# Patient Record
Sex: FEMALE | Race: WHITE | Employment: OTHER | URBAN - METROPOLITAN AREA
[De-identification: names, ages, dates, MRNs, and addresses within clinical notes are randomized per-mention and may not be internally consistent; named-entity substitution may affect disease eponyms.]

---

## 2017-01-06 DIAGNOSIS — I10 HYPERTENSION GOAL BP (BLOOD PRESSURE) < 140/90: Primary | ICD-10-CM

## 2017-01-06 NOTE — TELEPHONE ENCOUNTER
Lisinopril 40 mg      Last Written Prescription Date: 7/1/2016  Last Fill Quantity: 90, # refills: 1  Last Office Visit with G, [facility] or [facility] prescribing provider: 11/7/2016       POTASSIUM   Date Value Ref Range Status   12/12/2016 3.8 3.5 - 5.1 mmol/L Final     CREATININE   Date Value Ref Range Status   12/12/2016 0.99 0.70 - 1.30 mg/dL Final     BP Readings from Last 3 Encounters:   12/12/16 138/64   11/23/16 152/92   11/08/16 176/80

## 2017-01-09 RX ORDER — LISINOPRIL 40 MG/1
40 TABLET ORAL DAILY
Qty: 90 TABLET | Refills: 1 | Status: SHIPPED | OUTPATIENT
Start: 2017-01-09 | End: 2017-04-20

## 2017-01-09 NOTE — TELEPHONE ENCOUNTER
Prescription approved per [facility] Refill Protocol.  BENITEZ Cavazos, BSN, RN

## 2017-02-17 DIAGNOSIS — I48.91 ATRIAL FIBRILLATION (H): ICD-10-CM

## 2017-02-19 PROBLEM — I48.0 PAROXYSMAL ATRIAL FIBRILLATION (H): Status: ACTIVE | Noted: 2017-02-19

## 2017-02-19 PROBLEM — I27.20 PULMONARY HYPERTENSION, MILD (H): Status: ACTIVE | Noted: 2017-02-19

## 2017-02-19 PROBLEM — I07.1 MODERATE TRICUSPID REGURGITATION: Status: ACTIVE | Noted: 2017-02-19

## 2017-02-19 PROBLEM — Z79.01 CHRONIC ANTICOAGULATION: Status: ACTIVE | Noted: 2017-02-19

## 2017-02-20 ENCOUNTER — OFFICE VISIT (OUTPATIENT)
Dept: FAMILY MEDICINE | Facility: CLINIC | Age: 82
End: 2017-02-20
Payer: COMMERCIAL

## 2017-02-20 VITALS
OXYGEN SATURATION: 99 % | BODY MASS INDEX: 29.18 KG/M2 | SYSTOLIC BLOOD PRESSURE: 140 MMHG | HEART RATE: 72 BPM | RESPIRATION RATE: 16 BRPM | DIASTOLIC BLOOD PRESSURE: 84 MMHG | WEIGHT: 170 LBS | TEMPERATURE: 97.8 F

## 2017-02-20 DIAGNOSIS — I27.20 PULMONARY HYPERTENSION, MILD (H): ICD-10-CM

## 2017-02-20 DIAGNOSIS — L72.0 EPIDERMOID CYST OF FACE: Primary | ICD-10-CM

## 2017-02-20 DIAGNOSIS — E11.9 TYPE 2 DIABETES MELLITUS WITHOUT COMPLICATION, WITHOUT LONG-TERM CURRENT USE OF INSULIN (H): ICD-10-CM

## 2017-02-20 DIAGNOSIS — L60.3 DYSTROPHIC NAIL: ICD-10-CM

## 2017-02-20 DIAGNOSIS — L29.9 ITCHING: ICD-10-CM

## 2017-02-20 DIAGNOSIS — I48.0 PAROXYSMAL ATRIAL FIBRILLATION (H): ICD-10-CM

## 2017-02-20 DIAGNOSIS — L72.3 SEBACEOUS CYST: ICD-10-CM

## 2017-02-20 DIAGNOSIS — Z79.01 CHRONIC ANTICOAGULATION: ICD-10-CM

## 2017-02-20 DIAGNOSIS — I10 HYPERTENSION GOAL BP (BLOOD PRESSURE) < 140/90: ICD-10-CM

## 2017-02-20 DIAGNOSIS — Z71.89 ADVANCED DIRECTIVES, COUNSELING/DISCUSSION: ICD-10-CM

## 2017-02-20 DIAGNOSIS — N39.3 FEMALE STRESS INCONTINENCE: ICD-10-CM

## 2017-02-20 DIAGNOSIS — I07.1 MODERATE TRICUSPID REGURGITATION: ICD-10-CM

## 2017-02-20 LAB — HBA1C MFR BLD: 7 % (ref 4.3–6)

## 2017-02-20 PROCEDURE — 99214 OFFICE O/P EST MOD 30 MIN: CPT | Performed by: FAMILY MEDICINE

## 2017-02-20 PROCEDURE — 36415 COLL VENOUS BLD VENIPUNCTURE: CPT | Performed by: FAMILY MEDICINE

## 2017-02-20 PROCEDURE — 83036 HEMOGLOBIN GLYCOSYLATED A1C: CPT | Performed by: FAMILY MEDICINE

## 2017-02-20 PROCEDURE — 82043 UR ALBUMIN QUANTITATIVE: CPT | Performed by: FAMILY MEDICINE

## 2017-02-20 PROCEDURE — 80053 COMPREHEN METABOLIC PANEL: CPT | Performed by: FAMILY MEDICINE

## 2017-02-20 RX ORDER — TRIAMCINOLONE ACETONIDE 5 MG/G
CREAM TOPICAL 2 TIMES DAILY
Qty: 15 G | Refills: 3 | Status: SHIPPED | OUTPATIENT
Start: 2017-02-20 | End: 2021-04-28

## 2017-02-20 NOTE — PROGRESS NOTES
SUBJECTIVE:                                                    Aundrea Treviño is a 82 year old female who presents to clinic today for the following health issues:      Mass      Duration: years, and then went away and came back in the past 2 months    Description (location/character/radiation): right side of jaw line    Intensity:  moderate    Accompanying signs and symptoms: has fluid in it, not hard    History (similar episodes/previous evaluation): None    Precipitating or alleviating factors: None    Therapies tried and outcome: None    No fever or chills or redness, or drainage, no pain, getting bigger, has had before in same place a long time ago. Seen by derm for sun damage skin and cryo in past in [facility] would like to return to them      Medication changes make her feel her age    Diabetes Follow-up    Patient is checking blood sugars: once daily.  Results are as follows:         am - 115, 132, 121, 129, 122, 114, 137    Diabetic concerns: None     Symptoms of hypoglycemia (low blood sugar): none     Paresthesias (numbness or burning in feet) or sores: No     Date of last diabetic eye exam: 3 months ago negative , no record in epic    No sore in feet      Hypertension Follow-up      Outpatient blood pressures are being checked at home.  Results are 99/71, 150/79. 111/67 104/53, 122/69, 117/69, 131/81, 132/71.    Low Salt Diet: no added salt   Has stable ankle puffiness raises legs in evening, has varicose veins. No pain     Afib       Patient history: hx of afib on liquids,     Residual symptoms: None    Worsened or new symptoms since last visit: No    Daily aspirin use: No as on liquids     Hypertension controlled: Yes     Jevovahs witness brought updated advance directive copied to be scanned to chart    Has stress incontinence on oxybutynin, would like to see rehab for this as told by her massage therapist.     Has thickened toe nails. cannot take care of it on her own any more no  redness, pain     Would like kenalog filled for itchy skin at time on hands    Problem list and histories reviewed & adjusted, as indicated.  Additional history: as documented    Patient Active Problem List   Diagnosis     Disorder of bone and cartilage     Female stress incontinence     Dystonia     Essential tremor     Refusal of blood transfusions as patient is Yazidi     Hypertension goal BP (blood pressure) < 140/90     CARDIOVASCULAR SCREENING; LDL GOAL LESS THAN 130     Gout     Uncomplicated type 2 diabetes mellitus (H)     Venous (peripheral) insufficiency     Chronic anticoagulation     Paroxysmal atrial fibrillation (H)     Pulmonary hypertension, mild (H)     Moderate tricuspid regurgitation     Past Surgical History   Procedure Laterality Date     Surgical history of -        hysterectomy, bladder suspension     Surgical history of -        tonsillectomy       Social History   Substance Use Topics     Smoking status: Former Smoker     Quit date: 4/22/1970     Smokeless tobacco: Never Used     Alcohol use Yes      Comment: rarely, a glass of wine on a special occassion     Family History   Problem Relation Age of Onset     DIABETES Father      type 2     Hypertension Father      CEREBROVASCULAR DISEASE Father      Arthritis Father      Other Cancer Sister          Current Outpatient Prescriptions   Medication Sig Dispense Refill     triamcinolone (KENALOG) 0.5 % cream Apply topically 2 times daily to affected area as directed. 15 g 3     apixaban ANTICOAGULANT (ELIQUIS) 5 MG tablet Take 1 tablet (5 mg) by mouth 2 times daily 60 tablet 11     lisinopril (PRINIVIL/ZESTRIL) 40 MG tablet Take 1 tablet (40 mg) by mouth daily 90 tablet 1     hydrochlorothiazide (HYDRODIURIL) 25 MG tablet Take 1 tablet (25 mg) by mouth daily 90 tablet 3     carvedilol (COREG) 25 MG tablet Take 1 tablet (25 mg) by mouth 2 times daily (with meals) 60 tablet 11     amLODIPine (NORVASC) 2.5 MG tablet Take 1 tablet (2.5  mg) by mouth daily 90 tablet 1     oxybutynin (DITROPAN) 5 MG tablet Take 0.5 tablets (2.5 mg) by mouth 2 times daily 90 tablet 3     blood glucose monitoring (SAMEER MICROLET) lancets Use to test blood sugars once daily as directed. 3 Box 3     fluticasone (FLONASE) 50 MCG/ACT nasal spray Spray 1-2 sprays into both nostrils daily 3 Package 1     blood glucose (SAMEER CONTOUR NEXT) test strip 1 strip by In Vitro route daily 50 strip prn     [place] 650 MG TABS Take 2 capsules by mouth 2 times daily For arthritis pain       calcium carbonate (TUMS E-X 750) 750 MG CHEW Take 750 mg by mouth daily.       FLAXSEED (LINSEED) PO POWD 1.5  tablespoon daily       VITAMIN D 1000 UNIT OR TABS 1 TABLET DAILY 30 0     Allergies   Allergen Reactions     Amlodipine      Swollen ankles and rash but tolerates 2.5 mg without any symptoms     Apple [Chromium]      Kiwi      Nuts      Pear      Tape [Adhesive Tape]      Recent Labs   Lab Test  02/20/17   1557  12/12/16   1126  10/18/16   1618  09/12/16   1152  01/07/16   0920  06/18/15   0854   04/30/14   1057   12/30/13   0904   A1C  7.0*   --    --   7.1*  6.9*  6.9*   < >  6.5*   --   6.4*   LDL   --    --    --   96   --   97   --    --    --   104   HDL   --    --    --   46*   --   39*   --    --    --   39*   TRIG   --    --    --   91   --   115   --    --    --   161*   ALT   --    --    --   20  29  19   < >  22   --   34   CR   --   0.99  0.83  0.72  0.86  0.92   < >  0.79   --   0.86   GFRESTIMATED   --   54*  66  77  63  59*   < >  70   --   64   GFRESTBLACK   --   65  80  >90   GFR Calc    76  71   < >  85   --   77   POTASSIUM   --   3.8  4.2  3.6  3.5  3.6   < >  4.2   --   3.9   TSH   --    --    --   1.56   --    --    --   2.34   < >   --     < > = values in this interval not displayed.      BP Readings from Last 3 Encounters:   02/20/17 140/84   12/12/16 138/64   11/23/16 (!) 152/92    Wt Readings from Last 3 Encounters:   02/20/17 170 lb (77.1 kg)    12/12/16 173 lb (78.5 kg)   11/23/16 171 lb (77.6 kg)                  Labs reviewed in EPIC  Problem list, Medication list, Allergies, and Medical/Social/Surgical histories reviewed in [facility] and updated as appropriate.    ROS:  C: NEGATIVE for fever, chills, change in weight  I: NEGATIVE for worrisome rashes, moles or lesions  E: NEGATIVE for vision changes or irritation  E/M: NEGATIVE for ear, mouth and throat problems  R: NEGATIVE for significant cough or SOB  CV: NEGATIVE for chest pain, palpitations or peripheral edema  GI: NEGATIVE for nausea, abdominal pain, heartburn, or change in bowel habits  : NEGATIVE for frequency, dysuria, or hematuria  M: NEGATIVE for significant arthralgias or myalgia  N: NEGATIVE for weakness, dizziness or paresthesias  E: NEGATIVE for temperature intolerance, skin/hair changes  H: NEGATIVE for bleeding problems  P: NEGATIVE for changes in mood or affect    OBJECTIVE:                                                    /84 (BP Location: Right arm, Patient Position: Chair, Cuff Size: Adult Regular)  Pulse 72  Temp 97.8  F (36.6  C) (Oral)  Resp 16  Wt 170 lb (77.1 kg)  SpO2 99%  BMI 29.18 kg/m2  Body mass index is 29.18 kg/(m^2).  GENERAL: healthy, alert and no distress, looks younger then stated age  EYES: Eyes grossly normal to inspection, PERRL and conjunctivae and sclerae normal, wears glasses  HENT: ear canals and TM's normal, nose and mouth without ulcers or lesions  NECK: no adenopathy, no asymmetry, masses, or scars and thyroid normal to palpation  RESP: lungs clear to auscultation - no rales, rhonchi or wheezes  CV: irregular rate and rhythm, afib rate controlled, no S3 or S4, no murmur, click or rub, no peripheral edema and peripheral pulses strong  ABDOMEN: soft, nontender, no hepatosplenomegaly, no masses and bowel sounds normal  MS: no gross musculoskeletal defects noted, no edema  SKIN: has a 1 cm fluid filled sebaceous cyst below right mid jaw that is not  "draining tender , red or inflamed, no suspicious lesions or rashes  NEURO: Normal strength and tone, mentation intact and speech normal  PSYCH: mentation appears normal, affect normal/bright    Diagnostic Test Results:  Results for orders placed or performed in visit on 02/20/17 (from the past 24 hour(s))   Hemoglobin A1c   Result Value Ref Range    Hemoglobin A1C 7.0 (H) 4.3 - 6.0 %        ASSESSMENT/PLAN:                                                      1. Epidermoid cyst of face/ Sebaceous cyst    Hx of dystonia, essential tremor, DM on diet control , HTN previously on spironolactone 12.5 mg , Toprol- mg daily, HCTZ 25 mg daily, Lisinopril 40 mg daily, and amlodipine 2.5 mg daily, Venous insufficiency, stress incontinence, Jehovah s witness, former smoker, seen by PCP Dr Molina 10/18 for HTN, DM, Peripheral neuropathy with normal folate and B 12 levels, called 11/3 with a couple \"spells\" of her balance being off, and eyes have difficulty focusing, with one spell that lasted two hours, No falls, Occurred at random, and did not seem to be associated with standing up, Denied associated chest pain or SOB/difficulty breathing but did complain of decreased energy, TSH, cbc, CMP, HB A1C , micro albumin, folate and B 12 last two months were normal. BP was not goal but maxed on lisinopril and beta-blocker ( pulse in 60's), and HCTZ and couldn't tolerate dose of amlodipine any higher than 2.5 mg as would get ankle and foot puffiness that she couldn't T tolerate. Advised to stay off the spironolactone and referred to cardiology. Declined rehab. Seen by cardiology 11/8 EKG showed a fib , due to CHADSVas =5 , 7.2 % risk of stroke, started on liquids and asa d/C to decrease risk of bleed. Toprol changed to carvedilol and Echo done Nov 2016 showed a normal EF, Dilated RV, moderate TR and elevated RVSP corresponding to mild pulmonary HTN, seen by cardiology couple times after that last in 12/2016 and BP much improved " and asymptomatic. [place] monitoring [place] negative. Here for cyst on jaw and follow up  on her diabetes.   Given on liquids and near face will refer to derm to get it excised. Currently no infection seen  - DERMATOLOGY REFERRAL    2. Type 2 diabetes mellitus without complication, without long-term current use of insulin (H)  Labs today shows diabetes stable for age. HBA1c 7. micro albumin and CMP pending. recheck in 6 months cut back carbohydrates. Diabetes eye exam yearly. Continue care with cardiology with echo in fall 2017. Dermatology referral in [place] for cyst on face. podiatry referral for toenails. Follow up primary Dr Molina or myself.   - Comprehensive metabolic panel (BMP + Alb, Alk Phos, ALT, AST, Total. Bili, TP)  - Albumin Random Urine Quantitative  - Hemoglobin A1C  - PODIATRY/FOOT & ANKLE SURGERY REFERRAL  - DERMATOLOGY REFERRAL    3. Hypertension goal BP (blood pressure) < 140/90  Goal fo rage on current regimen and lower and better at home.     4. Paroxysmal atrial fibrillation (H)  Rate controlled on chronic anticoagulation with liquids, continue care with cardiology    5. Chronic anticoagulation  On eliquis  - DERMATOLOGY REFERRAL    6. Pulmonary hypertension, mild (H)  mild follow up with cardiology with echo in fall 2017    7. Moderate tricuspid regurgitation  Asymptomatic , follow up cardiology    8. Dystrophic nails b/l   From dm   - PODIATRY/FOOT & ANKLE SURGERY REFERRAL    9. Itching hands  - triamcinolone (KENALOG) 0.5 % cream; Apply topically 2 times daily to affected area as directed.  Dispense: 15 g; Refill: 3    10. Female stress incontinence  On oxybutynin , loosing effects, given age hesitant to increase dose, would benefit form pelvic therapy.   - PHYSICAL THERAPY REFERRAL    11. Advanced directives, counseling/discussion  Updated, Muslim      See Patient Instructions  Patient Instructions   Labs today shows diabetes stable for age  recheck in 6 months cut back  carbohydrates   Diabetes eye exam yearly   Continue care with cardiology with echo in fall 2017   Dermatology referral in [place] for cyst on face  podiatry referral for toenails  Work on advanced directives  Follow up primary Dr Jesse Barboza MD  [facility]

## 2017-02-20 NOTE — NURSING NOTE
"Chief Complaint   Patient presents with     Mass     jawline       Initial /84 (BP Location: Right arm, Patient Position: Chair, Cuff Size: Adult Regular)  Pulse 72  Temp 97.8  F (36.6  C) (Oral)  Resp 16  Wt 170 lb (77.1 kg)  SpO2 99%  BMI 29.18 kg/m2 Estimated body mass index is 29.18 kg/(m^2) as calculated from the following:    Height as of 12/12/16: 5' 4\" (1.626 m).    Weight as of this encounter: 170 lb (77.1 kg).  Medication Reconciliation: complete     Clare Chirinos CMA      "

## 2017-02-20 NOTE — PATIENT INSTRUCTIONS
Labs today shows diabetes stable for age  recheck in 6 months cut back carbohydrates   Diabetes eye exam yearly   Continue care with cardiology with echo in fall 2017   Dermatology referral in [place] for cyst on face  podiatry referral for toenails  Work on advanced directives  Follow up primary Dr Molina

## 2017-02-20 NOTE — MR AVS SNAPSHOT
After Visit Summary   2/20/2017    Aundrea Treviño    MRN: [id number]           Patient Information     Date Of Birth          8/29/1934        Visit Information        Provider Department      2/20/2017 4:00 PM Jes Barboza MD [facility]        Today's Diagnoses     Epidermoid cyst of face    -  1    Type 2 diabetes mellitus without complication, without long-term current use of insulin (H)        Hypertension goal BP (blood pressure) < 140/90        Paroxysmal atrial fibrillation (H)        Chronic anticoagulation        Pulmonary hypertension, mild (H)        Moderate tricuspid regurgitation        Dystrophic nail        Itching        Sebaceous cyst        Female stress incontinence          Care Instructions    Labs today shows diabetes stable for age  recheck in 6 months cut back carbohydrates   Diabetes eye exam yearly   Continue care with cardiology with echo in fall 2017   Dermatology referral in [place] for cyst on face  podiatry referral for toenails  Work on advanced directives  Follow up primary Dr House        Follow-ups after your visit        Additional Services     DERMATOLOGY REFERRAL       Your provider has referred you to: [facility]: [facility] [phone]   [email or web address]    Please be aware that coverage of these services is subject to the terms and limitations of your health insurance plan.  Call member services at your health plan with any benefit or coverage questions.      Please bring the following with you to your appointment:    (1) Any X-Rays, CTs or MRIs which have been performed.  Contact the facility where they were done to arrange for  prior to your scheduled appointment.  Any new CT, MRI or other procedures ordered by your specialist must be performed at a [facility] facility or coordinated by your clinic's referral office.  (2) List of current medications  (3) This referral request   (4) Any documents/labs given to  you for this referral            PHYSICAL THERAPY REFERRAL       *This order will print in the [facility] Central Scheduling Office*    [facility] provides Physical Therapy evaluation and treatment and many specialty services across the [facility] system.  If requesting a specialty program, please choose from the list below.    Call [phone] to schedule [facility] at all locations, with the exception of [facility], please call [phone].     Treatment: Evaluation & Treatment  Special Instructions/Modalities: has urgency on oxybutynin , not helping  Special Programs: Incontinence Pelvic Floor Program    Please be aware that coverage of these services is subject to the terms and limitations of your health insurance plan.  Call member services at your health plan with any benefit or coverage questions.      **Note to Provider** To refer patients to therapy outside of the location list, change the order class to External Referral in the order composer.            PODIATRY/FOOT & ANKLE SURGERY REFERRAL       Your provider has referred you to: [facility]: [facility] [phone]   [email or web address]    Please be aware that coverage of these services is subject to the terms and limitations of your health insurance plan.  Call member services at your health plan with any benefit or coverage questions.      Please bring the following to your appointment:  >>   Any x-rays, CTs or MRIs which have been performed.  Contact the facility where they were done to arrange for  prior to your scheduled appointment.  Any new CT, MRI or other procedures ordered by your specialist must be performed at a [facility] facility or coordinated by your clinic's referral office.    >>   List of current medications   >>   This referral request   >>   Any documents/labs given to you for this referral                  Who  "to contact     If you have questions or need follow up information about today's clinic visit or your schedule please contact [facility] directly at [phone].  Normal or non-critical lab and imaging results will be communicated to you by MyChart, letter or phone within 4 business days after the clinic has received the results. If you do not hear from us within 7 days, please contact the clinic through MyChart or phone. If you have a critical or abnormal lab result, we will notify you by phone as soon as possible.  Submit refill requests through [employer] or call your pharmacy and they will forward the refill request to us. Please allow 3 business days for your refill to be completed.          Additional Information About Your Visit        [facility] Interactive Information     [employer] lets you send messages to your doctor, view your test results, renew your prescriptions, schedule appointments and more. To sign up, go to [email or web address] . Click on \"Log in\" on the left side of the screen, which will take you to the Welcome page. Then click on \"Sign up Now\" on the right side of the page.     You will be asked to enter the access code listed below, as well as some personal information. Please follow the directions to create your username and password.     Your access code is: [email or web address]  Expires: 3/12/2017 12:55 PM     Your access code will  in 90 days. If you need help or a new code, please call your [facility] clinic or [phone].        Care EveryWhere ID     This is your Care EveryWhere ID. This could be used by other organizations to access your [facility] medical records  [id number]        Your Vitals Were     Pulse Temperature Respirations Pulse Oximetry BMI (Body Mass Index)       72 97.8  F (36.6  C) (Oral) 16 99% 29.18 kg/m2        Blood Pressure from Last 3 Encounters:   17 140/84   16 138/64   16 (!) 152/92    Weight from Last 3 Encounters:   17 170 lb (77.1 kg) "   12/12/16 173 lb (78.5 kg)   11/23/16 171 lb (77.6 kg)              We Performed the Following     Albumin Random Urine Quantitative     Comprehensive metabolic panel (BMP + Alb, Alk Phos, ALT, AST, Total. Bili, TP)     DERMATOLOGY REFERRAL     Hemoglobin A1c     PHYSICAL THERAPY REFERRAL     PODIATRY/FOOT & ANKLE SURGERY REFERRAL          Today's Medication Changes          These changes are accurate as of: 2/20/17  4:33 PM.  If you have any questions, ask your nurse or doctor.               These medicines have changed or have updated prescriptions.        Dose/Directions    triamcinolone 0.5 % cream   Commonly known as:  KENALOG   This may have changed:  See the new instructions.   Used for:  Itching   Changed by:  Jes Barboza MD        Apply topically 2 times daily to affected area as directed.   Quantity:  15 g   Refills:  3            Where to get your medicines      These medications were sent to [id number] [place], [place] - [address]  [address], [place] [address]     Phone:  [phone]     triamcinolone 0.5 % cream                Primary Care Provider Office Phone # Fax #    Krysten Molina [phone]       [facility] [address] [facility] [address]        Thank you!     Thank you for choosing [facility]  for your care. Our goal is always to provide you with excellent care. Hearing back from our patients is one way we can continue to improve our services. Please take a few minutes to complete the written survey that you may receive in the mail after your visit with us. Thank you!             Your Updated Medication List - Protect others around you: Learn how to safely use, store and throw away your medicines at www.disposemymeds.org.          This list is accurate as of: 2/20/17  4:33 PM.  Always use your most recent med list.                   Brand Name Dispense Instructions for use    Alfalfa 650 MG Tabs      Take 2  capsules by mouth 2 times daily For arthritis pain       amLODIPine 2.5 MG tablet    NORVASC    90 tablet    Take 1 tablet (2.5 mg) by mouth daily       apixaban ANTICOAGULANT 5 MG tablet    ELIQUIS    60 tablet    Take 1 tablet (5 mg) by mouth 2 times daily       blood glucose monitoring lancets     3 Box    Use to test blood sugars once daily as directed.       blood glucose monitoring test strip    SAMEER CONTOUR NEXT    50 strip    1 strip by In Vitro route daily       carvedilol 25 MG tablet    COREG    60 tablet    Take 1 tablet (25 mg) by mouth 2 times daily (with meals)       cholecalciferol 1000 UNIT tablet    vitamin D    30    1 TABLET DAILY       Flaxseed (Linseed) Powd      1.5  tablespoon daily       fluticasone 50 MCG/ACT spray    FLONASE    3 Package    Spray 1-2 sprays into both nostrils daily       hydrochlorothiazide 25 MG tablet    HYDRODIURIL    90 tablet    Take 1 tablet (25 mg) by mouth daily       lisinopril 40 MG tablet    PRINIVIL/ZESTRIL    90 tablet    Take 1 tablet (40 mg) by mouth daily       oxybutynin 5 MG tablet    DITROPAN    90 tablet    Take 0.5 tablets (2.5 mg) by mouth 2 times daily       triamcinolone 0.5 % cream    KENALOG    15 g    Apply topically 2 times daily to affected area as directed.       TUMS E-X 750 750 MG Chew   Generic drug:  calcium carbonate      Take 750 mg by mouth daily.

## 2017-02-21 LAB
ALBUMIN SERPL-MCNC: 4.2 G/DL (ref 3.4–5)
ALP SERPL-CCNC: 100 U/L (ref 40–150)
ALT SERPL W P-5'-P-CCNC: 20 U/L (ref 0–50)
ANION GAP SERPL CALCULATED.3IONS-SCNC: 9 MMOL/L (ref 3–14)
AST SERPL W P-5'-P-CCNC: 15 U/L (ref 0–45)
BILIRUB SERPL-MCNC: 0.7 MG/DL (ref 0.2–1.3)
BUN SERPL-MCNC: 12 MG/DL (ref 7–30)
CALCIUM SERPL-MCNC: 9.4 MG/DL (ref 8.5–10.1)
CHLORIDE SERPL-SCNC: 103 MMOL/L (ref 94–109)
CO2 SERPL-SCNC: 29 MMOL/L (ref 20–32)
CREAT SERPL-MCNC: 0.74 MG/DL (ref 0.52–1.04)
CREAT UR-MCNC: 42 MG/DL
GFR SERPL CREATININE-BSD FRML MDRD: 76 ML/MIN/1.7M2
GLUCOSE SERPL-MCNC: 154 MG/DL (ref 70–99)
MICROALBUMIN UR-MCNC: 5 MG/L
MICROALBUMIN/CREAT UR: 12.46 MG/G CR (ref 0–25)
POTASSIUM SERPL-SCNC: 3.8 MMOL/L (ref 3.4–5.3)
PROT SERPL-MCNC: 7.1 G/DL (ref 6.8–8.8)
SODIUM SERPL-SCNC: 141 MMOL/L (ref 133–144)

## 2017-03-02 ENCOUNTER — OFFICE VISIT (OUTPATIENT)
Dept: PODIATRY | Facility: CLINIC | Age: 82
End: 2017-03-02
Attending: FAMILY MEDICINE
Payer: COMMERCIAL

## 2017-03-02 VITALS — HEIGHT: 64 IN | BODY MASS INDEX: 29.02 KG/M2 | WEIGHT: 170 LBS | RESPIRATION RATE: 16 BRPM

## 2017-03-02 DIAGNOSIS — L60.8 CHANGE IN NAIL APPEARANCE: ICD-10-CM

## 2017-03-02 DIAGNOSIS — R20.2 PARESTHESIA OF FOOT, BILATERAL: ICD-10-CM

## 2017-03-02 DIAGNOSIS — E11.9 TYPE 2 DIABETES MELLITUS WITHOUT COMPLICATION, WITHOUT LONG-TERM CURRENT USE OF INSULIN (H): Primary | ICD-10-CM

## 2017-03-02 DIAGNOSIS — L60.8 NAIL DEFORMITY: ICD-10-CM

## 2017-03-02 PROCEDURE — 99203 OFFICE O/P NEW LOW 30 MIN: CPT | Performed by: PODIATRIST

## 2017-03-02 NOTE — PROGRESS NOTES
ASSESSMENT/PLAN:    Encounter Diagnoses   Name Primary?     Type 2 diabetes mellitus without complication, without long-term current use of insulin (H) Yes     Paresthesia of foot, bilateral      Nail deformity      Change in nail appearance      Palpable pedal pulses and intact protective sensation.  I do not think she is at high risk for diabetes-related pedal complications.    Pt is instructed to inspect feet daily, wear a supportive shoe at all times, and avoid walking barefoot.  She is instructed to call if any sores develop.   Importance of good blood sugar control emphasized.   Pt verbalized understanding.    A list of foot care nurse businesses was provided for nail care.    She has intact protective sensation, but symmetrical paresthesia.  I suspect some form of peripheral neuropathy.  I offered a referral to neurology, but she would like to wait with that option at this time.    Body mass index is 29.18 kg/(m^2).    Weight management plan: Patient was referred to their PCP to discuss a diet and exercise plan.      Alexey Arellano DPM, FACFAS, MS    [facility] Pod[facility]/Foot & Ankle Surgery      ____________________________________________________________________    HPI:         Chief Complaint: my feet need some attention; she is concerned about her big toenails .They have changed in appearance  Onset of problem: 4 months  Pain/ discomfort is described as:  throbbing  Ratin/10   Frequency:  occasoinal    The pain is made worse with hitting the toe  Previous treatment: no    Secondary concerns:  She also mentioned a numbness in her feet, like she has socks on when she doesn't. She has diet controlled type 2 DM.    *  Past Medical History   Diagnosis Date     Chronic anticoagulation 2017     Diverticulitis of colon (without mention of hemorrhage)      DM type 2, goal A1C below 8.0 12/3/2014     Essential and other specified forms of tremor      eval'd by neuro 2005     Hyperlipidemia LDL  goal <100 5/9/2010     Hypertension goal BP (blood pressure) < 140/90      Intestinal disaccharidase deficiencies and disaccharide malabsorption      Moderate tricuspid regurgitation 2/19/2017     Osteoarthrosis, unspecified whether generalized or localized, unspecified site      Paroxysmal atrial fibrillation (H) 2/19/2017     Pulmonary hypertension, mild (H) 2/19/2017     Type 2 diabetes, HbA1c goal < 7% (H) 5/2/2010     Urge incontinence    *  *  Past Surgical History   Procedure Laterality Date     Surgical history of -        hysterectomy, bladder suspension     Surgical history of -        tonsillectomy   *  *  Current Outpatient Prescriptions   Medication Sig Dispense Refill     triamcinolone (KENALOG) 0.5 % cream Apply topically 2 times daily to affected area as directed. 15 g 3     apixaban ANTICOAGULANT (ELIQUIS) 5 MG tablet Take 1 tablet (5 mg) by mouth 2 times daily 60 tablet 11     lisinopril (PRINIVIL/ZESTRIL) 40 MG tablet Take 1 tablet (40 mg) by mouth daily 90 tablet 1     hydrochlorothiazide (HYDRODIURIL) 25 MG tablet Take 1 tablet (25 mg) by mouth daily 90 tablet 3     carvedilol (COREG) 25 MG tablet Take 1 tablet (25 mg) by mouth 2 times daily (with meals) 60 tablet 11     amLODIPine (NORVASC) 2.5 MG tablet Take 1 tablet (2.5 mg) by mouth daily 90 tablet 1     oxybutynin (DITROPAN) 5 MG tablet Take 0.5 tablets (2.5 mg) by mouth 2 times daily 90 tablet 3     blood glucose monitoring (SAMEER MICROLET) lancets Use to test blood sugars once daily as directed. 3 Box 3     fluticasone (FLONASE) 50 MCG/ACT nasal spray Spray 1-2 sprays into both nostrils daily 3 Package 1     blood glucose (SAMEER CONTOUR NEXT) test strip 1 strip by In Vitro route daily 50 strip prn     Ross 650 MG TABS Take 2 capsules by mouth 2 times daily For arthritis pain       calcium carbonate (TUMS E-X 750) 750 MG CHEW Take 750 mg by mouth daily.       FLAXSEED (LINSEED) PO POWD 1.5  tablespoon daily       VITAMIN D 1000 UNIT OR  TABS 1 TABLET DAILY 30 0     [DISCONTINUED] oxybutynin (DITROPAN) 5 MG tablet Take 0.5 tablets by mouth 2 times daily. 90 tablet 2       ROS:     A 10-point review of systems was performed and is positive for that noted in the HPI and as seen below.  All other areas are negative.     Numbness in feet?  yes   Calf pain with walking? no  Recent foot/ankle injury? no  Weight change over past 12 months? no  Self perception as overweight? yes  Recent flu-like symptoms? no  Joint pain other than feet ? yes    Social History: Employment:  retired;  Exercise/Physical activity:  walking;  Tobacco use:  no  Social History     Social History     Marital status:      Spouse name: yumiko     Number of children: 3     Years of education: 12     Occupational History      None      Social History Main Topics     Smoking status: Former Smoker     Quit date: 4/22/1970     Smokeless tobacco: Never Used     Alcohol use Yes      Comment: rarely, a glass of wine on a special occassion     Drug use: No     Sexual activity: Not Currently     Other Topics Concern     Parent/Sibling W/ Cabg, Mi Or Angioplasty Before 65f 55m? No     Caffeine Concern No     diet cola occassionally     Special Diet No     decreasing carbs.     Exercise No     walking     Social History Narrative    2008    Balanced Diet - Yes    Osteoporosis Prevention Measures - Dairy servings per day: 3 servings daily    Regular Exercise -  Yes Describe walk and dance    Dental Exam - YES - Date: 2008    Eye Exam - YES - Date: 12-08    Self Breast Exam - Yes    Abuse: Current or Past (Physical, Sexual or Emotional)- Not Asked    Do you feel safe in your environment - Yes    Guns stored in the home - No    Sunscreen used - Yes, when she needs it    Seatbelts used - Yes    Lipids -  YES - Date: 7-16-07    Glucose -  YES - Date: 7-16-07    Colon Cancer Screening - Colonoscopy 10-18-06(date completed)    Hemoccults - YES - Date: 4-22-05    Pap Test -  YES -  "Date: 7-21-03    Do you have any concerns about STD's -  Not Asked    Mammography - YES - Date: 9-13-07    DEXA - YES - Date: 8-18-05    Immunizations reviewed and up to date - Yes, last TD was 11-1-00    Linnea Matute MA                       Family history:  Family History   Problem Relation Age of Onset     DIABETES Father      type 2     Hypertension Father      CEREBROVASCULAR DISEASE Father      Arthritis Father      Other Cancer Sister        Rheumatoid arthritis:  no  Foot Problems: no  Diabetes: no      EXAM:    Vitals: Resp 16  Ht 5' 4\" (1.626 m)  Wt 170 lb (77.1 kg)  BMI 29.18 kg/m2  BMI: Body mass index is 29.18 kg/(m^2).  Height: 5' 4\"    Constitutional/ general:  Pt is in no apparent distress, appears well-nourished.  Cooperative with history and physical exam.     Vascular:  Pedal pulses are palpable bilaterally for both the DP and PT arteries.  CFT < 3 sec.  No edema.  Pedal hair growth noted.     Neuro:  Alert and oriented x 3. Coordinated gait.  Light touch sensation is intact to the L4, L5, S1 distributions. No obvious deficits.  No evidence of neurological-based weakness, spasticity, or contracture in the lower extremities.     Protective sensation is intact bilaterally per [place] Monofilament testing.    Derm: Normal texture and turgor.  No erythema, ecchymosis, or cyanosis.  No open lesions.   Bilateral hallux incurvation, thickening and discoloration.     Musculoskeletal:    Lower extremity muscle strength is normal.  Patient is ambulatory without an assistive device or brace .  No gross deformities.   Decrease in medial longitudinal arch with weight bearing.   Ankle and subtalar joint ROM within normal limits B/L.     Alexey Arellano DPM, FACFAS, MS    [facility] of Podiatry/Foot & Ankle Surgery              "

## 2017-03-02 NOTE — NURSING NOTE
"Chief Complaint   Patient presents with     Foot Problems     Right hallux nail is ingrown and painful, thick nails on both feet        Initial Resp 16  Ht 5' 4\" (1.626 m)  Wt 170 lb (77.1 kg)  BMI 29.18 kg/m2 Estimated body mass index is 29.18 kg/(m^2) as calculated from the following:    Height as of this encounter: 5' 4\" (1.626 m).    Weight as of this encounter: 170 lb (77.1 kg).  Medication Reconciliation: complete  "

## 2017-04-05 ENCOUNTER — TELEPHONE (OUTPATIENT)
Dept: PHARMACY | Facility: CLINIC | Age: 82
End: 2017-04-05

## 2017-04-05 NOTE — TELEPHONE ENCOUNTER
Patient has not responded to contact attempts to follow up with MTM. I will stop reaching out to her at this time, but please let me know if I can assist in her care in the future. Routing to PCP as CEASAR.    Rabia Gonzalez, PharmD  Medication Therapy Management Pharmacist

## 2017-04-11 ENCOUNTER — TRANSFERRED RECORDS (OUTPATIENT)
Dept: HEALTH INFORMATION MANAGEMENT | Facility: CLINIC | Age: 82
End: 2017-04-11

## 2017-04-20 ENCOUNTER — OFFICE VISIT (OUTPATIENT)
Dept: FAMILY MEDICINE | Facility: CLINIC | Age: 82
End: 2017-04-20
Payer: COMMERCIAL

## 2017-04-20 VITALS
HEART RATE: 63 BPM | OXYGEN SATURATION: 98 % | BODY MASS INDEX: 28.84 KG/M2 | SYSTOLIC BLOOD PRESSURE: 145 MMHG | TEMPERATURE: 98.1 F | DIASTOLIC BLOOD PRESSURE: 83 MMHG | WEIGHT: 168 LBS | RESPIRATION RATE: 12 BRPM

## 2017-04-20 DIAGNOSIS — I10 HYPERTENSION GOAL BP (BLOOD PRESSURE) < 140/90: Primary | ICD-10-CM

## 2017-04-20 DIAGNOSIS — E11.9 TYPE 2 DIABETES MELLITUS WITHOUT COMPLICATION, WITHOUT LONG-TERM CURRENT USE OF INSULIN (H): ICD-10-CM

## 2017-04-20 PROCEDURE — 99214 OFFICE O/P EST MOD 30 MIN: CPT | Performed by: FAMILY MEDICINE

## 2017-04-20 RX ORDER — LISINOPRIL 40 MG/1
40 TABLET ORAL DAILY
Qty: 90 TABLET | Refills: 1 | Status: SHIPPED | OUTPATIENT
Start: 2017-04-20 | End: 2017-09-07

## 2017-04-20 NOTE — PROGRESS NOTES
"  SUBJECTIVE:                                                    Aundrea Treviño is a 82 year old female who presents to clinic today for the following health issues:    Hypertension Follow-up      Outpatient blood pressures are being checked at home.      Low Salt Diet: low salt       Amount of exercise or physical activity: 2-3 days/week for an average of 30-45 minutes    Problems taking medications regularly: No    Medication side effects: none    Diet: regular (no restrictions)    Cardio 12/12/16:  \"ASSESSMENT AND PLAN: Aundrea is a very delightful 82-year-old female who comes in for 2-week followup for evaluation of her blood pressure. Her blood pressures are very well controlled at home with systolic pressures running from 100-120 consistently. The patient has brought her blood pressure cuff in the past to calibrate it and it does read accurately. Her blood pressure was moderately hypertensive during her last office visit. However, patient reported that her home readings were a lot lower. She also had quite a bit of stress getting to our office that day. Given these facts, it was thought that her blood pressure was elevated due to situational stress and she was recommended to follow for re-evaluation of this.   Her blood pressure today is controlled okay with the carvedilol. We will continue with the current medication regimen at this time. I will have her set up an annual follow with Dr. Katz with a repeat echocardiogram at that time. She has tolerated Eliquis okay without any signs of bleeding.\"      Home BP continue to be within normal range. It ranges from 110 systolic but will fluctuate up to 140-145 systolic at the highest.   Occasionally she has slight SOB, unchanged.    She is getting home foot care through [facility]. Notes her feet still have that heavy feeling and it feels like she is constantly wearing a sock. Patient is pleased with the care.      Problem list and histories reviewed & adjusted, as " indicated.  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     Hypertension goal BP (blood pressure) < 140/90     CARDIOVASCULAR SCREENING; LDL GOAL LESS THAN 130     Gout     Uncomplicated type 2 diabetes mellitus (H)     Venous (peripheral) insufficiency     Chronic anticoagulation     Paroxysmal atrial fibrillation (H)     Pulmonary hypertension, mild (H)     Moderate tricuspid regurgitation     Past Surgical History:   Procedure Laterality Date     SURGICAL HISTORY OF -       hysterectomy, bladder suspension     SURGICAL HISTORY OF -       tonsillectomy       Social History   Substance Use Topics     Smoking status: Former Smoker     Quit date: 4/22/1970     Smokeless tobacco: Never Used     Alcohol use Yes      Comment: rarely, a glass of wine on a special occassion     Family History   Problem Relation Age of Onset     DIABETES Father      type 2     Hypertension Father      CEREBROVASCULAR DISEASE Father      Arthritis Father      Other Cancer Sister          Current Outpatient Prescriptions   Medication Sig Dispense Refill     lisinopril (PRINIVIL/ZESTRIL) 40 MG tablet Take 1 tablet (40 mg) by mouth daily 90 tablet 1     blood glucose monitoring (SAMEER CONTOUR NEXT) test strip 1 strip by In Vitro route daily 100 strip 11     blood glucose monitoring (SAMEER MICROLET) lancets Use to test blood sugars once daily as directed. 3 Box 3     triamcinolone (KENALOG) 0.5 % cream Apply topically 2 times daily to affected area as directed. 15 g 3     apixaban ANTICOAGULANT (ELIQUIS) 5 MG tablet Take 1 tablet (5 mg) by mouth 2 times daily 60 tablet 11     hydrochlorothiazide (HYDRODIURIL) 25 MG tablet Take 1 tablet (25 mg) by mouth daily 90 tablet 3     carvedilol (COREG) 25 MG tablet Take 1 tablet (25 mg) by mouth 2 times daily  (with meals) 60 tablet 11     amLODIPine (NORVASC) 2.5 MG tablet Take 1 tablet (2.5 mg) by mouth daily 90 tablet 1     oxybutynin (DITROPAN) 5 MG tablet Take 0.5 tablets (2.5 mg) by mouth 2 times daily 90 tablet 3     fluticasone (FLONASE) 50 MCG/ACT nasal spray Spray 1-2 sprays into both nostrils daily 3 Package 1     Alfalfa 650 MG TABS Take 2 capsules by mouth 2 times daily For arthritis pain       calcium carbonate (TUMS E-X 750) 750 MG CHEW Take 750 mg by mouth daily.       FLAXSEED (LINSEED) PO POWD 1.5  tablespoon daily       VITAMIN D 1000 UNIT OR TABS 1 TABLET DAILY 30 0     [DISCONTINUED] lisinopril (PRINIVIL/ZESTRIL) 40 MG tablet Take 1 tablet (40 mg) by mouth daily 90 tablet 1     [DISCONTINUED] oxybutynin (DITROPAN) 5 MG tablet Take 0.5 tablets by mouth 2 times daily. 90 tablet 2     Allergies   Allergen Reactions     Amlodipine      Swollen ankles and rash but tolerates 2.5 mg without any symptoms     Apple [Chromium]      Kiwi      Nuts      Pear      Tape [Adhesive Tape]      Recent Labs   Lab Test  02/20/17   1557  12/12/16   1126   09/12/16   1152  01/07/16   0920  06/18/15   0854   04/30/14   1057   12/30/13   0904   A1C  7.0*   --    --   7.1*  6.9*  6.9*   < >  6.5*   --   6.4*   LDL   --    --    --   96   --   97   --    --    --   104   HDL   --    --    --   46*   --   39*   --    --    --   39*   TRIG   --    --    --   91   --   115   --    --    --   161*   ALT  20   --    --   20  29  19   < >  22   --   34   CR  0.74  0.99   < >  0.72  0.86  0.92   < >  0.79   --   0.86   GFRESTIMATED  76  54*   < >  77  63  59*   < >  70   --   64   GFRESTBLACK  >90   GFR Calc    65   < >  >90   GFR Calc    76  71   < >  85   --   77   POTASSIUM  3.8  3.8   < >  3.6  3.5  3.6   < >  4.2   --   3.9   TSH   --    --    --   1.56   --    --    --   2.34   < >   --     < > = values in this interval not displayed.      BP Readings from Last 3 Encounters:   04/20/17 145/83    02/20/17 140/84   12/12/16 138/64    Wt Readings from Last 3 Encounters:   04/20/17 76.2 kg (168 lb)   03/02/17 77.1 kg (170 lb)   02/20/17 77.1 kg (170 lb)        Reviewed and updated as needed this visit by clinical staff  Reviewed and updated as needed this visit by Provider    ROS:  Denies headaches, vision changes, or chest pain. Endorses slight SOB. See above.    This document serves as a record of the services and decisions personally performed and made by Krysten Molina MD. It was created on his/her behalf by Jamia Morales, trained medical scribe. The creation of this document is based the provider's statements to the medical scribes.    Scribe Jamia Morales, April 20, 2017  OBJECTIVE:                                                    /83  Pulse 63  Temp 98.1  F (36.7  C) (Oral)  Resp 12  Wt 76.2 kg (168 lb)  SpO2 98%  BMI 28.84 kg/m2  Body mass index is 28.84 kg/(m^2).  GENERAL: healthy, alert and no distress    Diagnostic Test Results:  Results for orders placed or performed in visit on 02/20/17   Comprehensive metabolic panel (BMP + Alb, Alk Phos, ALT, AST, Total. Bili, TP)   Result Value Ref Range    Sodium 141 133 - 144 mmol/L    Potassium 3.8 3.4 - 5.3 mmol/L    Chloride 103 94 - 109 mmol/L    Carbon Dioxide 29 20 - 32 mmol/L    Anion Gap 9 3 - 14 mmol/L    Glucose 154 (H) 70 - 99 mg/dL    Urea Nitrogen 12 7 - 30 mg/dL    Creatinine 0.74 0.52 - 1.04 mg/dL    GFR Estimate 76 >60 mL/min/1.7m2    GFR Estimate If Black >90   GFR Calc   >60 mL/min/1.7m2    Calcium 9.4 8.5 - 10.1 mg/dL    Bilirubin Total 0.7 0.2 - 1.3 mg/dL    Albumin 4.2 3.4 - 5.0 g/dL    Protein Total 7.1 6.8 - 8.8 g/dL    Alkaline Phosphatase 100 40 - 150 U/L    ALT 20 0 - 50 U/L    AST 15 0 - 45 U/L   Albumin Random Urine Quantitative   Result Value Ref Range    Creatinine Urine 42 mg/dL    Albumin Urine mg/L 5 mg/L    Albumin Urine mg/g Cr 12.46 0 - 25 mg/g Cr   Hemoglobin A1c   Result Value Ref Range     Hemoglobin A1C 7.0 (H) 4.3 - 6.0 %         ASSESSMENT/PLAN:                                                    1. Hypertension goal BP (blood pressure) < 140/90  Controlled. Home BP readings are all within normal limits; ranging 110-145 systolic. Continues on lisinopril, HCTZ, and amlodipine.  - lisinopril (PRINIVIL/ZESTRIL) 40 MG tablet; Take 1 tablet (40 mg) by mouth daily  Dispense: 90 tablet; Refill: 1    2. Type 2 diabetes mellitus without complication, without long-term current use of insulin (H)  Controlled. Eye exam completed in January. She will schedule follow-up diabetes visit in July.  - EYE EXAM (SIMPLE-NONBILLABLE)      Patient Instructions   Schedule your next diabetes visit in August 2017.       The information in this document, created by the medical scribe for me, accurately reflects the services I personally performed and the decisions made by me. I have reviewed and approved this document for accuracy. 04/20/17    Krysten Molina MD  [facility]

## 2017-04-20 NOTE — MR AVS SNAPSHOT
"              After Visit Summary   4/20/2017    Aundrea Treviño    MRN: [id number]           Patient Information     Date Of Birth          8/29/1934        Visit Information        Provider Department      4/20/2017 11:00 AM Krysten Molina MD [facility]        Today's Diagnoses     Hypertension goal BP (blood pressure) < 140/90    -  1    Type 2 diabetes mellitus without complication, without long-term current use of insulin (H)          Care Instructions    Schedule your next diabetes visit in August 2017.         Follow-ups after your visit        Who to contact     If you have questions or need follow up information about today's clinic visit or your schedule please contact [facility] [place] directly at [phone].  Normal or non-critical lab and imaging results will be communicated to you by MyChart, letter or phone within 4 business days after the clinic has received the results. If you do not hear from us within 7 days, please contact the clinic through [employer] or phone. If you have a critical or abnormal lab result, we will notify you by phone as soon as possible.  Submit refill requests through [employer] or call your pharmacy and they will forward the refill request to us. Please allow 3 business days for your refill to be completed.          Additional Information About Your Visit        MyChart Information     [employer] lets you send messages to your doctor, view your test results, renew your prescriptions, schedule appointments and more. To sign up, go to [email or web address] . Click on \"Log in\" on the left side of the screen, which will take you to the Welcome page. Then click on \"Sign up Now\" on the right side of the page.     You will be asked to enter the access code listed below, as well as some personal information. Please follow the directions to create your username and password.     Your access code is: [email or web address]  Expires: 7/19/2017 11:42 AM     Your access code will "  in 90 days. If you need help or a new code, please call your [facility] clinic or [phone].        Care EveryWhere ID     This is your Care EveryWhere ID. This could be used by other organizations to access your [facility] medical records  [id number]        Your Vitals Were     Pulse Temperature Respirations Pulse Oximetry BMI (Body Mass Index)       63 98.1  F (36.7  C) (Oral) 12 98% 28.84 kg/m2        Blood Pressure from Last 3 Encounters:   17 145/83   17 140/84   16 138/64    Weight from Last 3 Encounters:   17 168 lb (76.2 kg)   17 170 lb (77.1 kg)   17 170 lb (77.1 kg)              We Performed the Following     EYE EXAM (SIMPLE-NONBILLABLE)          Where to get your medicines      These medications were sent to [facility] [address] [place], [place] - [address]  [address], [place] [address]     Phone:  [phone]     lisinopril 40 MG tablet          Primary Care Provider Office Phone # Fax #    Krysten Molina [phone]       [facility] [address] [facility] [place] [address]        Thank you!     Thank you for choosing [facility] [place] [facility]  for your care. Our goal is always to provide you with excellent care. Hearing back from our patients is one way we can continue to improve our services. Please take a few minutes to complete the written survey that you may receive in the mail after your visit with us. Thank you!             Your Updated Medication List - Protect others around you: Learn how to safely use, store and throw away your medicines at www.disposemymeds.org.          This list is accurate as of: 17 11:42 AM.  Always use your most recent med list.                   Brand Name Dispense Instructions for use    Alfalfa 650 MG Tabs      Take 2 capsules by mouth 2 times daily For arthritis pain       amLODIPine 2.5 MG tablet    NORVASC    90 tablet    Take 1 tablet (2.5 mg) by mouth daily        apixaban ANTICOAGULANT 5 MG tablet    ELIQUIS    60 tablet    Take 1 tablet (5 mg) by mouth 2 times daily       blood glucose monitoring lancets     3 Box    Use to test blood sugars once daily as directed.       blood glucose monitoring test strip    SAMEER CONTOUR NEXT    50 strip    1 strip by In Vitro route daily       carvedilol 25 MG tablet    COREG    60 tablet    Take 1 tablet (25 mg) by mouth 2 times daily (with meals)       cholecalciferol 1000 UNIT tablet    vitamin D    30    1 TABLET DAILY       Flaxseed (Linseed) Powd      1.5  tablespoon daily       fluticasone 50 MCG/ACT spray    FLONASE    3 Package    Spray 1-2 sprays into both nostrils daily       hydrochlorothiazide 25 MG tablet    HYDRODIURIL    90 tablet    Take 1 tablet (25 mg) by mouth daily       lisinopril 40 MG tablet    PRINIVIL/ZESTRIL    90 tablet    Take 1 tablet (40 mg) by mouth daily       oxybutynin 5 MG tablet    DITROPAN    90 tablet    Take 0.5 tablets (2.5 mg) by mouth 2 times daily       triamcinolone 0.5 % cream    KENALOG    15 g    Apply topically 2 times daily to affected area as directed.       TUMS E-X 750 750 MG Chew   Generic drug:  calcium carbonate      Take 750 mg by mouth daily.

## 2017-04-20 NOTE — NURSING NOTE
"Chief Complaint   Patient presents with     Hypertension       Initial /83  Pulse 63  Temp 98.1  F (36.7  C) (Oral)  Resp 12  Wt 168 lb (76.2 kg)  SpO2 98%  BMI 28.84 kg/m2 Estimated body mass index is 28.84 kg/(m^2) as calculated from the following:    Height as of 3/2/17: 5' 4\" (1.626 m).    Weight as of this encounter: 168 lb (76.2 kg).  Medication Reconciliation: complete     Queenie Alejandre MA     "

## 2017-06-01 ENCOUNTER — DOCUMENTATION ONLY (OUTPATIENT)
Dept: OTHER | Facility: CLINIC | Age: 82
End: 2017-06-01

## 2017-06-01 DIAGNOSIS — Z71.89 ACP (ADVANCE CARE PLANNING): Chronic | ICD-10-CM

## 2017-06-02 DIAGNOSIS — I10 HYPERTENSION GOAL BP (BLOOD PRESSURE) < 140/90: ICD-10-CM

## 2017-06-05 RX ORDER — AMLODIPINE BESYLATE 2.5 MG/1
TABLET ORAL
Qty: 90 TABLET | Refills: 0 | Status: SHIPPED | OUTPATIENT
Start: 2017-06-05 | End: 2017-08-31

## 2017-06-05 NOTE — TELEPHONE ENCOUNTER
Amlodipine  bp is high. Routing to pcp.  BETO Berg    Last Written Prescription Date: 12/6/16  Last Fill Quantity: 90, # refills: 1  Last Office Visit with G, P or [facility] prescribing provider: 4/20/17       Potassium   Date Value Ref Range Status   02/20/2017 3.8 3.4 - 5.3 mmol/L Final     Creatinine   Date Value Ref Range Status   02/20/2017 0.74 0.52 - 1.04 mg/dL Final     BP Readings from Last 3 Encounters:   04/20/17 145/83   02/20/17 140/84   12/12/16 138/64

## 2017-07-25 ENCOUNTER — TELEPHONE (OUTPATIENT)
Dept: FAMILY MEDICINE | Facility: CLINIC | Age: 82
End: 2017-07-25

## 2017-07-25 NOTE — TELEPHONE ENCOUNTER
CLAUDIO to schedule office visit with Dr. Molina for BP and diabetic follow up / mid to late August.  Please schedule when she calls back.    Thank you,  Tanna Guadarrama

## 2017-08-31 DIAGNOSIS — I10 HYPERTENSION GOAL BP (BLOOD PRESSURE) < 150/90: Primary | ICD-10-CM

## 2017-08-31 NOTE — TELEPHONE ENCOUNTER
amLODIPine (NORVASC) 2.5 MG tablet      Last Written Prescription Date: 6/5/17  Last Fill Quantity: 90, # refills: 0    Last Office Visit with [facility], [facility] or [facility] prescribing provider:  2/20/17   Future Office Visit:    Next 5 appointments (look out 90 days)     Sep 07, 2017  9:40 AM CDT   SHORT with Krysten Molina MD   [facility] ([facility])    [address] [place] [id number] [phone]                    BP Readings from Last 3 Encounters:   04/20/17 145/83   02/20/17 140/84   12/12/16 138/64

## 2017-09-01 NOTE — TELEPHONE ENCOUNTER
2/20/17 ov said to continue on this med.  BP was high at the visit.  Will send to pcp.  BETO Berg

## 2017-09-05 RX ORDER — AMLODIPINE BESYLATE 2.5 MG/1
TABLET ORAL
Qty: 90 TABLET | Refills: 1 | Status: SHIPPED | OUTPATIENT
Start: 2017-09-05 | End: 2018-02-27

## 2017-09-07 ENCOUNTER — OFFICE VISIT (OUTPATIENT)
Dept: FAMILY MEDICINE | Facility: CLINIC | Age: 82
End: 2017-09-07
Payer: COMMERCIAL

## 2017-09-07 VITALS
DIASTOLIC BLOOD PRESSURE: 69 MMHG | RESPIRATION RATE: 12 BRPM | OXYGEN SATURATION: 98 % | BODY MASS INDEX: 28.06 KG/M2 | TEMPERATURE: 97.6 F | WEIGHT: 163.5 LBS | HEART RATE: 63 BPM | SYSTOLIC BLOOD PRESSURE: 140 MMHG

## 2017-09-07 DIAGNOSIS — Z23 NEED FOR PROPHYLACTIC VACCINATION AND INOCULATION AGAINST INFLUENZA: ICD-10-CM

## 2017-09-07 DIAGNOSIS — Z78.0 POST-MENOPAUSAL: ICD-10-CM

## 2017-09-07 DIAGNOSIS — Z79.01 CHRONIC ANTICOAGULATION: ICD-10-CM

## 2017-09-07 DIAGNOSIS — I48.0 PAROXYSMAL ATRIAL FIBRILLATION (H): ICD-10-CM

## 2017-09-07 DIAGNOSIS — Z13.6 CARDIOVASCULAR SCREENING; LDL GOAL LESS THAN 130: ICD-10-CM

## 2017-09-07 DIAGNOSIS — E11.9 TYPE 2 DIABETES MELLITUS WITHOUT COMPLICATION, WITHOUT LONG-TERM CURRENT USE OF INSULIN (H): Primary | ICD-10-CM

## 2017-09-07 DIAGNOSIS — I10 HYPERTENSION GOAL BP (BLOOD PRESSURE) < 140/90: ICD-10-CM

## 2017-09-07 LAB
ALBUMIN SERPL-MCNC: 3.9 G/DL (ref 3.4–5)
ALP SERPL-CCNC: 95 U/L (ref 40–150)
ALT SERPL W P-5'-P-CCNC: 21 U/L (ref 0–50)
ANION GAP SERPL CALCULATED.3IONS-SCNC: 9 MMOL/L (ref 3–14)
AST SERPL W P-5'-P-CCNC: 15 U/L (ref 0–45)
BILIRUB SERPL-MCNC: 1.2 MG/DL (ref 0.2–1.3)
BUN SERPL-MCNC: 14 MG/DL (ref 7–30)
CALCIUM SERPL-MCNC: 9.3 MG/DL (ref 8.5–10.1)
CHLORIDE SERPL-SCNC: 99 MMOL/L (ref 94–109)
CHOLEST SERPL-MCNC: 155 MG/DL
CO2 SERPL-SCNC: 28 MMOL/L (ref 20–32)
CREAT SERPL-MCNC: 0.75 MG/DL (ref 0.52–1.04)
GFR SERPL CREATININE-BSD FRML MDRD: 73 ML/MIN/1.7M2
GLUCOSE SERPL-MCNC: 163 MG/DL (ref 70–99)
HBA1C MFR BLD: 6.9 % (ref 4.3–6)
HDLC SERPL-MCNC: 53 MG/DL
LDLC SERPL CALC-MCNC: 88 MG/DL
NONHDLC SERPL-MCNC: 102 MG/DL
POTASSIUM SERPL-SCNC: 3.6 MMOL/L (ref 3.4–5.3)
PROT SERPL-MCNC: 7 G/DL (ref 6.8–8.8)
SODIUM SERPL-SCNC: 136 MMOL/L (ref 133–144)
TRIGL SERPL-MCNC: 70 MG/DL

## 2017-09-07 PROCEDURE — 90662 IIV NO PRSV INCREASED AG IM: CPT | Performed by: FAMILY MEDICINE

## 2017-09-07 PROCEDURE — 80061 LIPID PANEL: CPT | Performed by: FAMILY MEDICINE

## 2017-09-07 PROCEDURE — 80053 COMPREHEN METABOLIC PANEL: CPT | Performed by: FAMILY MEDICINE

## 2017-09-07 PROCEDURE — 83036 HEMOGLOBIN GLYCOSYLATED A1C: CPT | Performed by: FAMILY MEDICINE

## 2017-09-07 PROCEDURE — 99214 OFFICE O/P EST MOD 30 MIN: CPT | Mod: 25 | Performed by: FAMILY MEDICINE

## 2017-09-07 PROCEDURE — 36415 COLL VENOUS BLD VENIPUNCTURE: CPT | Performed by: FAMILY MEDICINE

## 2017-09-07 PROCEDURE — 99207 C PAF COMPLETED  NO CHARGE: CPT | Performed by: FAMILY MEDICINE

## 2017-09-07 PROCEDURE — 90471 IMMUNIZATION ADMIN: CPT | Performed by: FAMILY MEDICINE

## 2017-09-07 RX ORDER — LISINOPRIL 40 MG/1
40 TABLET ORAL DAILY
Qty: 90 TABLET | Refills: 1 | Status: SHIPPED | OUTPATIENT
Start: 2017-09-07 | End: 2018-04-12

## 2017-09-07 NOTE — NURSING NOTE
"Chief Complaint   Patient presents with     Hypertension     Diabetes       Initial /69  Pulse 63  Temp 97.6  F (36.4  C) (Oral)  Resp 12  Wt 163 lb 8 oz (74.2 kg)  SpO2 98%  BMI 28.06 kg/m2 Estimated body mass index is 28.06 kg/(m^2) as calculated from the following:    Height as of 3/2/17: 5' 4\" (1.626 m).    Weight as of this encounter: 163 lb 8 oz (74.2 kg).  Medication Reconciliation: complete       Queenie Alejandre MA     "

## 2017-09-07 NOTE — MR AVS SNAPSHOT
After Visit Summary   9/7/2017    Aundrea Treviño    MRN: [id number]           Patient Information     Date Of Birth          8/29/1934        Visit Information        Provider Department      9/7/2017 9:40 AM Krysten Molina MD [facility]        Today's Diagnoses     Type 2 diabetes mellitus without complication, without long-term current use of insulin (H)    -  1    Hypertension goal BP (blood pressure) < 140/90        Paroxysmal atrial fibrillation (H)        Chronic anticoagulation        Post-menopausal        CARDIOVASCULAR SCREENING; LDL GOAL LESS THAN 130        Need for vaccination          Care Instructions    1. You can take Tylenol for pain relief. Avoid Ibuprofen or Aleve. If the pain in your right leg becomes more bothersome, let me know and we can have you see orthopedics.   2. Schedule with Cardiology.   3. Follow up with me in December.           Follow-ups after your visit        Future tests that were ordered for you today     Open Future Orders        Priority Expected Expires Ordered    DX Hip/Pelvis/Spine Routine  9/7/2018 9/7/2017            Who to contact     If you have questions or need follow up information about today's clinic visit or your schedule please contact [facility] directly at [phone].  Normal or non-critical lab and imaging results will be communicated to you by MyChart, letter or phone within 4 business days after the clinic has received the results. If you do not hear from us within 7 days, please contact the clinic through [employer] or phone. If you have a critical or abnormal lab result, we will notify you by phone as soon as possible.  Submit refill requests through [employer] or call your pharmacy and they will forward the refill request to us. Please allow 3 business days for your refill to be completed.          Additional Information About Your Visit        MyChart Information     [employer] lets you send messages to your  "doctor, view your test results, renew your prescriptions, schedule appointments and more. To sign up, go to [email or web address] . Click on \"Log in\" on the left side of the screen, which will take you to the Welcome page. Then click on \"Sign up Now\" on the right side of the page.     You will be asked to enter the access code listed below, as well as some personal information. Please follow the directions to create your username and password.     Your access code is: [email or web address]  Expires: 2017 10:27 AM     Your access code will  in 90 days. If you need help or a new code, please call your [facility] clinic or [phone].        Care EveryWhere ID     This is your Care EveryWhere ID. This could be used by other organizations to access your [facility] medical records  [id number]        Your Vitals Were     Pulse Temperature Respirations Pulse Oximetry BMI (Body Mass Index)       63 97.6  F (36.4  C) (Oral) 12 98% 28.06 kg/m2        Blood Pressure from Last 3 Encounters:   17 140/69   17 145/83   17 140/84    Weight from Last 3 Encounters:   17 74.2 kg (163 lb 8 oz)   17 76.2 kg (168 lb)   17 77.1 kg (170 lb)              We Performed the Following     Comprehensive metabolic panel     Hemoglobin A1c     Lipid panel reflex to direct LDL     PAF COMPLETED        Primary Care Provider Office Phone # Fax #    Krysten Molina [phone] [address] [facility] [address]        Equal Access to Services     FIDELIA MARTINEZ : Hadofelia Merlos, angelina henry, imelda singletary. So [facility] [phone].    ATENCIÓN: Si habla español, tiene a husain disposición servicios gratuitos de asistencia lingüística. Llame al [phone].    We comply with applicable federal civil rights laws and [place] laws. We do not discriminate on the basis of race, color, national origin, age, disability sex, " sexual orientation or gender identity.            Thank you!     Thank you for choosing [facility] your [facility]. Our goal is always to provide you with excellent care. Hearing back from our patients is one way we can continue to improve our services. Please take a few minutes to complete the written survey that you may receive in the mail after your visit with us. Thank you!             Your Updated Medication List - Protect others around you: Learn how to safely use, store and throw away your medicines at www.disposemymeds.org.          This list is accurate as of: 9/7/17 10:27 AM.  Always use your most recent med list.                   Brand Name Dispense Instructions for use Diagnosis    Alfalfa 650 MG Tabs      Take 2 capsules by mouth 2 times daily For arthritis pain        amLODIPine 2.5 MG tablet    NORVASC    90 tablet    TAKE ONE TABLET BY MOUTH ONE TIME DAILY    Hypertension goal BP (blood pressure) < 150/90       apixaban ANTICOAGULANT 5 MG tablet    ELIQUIS    60 tablet    Take 1 tablet (5 mg) by mouth 2 times daily    Atrial fibrillation (H)       blood glucose monitoring lancets     3 Box    Use to test blood sugars once daily as directed.        blood glucose monitoring test strip    SAMEER CONTOUR NEXT    100 strip    1 strip by In Vitro route daily        carvedilol 25 MG tablet    COREG    60 tablet    Take 1 tablet (25 mg) by mouth 2 times daily (with meals)    SOB (shortness of breath), Essential hypertension with goal blood pressure less than 140/90       cholecalciferol 1000 UNIT tablet    vitamin D    30    1 TABLET DAILY        Flaxseed (Linseed) Powd      1.5  tablespoon daily    Type II or unspecified type diabetes mellitus without mention of complication, not stated as uncontrolled       fluticasone 50 MCG/ACT spray    FLONASE    3 Package    Spray 1-2 sprays into both nostrils daily    Cough, Allergic rhinitis       hydrochlorothiazide 25 MG tablet    HYDRODIURIL    90  tablet    Take 1 tablet (25 mg) by mouth daily    Hypertension goal BP (blood pressure) < 140/90       lisinopril 40 MG tablet    PRINIVIL/ZESTRIL    90 tablet    Take 1 tablet (40 mg) by mouth daily    Hypertension goal BP (blood pressure) < 140/90       oxybutynin 5 MG tablet    DITROPAN    90 tablet    Take 0.5 tablets (2.5 mg) by mouth 2 times daily    Female stress incontinence       triamcinolone 0.5 % cream    KENALOG    15 g    Apply topically 2 times daily to affected area as directed.    Itching       TUMS E-X 750 750 MG Chew   Generic drug:  calcium carbonate      Take 750 mg by mouth daily.

## 2017-09-07 NOTE — PATIENT INSTRUCTIONS
1. You can take Tylenol for pain relief. Avoid Ibuprofen or Aleve. If the pain in your right leg becomes more bothersome, let me know and we can have you see orthopedics.   2. Schedule with Cardiology.   3. Follow up with me in December.

## 2017-09-07 NOTE — PROGRESS NOTES
"  SUBJECTIVE:   Aundrea Treviño is a 83 year old female who presents to clinic today for the following health issues:    Diabetes Follow-up    Patient is checking blood sugars: not recently     Diabetic concerns: None     Symptoms of hypoglycemia (low blood sugar): none     Paresthesias (numbness or burning in feet) or sores: Yes      Date of last diabetic eye exam: This year     Lab Results   Component Value Date    A1C 6.9 09/07/2017    A1C 7.0 02/20/2017    A1C 7.1 09/12/2016    A1C 6.9 01/07/2016    A1C 6.9 06/18/2015     Hypertension Follow-up    Outpatient blood pressures are being checked at home infrequently.      Low Salt Diet: no added salt      Clinic on 4/20/17:  \"1. Hypertension goal BP (blood pressure) < 140/90  Controlled. Home BP readings are all within normal limits; ranging 110-145 systolic. Continues on lisinopril, HCTZ, and amlodipine.  - lisinopril (PRINIVIL/ZESTRIL) 40 MG tablet; Take 1 tablet (40 mg) by mouth daily  Dispense: 90 tablet; Refill: 1  2. Type 2 diabetes mellitus without complication, without long-term current use of insulin (H)  Controlled. Eye exam completed in January. She will schedule follow-up diabetes visit in July.  - EYE EXAM (SIMPLE-NONBILLABLE)\"      Update - She has not been regularly checking her blood pressure or blood glucose. Sometimes her blood pressure is down to low 100s systolic. Patient notes she has been eating more foods she knows she should avoid.     She has not been trying to deliberately lose weight but does have decreased appetite over time.     Right leg pain - Recently she has been experiencing more aches and pains in her right groin area and knee. At times she has pain in her entire right leg. It has been increasingly more difficult to get out of bed in the morning and she has had difficulty even walking. Pain has been somewhat limiting on her physical activity.     Patient is following up with her cardiologist yearly. She feels she has had decreased " "energy since starting Carvedilol in December. The swelling in her ankles continues to come and go. Her feet feel like she has \"socks on\" all the time with decreased sensation in her feet. This feeling does not extend up her legs. She saw podiatry in the past. There is a lump on the medial aspect of her left second toe that is nonpainful and does not rub. Going up steps and walking from her car into a store she feels slightly short of breath. In the morning she occasionally has congestion and hoarseness in the morning.       Wt Readings from Last 5 Encounters:   09/07/17 74.2 kg (163 lb 8 oz)   04/20/17 76.2 kg (168 lb)   03/02/17 77.1 kg (170 lb)   02/20/17 77.1 kg (170 lb)   12/12/16 78.5 kg (173 lb)     Problem list and histories reviewed & adjusted, as indicated.  Additional history: as documented  Patient Active Problem List   Diagnosis     Disorder of bone and cartilage     Female stress incontinence     Dystonia     Essential tremor     Refusal of blood transfusions as patient is [employer]     Hypertension goal BP (blood pressure) < 140/90     CARDIOVASCULAR SCREENING; LDL GOAL LESS THAN 130     Gout     Uncomplicated type 2 diabetes mellitus (H)     Venous (peripheral) insufficiency     Chronic anticoagulation     Paroxysmal atrial fibrillation (H)     Pulmonary hypertension, mild (H)     Moderate tricuspid regurgitation     ACP (advance care planning)     Past Surgical History:   Procedure Laterality Date     SURGICAL HISTORY OF -       hysterectomy, bladder suspension     SURGICAL HISTORY OF -       tonsillectomy       Social History   Substance Use Topics     Smoking status: Former Smoker     Quit date: 4/22/1970     Smokeless tobacco: Never Used     Alcohol use Yes      Comment: rarely, a glass of wine on a special occassion     Family History   Problem Relation Age of Onset     DIABETES Father      type 2     Hypertension Father      CEREBROVASCULAR DISEASE Father      Arthritis Father      Other " Cancer Sister          Current Outpatient Prescriptions   Medication Sig Dispense Refill     lisinopril (PRINIVIL/ZESTRIL) 40 MG tablet Take 1 tablet (40 mg) by mouth daily 90 tablet 1     amLODIPine (NORVASC) 2.5 MG tablet TAKE ONE TABLET BY MOUTH ONE TIME DAILY  90 tablet 1     blood glucose monitoring (SAMEER CONTOUR NEXT) test strip 1 strip by In Vitro route daily 100 strip 11     blood glucose monitoring (SAMEER MICROLET) lancets Use to test blood sugars once daily as directed. 3 Box 3     triamcinolone (KENALOG) 0.5 % cream Apply topically 2 times daily to affected area as directed. 15 g 3     apixaban ANTICOAGULANT (ELIQUIS) 5 MG tablet Take 1 tablet (5 mg) by mouth 2 times daily 60 tablet 11     hydrochlorothiazide (HYDRODIURIL) 25 MG tablet Take 1 tablet (25 mg) by mouth daily 90 tablet 3     carvedilol (COREG) 25 MG tablet Take 1 tablet (25 mg) by mouth 2 times daily (with meals) 60 tablet 11     oxybutynin (DITROPAN) 5 MG tablet Take 0.5 tablets (2.5 mg) by mouth 2 times daily 90 tablet 3     fluticasone (FLONASE) 50 MCG/ACT nasal spray Spray 1-2 sprays into both nostrils daily 3 Package 1     Alfalfa 650 MG TABS Take 2 capsules by mouth 2 times daily For arthritis pain       calcium carbonate (TUMS E-X 750) 750 MG CHEW Take 750 mg by mouth daily.       FLAXSEED (LINSEED) PO POWD 1.5  tablespoon daily       VITAMIN D 1000 UNIT OR TABS 1 TABLET DAILY 30 0     [DISCONTINUED] lisinopril (PRINIVIL/ZESTRIL) 40 MG tablet Take 1 tablet (40 mg) by mouth daily 90 tablet 1     [DISCONTINUED] oxybutynin (DITROPAN) 5 MG tablet Take 0.5 tablets by mouth 2 times daily. 90 tablet 2     Allergies   Allergen Reactions     Amlodipine      Swollen ankles and rash but tolerates 2.5 mg without any symptoms     Apple [Chromium]      Kiwi      Nuts      Pear      Tape [Adhesive Tape]      Recent Labs   Lab Test  09/07/17   0954  02/20/17   1557  12/12/16   1126   09/12/16   1152  01/07/16   0920  06/18/15   0854   04/30/14   1057    12/30/13   0904   A1C  6.9*  7.0*   --    --   7.1*  6.9*  6.9*   < >  6.5*   --   6.4*   LDL   --    --    --    --   96   --   97   --    --    --   104   HDL   --    --    --    --   46*   --   39*   --    --    --   39*   TRIG   --    --    --    --   91   --   115   --    --    --   161*   ALT   --   20   --    --   20  29  19   < >  22   --   34   CR   --   0.74  0.99   < >  0.72  0.86  0.92   < >  0.79   --   0.86   GFRESTIMATED   --   76  54*   < >  77  63  59*   < >  70   --   64   GFRESTBLACK   --   >90   GFR Calc    65   < >  >90   GFR Calc    76  71   < >  85   --   77   POTASSIUM   --   3.8  3.8   < >  3.6  3.5  3.6   < >  4.2   --   3.9   TSH   --    --    --    --   1.56   --    --    --   2.34   < >   --     < > = values in this interval not displayed.      BP Readings from Last 3 Encounters:   09/07/17 140/69   04/20/17 145/83   02/20/17 140/84    Wt Readings from Last 3 Encounters:   09/07/17 74.2 kg (163 lb 8 oz)   04/20/17 76.2 kg (168 lb)   03/02/17 77.1 kg (170 lb)        Reviewed and updated as needed this visit by clinical staffTobacco  Allergies  Meds  Med Hx  Surg Hx  Fam Hx  Soc Hx      Reviewed and updated as needed this visit by Provider         ROS:  Denies chest pain. Endorses mild BRAN, stable over the year.     This document serves as a record of the services and decisions personally performed and made by Krysten Molina MD. It was created on his/her behalf by Jamia Morales, trained medical scribe. The creation of this document is based the provider's statements to the medical scribes.    Scribe Jamia Morales, September 7, 2017  OBJECTIVE:   /69  Pulse 63  Temp 97.6  F (36.4  C) (Oral)  Resp 12  Wt 74.2 kg (163 lb 8 oz)  SpO2 98%  BMI 28.06 kg/m2  Body mass index is 28.06 kg/(m^2).  GENERAL: healthy, alert and no distress  PSYCH: mentation appears normal, affect normal/bright       Diagnostic Test Results:  Results for orders  placed or performed in visit on 09/07/17 (from the past 24 hour(s))   Hemoglobin A1c   Result Value Ref Range    Hemoglobin A1C 6.9 (H) 4.3 - 6.0 %      ASSESSMENT/PLAN:   1. Type 2 diabetes mellitus without complication, without long-term current use of insulin (H)  Controlled.      - Comprehensive metabolic panel  - Hemoglobin A1c    2. Hypertension goal BP (blood pressure) < 140/90  Controlled at home, reasonable bp today as it is often high in clinic. Continues on lisinopril, HCTZ, amlodipine and carvedilol.    follow up with cardiology planned.   - Comprehensive metabolic panel  - lisinopril (PRINIVIL/ZESTRIL) 40 MG tablet; Take 1 tablet (40 mg) by mouth daily  Dispense: 90 tablet; Refill: 1    3. Paroxysmal atrial fibrillation (H)  Stable. Followed by Cardio. Continues on eliquis and carvedilol. No new symptoms. Over time, reports she is exercising less and feels more tired, but no chest pain or worsening BRAN. She felt the lack of energy began after starting the carvedilol and will discuss further with cardiology. She is due for echo and follow up with cardiology.        4. Chronic anticoagulation  Stable. Continues on Eliquis.        5. Post-menopausal       - DX Hip/Pelvis/Spine; Future    6. CARDIOVASCULAR SCREENING; LDL GOAL LESS THAN 130    - Lipid panel reflex to direct LDL    7. Need for prophylactic vaccination and inoculation against influenza    - FLU VACCINE, INCREASED ANTIGEN, PRESV FREE, AGE 65+ [42293]  - Vaccine Administration, Initial [33598]    8. Right leg pain, intermittent, more limiting in the morning. She does not want further eval at this time. She wanted to know if tylenol would be okay to take. She will try tylenol and contact me for referral if not improving.       Patient Instructions   1. You can take Tylenol for pain relief. Avoid Ibuprofen or Aleve. If the pain in your right leg becomes more bothersome, let me know and we can have you see orthopedics.   2. Schedule with  Cardiology.   3. Follow up with me in December.       The information in this document, created by the medical scribe for me, accurately reflects the services I personally performed and the decisions made by me. I have reviewed and approved this document for accuracy. 09/07/17    Krysten Molina MD  [facility] [place]      Injectable Influenza Immunization Documentation    1.  Is the person to be vaccinated sick today?  No    2. Does the person to be vaccinated have an allergy to eggs or to a component of the vaccine?  No    3. Has the person to be vaccinated today ever had a serious reaction to influenza vaccine in the past?  No    4. Has the person to be vaccinated ever had Guillain-Valatie syndrome?  No     Form completed by Queenie Alejandre MA

## 2017-09-14 ENCOUNTER — RADIANT APPOINTMENT (OUTPATIENT)
Dept: BONE DENSITY | Facility: CLINIC | Age: 82
End: 2017-09-14
Attending: FAMILY MEDICINE
Payer: COMMERCIAL

## 2017-09-14 DIAGNOSIS — Z78.0 POST-MENOPAUSAL: ICD-10-CM

## 2017-09-14 PROCEDURE — 77085 DXA BONE DENSITY AXL VRT FX: CPT | Performed by: INTERNAL MEDICINE

## 2017-10-06 ENCOUNTER — DOCUMENTATION ONLY (OUTPATIENT)
Dept: OTHER | Facility: CLINIC | Age: 82
End: 2017-10-06

## 2017-10-06 DIAGNOSIS — Z71.89 ACP (ADVANCE CARE PLANNING): Chronic | ICD-10-CM

## 2017-11-03 DIAGNOSIS — N39.3 FEMALE STRESS INCONTINENCE: ICD-10-CM

## 2017-11-07 RX ORDER — OXYBUTYNIN CHLORIDE 5 MG/1
TABLET ORAL
Qty: 90 TABLET | Refills: 3 | Status: SHIPPED | OUTPATIENT
Start: 2017-11-07 | End: 2018-11-09

## 2017-11-07 NOTE — TELEPHONE ENCOUNTER
Prescription [place] per [facility] [facility].  Thanks! Vivian Mcgee RN

## 2017-12-04 ENCOUNTER — HOSPITAL ENCOUNTER (OUTPATIENT)
Dept: CARDIOLOGY | Facility: CLINIC | Age: 82
Discharge: HOME | End: 2017-12-04
Attending: PHYSICIAN ASSISTANT | Admitting: PHYSICIAN ASSISTANT
Payer: COMMERCIAL

## 2017-12-04 DIAGNOSIS — I07.1 TRICUSPID VALVE INSUFFICIENCY, UNSPECIFIED ETIOLOGY: ICD-10-CM

## 2017-12-04 PROCEDURE — 93306 TTE W/DOPPLER COMPLETE: CPT | Mod: 26 | Performed by: INTERNAL MEDICINE

## 2017-12-04 PROCEDURE — 93306 TTE W/DOPPLER COMPLETE: CPT

## 2017-12-07 ENCOUNTER — PRE VISIT (OUTPATIENT)
Dept: CARDIOLOGY | Facility: CLINIC | Age: 82
End: 2017-12-07

## 2017-12-08 ENCOUNTER — OFFICE VISIT (OUTPATIENT)
Dept: CARDIOLOGY | Facility: CLINIC | Age: 82
End: 2017-12-08
Attending: PHYSICIAN ASSISTANT
Payer: COMMERCIAL

## 2017-12-08 VITALS
BODY MASS INDEX: 27.91 KG/M2 | DIASTOLIC BLOOD PRESSURE: 73 MMHG | SYSTOLIC BLOOD PRESSURE: 121 MMHG | WEIGHT: 163.5 LBS | HEIGHT: 64 IN | HEART RATE: 73 BPM

## 2017-12-08 DIAGNOSIS — I07.1 TRICUSPID VALVE INSUFFICIENCY, UNSPECIFIED ETIOLOGY: Primary | ICD-10-CM

## 2017-12-08 DIAGNOSIS — R06.02 SOB (SHORTNESS OF BREATH): ICD-10-CM

## 2017-12-08 DIAGNOSIS — I48.19 PERSISTENT ATRIAL FIBRILLATION (H): ICD-10-CM

## 2017-12-08 DIAGNOSIS — I10 ESSENTIAL HYPERTENSION WITH GOAL BLOOD PRESSURE LESS THAN 140/90: ICD-10-CM

## 2017-12-08 PROCEDURE — 99213 OFFICE O/P EST LOW 20 MIN: CPT | Performed by: INTERNAL MEDICINE

## 2017-12-08 NOTE — PROGRESS NOTES
HISTORY OF PRESENT ILLNESS:  Aundrea is a very pleasant 83-year-old.  She was initially seen by Dr. Mcgrath for premature atrial contractions and atrial fibrillation.  He was managing her blood pressure as well.  She subsequently came to me approximately a year ago for continued hypertension.  Given her CHADS-VASc score, we gave her a new diagnosis of atrial fibrillation at that time and her CHADS-VASc score of 5.  We restarted Eliquis 5 b.i.d.  She is a [employer], but is doing well with this.  We also changed her Toprol to carvedilol for blood pressure control.  We kept her on lisinopril and Norvasc as well.  In followup here today, she has no cardiovascular complaints.  Her biggest complaint is that of her muscles aching and joint pain.  She has no chest pain, chest pressure.  She has intermittent mild shortness of breath.  She has no problems with atrial fibrillation or edema.      Review of her echocardiogram shows normal ejection fraction, mild dilatation of the RV and 2+ tricuspid regurgitation with mild pulmonary pressures unchanged from previous study.      ASSESSMENT AND PLAN:  It was a delight to see Mrs. Treviño back in clinic, really stable from a cardiovascular standpoint.  Her blood pressure is controlled.  Her AFib is controlled.  She is tolerating anticoagulation.  We discussed that she can certainly come back and see us on a p.r.n. basis but there is no ongoing active management of her cardiovascular problems.  We will sign her over to primary care.      MD MIGUEL Morelos MD [facility]             D: 2017 08:59   T: 2017 09:45   MT: al      Name:     AUNDREA TREVIÑO   MRN:      [id number]        Account:      [id number]   :      1934           Service Date: 2017      Document: [id number]

## 2017-12-08 NOTE — LETTER
12/8/2017      Krysten Molina MD, MD  [address]  [facility] [address]      RE: Aundrea Treviño       Dear Colleague,    I had the pleasure of seeing Aundrea Treviño in the [facility].    HISTORY OF PRESENT ILLNESS:  Aundrea is a very pleasant 83-year-old.  She was initially seen by Dr. Mcgrath for premature atrial contractions and atrial fibrillation.  He was managing her blood pressure as well.  She subsequently came to me approximately a year ago for continued hypertension.  Given her CHADS-VASc score, we gave her a new diagnosis of atrial fibrillation at that time and her CHADS-VASc score of 5.  We restarted Eliquis 5 b.i.d.  She is a [employer], but is doing well with this.  We also changed her Toprol to carvedilol for blood pressure control.  We kept her on lisinopril and Norvasc as well.  In followup here today, she has no cardiovascular complaints.  Her biggest complaint is that of her muscles aching and joint pain.  She has no chest pain, chest pressure.  She has intermittent mild shortness of breath.  She has no problems with atrial fibrillation or edema.      Review of her echocardiogram shows normal ejection fraction, mild dilatation of the RV and 2+ tricuspid regurgitation with mild pulmonary pressures unchanged from previous study.      ASSESSMENT AND PLAN:  It was a delight to see Mrs. Treviño back in clinic, really stable from a cardiovascular standpoint.  Her blood pressure is controlled.  Her AFib is controlled.  She is tolerating anticoagulation.  We discussed that she can certainly come back and see us on a p.r.n. basis but there is no ongoing active management of her cardiovascular problems.  We will sign her over to primary care.     Outpatient Encounter Prescriptions as of 12/8/2017   Medication Sig Dispense Refill     oxybutynin (DITROPAN) 5 MG tablet TAKE 1/2 TABLET BY MOUTH TWICE DAILY 90 tablet 3     lisinopril (PRINIVIL/ZESTRIL) 40 MG tablet Take 1  tablet (40 mg) by mouth daily 90 tablet 1     amLODIPine (NORVASC) 2.5 MG tablet TAKE ONE TABLET BY MOUTH ONE TIME DAILY  90 tablet 1     blood glucose monitoring (SAMEER CONTOUR NEXT) test strip 1 strip by In Vitro route daily 100 strip 11     blood glucose monitoring (SAMEER MICROLET) lancets Use to test blood sugars once daily as directed. 3 Box 3     triamcinolone (KENALOG) 0.5 % cream Apply topically 2 times daily to affected area as directed. 15 g 3     apixaban ANTICOAGULANT (ELIQUIS) 5 MG tablet Take 1 tablet (5 mg) by mouth 2 times daily 60 tablet 11     hydrochlorothiazide (HYDRODIURIL) 25 MG tablet Take 1 tablet (25 mg) by mouth daily 90 tablet 3     carvedilol (COREG) 25 MG tablet Take 1 tablet (25 mg) by mouth 2 times daily (with meals) 60 tablet 11     fluticasone (FLONASE) 50 MCG/ACT nasal spray Spray 1-2 sprays into both nostrils daily 3 Package 1     Alfalfa 650 MG TABS Take 2 capsules by mouth 2 times daily For arthritis pain       calcium carbonate (TUMS E-X 750) 750 MG CHEW Take 750 mg by mouth daily.       FLAXSEED (LINSEED) PO POWD 1.5  tablespoon daily       VITAMIN D 1000 UNIT OR TABS 1 TABLET DAILY 30 0     No facility-administered encounter medications on file as of 12/8/2017.        Again, thank you for allowing me to participate in the care of your patient.      Sincerely,    MIGUEL SANTOS MD     [facility]

## 2017-12-08 NOTE — PROGRESS NOTES
HPI and Plan:   See dictation    No orders of the defined types were placed in this encounter.    No orders of the defined types were placed in this encounter.    There are no discontinued medications.      Encounter Diagnoses   Name Primary?     Essential hypertension with goal blood pressure less than 140/90      SOB (shortness of breath)        CURRENT MEDICATIONS:  Current Outpatient Prescriptions   Medication Sig Dispense Refill     oxybutynin (DITROPAN) 5 MG tablet TAKE 1/2 TABLET BY MOUTH TWICE DAILY 90 tablet 3     lisinopril (PRINIVIL/ZESTRIL) 40 MG tablet Take 1 tablet (40 mg) by mouth daily 90 tablet 1     amLODIPine (NORVASC) 2.5 MG tablet TAKE ONE TABLET BY MOUTH ONE TIME DAILY  90 tablet 1     blood glucose monitoring (SAMEER CONTOUR NEXT) test strip 1 strip by In Vitro route daily 100 strip 11     blood glucose monitoring (SAMEER MICROLET) lancets Use to test blood sugars once daily as directed. 3 Box 3     triamcinolone (KENALOG) 0.5 % cream Apply topically 2 times daily to affected area as directed. 15 g 3     apixaban ANTICOAGULANT (ELIQUIS) 5 MG tablet Take 1 tablet (5 mg) by mouth 2 times daily 60 tablet 11     hydrochlorothiazide (HYDRODIURIL) 25 MG tablet Take 1 tablet (25 mg) by mouth daily 90 tablet 3     carvedilol (COREG) 25 MG tablet Take 1 tablet (25 mg) by mouth 2 times daily (with meals) 60 tablet 11     fluticasone (FLONASE) 50 MCG/ACT nasal spray Spray 1-2 sprays into both nostrils daily 3 Package 1     Alfalfa 650 MG TABS Take 2 capsules by mouth 2 times daily For arthritis pain       calcium carbonate (TUMS E-X 750) 750 MG CHEW Take 750 mg by mouth daily.       FLAXSEED (LINSEED) PO POWD 1.5  tablespoon daily       VITAMIN D 1000 UNIT OR TABS 1 TABLET DAILY 30 0     [DISCONTINUED] oxybutynin (DITROPAN) 5 MG tablet Take 0.5 tablets by mouth 2 times daily. 90 tablet 2       ALLERGIES     Allergies   Allergen Reactions     Amlodipine      Swollen ankles and rash but tolerates 2.5 mg  without any symptoms     Apple [Chromium]      Kiwi      Nuts      Pear      Tape [Adhesive Tape]        PAST MEDICAL HISTORY:  Past Medical History:   Diagnosis Date     Chronic anticoagulation 2/19/2017     Diverticulitis of colon (without mention of hemorrhage)(562.11)      DM type 2, goal A1C below 8.0 12/3/2014     Essential and other specified forms of tremor     eval'd by neuro 2005     Hyperlipidemia LDL goal <100 5/9/2010     Hypertension goal BP (blood pressure) < 140/90      Intestinal disaccharidase deficiencies and disaccharide malabsorption      Moderate tricuspid regurgitation 2/19/2017     Osteoarthrosis, unspecified whether generalized or localized, unspecified site      Paroxysmal atrial fibrillation (H) 2/19/2017     Pulmonary hypertension, mild 2/19/2017     Type 2 diabetes, HbA1c goal < 7% (H) 5/2/2010     Urge incontinence        PAST SURGICAL HISTORY:  Past Surgical History:   Procedure Laterality Date     SURGICAL HISTORY OF -       hysterectomy, bladder suspension     SURGICAL HISTORY OF -       tonsillectomy       FAMILY HISTORY:  Family History   Problem Relation Age of Onset     DIABETES Father      type 2     Hypertension Father      CEREBROVASCULAR DISEASE Father      Arthritis Father      Other Cancer Sister        SOCIAL HISTORY:  Social History     Social History     Marital status:      Spouse name: yumiko     Number of children: 3     Years of education: 12     Occupational History      None      Social History Main Topics     Smoking status: Former Smoker     Quit date: 4/22/1970     Smokeless tobacco: Never Used     Alcohol use Yes      Comment: rarely, a glass of wine on a special occassion     Drug use: No     Sexual activity: Not Currently     Other Topics Concern     Parent/Sibling W/ Cabg, Mi Or Angioplasty Before 65f 55m? No     Caffeine Concern No     diet cola occassionally     Special Diet No     decreasing carbs.     Exercise No     walking  "    Social History Narrative    2008    Balanced Diet - Yes    Osteoporosis Prevention Measures - Dairy servings per day: 3 servings daily    Regular Exercise -  Yes Describe walk and dance    Dental Exam - YES - Date: 2008    Eye Exam - YES - Date: 12-08    Self Breast Exam - Yes    Abuse: Current or Past (Physical, Sexual or Emotional)- Not Asked    Do you feel safe in your environment - Yes    Guns stored in the home - No    Sunscreen used - Yes, when she needs it    Seatbelts used - Yes    Lipids -  YES - Date: 7-16-07    Glucose -  YES - Date: 7-16-07    Colon Cancer Screening - Colonoscopy 10-18-06(date completed)    Hemoccults - YES - Date: 4-22-05    Pap Test -  YES - Date: 7-21-03    Do you have any concerns about STD's -  Not Asked    Mammography - YES - Date: 9-13-07    DEXA - YES - Date: 8-18-05    Immunizations reviewed and up to date - Yes, last TD was 11-1-00    Linnea Matute MA                       Review of Systems:  Skin:  Positive for     Eyes:  Positive for glasses  ENT:  Negative    Respiratory:    cough;dyspnea on exertion  Cardiovascular:  Negative;palpitations;chest pain;lightheadedness;dizziness;syncope or near-syncope;cyanosis;exercise intolerance Positive for;fatigue;edema  Gastroenterology: Negative    Genitourinary:  Negative    Musculoskeletal:  Positive for gout;arthritis;joint pain  Neurologic:  Positive for numbness or tingling of feet  Psychiatric:  Negative    Heme/Lymph/Imm:  Positive for allergies  Endocrine:  Negative      Physical Exam:  Vitals: /73 (BP Location: Left arm, Cuff Size: Adult Regular)  Pulse 73  Ht 1.626 m (5' 4\")  Wt 74.2 kg (163 lb 8 oz)  BMI 28.06 kg/m2    Constitutional:           Skin:             Head:           Eyes:           Lymph:      ENT:           Neck:           Respiratory:            Cardiac:                                                           GI:           Extremities and Muscular Skeletal:                 Neurological:       "     Psych:           Recent Lab Results:  LIPID RESULTS:  Lab Results   Component Value Date    CHOL 155 09/07/2017    HDL 53 09/07/2017    LDL 88 09/07/2017    TRIG 70 09/07/2017    CHOLHDLRATIO 4.1 06/18/2015       LIVER ENZYME RESULTS:  Lab Results   Component Value Date    AST 15 09/07/2017    ALT 21 09/07/2017       CBC RESULTS:  Lab Results   Component Value Date    WBC 6.6 04/30/2014    RBC 5.40 (H) 04/30/2014    HGB 15.2 04/30/2014    HCT 44.2 04/30/2014    MCV 82 04/30/2014    MCH 28.1 04/30/2014    MCHC 34.4 04/30/2014    RDW 13.5 04/30/2014     04/30/2014       BMP RESULTS:  Lab Results   Component Value Date     09/07/2017    POTASSIUM 3.6 09/07/2017    CHLORIDE 99 09/07/2017    CO2 28 09/07/2017    ANIONGAP 9 09/07/2017     (H) 09/07/2017    BUN 14 09/07/2017    CR 0.75 09/07/2017    GFRESTIMATED 73 09/07/2017    GFRESTBLACK 89 09/07/2017    KAIDEN 9.3 09/07/2017        A1C RESULTS:  Lab Results   Component Value Date    A1C 6.9 (H) 09/07/2017       INR RESULTS:  No results found for: INR        CC  Prieto Valentine PA-C  [address]  [address] [place] [address]

## 2017-12-08 NOTE — MR AVS SNAPSHOT
"              After Visit Summary   12/8/2017    Aundrea Treviño    MRN: [id number]           Patient Information     Date Of Birth          8/29/1934        Visit Information        Provider Department      12/8/2017 8:15 AM Mathew Katz MD [facility]        Today's Diagnoses     Tricuspid valve insufficiency, unspecified etiology    -  1    Essential hypertension with goal blood pressure less than 140/90        SOB (shortness of breath)        Persistent atrial fibrillation (H)        Patient is Jehovah's witness           Follow-ups after your visit        Who to contact     If you have questions or need follow up information about today's clinic visit or your schedule please contact [facility] directly at [phone].  Normal or non-critical lab and imaging results will be communicated to you by Mozaicohart, letter or phone within 4 business days after the clinic has received the results. If you do not hear from us within 7 days, please contact the clinic through Mozaicohart or phone. If you have a critical or abnormal lab result, we will notify you by phone as soon as possible.  Submit refill requests through YeePay or call your pharmacy and they will forward the refill request to us. Please allow 3 business days for your refill to be completed.          Additional Information About Your Visit        Mozaicohart Information     YeePay lets you send messages to your doctor, view your test results, renew your prescriptions, schedule appointments and more. To sign up, go to [email or web address] . Click on \"Log in\" on the left side of the screen, which will take you to the Welcome page. Then click on \"Sign up Now\" on the right side of the page.     You will be asked to enter the access code listed below, as well as some personal information. Please follow the directions to create your username and password.     Your access code is: " [email or web address]  Expires: 3/8/2018  8:59 AM     Your access code will  in 90 days. If you need help or a new code, please call your [facility] clinic or [phone].        Care EveryWhere ID     This is your Care EveryWhere ID. This could be used by other organizations to access your [facility] medical records  [id number]        Your Vitals Were     Pulse Height BMI (Body Mass Index)             73 1.626 m (5' 4\") 28.06 kg/m2          Blood Pressure from Last 3 Encounters:   17 121/73   17 140/69   17 145/83    Weight from Last 3 Encounters:   17 74.2 kg (163 lb 8 oz)   17 74.2 kg (163 lb 8 oz)   17 76.2 kg (168 lb)              We Performed the Following     Follow-Up with Cardiologist        Primary Care Provider Office Phone # Fax #    Krysten Molina [phone] [address] [facility] [address]        Equal Access to Services     [facility] : Hadii barrett Merlos, waphilda elaine, qaybta meron lópez, imelda gordon . So [facility] [phone].    ATENCIÓN: Si habla español, tiene a husain disposición servicios gratuitos de asistencia lingüística. [facility] [phone].    We comply with applicable federal civil rights laws and [place] laws. We do not discriminate on the basis of race, color, national origin, age, disability, sex, sexual orientation, or gender identity.            Thank you!     Thank you for choosing [facility]  for your care. Our goal is always to provide you with excellent care. Hearing back from our patients is one way we can continue to improve our services. Please take a few minutes to complete the written survey that you may receive in the mail after your visit with us. Thank you!             Your Updated Medication List - Protect others around you: Learn how to safely use, store and throw away your medicines at www.disposemymeds.org.          This " list is accurate as of: 12/8/17  8:59 AM.  Always use your most recent med list.                   Brand Name Dispense Instructions for use Diagnosis    Alfalfa 650 MG Tabs      Take 2 capsules by mouth 2 times daily For arthritis pain        amLODIPine 2.5 MG tablet    NORVASC    90 tablet    TAKE ONE TABLET BY MOUTH ONE TIME DAILY    Hypertension goal BP (blood pressure) < 150/90       apixaban ANTICOAGULANT 5 MG tablet    ELIQUIS    60 tablet    Take 1 tablet (5 mg) by mouth 2 times daily    Atrial fibrillation (H)       blood glucose monitoring lancets     3 Box    Use to test blood sugars once daily as directed.        blood glucose monitoring test strip    SAMEER CONTOUR NEXT    100 strip    1 strip by In Vitro route daily        carvedilol 25 MG tablet    COREG    60 tablet    Take 1 tablet (25 mg) by mouth 2 times daily (with meals)    SOB (shortness of breath), Essential hypertension with goal blood pressure less than 140/90       cholecalciferol 1000 UNIT tablet    vitamin D3    30    1 TABLET DAILY        Flaxseed (Linseed) Powd      1.5  tablespoon daily    Type II or unspecified type diabetes mellitus without mention of complication, not stated as uncontrolled       fluticasone 50 MCG/ACT spray    FLONASE    3 Package    Spray 1-2 sprays into both nostrils daily    Cough, Allergic rhinitis       hydrochlorothiazide 25 MG tablet    HYDRODIURIL    90 tablet    Take 1 tablet (25 mg) by mouth daily    Hypertension goal BP (blood pressure) < 140/90       lisinopril 40 MG tablet    PRINIVIL/ZESTRIL    90 tablet    Take 1 tablet (40 mg) by mouth daily    Hypertension goal BP (blood pressure) < 140/90       oxybutynin 5 MG tablet    DITROPAN    90 tablet    TAKE 1/2 TABLET BY MOUTH TWICE DAILY    Female stress incontinence       triamcinolone 0.5 % cream    KENALOG    15 g    Apply topically 2 times daily to affected area as directed.    Itching       TUMS E-X 750 750 MG Chew   Generic drug:  calcium carbonate       Take 750 mg by mouth daily.

## 2018-01-09 DIAGNOSIS — I10 HYPERTENSION GOAL BP (BLOOD PRESSURE) < 140/90: ICD-10-CM

## 2018-01-09 NOTE — TELEPHONE ENCOUNTER
"Requested Prescriptions   Pending Prescriptions Disp Refills     hydrochlorothiazide (HYDRODIURIL) 25 MG tablet [Pharmacy Med Name: HydroCHLOROthiazide Oral Tablet 25 MG]  Last Written Prescription Date:  1/2/2017  Last Fill Quantity: 90 tablet,  # refills: 3   Last Office Visit with [facility], [facility] or [facility] prescribing provider:  9/7/2017   Future Office Visit:      90 tablet 2     Sig: TAKE ONE TABLET BY MOUTH ONE TIME DAILY    Diuretics (Including Combos) Protocol Passed    1/9/2018  3:40 PM       Passed - Blood pressure under 140/90    BP Readings from Last 3 Encounters:   12/08/17 121/73   09/07/17 140/69   04/20/17 145/83          Passed - Recent or future visit with authorizing provider's specialty    Patient had office visit in the last year or has a visit in the next 30 days with authorizing provider.  See \"Patient Info\" tab in inbasket, or \"Choose Columns\" in Meds & Orders section of the refill encounter.        Passed - Patient is age 18 or older       Passed - No active pregancy on record       Passed - Normal serum creatinine on file in past 12 months    Recent Labs   Lab Test  09/07/17   0954   CR  0.75           Passed - Normal serum potassium on file in past 12 months    Recent Labs   Lab Test  09/07/17   0954   POTASSIUM  3.6           Passed - Normal serum sodium on file in past 12 months    Recent Labs   Lab Test  09/07/17   0954   NA  136           Passed - No positive pregnancy test in past 12 months          "

## 2018-01-11 DIAGNOSIS — I10 ESSENTIAL HYPERTENSION WITH GOAL BLOOD PRESSURE LESS THAN 140/90: ICD-10-CM

## 2018-01-11 DIAGNOSIS — R06.02 SOB (SHORTNESS OF BREATH): ICD-10-CM

## 2018-01-11 RX ORDER — HYDROCHLOROTHIAZIDE 25 MG/1
TABLET ORAL
Qty: 90 TABLET | Refills: 1 | Status: SHIPPED | OUTPATIENT
Start: 2018-01-11 | End: 2018-07-17

## 2018-01-11 RX ORDER — CARVEDILOL 25 MG/1
25 TABLET ORAL 2 TIMES DAILY WITH MEALS
Qty: 180 TABLET | Refills: 1 | Status: SHIPPED | OUTPATIENT
Start: 2018-01-11 | End: 2018-07-18

## 2018-01-11 NOTE — TELEPHONE ENCOUNTER
Prescription approved per [facility] Refill Protocol.  BENITEZ Peña, BSN, RN

## 2018-01-22 ENCOUNTER — TELEPHONE (OUTPATIENT)
Dept: FAMILY MEDICINE | Facility: CLINIC | Age: 83
End: 2018-01-22

## 2018-01-22 DIAGNOSIS — I10 HYPERTENSION GOAL BP (BLOOD PRESSURE) < 140/90: ICD-10-CM

## 2018-01-22 NOTE — TELEPHONE ENCOUNTER
Patient just refilled her prescription in [place] but forgot to take her meds to [place] with her.  I did authorize a 14 day supply of HCTZ 25 mg tabs to cover patient until she returns to [place].  Padmini Gurrola RN

## 2018-01-22 NOTE — TELEPHONE ENCOUNTER
Reason for Call:  Medication or medication refill:    Do you use a [facility]?  Name of the pharmacy and phone number for the current request:  [facility] [place], [place] [address]    Name of the medication requested: hydrochlorothiazide (HYDRODIURIL) 25 MG tablet     Other request: Pt is in [place] and left her meds in [place]. She needs this ASAP.    Can we leave a detailed message on this number? YES    Phone number patient can be reached at: Other phone number:  [phone]    Best Time: Anytime    Call taken on 1/22/2018 at 2:09 PM by Patrizia Alexander

## 2018-02-09 ENCOUNTER — TELEPHONE (OUTPATIENT)
Dept: FAMILY MEDICINE | Facility: CLINIC | Age: 83
End: 2018-02-09

## 2018-02-09 DIAGNOSIS — I48.0 PAROXYSMAL ATRIAL FIBRILLATION (H): Primary | ICD-10-CM

## 2018-02-09 NOTE — TELEPHONE ENCOUNTER
"Requested Prescriptions   Pending Prescriptions Disp Refills     apixaban ANTICOAGULANT (ELIQUIS) 5 MG tablet  Last Written Prescription Date:  2/17/2017  Last Fill Quantity: 60 tabs,  # refills: 11   Last Office Visit with [facility], [facility] or [facility] prescribing provider:  9/7/2017  Future Office Visit:      60 tablet 11     Sig: Take 1 tablet (5 mg) by mouth 2 times daily    Platelet Inhibitors Failed    2/9/2018 10:53 AM       Failed - Normal HGB on file in past 12 months    Recent Labs   Lab Test  04/30/14   1102   HGB  15.2              Failed - Normal Platelets on file in past 12 months    Recent Labs   Lab Test  04/30/14   1102   PLT  163              Passed - Normal ALT on file in past 12 months    Recent Labs   Lab Test  09/07/17   0954   ALT  21            Passed - Normal AST on file in past 12 months    Recent Labs   Lab Test  09/07/17   0954   AST  15            Passed - Recent or future visit with authorizing provider's specialty    Patient had office visit in the last year or has a visit in the next 30 days with authorizing provider.  See \"Patient Info\" tab in inbasket, or \"Choose Columns\" in Meds & Orders section of the refill encounter.            Passed - Patient is age 18 or older       Passed - No active pregnancy on record       Passed - Normal serum creatinine on file in past 12 months    Recent Labs   Lab Test  09/07/17   0954   CR  0.75            Passed - No positive pregnancy test in past 12 months          "

## 2018-02-09 NOTE — TELEPHONE ENCOUNTER
Patient is out of medication  Previously ordered by cardiology.  Per pharmacy, they called the cardiology office for refill and were told that refills should be going through primary provider's office.  Patient is at the pharmacy.  Padmini Gurrola RN

## 2018-02-12 NOTE — TELEPHONE ENCOUNTER
Patient is again checking on status of refill.  Totally out of medication.  Took last pill today and pharmacy already advanced her 3 tabs.  Padmini Gurrola RN

## 2018-02-13 NOTE — PROGRESS NOTES
SUBJECTIVE:   Aundrea Treviño is a 83 year old female who presents to clinic today for the following health issues:    Joint Pain    Onset: 5 months    Description:   Location: right shoulder, right knee, right hip and right side groin  Character: Dull ache    Intensity: moderate but varies day to day     Progression of Symptoms: worse, intermittent    Accompanying Signs & Symptoms:  Other symptoms: numbness, tingling and weakness of shoulder, knee and hip     History:   Previous similar pain: no       Precipitating factors:   Trauma or overuse: no     Alleviating factors:  Improved by: rest/inactivity and acetaminophen  Therapies Tried and outcome: nothing       She has been bothered by pain in the right groin. Also has right knee pain at times and just feels like it s the right side up her leg and the lateral hip and the right shoulder if she does something wrong like lies on it wrong. The pain comes and goes. One day it may be her knee, one day her shoulder, etc. The groin and right hip are the biggest problem and make it difficult to walk at times. It is not getting any better over time, maybe worse. She does not recall an incident that started this or an injury. She has general lack of energy, but no specific weakness. Does have some tingling at time in the left hand, but none on the right. Denies redness or swelling. She is comfortable sitting and lying down.     She also has concern about her bladder. She has not much control of her bladder anymore. She has had this for the past few months. In the past has had to wear a pad due to mild leakage, but about 2-3 months ago has not had much control over urination. Very occasionally has had some bowel leakage. Denies dysuria, hematuria, frequency, urgency, nocturia. She has some urge and stress incontinence. Denies saddle anesthesia.     Her blood sugar she has not been checking because her strips don't work.     Her feet feel like they weigh a ton. There is  reduced sensation in her feet. Has been present over the past year. Saw Dr. Arellano, who recommended neurology, but she declined a referral at that time, though she does not remember this.     Long list of questions for me today.       Problem list and histories reviewed & adjusted, as indicated.  Additional history: as documented  Patient Active Problem List   Diagnosis     Disorder of bone and cartilage     Female stress incontinence     Dystonia     Essential tremor     Refusal of blood transfusions as patient is [employer]ist     Hypertension goal BP (blood pressure) < 140/90     CARDIOVASCULAR SCREENING; LDL GOAL LESS THAN 130     Gout     Uncomplicated type 2 diabetes mellitus (H)     Venous (peripheral) insufficiency     Chronic anticoagulation     Paroxysmal atrial fibrillation (H)     Pulmonary hypertension, mild     Moderate tricuspid regurgitation     ACP (advance care planning)     Patient is [employer]ist     Past Surgical History:   Procedure Laterality Date     SURGICAL HISTORY OF -       hysterectomy, bladder suspension     SURGICAL HISTORY OF -       tonsillectomy       Social History   Substance Use Topics     Smoking status: Former Smoker     Quit date: 4/22/1970     Smokeless tobacco: Never Used     Alcohol use Yes      Comment: rarely, a glass of wine on a special occassion     Family History   Problem Relation Age of Onset     DIABETES Father      type 2     Hypertension Father      CEREBROVASCULAR DISEASE Father      Arthritis Father      Other Cancer Sister          Current Outpatient Prescriptions   Medication Sig Dispense Refill     blood glucose monitoring (NO BRAND SPECIFIED) test strip Use to test blood sugars 1 times daily or as directed. Needs strips that will work with her contour next ez meter. 100 strip 3     apixaban ANTICOAGULANT (ELIQUIS) 5 MG tablet Take 1 tablet (5 mg) by mouth 2 times daily 60 tablet 11     hydrochlorothiazide (HYDRODIURIL) 25 MG tablet TAKE ONE TABLET BY  MOUTH ONE TIME DAILY  90 tablet 1     carvedilol (COREG) 25 MG tablet Take 1 tablet (25 mg) by mouth 2 times daily (with meals) 180 tablet 1     oxybutynin (DITROPAN) 5 MG tablet TAKE 1/2 TABLET BY MOUTH TWICE DAILY 90 tablet 3     lisinopril (PRINIVIL/ZESTRIL) 40 MG tablet Take 1 tablet (40 mg) by mouth daily 90 tablet 1     amLODIPine (NORVASC) 2.5 MG tablet TAKE ONE TABLET BY MOUTH ONE TIME DAILY  90 tablet 1     triamcinolone (KENALOG) 0.5 % cream Apply topically 2 times daily to affected area as directed. 15 g 3     Alfalfa 650 MG TABS Take 2 capsules by mouth 2 times daily For arthritis pain       calcium carbonate (TUMS E-X 750) 750 MG CHEW Take 750 mg by mouth daily.       FLAXSEED (LINSEED) PO POWD 1.5  tablespoon daily       VITAMIN D 1000 UNIT OR TABS 1 TABLET DAILY 30 0     blood glucose monitoring (SAMEER CONTOUR NEXT) test strip 1 strip by In Vitro route daily (Patient not taking: Reported on 2/14/2018) 100 strip 11     blood glucose monitoring (SAMEER MICROLET) lancets Use to test blood sugars once daily as directed. (Patient not taking: Reported on 2/14/2018) 3 Box 3     fluticasone (FLONASE) 50 MCG/ACT nasal spray Spray 1-2 sprays into both nostrils daily (Patient not taking: Reported on 2/14/2018) 3 Package 1     [DISCONTINUED] oxybutynin (DITROPAN) 5 MG tablet Take 0.5 tablets by mouth 2 times daily. 90 tablet 2     Allergies   Allergen Reactions     Amlodipine      Swollen ankles and rash but tolerates 2.5 mg without any symptoms     Apple [Chromium]      Kiwi      Nuts      Pear      Tape [Adhesive Tape]      Recent Labs   Lab Test  02/14/18   1216  09/07/17   0954  02/20/17   1557   09/12/16   1152   06/18/15   0854   04/30/14   1057   A1C  7.0*  6.9*  7.0*   --   7.1*   < >  6.9*   < >  6.5*   LDL   --   88   --    --   96   --   97   --    --    HDL   --   53   --    --   46*   --   39*   --    --    TRIG   --   70   --    --   91   --   115   --    --    ALT   --   21  20   --   20   < >  19    "< >  22   CR   --   0.75  0.74   < >  0.72   < >  0.92   < >  0.79   GFRESTIMATED   --   73  76   < >  77   < >  59*   < >  70   GFRESTBLACK   --   89  >90   GFR Calc     < >  >90   GFR Calc     < >  71   < >  85   POTASSIUM   --   3.6  3.8   < >  3.6   < >  3.6   < >  4.2   TSH   --    --    --    --   1.56   --    --    --   2.34    < > = values in this interval not displayed.      BP Readings from Last 3 Encounters:   02/14/18 138/84   12/08/17 121/73   09/07/17 140/69    Wt Readings from Last 3 Encounters:   02/14/18 160 lb 8 oz (72.8 kg)   12/08/17 163 lb 8 oz (74.2 kg)   09/07/17 163 lb 8 oz (74.2 kg)        Reviewed and updated as needed this visit by clinical staff  Tobacco  Allergies  Meds  Med Hx  Surg Hx  Fam Hx  Soc Hx      Reviewed and updated as needed this visit by Provider         ROS:  See above.       OBJECTIVE:     /84 (Cuff Size: Adult Regular)  Pulse 64  Temp 97.8  F (36.6  C) (Oral)  Resp 16  Ht 5' 3.75\" (1.619 m)  Wt 160 lb 8 oz (72.8 kg)  SpO2 98%  BMI 27.77 kg/m2  Body mass index is 27.77 kg/(m^2).  GENERAL: healthy, alert and no distress  EXT: BLE with venous insufficiency changes, no edema     Diagnostic Test Results:  Results for orders placed or performed in visit on 02/14/18   *UA reflex to Microscopic and Culture ([place] [facility] (except [place] and [place])   Result Value Ref [facility]    Color Urine Yellow     Appearance Urine Clear     Glucose Urine Negative NEG^Negative mg/dL    Bilirubin Urine Negative NEG^Negative    Ketones Urine Negative NEG^Negative mg/dL    Specific Gravity Urine 1.020 1.003 - 1.035    Blood Urine Negative NEG^Negative    pH Urine 7.0 5.0 - 7.0 pH    Protein Albumin Urine Negative NEG^Negative mg/dL    Urobilinogen Urine 0.2 0.2 - 1.0 EU/dL    Nitrite Urine Negative NEG^Negative    Leukocyte Esterase Urine Negative NEG^Negative    Source Midstream Urine    Hemoglobin A1c   Result Value Ref [facility]    " Hemoglobin A1C 7.0 (H) 4.3 - 6.0 %       ASSESSMENT/PLAN:   1. Hip pain, right  X-rays today with evidence of right hip arthritis. No exam performed today as she had several topics to discuss, referred to sports medicine  - XR Hip Right 2-3 Views; Future  - ORTHO  REFERRAL    2. Right knee pain, unspecified chronicity  As above   - ORTHO  REFERRAL    3. Right shoulder pain, unspecified chronicity  As above   - ORTHO  REFERRAL    4. Mixed incontinence urge and stress (male)(female)  Years of symptoms with 3 months of worsening and without sx suggestive of UTI. UA today was normal. She will schedule with urology/urogyn for further eval. She has been on oxybutinin currently 2.5mg bid without much relief.   - *UA reflex to Microscopic and Culture ([place] [facility] (except [place] and [place])  - UROLOGY ADULT REFERRAL    5. Type 2 diabetes mellitus without complication, without long-term current use of insulin (H)  Well controlled  Given bilateral LE neuropathy symptoms, will recheck a1c today   - blood glucose monitoring (NO BRAND SPECIFIED) test strip; Use to test blood sugars 1 times daily or as directed. Needs strips that will work with her contour next ez meter.  Dispense: 100 strip; Refill: 3    6. Numbness and tingling of both feet  Saw Dr. Arellano in 3/2017. Has not had change in symptoms since then. Was recommended to see neurology at that time, but she declined. She is now interested in scheduling with neurology   - Vitamin B12  - NEUROLOGY ADULT REFERRAL  - Hemoglobin A1c  - Folate    7. HTN   Controlled,   No changes today   Continues on hctz 25mg daily, carvedilol 25mg bid, lisinopril 40mg daily, amlodipine 2.5mg daily.     8. Paroxysmal a fib  Continues on eliquis    She also wanted a handicapped parking application to fill out today. I completed application for temporary parking pass for her on the basis of her hip pain.   Patient Instructions   1) Schedule with the  neurologist (we will help you schedule this) for your feet. We will check labs today for this.   2) Schedule with urology for your incontinence.   3) Schedule with the sports medicine doctor for your hip, knee and shoulder pain. They may also recommend PT.         Krysten Molina MD  [facility]

## 2018-02-14 ENCOUNTER — OFFICE VISIT (OUTPATIENT)
Dept: FAMILY MEDICINE | Facility: CLINIC | Age: 83
End: 2018-02-14
Payer: COMMERCIAL

## 2018-02-14 ENCOUNTER — RADIANT APPOINTMENT (OUTPATIENT)
Dept: GENERAL RADIOLOGY | Facility: CLINIC | Age: 83
End: 2018-02-14
Attending: FAMILY MEDICINE
Payer: COMMERCIAL

## 2018-02-14 VITALS
WEIGHT: 160.5 LBS | HEIGHT: 64 IN | RESPIRATION RATE: 16 BRPM | HEART RATE: 64 BPM | TEMPERATURE: 97.8 F | BODY MASS INDEX: 27.4 KG/M2 | SYSTOLIC BLOOD PRESSURE: 138 MMHG | OXYGEN SATURATION: 98 % | DIASTOLIC BLOOD PRESSURE: 84 MMHG

## 2018-02-14 DIAGNOSIS — I48.0 PAROXYSMAL ATRIAL FIBRILLATION (H): ICD-10-CM

## 2018-02-14 DIAGNOSIS — I10 HYPERTENSION GOAL BP (BLOOD PRESSURE) < 140/90: ICD-10-CM

## 2018-02-14 DIAGNOSIS — R20.2 NUMBNESS AND TINGLING OF BOTH FEET: ICD-10-CM

## 2018-02-14 DIAGNOSIS — N39.46 MIXED INCONTINENCE URGE AND STRESS (MALE)(FEMALE): ICD-10-CM

## 2018-02-14 DIAGNOSIS — M25.551 HIP PAIN, RIGHT: Primary | ICD-10-CM

## 2018-02-14 DIAGNOSIS — R20.0 NUMBNESS AND TINGLING OF BOTH FEET: ICD-10-CM

## 2018-02-14 DIAGNOSIS — M25.551 HIP PAIN, RIGHT: ICD-10-CM

## 2018-02-14 DIAGNOSIS — M25.511 RIGHT SHOULDER PAIN, UNSPECIFIED CHRONICITY: ICD-10-CM

## 2018-02-14 DIAGNOSIS — M25.561 RIGHT KNEE PAIN, UNSPECIFIED CHRONICITY: ICD-10-CM

## 2018-02-14 DIAGNOSIS — E11.9 TYPE 2 DIABETES MELLITUS WITHOUT COMPLICATION, WITHOUT LONG-TERM CURRENT USE OF INSULIN (H): ICD-10-CM

## 2018-02-14 LAB
ALBUMIN UR-MCNC: NEGATIVE MG/DL
APPEARANCE UR: CLEAR
BILIRUB UR QL STRIP: NEGATIVE
COLOR UR AUTO: YELLOW
FOLATE SERPL-MCNC: 15.2 NG/ML
GLUCOSE UR STRIP-MCNC: NEGATIVE MG/DL
HBA1C MFR BLD: 7 % (ref 4.3–6)
HGB UR QL STRIP: NEGATIVE
KETONES UR STRIP-MCNC: NEGATIVE MG/DL
LEUKOCYTE ESTERASE UR QL STRIP: NEGATIVE
NITRATE UR QL: NEGATIVE
PH UR STRIP: 7 PH (ref 5–7)
SOURCE: NORMAL
SP GR UR STRIP: 1.02 (ref 1–1.03)
UROBILINOGEN UR STRIP-ACNC: 0.2 EU/DL (ref 0.2–1)
VIT B12 SERPL-MCNC: 446 PG/ML (ref 193–986)

## 2018-02-14 PROCEDURE — 82607 VITAMIN B-12: CPT | Performed by: FAMILY MEDICINE

## 2018-02-14 PROCEDURE — 73502 X-RAY EXAM HIP UNI 2-3 VIEWS: CPT

## 2018-02-14 PROCEDURE — 82746 ASSAY OF FOLIC ACID SERUM: CPT | Performed by: FAMILY MEDICINE

## 2018-02-14 PROCEDURE — 83036 HEMOGLOBIN GLYCOSYLATED A1C: CPT | Performed by: FAMILY MEDICINE

## 2018-02-14 PROCEDURE — 36415 COLL VENOUS BLD VENIPUNCTURE: CPT | Performed by: FAMILY MEDICINE

## 2018-02-14 PROCEDURE — 81003 URINALYSIS AUTO W/O SCOPE: CPT | Performed by: FAMILY MEDICINE

## 2018-02-14 PROCEDURE — 99214 OFFICE O/P EST MOD 30 MIN: CPT | Performed by: FAMILY MEDICINE

## 2018-02-14 NOTE — NURSING NOTE
"Chief Complaint   Patient presents with     Musculoskeletal Problem       Initial /84 (Cuff Size: Adult Regular)  Pulse 64  Temp 97.8  F (36.6  C) (Oral)  Resp 16  Ht 5' 3.75\" (1.619 m)  Wt 160 lb 8 oz (72.8 kg)  SpO2 98%  BMI 27.77 kg/m2 Estimated body mass index is 27.77 kg/(m^2) as calculated from the following:    Height as of this encounter: 5' 3.75\" (1.619 m).    Weight as of this encounter: 160 lb 8 oz (72.8 kg).  Medication Reconciliation: complete     Shana Bowens CMA      "

## 2018-02-14 NOTE — LETTER
February 15, 2018      Aundrea Treviño  [address]   [place] [address]        Dear Aundrea,    Here are your recent lab results:    Your hemoglobin A1C, the three month measure of your blood sugar, was stable.     Your vitamin B12 and folate were normal. These were checked due to your foot numbness, but do not appear to be the cause of your sensation changes.     Your urine test was normal. As I suspected, you do not have a bladder infection. Please schedule with the urologist to discuss your incontinence.    Results for orders placed or performed in visit on 02/14/18   *UA reflex to Microscopic and Culture ([place] [facility] [place] (except [place] and [place])   Result Value Ref [facility]    Color Urine Yellow     Appearance Urine Clear     Glucose Urine Negative NEG^Negative mg/dL    Bilirubin Urine Negative NEG^Negative    Ketones Urine Negative NEG^Negative mg/dL    Specific Gravity Urine 1.020 1.003 - 1.035    Blood Urine Negative NEG^Negative    pH Urine 7.0 5.0 - 7.0 pH    Protein Albumin Urine Negative NEG^Negative mg/dL    Urobilinogen Urine 0.2 0.2 - 1.0 EU/dL    Nitrite Urine Negative NEG^Negative    Leukocyte Esterase Urine Negative NEG^Negative    Source Midstream Urine    Vitamin B12   Result Value Ref [facility]    Vitamin B12 446 193 - 986 pg/mL   Hemoglobin A1c   Result Value Ref [facility]    Hemoglobin A1C 7.0 (H) 4.3 - 6.0 %   Folate   Result Value Ref [facility]    Folate 15.2 >5.4 ng/mL         Sincerely,      Krysten Molina MD/marylin

## 2018-02-14 NOTE — MR AVS SNAPSHOT
After Visit Summary   2/14/2018    Aundrea Treviño    MRN: [id number]           Patient Information     Date Of Birth          8/29/1934        Visit Information        Provider Department      2/14/2018 10:40 AM Krysten Molina MD [facility]        Today's Diagnoses     Hip pain, right    -  1    Right knee pain, unspecified chronicity        Right shoulder pain, unspecified chronicity        Mixed incontinence urge and stress (male)(female)        Type 2 diabetes mellitus without complication, without long-term current use of insulin (H)        Numbness and tingling of both feet          Care Instructions    1) Schedule with the neurologist (we will help you schedule this) for your feet. We will check labs today for this.   2) Schedule with urology for your incontinence.   3) Schedule with the sports medicine doctor for your hip, knee and shoulder pain. They may also recommend PT.           Follow-ups after your visit        Additional Services     NEUROLOGY ADULT REFERRAL       Your provider has referred you for the following:   Consult at [facility]: [facility] [phone]   [email or web address]    Please be aware that coverage of these services is subject to the terms and limitations of your health insurance plan.  Call member services at your health plan with any benefit or coverage questions.      Please bring the following with you to your appointment:    (1) Any X-Rays, CTs or MRIs which have been performed.  Contact the facility where they were done to arrange for  prior to your scheduled appointment.    (2) List of current medications  (3) This referral request   (4) Any documents/labs given to you for this referral            ORTHO  REFERRAL       [facility] is referring you to the Orthopedic  Services at [facility].       The  Representative will  assist you in the coordination of your Orthopedic and Musculoskeletal Care as prescribed by your physician.    The [facility] Representative will call you within 1 business day to help schedule your appointment, or you may contact the  Representative at:    All areas ~ [phone]     Type of Referral : Non Surgical       Timeframe requested: Routine    Coverage of these services is subject to the terms and limitations of your health insurance plan.  Please call member services at your health plan with any benefit or coverage questions.      If X-rays, CT or MRI's have been performed, please contact the facility where they were done to arrange for , prior to your scheduled appointment.  Please bring this referral request to your appointment and present it to your specialist.            UROLOGY ADULT REFERRAL       Your provider has referred you to: [facility]: [facility] [phone]   [email or web address]    Please be aware that coverage of these services is subject to the terms and limitations of your health insurance plan.  Call member services at your health plan with any benefit or coverage questions.      Please bring the following with you to your appointment:    (1) Any X-Rays, CTs or MRIs which have been performed.  Contact the facility where they were done to arrange for  prior to your scheduled appointment.    (2) List of current medications  (3) This referral request   (4) Any documents/labs given to you for this referral                  Future tests that were ordered for you today     Open Future Orders        Priority Expected Expires Ordered    XR Hip Right 2-3 Views Routine 2/14/2018 2/14/2019 2/14/2018            Who to contact     If you have questions or need follow up information about today's clinic visit or your schedule please contact [facility] directly at [phone].  Normal or  "non-critical lab and imaging results will be communicated to you by MyChart, letter or phone within 4 business days after the clinic has received the results. If you do not hear from us within 7 days, please contact the clinic through [employer] or phone. If you have a critical or abnormal lab result, we will notify you by phone as soon as possible.  Submit refill requests through [employer] or call your pharmacy and they will forward the refill request to us. Please allow 3 business days for your refill to be completed.          Additional Information About Your Visit        [employer] Information     [employer] lets you send messages to your doctor, view your test results, renew your prescriptions, schedule appointments and more. To sign up, go to [email or web address] . Click on \"Log in\" on the left side of the screen, which will take you to the Welcome page. Then click on \"Sign up Now\" on the right side of the page.     You will be asked to enter the access code listed below, as well as some personal information. Please follow the directions to create your username and password.     Your access code is: [email or web address]  Expires: 3/8/2018  8:59 AM     Your access code will  in 90 days. If you need help or a new code, please call your [place] clinic or [phone].        Care EveryWhere ID     This is your Care EveryWhere ID. This could be used by other organizations to access your [place] medical records  [id number]        Your Vitals Were     Pulse Temperature Respirations Height Pulse Oximetry BMI (Body Mass Index)    64 97.8  F (36.6  C) (Oral) 16 5' 3.75\" (1.619 m) 98% 27.77 kg/m2       Blood Pressure from Last 3 Encounters:   18 138/84   17 121/73   17 140/69    Weight from Last 3 Encounters:   18 160 lb 8 oz (72.8 kg)   17 163 lb 8 oz (74.2 kg)   17 163 lb 8 oz (74.2 kg)              We Performed the Following     *UA reflex to Microscopic and Culture ([place] [facility] [place] [facility] (except [place] and [place])     Folate     Hemoglobin A1c     NEUROLOGY ADULT REFERRAL     ORTHO  REFERRAL     UROLOGY ADULT REFERRAL     Vitamin B12          Today's Medication Changes          These changes are accurate as of 2/14/18 11:42 AM.  If you have any questions, ask your nurse or doctor.               These medicines have changed or have updated prescriptions.        Dose/Directions    * blood glucose monitoring test strip   Commonly known as:  SAMEER CONTOUR NEXT   This may have changed:  Another medication with the same name was added. Make sure you understand how and when to take each.   Changed by:  Krysten Molina MD        Dose:  1 strip   1 strip by In Vitro route daily   Quantity:  100 strip   Refills:  11       * blood glucose monitoring test strip   Commonly known as:  no brand specified   This may have changed:  You were already taking a medication with the same name, and this prescription was added. Make sure you understand how and when to take each.   Used for:  Type 2 diabetes mellitus without complication, without long-term current use of insulin (H)   Changed by:  Krysten Molina MD        Use to test blood sugars 1 times daily or as directed. Needs strips that will work with her contour next ez meter.   Quantity:  100 strip   Refills:  3       * Notice:  This list has 2 medication(s) that are the same as other medications prescribed for you. Read the directions carefully, and ask your doctor or other care provider to review them with you.         Where to get your medicines      These medications were sent to [id number] - [place], [place] - [address]  [address], [place] [address]     Phone:  [phone]     blood glucose monitoring test strip                Primary Care Provider Office Phone # Fax #    Krysten Molina [phone] [address] [place] [address]        Equal Access to Services     KEVIN  GAAR : Hadii aad ku hadanson Esparzaali, waaxda luqadaha, qaybta kaalmada adehai, imelda javierin hayaalc rashid ericcharles gordon . So [facility] [phone].    ATENCIÓN: Si habla español, tiene a husain disposición servicios gratuitos de asistencia lingüística. Llame al [phone].    We comply with applicable federal civil rights laws and [place] laws. We do not discriminate on the basis of race, color, national origin, age, disability, sex, sexual orientation, or gender identity.            Thank you!     Thank you for choosing [facility]  for your care. Our goal is always to provide you with excellent care. Hearing back from our patients is one way we can continue to improve our services. Please take a few minutes to complete the written survey that you may receive in the mail after your visit with us. Thank you!             Your Updated Medication List - Protect others around you: Learn how to safely use, store and throw away your medicines at www.disposemymeds.org.          This list is accurate as of 2/14/18 11:42 AM.  Always use your most recent med list.                   Brand Name Dispense Instructions for use Diagnosis    Alfalfa 650 MG Tabs      Take 2 capsules by mouth 2 times daily For arthritis pain        amLODIPine 2.5 MG tablet    NORVASC    90 tablet    TAKE ONE TABLET BY MOUTH ONE TIME DAILY    Hypertension goal BP (blood pressure) < 150/90       apixaban ANTICOAGULANT 5 MG tablet    ELIQUIS    60 tablet    Take 1 tablet (5 mg) by mouth 2 times daily    Paroxysmal atrial fibrillation (H)       blood glucose monitoring lancets     3 Box    Use to test blood sugars once daily as directed.        * blood glucose monitoring test strip    SAMEER CONTOUR NEXT    100 strip    1 strip by In Vitro route daily        * blood glucose monitoring test strip    no brand specified    100 strip    Use to test blood sugars 1 times daily or as directed. Needs strips that will work with her contour next ez  meter.    Type 2 diabetes mellitus without complication, without long-term current use of insulin (H)       carvedilol 25 MG tablet    COREG    180 tablet    Take 1 tablet (25 mg) by mouth 2 times daily (with meals)    SOB (shortness of breath), Essential hypertension with goal blood pressure less than 140/90       cholecalciferol 1000 UNIT tablet    vitamin D3    30    1 TABLET DAILY        Flaxseed (Linseed) Powd      1.5  tablespoon daily    Type II or unspecified type diabetes mellitus without mention of complication, not stated as uncontrolled       fluticasone 50 MCG/ACT spray    FLONASE    3 Package    Spray 1-2 sprays into both nostrils daily    Cough, Allergic rhinitis       hydrochlorothiazide 25 MG tablet    HYDRODIURIL    90 tablet    TAKE ONE TABLET BY MOUTH ONE TIME DAILY    Hypertension goal BP (blood pressure) < 140/90       lisinopril 40 MG tablet    PRINIVIL/ZESTRIL    90 tablet    Take 1 tablet (40 mg) by mouth daily    Hypertension goal BP (blood pressure) < 140/90       oxybutynin 5 MG tablet    DITROPAN    90 tablet    TAKE 1/2 TABLET BY MOUTH TWICE DAILY    Female stress incontinence       triamcinolone 0.5 % cream    KENALOG    15 g    Apply topically 2 times daily to affected area as directed.    Itching       TUMS E-X 750 750 MG Chew   Generic drug:  calcium carbonate      Take 750 mg by mouth daily.        * Notice:  This list has 2 medication(s) that are the same as other medications prescribed for you. Read the directions carefully, and ask your doctor or other care provider to review them with you.

## 2018-02-14 NOTE — PATIENT INSTRUCTIONS
1) Schedule with the neurologist (we will help you schedule this) for your feet. We will check labs today for this.   2) Schedule with urology for your incontinence.   3) Schedule with the sports medicine doctor for your hip, knee and shoulder pain. They may also recommend PT.

## 2018-02-20 ENCOUNTER — TELEPHONE (OUTPATIENT)
Dept: FAMILY MEDICINE | Facility: CLINIC | Age: 83
End: 2018-02-20

## 2018-02-27 DIAGNOSIS — I10 HYPERTENSION GOAL BP (BLOOD PRESSURE) < 150/90: ICD-10-CM

## 2018-02-27 RX ORDER — AMLODIPINE BESYLATE 2.5 MG/1
TABLET ORAL
Qty: 90 TABLET | Refills: 0 | Status: SHIPPED | OUTPATIENT
Start: 2018-02-27 | End: 2018-05-07

## 2018-02-27 NOTE — TELEPHONE ENCOUNTER
"Requested Prescriptions   Pending Prescriptions Disp Refills     amLODIPine (NORVASC) 2.5 MG tablet [Pharmacy Med Name: AmLODIPine Besylate Oral Tablet 2.5 MG]  Last Written Prescription Date:  9/5/2017  Last Fill Quantity: 90 TABLET,  # refills: 1   Last Office Visit: 2/14/2018   Future Office Visit:      90 tablet 0     Sig: TAKE ONE TABLET BY MOUTH ONE TIME DAILY    Calcium Channel Blockers Protocol  Failed    2/27/2018  7:59 AM       Failed - No positive pregnancy test in past 12 months       Passed - Blood pressure under 140/90 in past 12 months    BP Readings from Last 3 Encounters:   02/14/18 138/84   12/08/17 121/73   09/07/17 140/69          Passed - Recent or future visit with authorizing provider    Patient had office visit in the last year or has a visit in the next 30 days with authorizing provider.  See \"Patient Info\" tab in inbasket, or \"Choose Columns\" in Meds & Orders section of the refill encounter.          Passed - Patient is age 18 or older       Passed - No active pregnancy on record       Passed - Normal serum creatinine on file in past 12 months    Recent Labs   Lab Test  09/07/17   0954   CR  0.75               "

## 2018-02-28 ENCOUNTER — OFFICE VISIT (OUTPATIENT)
Dept: NEUROLOGY | Facility: CLINIC | Age: 83
End: 2018-02-28
Payer: COMMERCIAL

## 2018-02-28 VITALS
WEIGHT: 162 LBS | SYSTOLIC BLOOD PRESSURE: 126 MMHG | DIASTOLIC BLOOD PRESSURE: 70 MMHG | HEIGHT: 64 IN | BODY MASS INDEX: 27.66 KG/M2 | TEMPERATURE: 98.3 F | HEART RATE: 58 BPM | RESPIRATION RATE: 18 BRPM | OXYGEN SATURATION: 96 %

## 2018-02-28 DIAGNOSIS — R20.0 NUMBNESS IN FEET: Primary | ICD-10-CM

## 2018-02-28 DIAGNOSIS — R20.2 LEFT HAND PARESTHESIA: ICD-10-CM

## 2018-02-28 PROCEDURE — 99205 OFFICE O/P NEW HI 60 MIN: CPT | Performed by: PSYCHIATRY & NEUROLOGY

## 2018-02-28 NOTE — PROGRESS NOTES
"INITIAL NEUROLOGY CONSULTATION    DATE OF VISIT: 2/28/2018  CLINIC LOCATION: [facility]  MRN: [id number]  PATIENT NAME: Aundrea Treviño  YOB: 1934    PRIMARY CARE PROVIDER: Krysten Molina MD.     REASON FOR VISIT:   Chief Complaint   Patient presents with     Consult     numbness/tingling in both feet     HISTORY OF PRESENT ILLNESS:                                                    Ms. Aundrea Treviño is 83 year old right handed female patient with past medical history of essential tremor, dystonia, gout, diabetes mellitus type 2 (most recent hemoglobin A1C is 7.0), and paroxysmal atrial fibrillation (on apixaban), who was seen in consultation today requested by Krysten Molina MD, for bilateral feet paresthesias.    Per patient's report, she was in her usual state of health until approximately 2 years ago, when she noticed gradual onset of constant feet numbness after the initiation of Norvasc.  At that time, her feet were swollen and she had rash.  Symptoms improved after amlodipine was stopped, except numbness that persists since that time.  She feels that she is constantly \"wearing socks, which is not true\".  She denies any associated focal weakness.  Does not trip over her feet.  No recent falls.    In addition, for about a year the patient noted intermittent tingling of her palmar surface of her left hand and first 3 fingers.  These symptoms occur multiple times a day lasting for several minutes.  There is no associated pain or weakness.    For both locations of the sensory complaints, the patient did not notice any aggravating or alleviating factors.  She did not try any treatments.  She denies other focal neurological symptoms.    The patient was previously seen by Dr. Tobin at the [facility] in 2002 for TGA and 2015 for essential tremor and dystonia.    Recent laboratory evaluation (2018) includes hemoglobin A1C of 7.0, normal folate of 15.2, normal " vitamin B12 of 446, normal UA.  Her LDL was 88, CMP was unremarkable, except elevated glucose of 163.  No prior brain/spine imaging.    The patient denies a history of recent head injury. Prior neurological history: negative for migraine, stroke, brain neoplasms, seizure disorders, multiple sclerosis, meningitis, encephalitis, and major head injuries.    Neurologic Review of Systems - no amaurosis, diplopia, abnormal speech, unilateral numbness or weakness. She endorses irregular heartbeat (known atrial fibrillation, on apixaban), varicose veins, bilateral hearing loss, ringing in the ears, urinary incontinence, arthritis, muscle tenderness, shortness of breath, and memory problems.  These symptoms have been discussed with other medical providers.  Otherwise, she denies any other complaints on 14-point comprehensive review of systems.    PAST MEDICAL/SURGICAL HISTORY:                                                    I personally reviewed patient's past medical and surgical history with the patient at today's visit.  Past Medical History:   Diagnosis Date     Chronic anticoagulation 2/19/2017     Diverticulitis of colon (without mention of hemorrhage)(562.11)      DM type 2, goal A1C below 8.0 12/3/2014     Essential and other specified forms of tremor     eval'd by neuro 2005     Hyperlipidemia LDL goal <100 5/9/2010     Hypertension goal BP (blood pressure) < 140/90      Intestinal disaccharidase deficiencies and disaccharide malabsorption      Moderate tricuspid regurgitation 2/19/2017     Osteoarthrosis, unspecified whether generalized or localized, unspecified site      Paroxysmal atrial fibrillation (H) 2/19/2017     Pulmonary hypertension, mild 2/19/2017     Type 2 diabetes, HbA1c goal < 7% (H) 5/2/2010     Urge incontinence      Past Surgical History:   Procedure Laterality Date     SURGICAL HISTORY OF -       hysterectomy, bladder suspension     SURGICAL HISTORY OF -       tonsillectomy     MEDICATIONS:                                                     I personally reviewed patient's medications and allergies with the patient at today's visit.  Current Outpatient Prescriptions on File Prior to Visit:  amLODIPine (NORVASC) 2.5 MG tablet TAKE ONE TABLET BY MOUTH ONE TIME DAILY    blood glucose monitoring (NO BRAND SPECIFIED) test strip Use to test blood sugars 1 times daily or as directed. Needs strips that will work with her contour next ez meter.   apixaban ANTICOAGULANT (ELIQUIS) 5 MG tablet Take 1 tablet (5 mg) by mouth 2 times daily   hydrochlorothiazide (HYDRODIURIL) 25 MG tablet TAKE ONE TABLET BY MOUTH ONE TIME DAILY    carvedilol (COREG) 25 MG tablet Take 1 tablet (25 mg) by mouth 2 times daily (with meals)   oxybutynin (DITROPAN) 5 MG tablet TAKE 1/2 TABLET BY MOUTH TWICE DAILY   lisinopril (PRINIVIL/ZESTRIL) 40 MG tablet Take 1 tablet (40 mg) by mouth daily   blood glucose monitoring (SAMEER CONTOUR NEXT) test strip 1 strip by In Vitro route daily (Patient not taking: Reported on 2/14/2018)   blood glucose monitoring (SAMEER MICROLET) lancets Use to test blood sugars once daily as directed. (Patient not taking: Reported on 2/14/2018)   triamcinolone (KENALOG) 0.5 % cream Apply topically 2 times daily to affected area as directed.   fluticasone (FLONASE) 50 MCG/ACT nasal spray Spray 1-2 sprays into both nostrils daily (Patient not taking: Reported on 2/14/2018)   Alfalfa 650 MG TABS Take 2 capsules by mouth 2 times daily For arthritis pain   [DISCONTINUED] oxybutynin (DITROPAN) 5 MG tablet Take 0.5 tablets by mouth 2 times daily.   calcium carbonate (TUMS E-X 750) 750 MG CHEW Take 750 mg by mouth daily.   FLAXSEED (LINSEED) PO POWD 1.5  tablespoon daily   VITAMIN D 1000 UNIT OR TABS 1 TABLET DAILY     ALLERGIES:                                                      Allergies   Allergen Reactions     Amlodipine      Swollen ankles and rash but tolerates 2.5 mg without any symptoms     Apple [Chromium]      Kiwi   "    Nuts      Pear      Tape [Adhesive Tape]      FAMILY/SOCIAL HISTORY:                                                    Family and social history was reviewed with the patient at today's visit.  Family history is positive for stroke.   Problem (# of Occurrences) Relation (Name,Age of Onset)    Arthritis (1) Father    CEREBROVASCULAR DISEASE (1) Father    DIABETES (1) Father: type 2    Hypertension (1) Father    Other Cancer (1) Sister        , lives alone.  Warmer smoker, quit in 1970.  Denies current alcohol and recreational drug use.  Retired.  Social History   Substance Use Topics     Smoking status: Former Smoker     Quit date: 4/22/1970     Smokeless tobacco: Never Used     Alcohol use Yes      Comment: rarely, a glass of wine on a special occassion     REVIEW OF SYSTEMS:                                                    Patient has completed a [facility] Patient Health History, including a 14-system review, which was personally reviewed, and pertinent positives are listed in HPI. She denies any additional problems on the further questioning.    EXAM:                                                    VITAL SIGNS:   /78 (BP Location: Left arm, Patient Position: Sitting, Cuff Size: Adult Large)  Pulse 58  Temp 98.3  F (36.8  C) (Oral)  Resp 18  Ht 1.626 m (5' 4\")  Wt 73.5 kg (162 lb)  SpO2 96%  BMI 27.81 kg/m2  Repeat blood pressure: /70 (BP Location: Left arm, Patient Position: Sitting, Cuff Size: Adult Large)  Pulse 58  Temp 98.3  F (36.8  C) (Oral)  Resp 18  Ht 1.626 m (5' 4\")  Wt 73.5 kg (162 lb)  SpO2 96%  BMI 27.81 kg/m2    General: pt is in NAD, cooperative.  Skin: normal turgor, moist mucous membranes, no lesions/rashes noticed.  HEENT: ATNC, EOMI, PERRL, white sclera, normal conjunctiva, no nystagmus or ptosis. No carotid bruits bilaterally.  Respiratory: lung sounds clear to auscultation bilaterally, no crackles, wheezes, rhonchi. Symmetric lung excursion, " no accessory respiratory muscle use.  Cardiovascular: Irregularly irregular, no murmurs/rubs/gallops.   Abdomen: Not distended.  : deferred.    Neurological:  Mental: alert, follows commands, mini-cog is 4/5 with 2/3 on memory recall, no aphasia or dysarthria. Fund of knowledge is appropriate for age.  Cranial Nerves:  CN II: visual acuity - able to accurately count fingers with each eye. Visual fields intact, fundi: discs sharp, no papilledema and normal vessels bilaterally.  CN III, IV, VI: EOM intact, pupils equal and reactive  CN V: facial sensation nl  CN VII: face symmetric, no facial droop  CN VIII: hearing bilaterally reduced  CN IX: palate elevation symmetric, uvula at midline  CN XI SCM normal, shoulder shrug nl  CN XII: tongue midline  Motor: Strength: 5/5 in all major groups of all extremities. Normal tone. No abnormal movements. No pronator drift b/l.  Reflexes: Triceps, biceps, brachioradialis, patellar, and achilles reflexes normal and symmetric. No clonus noted. Toes are down-going b/l.  Phalen's test is positive on the left and is negative on the right.  Tinel's tests are negative bilaterally.  Sensory: temperature and light touch sensations are intact, pinprick and vibration are symmetrically reduced in both feet and 10 cm above her ankles. Romberg: negative.  Coordination: FNF and heel-shin tests intact b/l. No dysdiadochokinesia with rapid alternating movements.  Gait:  Normal, except mild difficulty with tandem walk.    DATA:     LABS: I personally reviewed the following labs:  Office Visit on 02/14/2018   Component Date Value Ref Range Status     Color Urine 02/14/2018 Yellow   Final     Appearance Urine 02/14/2018 Clear   Final     Glucose Urine 02/14/2018 Negative  NEG^Negative mg/dL Final     Bilirubin Urine 02/14/2018 Negative  NEG^Negative Final     Ketones Urine 02/14/2018 Negative  NEG^Negative mg/dL Final     Specific Gravity Urine 02/14/2018 1.020  1.003 - 1.035 Final     Blood Urine  02/14/2018 Negative  NEG^Negative Final     pH Urine 02/14/2018 7.0  5.0 - 7.0 pH Final     Protein Albumin Urine 02/14/2018 Negative  NEG^Negative mg/dL Final     Urobilinogen Urine 02/14/2018 0.2  0.2 - 1.0 EU/dL Final     Nitrite Urine 02/14/2018 Negative  NEG^Negative Final     Leukocyte Esterase Urine 02/14/2018 Negative  NEG^Negative Final     Source 02/14/2018 Midstream Urine   Final     Vitamin B12 02/14/2018 446  193 - 986 pg/mL Final     Hemoglobin A1C 02/14/2018 7.0* 4.3 - 6.0 % Final     Folate 02/14/2018 15.2  >5.4 ng/mL Final   Office Visit on 09/07/2017   Component Date Value Ref Range Status     Sodium 09/07/2017 136  133 - 144 mmol/L Final     Potassium 09/07/2017 3.6  3.4 - 5.3 mmol/L Final     Chloride 09/07/2017 99  94 - 109 mmol/L Final     Carbon Dioxide 09/07/2017 28  20 - 32 mmol/L Final     Anion Gap 09/07/2017 9  3 - 14 mmol/L Final     Glucose 09/07/2017 163* 70 - 99 mg/dL Final     Urea Nitrogen 09/07/2017 14  7 - 30 mg/dL Final     Creatinine 09/07/2017 0.75  0.52 - 1.04 mg/dL Final     GFR Estimate 09/07/2017 73  >60 mL/min/1.7m2 Final    Non  GFR Calc     GFR Estimate If Black 09/07/2017 89  >60 mL/min/1.7m2 Final    African American GFR Calc     Calcium 09/07/2017 9.3  8.5 - 10.1 mg/dL Final     Bilirubin Total 09/07/2017 1.2  0.2 - 1.3 mg/dL Final     Albumin 09/07/2017 3.9  3.4 - 5.0 g/dL Final     Protein Total 09/07/2017 7.0  6.8 - 8.8 g/dL Final     Alkaline Phosphatase 09/07/2017 95  40 - 150 U/L Final     ALT 09/07/2017 21  0 - 50 U/L Final     AST 09/07/2017 15  0 - 45 U/L Final     Hemoglobin A1C 09/07/2017 6.9* 4.3 - 6.0 % Final     Cholesterol 09/07/2017 155  <200 mg/dL Final     Triglycerides 09/07/2017 70  <150 mg/dL Final     HDL Cholesterol 09/07/2017 53  >49 mg/dL Final     LDL Cholesterol Calculated 09/07/2017 88  <100 mg/dL Final    Desirable:       <100 mg/dl     Non HDL Cholesterol 09/07/2017 102  <130 mg/dL Final     IMAGING/OTHER STUDIES: I  reviewed pertinent medical records, including previous neurology notes, as detailed in the history of present illness.    ASSESSMENT and PLAN:      ASSESSMENT: Aundrea Treviño is a 83 year old female patient with past medical history of essential tremor, dystonia, gout, diabetes mellitus type 2 (most recent hemoglobin A1C is 7.0), and paroxysmal atrial fibrillation (on apixaban), who presents with bilateral constant feet numbness, started 2 years ago, and left arm tingling, started 1 year ago.    We had a detailed discussion with the patient regarding her symptoms.  Her neurological exam today is suggestive of mild sensory peripheral neuropathy (diabetes would be most likely etiology) and possibly mild carpal tunnel syndrome.  I ordered EMG to clarify.    DIAGNOSES:    ICD-10-CM    1. Numbness in feet R20.0 EMG   2. Left hand paresthesia R20.2 EMG     PLAN: At today's visit we thoroughly discussed various diagnostic possibilities for patient's symptoms and the reasons for work-up, which includes:  Orders Placed This Encounter   Procedures     EMG     No new medications were ordered.    Patient's blood pressure was elevated during today's visit.  I recommended the patient to discuss it with her primary care provider.    Additional recommendations after the work-up.    Next follow-up appointment is in the next 4-8 weeks or earlier if needed.    I advised the patient to call me with any questions or concerns.    Total Time:  62 minutes with > 50% spent counseling the patient on stated above assessment and recommendations, including nature of the diagnosis, needed w/u, proposed plan of treatment, and prognosis.  Additional time was used to answer patient's questions.    Tramaine Borja MD  / Neurology  [place]  (Chart documentation was completed in part with Dragon voice-recognition software. Even though reviewed, some grammatical, spelling, and word errors may remain.)

## 2018-02-28 NOTE — PATIENT INSTRUCTIONS
AFTER VISIT SUMMARY (AVS):    At today's visit we discussed various diagnostic possibilities for your symptoms and the reasons for work-up, which includes:  Orders Placed This Encounter   Procedures     EMG     No new medications were ordered.    Your blood pressure was elevated during today's visit.  Please discuss it with your primary care provider.    Additional recommendations after the work-up.    Next follow-up appointment is in the next 4-8 weeks or earlier if needed.    Please do not hesitate to call me with any questions or concerns.    Thanks.

## 2018-02-28 NOTE — MR AVS SNAPSHOT
After Visit Summary   2/28/2018    Aundrea Treviño    MRN: [id number]           Patient Information     Date Of Birth          8/29/1934        Visit Information        Provider Department      2/28/2018 1:00 PM Tramaine Borja MD [facility]        Today's Diagnoses     Numbness in feet    -  1    Left hand paresthesia          Care Instructions    AFTER VISIT SUMMARY (AVS):    At today's visit we discussed various diagnostic possibilities for your symptoms and the reasons for work-up, which includes:  Orders Placed This Encounter   Procedures     EMG     No new medications were ordered.    Your blood pressure was elevated during today's visit.  Please discuss it with your primary care provider.    Additional recommendations after the work-up.    Next follow-up appointment is in the next 4-8 weeks or earlier if needed.    Please do not hesitate to call me with any questions or concerns.    Thanks.            Follow-ups after your visit        Follow-up notes from your care team     Return in about 4 weeks (around 3/28/2018).      Your next 10 appointments already scheduled     Mar 02, 2018  2:00 PM CST   New Visit with Luis Lorenzana MD   [facility] ([facility])    [address] [place] [id number] [phone]              Future tests that were ordered for you today     Open Future Orders        Priority Expected Expires Ordered    EMG Routine  2/28/2019 2/28/2018            Who to contact     If you have questions or need follow up information about today's clinic visit or your schedule please contact [facility] directly at [phone].  Normal or non-critical lab and imaging results will be communicated to you by MyChart, letter or phone within 4 business days after the clinic has received the results. If you do not hear from us within 7 days, please contact  "the clinic through Secrettehart or phone. If you have a critical or abnormal lab result, we will notify you by phone as soon as possible.  Submit refill requests through zweitgeist or call your pharmacy and they will forward the refill request to us. Please allow 3 business days for your refill to be completed.          Additional Information About Your Visit        Secrettehart Information     zweitgeist lets you send messages to your doctor, view your test results, renew your prescriptions, schedule appointments and more. To sign up, go to [email or web address] . Click on \"Log in\" on the left side of the screen, which will take you to the Welcome page. Then click on \"Sign up Now\" on the right side of the page.     You will be asked to enter the access code listed below, as well as some personal information. Please follow the directions to create your username and password.     Your access code is: [email or web address]  Expires: 3/8/2018  8:59 AM     Your access code will  in 90 days. If you need help or a new code, please call your [place] clinic or [phone].        Care EveryWhere ID     This is your Care EveryWhere ID. This could be used by other organizations to access your [place] medical records  [id number]        Your Vitals Were     Pulse Temperature Respirations Height Pulse Oximetry BMI (Body Mass Index)    58 98.3  F (36.8  C) (Oral) 18 1.626 m (5' 4\") 96% 27.81 kg/m2       Blood Pressure from Last 3 Encounters:   18 156/78   18 138/84   17 121/73    Weight from Last 3 Encounters:   18 73.5 kg (162 lb)   18 72.8 kg (160 lb 8 oz)   17 74.2 kg (163 lb 8 oz)               Primary Care Provider Office Phone # Fax #    Krysten Molina [phone] [address]  [facility] [address]        Equal Access to Services     [facility] AH: Krista Merlos, angelina henry, qaybta imelda llanos. So [facility] " [phone].    ATENCIÓN: Si gilbert perez, tiene a husain disposición servicios gratuitos de asistencia lingüística. Maria Antonia gupta [phone].    We comply with applicable federal civil rights laws and [place] laws. We do not discriminate on the basis of race, color, national origin, age, disability, sex, sexual orientation, or gender identity.            Thank you!     Thank you for choosing [facility]  for your care. Our goal is always to provide you with excellent care. Hearing back from our patients is one way we can continue to improve our services. Please take a few minutes to complete the written survey that you may receive in the mail after your visit with us. Thank you!             Your Updated Medication List - Protect others around you: Learn how to safely use, store and throw away your medicines at www.disposemymeds.org.          This list is accurate as of 2/28/18  1:31 PM.  Always use your most recent med list.                   Brand Name Dispense Instructions for use Diagnosis    Alfalfa 650 MG Tabs      Take 2 capsules by mouth 2 times daily For arthritis pain        amLODIPine 2.5 MG tablet    NORVASC    90 tablet    TAKE ONE TABLET BY MOUTH ONE TIME DAILY    Hypertension goal BP (blood pressure) < 150/90       apixaban ANTICOAGULANT 5 MG tablet    ELIQUIS    60 tablet    Take 1 tablet (5 mg) by mouth 2 times daily    Paroxysmal atrial fibrillation (H)       blood glucose monitoring lancets     3 Box    Use to test blood sugars once daily as directed.        * blood glucose monitoring test strip    SAMEER CONTOUR NEXT    100 strip    1 strip by In Vitro route daily        * blood glucose monitoring test strip    no brand specified    100 strip    Use to test blood sugars 1 times daily or as directed. Needs strips that will work with her contour next ez meter.    Type 2 diabetes mellitus without complication, without long-term current use of insulin (H)       carvedilol 25 MG tablet     COREG    180 tablet    Take 1 tablet (25 mg) by mouth 2 times daily (with meals)    SOB (shortness of breath), Essential hypertension with goal blood pressure less than 140/90       cholecalciferol 1000 UNIT tablet    vitamin D3    30    1 TABLET DAILY        Flaxseed (Linseed) Powd      1.5  tablespoon daily    Type II or unspecified type diabetes mellitus without mention of complication, not stated as uncontrolled       fluticasone 50 MCG/ACT spray    FLONASE    3 Package    Spray 1-2 sprays into both nostrils daily    Cough, Allergic rhinitis       hydrochlorothiazide 25 MG tablet    HYDRODIURIL    90 tablet    TAKE ONE TABLET BY MOUTH ONE TIME DAILY    Hypertension goal BP (blood pressure) < 140/90       lisinopril 40 MG tablet    PRINIVIL/ZESTRIL    90 tablet    Take 1 tablet (40 mg) by mouth daily    Hypertension goal BP (blood pressure) < 140/90       oxybutynin 5 MG tablet    DITROPAN    90 tablet    TAKE 1/2 TABLET BY MOUTH TWICE DAILY    Female stress incontinence       triamcinolone 0.5 % cream    KENALOG    15 g    Apply topically 2 times daily to affected area as directed.    Itching       TUMS E-X 750 750 MG Chew   Generic drug:  calcium carbonate      Take 1,500 mg by mouth daily        * Notice:  This list has 2 medication(s) that are the same as other medications prescribed for you. Read the directions carefully, and ask your doctor or other care provider to review them with you.

## 2018-02-28 NOTE — NURSING NOTE
COGNITIVE SCREEN  1) Repeat 3 items (Banana, Sunrise, Chair)    2) Clock draw: NORMAL  3) 3 item recall: Recalls 2 objects   Results: NORMAL clock, 1-2 items recalled: COGNITIVE IMPAIRMENT LESS LIKELY    Mini-CogTM Copyright S Shamika. Licensed by the author for use in [facility]; reprinted with permission ([email or web address]). All rights reserved.        Riana Amaya, [facility]  Neurology

## 2018-03-02 ENCOUNTER — OFFICE VISIT (OUTPATIENT)
Dept: ORTHOPEDICS | Facility: CLINIC | Age: 83
End: 2018-03-02
Payer: COMMERCIAL

## 2018-03-02 VITALS
DIASTOLIC BLOOD PRESSURE: 77 MMHG | BODY MASS INDEX: 27.66 KG/M2 | HEIGHT: 64 IN | SYSTOLIC BLOOD PRESSURE: 146 MMHG | WEIGHT: 162 LBS

## 2018-03-02 DIAGNOSIS — M94.9 DISORDER OF BONE AND CARTILAGE: ICD-10-CM

## 2018-03-02 DIAGNOSIS — Z79.01 CHRONIC ANTICOAGULATION: ICD-10-CM

## 2018-03-02 DIAGNOSIS — E11.9 TYPE 2 DIABETES MELLITUS WITHOUT COMPLICATION, WITHOUT LONG-TERM CURRENT USE OF INSULIN (H): ICD-10-CM

## 2018-03-02 DIAGNOSIS — M16.11 PRIMARY OSTEOARTHRITIS OF RIGHT HIP: Primary | ICD-10-CM

## 2018-03-02 DIAGNOSIS — M89.9 DISORDER OF BONE AND CARTILAGE: ICD-10-CM

## 2018-03-02 PROCEDURE — 99203 OFFICE O/P NEW LOW 30 MIN: CPT | Performed by: FAMILY MEDICINE

## 2018-03-02 NOTE — MR AVS SNAPSHOT
"              After Visit Summary   3/2/2018    Aundrea Treviño    MRN: [id number]           Patient Information     Date Of Birth          8/29/1934        Visit Information        Provider Department      3/2/2018 2:00 PM Luis Lorenzana MD [facility] [place]        Today's Diagnoses     Primary osteoarthritis of right hip    -  1    Chronic anticoagulation        Type 2 diabetes mellitus without complication, without long-term current use of insulin (H)        Disorder of bone and cartilage           Follow-ups after your visit        Who to contact     If you have questions or need follow up information about today's clinic visit or your schedule please contact [facility] [place] directly at [phone].  Normal or non-critical lab and imaging results will be communicated to you by MyChart, letter or phone within 4 business days after the clinic has received the results. If you do not hear from us within 7 days, please contact the clinic through [employer] or phone. If you have a critical or abnormal lab result, we will notify you by phone as soon as possible.  Submit refill requests through [employer] or call your pharmacy and they will forward the refill request to us. Please allow 3 business days for your refill to be completed.          Additional Information About Your Visit        MyChart Information     [employer] lets you send messages to your doctor, view your test results, renew your prescriptions, schedule appointments and more. To sign up, go to [email or web address] . Click on \"Log in\" on the left side of the screen, which will take you to the Welcome page. Then click on \"Sign up Now\" on the right side of the page.     You will be asked to enter the access code listed below, as well as some personal information. Please follow the directions to create your username and password.     Your access code is: [email or web address]  Expires: 3/8/2018  " "8:59 AM     Your access code will  in 90 days. If you need help or a new code, please call your [facility] clinic or [phone].        Care EveryWhere ID     This is your Care EveryWhere ID. This could be used by other organizations to access your [facility] medical records  [id number]        Your Vitals Were     Height BMI (Body Mass Index)                5' 4\" (1.626 m) 27.81 kg/m2           Blood Pressure from Last 3 Encounters:   18 146/77   18 126/70   18 138/84    Weight from Last 3 Encounters:   18 162 lb (73.5 kg)   18 162 lb (73.5 kg)   18 160 lb 8 oz (72.8 kg)              Today, you had the following     No orders found for display       Primary Care Provider Office Phone # Fax #    Krysten Molina [phone] [address] [place] [address]        Equal Access to Services     [facility] : Hadii aad ku hadasho Soomaali, waaxda luqadaha, qaybta kaalmada adeegyada, waxay mallory haykevon gordon . [facility] [phone].    ATENCIÓN: Si habla español, tiene a husain disposición servicios gratuitos de asistencia lingüística. [facility] [phone].    We comply with applicable federal civil rights laws and [place] laws. We do not discriminate on the basis of race, color, national origin, age, disability, sex, sexual orientation, or gender identity.            Thank you!     Thank you for choosing [facility] [place]  for your care. Our goal is always to provide you with excellent care. Hearing back from our patients is one way we can continue to improve our services. Please take a few minutes to complete the written survey that you may receive in the mail after your visit with us. Thank you!             Your Updated Medication List - Protect others around you: Learn how to safely use, store and throw away your medicines at www.disposemymeds.org.          This list is accurate as of 3/2/18 11:59 PM.  " Always use your most recent med list.                   Brand Name Dispense Instructions for use Diagnosis    Alfalfa 650 MG Tabs      Take 2 capsules by mouth 2 times daily For arthritis pain        amLODIPine 2.5 MG tablet    NORVASC    90 tablet    TAKE ONE TABLET BY MOUTH ONE TIME DAILY    Hypertension goal BP (blood pressure) < 150/90       apixaban ANTICOAGULANT 5 MG tablet    ELIQUIS    60 tablet    Take 1 tablet (5 mg) by mouth 2 times daily    Paroxysmal atrial fibrillation (H)       blood glucose monitoring lancets     3 Box    Use to test blood sugars once daily as directed.        * blood glucose monitoring test strip    SAMEER CONTOUR NEXT    100 strip    1 strip by In Vitro route daily        * blood glucose monitoring test strip    no brand specified    100 strip    Use to test blood sugars 1 times daily or as directed. Needs strips that will work with her contour next ez meter.    Type 2 diabetes mellitus without complication, without long-term current use of insulin (H)       carvedilol 25 MG tablet    COREG    180 tablet    Take 1 tablet (25 mg) by mouth 2 times daily (with meals)    SOB (shortness of breath), Essential hypertension with goal blood pressure less than 140/90       cholecalciferol 1000 UNIT tablet    vitamin D3    30    1 TABLET DAILY        Flaxseed (Linseed) Powd      1.5  tablespoon daily    Type II or unspecified type diabetes mellitus without mention of complication, not stated as uncontrolled       fluticasone 50 MCG/ACT spray    FLONASE    3 Package    Spray 1-2 sprays into both nostrils daily    Cough, Allergic rhinitis       hydrochlorothiazide 25 MG tablet    HYDRODIURIL    90 tablet    TAKE ONE TABLET BY MOUTH ONE TIME DAILY    Hypertension goal BP (blood pressure) < 140/90       lisinopril 40 MG tablet    PRINIVIL/ZESTRIL    90 tablet    Take 1 tablet (40 mg) by mouth daily    Hypertension goal BP (blood pressure) < 140/90       oxybutynin 5 MG tablet    DITROPAN    90  tablet    TAKE 1/2 TABLET BY MOUTH TWICE DAILY    Female stress incontinence       triamcinolone 0.5 % cream    KENALOG    15 g    Apply topically 2 times daily to affected area as directed.    Itching       TUMS E-X 750 750 MG Chew   Generic drug:  calcium carbonate      Take 1,500 mg by mouth daily        * Notice:  This list has 2 medication(s) that are the same as other medications prescribed for you. Read the directions carefully, and ask your doctor or other care provider to review them with you.

## 2018-03-02 NOTE — PROGRESS NOTES
[facility]   Clinic Visit s Mar 2, 2018    PCP: Krysten Molina      Aundrea is a 83 year old female who is seen as self referral for   Chief Complaint   Patient presents with     Hip Pain     Knee Pain       Injury: Reports insidious onset without acute precipitating event.    Location of Pain: right anterior knee and latera hip and groin, nonradiating   Duration of Pain: 6 month(s)  Rating of Pain at worst: 6/10  Rating of Pain Currently: 0/10  Pain is better with: sitting and lying down   Pain is worse with: walking    Treatment so far consists of: Pain medication: acetaminophen (TYLENOL)  Associated symptoms: no distal numbness or tingling; denies swelling or warmth  Recent imaging completed: X-rays completed 2/14/18.  Prior History of related problems: none    Social History: retired     Past Medical History:   Diagnosis Date     Chronic anticoagulation 2/19/2017     Diverticulitis of colon (without mention of hemorrhage)(562.11)      DM type 2, goal A1C below 8.0 12/3/2014     Essential and other specified forms of tremor     eval'd by neuro 2005     Hyperlipidemia LDL goal <100 5/9/2010     Hypertension goal BP (blood pressure) < 140/90      Intestinal disaccharidase deficiencies and disaccharide malabsorption      Moderate tricuspid regurgitation 2/19/2017     Osteoarthrosis, unspecified whether generalized or localized, unspecified site      Paroxysmal atrial fibrillation (H) 2/19/2017     Pulmonary hypertension, mild 2/19/2017     Type 2 diabetes, HbA1c goal < 7% (H) 5/2/2010     Urge incontinence        Patient Active Problem List    Diagnosis Date Noted     Patient is Moravian 10/06/2017     Priority: Medium     ACP (advance care planning) 06/01/2017     Priority: Medium     Advance Care Planning 6/1/2017: Patient is Moravian.  See Health Care Directive for information on use of blood products.  Recommend Code Status in chart and patient's Advance Care  Planning documents be reviewed to ensure alignment with patient's current wishes.  Most recent Advance Care Planning document is a Health Care Directive dated 11/20/16.  Dunia Garcia   Advance Care Planning Liaison       Chronic anticoagulation 02/19/2017     Priority: Medium     Paroxysmal atrial fibrillation (H) 02/19/2017     Priority: Medium     Pulmonary hypertension, mild 02/19/2017     Priority: Medium     Moderate tricuspid regurgitation 02/19/2017     Priority: Medium     Venous (peripheral) insufficiency 01/07/2016     Priority: Medium     Uncomplicated type 2 diabetes mellitus (H) 12/03/2014     Priority: Medium     Gout 03/21/2012     Priority: Medium     CARDIOVASCULAR SCREENING; LDL GOAL LESS THAN 130 09/20/2011     Priority: Medium     Hypertension goal BP (blood pressure) < 140/90      Priority: Medium     Refusal of blood transfusions as patient is Hindu 03/03/2010     Priority: Medium     Female stress incontinence 12/23/2008     Priority: Medium     (Problem list name updated by automated process. Provider to review and confirm.)       Dystonia 12/23/2008     Priority: Medium     Essential tremor 12/23/2008     Priority: Medium     (Problem list name updated by automated process. Provider to review and confirm.)       Disorder of bone and cartilage 07/19/2006     Priority: Medium     Problem list name updated by automated process. Provider to review         Family History   Problem Relation Age of Onset     DIABETES Father      type 2     Hypertension Father      CEREBROVASCULAR DISEASE Father      Arthritis Father      Other Cancer Sister        Social History     Social History     Marital status:      Spouse name: yumiko     Number of children: 3     Years of education: 12     Occupational History      None      Social History Main Topics     Smoking status: Former Smoker     Quit date: 4/22/1970     Smokeless tobacco: Never Used     Alcohol use Yes     Past  "Surgical History:   Procedure Laterality Date     SURGICAL HISTORY OF -       hysterectomy, bladder suspension     SURGICAL HISTORY OF -       tonsillectomy     Review of Systems   Musculoskeletal: Positive for joint pain.   All other systems reviewed and are negative.        Physical Exam  /77  Ht 5' 4\" (1.626 m)  Wt 162 lb (73.5 kg)  BMI 27.81 kg/m2  Constitutional:well-developed, well-nourished, and in no distress.   Cardiovascular: Intact distal pulses.    Neurological: alert. Gait Abnormal:   Antalgic gait  Skin: Skin is warm and dry.   Psychiatric: Mood and affect normal.   Respiratory: unlabored, speaks in full sentences  Lymph: no LAD, no lymphangitis          Left Hip Exam   Gait: Antalgic.    Tenderness   None    Range of Motion   Extension:            Normal  Flexion:                 120  Internal Rotation:  10  External Rotation: Normal  Abduction:            Normal  Adduction:            Normal    Muscle Strength   Abduction:  5/5  Adduction:  5/5  Flexion:      5/5    Tests   Evelyn: Negative  Samina:  N/t    Right Hip Exam   Gait: Antalgic.    Tenderness   The patient is experiencing tenderness in the anterior.    Range of Motion   Extension:            Normal  Flexion:                 100  Internal Rotation:  0  External Rotation: Normal  Abduction:            Normal  Adduction:            Normal    Muscle Strength   Abduction:  5/5  Adduction:  5/5  Flexion:      5/5    Tests   Evelyn: Positive  Samina:  N/t        X-ray images Previously done and independently reviewed by me in the office today with the patient. X-ray shows:     Recent Results (from the past 744 hour(s))   XR Hip Right 2-3 Views    Narrative    HIP TWO VIEWS RIGHT 2/14/2018 1:27 PM     HISTORY: Right hip and groin pain for five months.     COMPARISON: None.    FINDINGS: Severe bone-on-bone degenerative changes in the right hip  with extensive subchondral sclerosis and geode formation. Moderate  spurring of both femoral head and " acetabulum. There is no acute  fracture. No dislocation. There are no worrisome bony lesions.      Impression    IMPRESSION: No acute osseous abnormality demonstrated.    CHRISTIAN HAM MD     ASSESSMENT/PLAN    ICD-10-CM    1. Primary osteoarthritis of right hip M16.11    2. Chronic anticoagulation Z79.01    3. Type 2 diabetes mellitus without complication, without long-term current use of insulin (H) E11.9    4. Disorder of bone and cartilage M89.9     M94.9      Severe degenerative arthritis of the right hip.  Multiple options discussed.  She would not be a good surgical candidate by her preference, age, and anticoagulation/osteopenia/blood product refusal.  Management with physical therapy, scheduled acetaminophen, and cortisone injections discussed at length.  She is not pleased with her options but will consider these and return if desiring to move forward.

## 2018-03-02 NOTE — LETTER
3/2/2018         RE: Aundrea Treviño  [address]   [place] [address]        Dear Colleague,    Thank you for referring your patient, Aundrea Treviño, to the [facility] [place]. Please see a copy of my visit note below.    HPI     [facility]   Clinic Visit s Mar 2, 2018    PCP: Krysten Molina      Aundrea is a 83 year old female who is seen as self referral for   Chief Complaint   Patient presents with     Hip Pain     Knee Pain       Injury: Reports insidious onset without acute precipitating event.    Location of Pain: right anterior knee and latera hip and groin, nonradiating   Duration of Pain: 6 month(s)  Rating of Pain at worst: 6/10  Rating of Pain Currently: 0/10  Pain is better with: sitting and lying down   Pain is worse with: walking    Treatment so far consists of: Pain medication: acetaminophen (TYLENOL)  Associated symptoms: no distal numbness or tingling; denies swelling or warmth  Recent imaging completed: X-rays completed 2/14/18.  Prior History of related problems: none    Social History: retired     Past Medical History:   Diagnosis Date     Chronic anticoagulation 2/19/2017     Diverticulitis of colon (without mention of hemorrhage)(562.11)      DM type 2, goal A1C below 8.0 12/3/2014     Essential and other specified forms of tremor     eval'd by neuro 2005     Hyperlipidemia LDL goal <100 5/9/2010     Hypertension goal BP (blood pressure) < 140/90      Intestinal disaccharidase deficiencies and disaccharide malabsorption      Moderate tricuspid regurgitation 2/19/2017     Osteoarthrosis, unspecified whether generalized or localized, unspecified site      Paroxysmal atrial fibrillation (H) 2/19/2017     Pulmonary hypertension, mild 2/19/2017     Type 2 diabetes, HbA1c goal < 7% (H) 5/2/2010     Urge incontinence        Patient Active Problem List    Diagnosis Date Noted     Patient is Taoism 10/06/2017      Priority: Medium     ACP (advance care planning) 06/01/2017     Priority: Medium     Advance Care Planning 6/1/2017: Patient is Scientologist.  See Health Care Directive for information on use of blood products.  Recommend Code Status in chart and patient's Advance Care Planning documents be reviewed to ensure alignment with patient's current wishes.  Most recent Advance Care Planning document is a Health Care Directive dated 11/20/16.  Dunia Garcia   Advance Care Planning Liaison       Chronic anticoagulation 02/19/2017     Priority: Medium     Paroxysmal atrial fibrillation (H) 02/19/2017     Priority: Medium     Pulmonary hypertension, mild 02/19/2017     Priority: Medium     Moderate tricuspid regurgitation 02/19/2017     Priority: Medium     Venous (peripheral) insufficiency 01/07/2016     Priority: Medium     Uncomplicated type 2 diabetes mellitus (H) 12/03/2014     Priority: Medium     Gout 03/21/2012     Priority: Medium     CARDIOVASCULAR SCREENING; LDL GOAL LESS THAN 130 09/20/2011     Priority: Medium     Hypertension goal BP (blood pressure) < 140/90      Priority: Medium     Refusal of blood transfusions as patient is Scientologist 03/03/2010     Priority: Medium     Female stress incontinence 12/23/2008     Priority: Medium     (Problem list name updated by automated process. Provider to review and confirm.)       Dystonia 12/23/2008     Priority: Medium     Essential tremor 12/23/2008     Priority: Medium     (Problem list name updated by automated process. Provider to review and confirm.)       Disorder of bone and cartilage 07/19/2006     Priority: Medium     Problem list name updated by automated process. Provider to review         Family History   Problem Relation Age of Onset     DIABETES Father      type 2     Hypertension Father      CEREBROVASCULAR DISEASE Father      Arthritis Father      Other Cancer Sister        Social History     Social History     Marital status:       "Spouse name: yumiko     Number of children: 3     Years of education: 12     Occupational History      None      Social History Main Topics     Smoking status: Former Smoker     Quit date: 4/22/1970     Smokeless tobacco: Never Used     Alcohol use Yes     Past Surgical History:   Procedure Laterality Date     SURGICAL HISTORY OF -       hysterectomy, bladder suspension     SURGICAL HISTORY OF -       tonsillectomy     Review of Systems   Musculoskeletal: Positive for joint pain.   All other systems reviewed and are negative.        Physical Exam  /77  Ht 5' 4\" (1.626 m)  Wt 162 lb (73.5 kg)  BMI 27.81 kg/m2  Constitutional:well-developed, well-nourished, and in no distress.   Cardiovascular: Intact distal pulses.    Neurological: alert. Gait Abnormal:   Antalgic gait  Skin: Skin is warm and dry.   Psychiatric: Mood and affect normal.   Respiratory: unlabored, speaks in full sentences  Lymph: no LAD, no lymphangitis          Left Hip Exam   Gait: Antalgic.    Tenderness   None    Range of Motion   Extension:            Normal  Flexion:                 120  Internal Rotation:  10  External Rotation: Normal  Abduction:            Normal  Adduction:            Normal    Muscle Strength   Abduction:  5/5  Adduction:  5/5  Flexion:      5/5    Tests   Evelyn: Negative  Samina:  N/t    Right Hip Exam   Gait: Antalgic.    Tenderness   The patient is experiencing tenderness in the anterior.    Range of Motion   Extension:            Normal  Flexion:                 100  Internal Rotation:  0  External Rotation: Normal  Abduction:            Normal  Adduction:            Normal    Muscle Strength   Abduction:  5/5  Adduction:  5/5  Flexion:      5/5    Tests   Evelyn: Positive  Samina:  N/t        X-ray images Previously done and independently reviewed by me in the office today with the patient. X-ray shows:     Recent Results (from the past 744 hour(s))   XR Hip Right 2-3 Views    Narrative    HIP TWO " VIEWS RIGHT 2/14/2018 1:27 PM     HISTORY: Right hip and groin pain for five months.     COMPARISON: None.    FINDINGS: Severe bone-on-bone degenerative changes in the right hip  with extensive subchondral sclerosis and geode formation. Moderate  spurring of both femoral head and acetabulum. There is no acute  fracture. No dislocation. There are no worrisome bony lesions.      Impression    IMPRESSION: No acute osseous abnormality demonstrated.    CHRISTIAN HAM MD     ASSESSMENT/PLAN    ICD-10-CM    1. Primary osteoarthritis of right hip M16.11    2. Chronic anticoagulation Z79.01    3. Type 2 diabetes mellitus without complication, without long-term current use of insulin (H) E11.9    4. Disorder of bone and cartilage M89.9     M94.9      Severe degenerative arthritis of the right hip.  Multiple options discussed.  She would not be a good surgical candidate by her preference, age, and anticoagulation/osteopenia/blood product refusal.  Management with physical therapy, scheduled acetaminophen, and cortisone injections discussed at length.  She is not pleased with her options but will consider these and return if desiring to move forward.      Again, thank you for allowing me to participate in the care of your patient.        Sincerely,        Luis Lorenzana MD

## 2018-03-07 ENCOUNTER — TELEPHONE (OUTPATIENT)
Dept: ORTHOPEDICS | Facility: CLINIC | Age: 83
End: 2018-03-07

## 2018-03-07 NOTE — TELEPHONE ENCOUNTER
Patient scheduled an appointment for 3/23/18  Patient wants to make sure she will not have any complications because she is on a blood thinner and 4 blood pressure meds  Please call patient to advise at [phone]  Janneth BRYANT

## 2018-03-08 ENCOUNTER — TELEPHONE (OUTPATIENT)
Dept: FAMILY MEDICINE | Facility: CLINIC | Age: 83
End: 2018-03-08

## 2018-03-08 DIAGNOSIS — E11.9 TYPE 2 DIABETES MELLITUS WITHOUT COMPLICATION, WITHOUT LONG-TERM CURRENT USE OF INSULIN (H): Primary | ICD-10-CM

## 2018-03-08 NOTE — TELEPHONE ENCOUNTER
Insurance no longer covers supplies for patient's Shasha Contour glucometer  Preferred is either the [employer] Ultra or Verio  Pended orders for new glucometer, test strips and lancets    Patient understands.  I called both pharmacy and patient.    Padmini Gurrola RN

## 2018-03-08 NOTE — TELEPHONE ENCOUNTER
Risk reviewed, eliquis noted, does increase risk but typically goes well. Jainism status noted, so will proceed with caution. Diligence also noted for blood glucose level following injection due to DM2.

## 2018-03-19 ENCOUNTER — TELEPHONE (OUTPATIENT)
Dept: CARDIOLOGY | Facility: CLINIC | Age: 83
End: 2018-03-19

## 2018-03-19 NOTE — TELEPHONE ENCOUNTER
Patient having EMG done.  Needs to hold Eliquis x 3 days prior to procedure.  Yes, this would be OK.

## 2018-03-23 ENCOUNTER — OFFICE VISIT (OUTPATIENT)
Dept: ORTHOPEDICS | Facility: CLINIC | Age: 83
End: 2018-03-23
Payer: COMMERCIAL

## 2018-03-23 DIAGNOSIS — M16.11 PRIMARY OSTEOARTHRITIS OF RIGHT HIP: Primary | ICD-10-CM

## 2018-03-23 PROCEDURE — 20611 DRAIN/INJ JOINT/BURSA W/US: CPT | Mod: RT | Performed by: FAMILY MEDICINE

## 2018-03-23 NOTE — MR AVS SNAPSHOT
After Visit Summary   3/23/2018    Aundrea Treviño    MRN: [id number]           Patient Information     Date Of Birth          8/29/1934        Visit Information        Provider Department      3/23/2018 1:40 PM Luis Lorenzana MD; EC PROC RM 1 [facility] [place]        Today's Diagnoses     Primary osteoarthritis of right hip    -  1       Follow-ups after your visit        Your next 10 appointments already scheduled     Apr 09, 2018  2:15 PM CDT   (Arrive by 2:00 PM)   EMG with Lamin Guerrero MD   [facility] EMG ([facility])    [address] [place] [address] [phone]           Do not use lotions or creams on the area to be tested. If you are on blood thinners (Warfarin, Coumadin, Lovenox, etc), please contact your primary care physician to check if it is safe to stop them 3 days prior to testing. If you have anxiety, please check with your referring physician to obtain anti-anxiety medication for the procedure.              Who to contact     If you have questions or need follow up information about today's clinic visit or your schedule please contact [facility] [place] directly at [phone].  Normal or non-critical lab and imaging results will be communicated to you by [employer], letter or phone within 4 business days after the clinic has received the results. If you do not hear from us within 7 days, please contact the clinic through [employer] or phone. If you have a critical or abnormal lab result, we will notify you by phone as soon as possible.  Submit refill requests through [employer] or call your pharmacy and they will forward the refill request to us. Please allow 3 business days for your refill to be completed.          Additional Information About Your Visit        [employer] Information     [employer] lets you send messages to your doctor, view your test results, renew  "your prescriptions, schedule appointments and more. To sign up, go to [email or web address] . Click on \"Log in\" on the left side of the screen, which will take you to the Welcome page. Then click on \"Sign up Now\" on the right side of the page.     You will be asked to enter the access code listed below, as well as some personal information. Please follow the directions to create your username and password.     Your access code is: [email or web address]  Expires: 2018  8:13 PM     Your access code will  in 90 days. If you need help or a new code, please call your [place] clinic or [phone].        Care EveryWhere ID     This is your Care EveryWhere ID. This could be used by other organizations to access your [place] medical records  [id number]         Blood Pressure from Last 3 Encounters:   18 146/77   18 126/70   18 138/84    Weight from Last 3 Encounters:   18 162 lb (73.5 kg)   18 162 lb (73.5 kg)   18 160 lb 8 oz (72.8 kg)              We Performed the Following     HC ARTHROCENTESIS ASPIR&/INJ MAJOR JT/BURSA W/US     METHYLPREDNISOLONE 80 MG INJ          Today's Medication Changes          These changes are accurate as of 3/23/18 11:59 PM.  If you have any questions, ask your nurse or doctor.               Start taking these medicines.        Dose/Directions    lidocaine 1 % injection   Used for:  Primary osteoarthritis of right hip        Dose:  9 mL   9 mLs by Other route once for 1 dose   Quantity:  9 mL   Refills:  0       methylPREDNISolone acetate 80 MG/ML injection   Commonly known as:  DEPO-MEDROL   Used for:  Primary osteoarthritis of right hip        Dose:  80 mg   Inject 1 mL (80 mg) into the muscle once for 1 dose   Quantity:  1 mL   Refills:  0            Where to get your medicines      Some of these will need a paper prescription and others can be bought over the counter.  Ask your nurse if you have questions.     You don't need a prescription for " these medications     lidocaine 1 % injection    methylPREDNISolone acetate 80 MG/ML injection                Primary Care Provider Office Phone # Fax #    Krysten Molina [phone] [address] [facility] [address]        Equal Access to Services     KEVIN MARTINEZ : Hadii barrett ku hadmaggieo Soomaali, waaxda luqadaha, qaybta kaalmada adeericayada, imelda lealn rachid elizondo laLinokevon mansfield. So [facility] [phone].    ATENCIÓN: Si habla español, tiene a husain disposición servicios gratuitos de asistencia lingüística. Llame al [phone].    We comply with applicable federal civil rights laws and [place] laws. We do not discriminate on the basis of race, color, national origin, age, disability, sex, sexual orientation, or gender identity.            Thank you!     Thank you for choosing [facility] [place]  for your care. Our goal is always to provide you with excellent care. Hearing back from our patients is one way we can continue to improve our services. Please take a few minutes to complete the written survey that you may receive in the mail after your visit with us. Thank you!             Your Updated Medication List - Protect others around you: Learn how to safely use, store and throw away your medicines at www.disposemymeds.org.          This list is accurate as of 3/23/18 11:59 PM.  Always use your most recent med list.                   Brand Name Dispense Instructions for use Diagnosis    Alfalfa 650 MG Tabs      Take 2 capsules by mouth 2 times daily For arthritis pain        amLODIPine 2.5 MG tablet    NORVASC    90 tablet    TAKE ONE TABLET BY MOUTH ONE TIME DAILY    Hypertension goal BP (blood pressure) < 150/90       apixaban ANTICOAGULANT 5 MG tablet    ELIQUIS    60 tablet    Take 1 tablet (5 mg) by mouth 2 times daily    Paroxysmal atrial fibrillation (H)       blood glucose lancets standard    no brand specified    100 each    Use to test blood sugar  one times daily or as directed. Dispense compatible with insurance and glucometer - One Touch Ultra    Type 2 diabetes mellitus without complication, without long-term current use of insulin (H)       blood glucose monitoring lancets     3 Box    Use to test blood sugars once daily as directed.        blood glucose monitoring meter device kit    no brand specified    1 kit    Use to test blood sugar one times daily or as directed.  Dispense One Touch Ultra per insurance    Type 2 diabetes mellitus without complication, without long-term current use of insulin (H)       * blood glucose monitoring test strip    SAMEER CONTOUR NEXT    100 strip    1 strip by In Vitro route daily        * blood glucose monitoring test strip    no brand specified    100 strip    Use to test blood sugars 1 times daily or as directed. Needs strips that will work with her contour next ez meter.    Type 2 diabetes mellitus without complication, without long-term current use of insulin (H)       * blood glucose monitoring test strip    no brand specified    100 strip    Use to test blood sugars one times daily or as directed.  Dispense compatible with insurance and glucometer - One Touch Ultra    Type 2 diabetes mellitus without complication, without long-term current use of insulin (H)       carvedilol 25 MG tablet    COREG    180 tablet    Take 1 tablet (25 mg) by mouth 2 times daily (with meals)    SOB (shortness of breath), Essential hypertension with goal blood pressure less than 140/90       cholecalciferol 1000 UNIT tablet    vitamin D3    30    1 TABLET DAILY        Flaxseed (Linseed) Powd      1.5  tablespoon daily    Type II or unspecified type diabetes mellitus without mention of complication, not stated as uncontrolled       fluticasone 50 MCG/ACT spray    FLONASE    3 Package    Spray 1-2 sprays into both nostrils daily    Cough, Allergic rhinitis       hydrochlorothiazide 25 MG tablet    HYDRODIURIL    90 tablet    TAKE ONE  TABLET BY MOUTH ONE TIME DAILY    Hypertension goal BP (blood pressure) < 140/90       lidocaine 1 % injection     9 mL    9 mLs by Other route once for 1 dose    Primary osteoarthritis of right hip       lisinopril 40 MG tablet    PRINIVIL/ZESTRIL    90 tablet    Take 1 tablet (40 mg) by mouth daily    Hypertension goal BP (blood pressure) < 140/90       methylPREDNISolone acetate 80 MG/ML injection    DEPO-MEDROL    1 mL    Inject 1 mL (80 mg) into the muscle once for 1 dose    Primary osteoarthritis of right hip       oxybutynin 5 MG tablet    DITROPAN    90 tablet    TAKE 1/2 TABLET BY MOUTH TWICE DAILY    Female stress incontinence       triamcinolone 0.5 % cream    KENALOG    15 g    Apply topically 2 times daily to affected area as directed.    Itching       TUMS E-X 750 750 MG Chew   Generic drug:  calcium carbonate      Take 1,500 mg by mouth daily        * Notice:  This list has 3 medication(s) that are the same as other medications prescribed for you. Read the directions carefully, and ask your doctor or other care provider to review them with you.

## 2018-03-23 NOTE — PROGRESS NOTES
Right Hip Intra-Articular Corticosteroid Injection     Diagnoses (preoperative and postoperative):  Primary osteoarthritis of right hip    Current Procedure (include preoperative):  Sonographically guided right hip intra-articular corticosteroid injection  Current Indication (include preoperative):  Alleviation of pain    REASON FOR PROCEDURE:  right hip joint corticosteroid injection for modulation of pain. Sonographic guidance will be used to ensure accurate placement of the medication within the joint space and avoid nearby neurovascular structures.  PATIENT EDUCATION:  Ready to learn with no apparent learning barriers identified.  Learning preferences include listening. Explained diagnosis and treatment plan as well as treatment alternatives. Patient expressed understanding of the content.  Following denial of allergy and review of potential side effects and complications including but not necessarily limited to infection, bleeding, allergic reaction, post-injection flare, local tissue breakdown (including but not limited to potential for skin depigmentation and/or subcutaneous fat atrophy), systemic effects of corticosteroids, elevation of blood glucose, injury to soft tissue and/or nerves and seizure, patient indicated their understanding and agreed to proceed. Written and signed consent was obtained and is scanned into the chart.  PROCEDURE: Prior to the procedure, the right anterior hip joint was examined with a 4 MHz curvilinear transducer to visualize the joint space and determine the approach for the procedure.  Prior to procedure, probe and cord were prepared in a sanitary fashion. Procedure was carried out using sterile technique, including chloroprep and sterile transducer gel.     PROCEDURAL PAUSE:  Procedural pause conducted to verify correct patient identity, procedure to be performed, and as applicable, correct side/site, correct patient position, special equipment, or special  requirements.  Patient position: Supine  Transducer type: 4 MHz curvilinear  Approach: Lateral to medial parallel to long axis of transducer  Local Anesthesia: Sonographically guided 25-gauge 2 inch needle was used to anesthetize the skin and subcutaneous tissue with 5 ml of 1% Lidocaine  Aspirate: None  Injectate:  Sonographically guided 25-gauge 2.5 inch needle was directly visualized entering down into the right hip joint.  After confirming needle tip position a solution containing 1 ml of 40 mg/ml Depo Medrol and 4 ml of 1% Lidocaine was injected and seen filling the hip joint.  AFTERCARE:  Patient tolerated the procedure without complication. After a short observation period, patient was discharged under their own power and in excellent condition.    Luis Lorenzana MD [facility] [place]

## 2018-03-23 NOTE — LETTER
3/23/2018         RE: Aundrea Treviño  [address]   [place] [address]        Dear Colleague,    Thank you for referring your patient, Aundrea Treviño, to the [place] [facility]. Please see a copy of my visit note below.    Right Hip Intra-Articular Corticosteroid Injection     Diagnoses (preoperative and postoperative):  Primary osteoarthritis of right hip    Current Procedure (include preoperative):  Sonographically guided right hip intra-articular corticosteroid injection  Current Indication (include preoperative):  Alleviation of pain    REASON FOR PROCEDURE:  right hip joint corticosteroid injection for modulation of pain. Sonographic guidance will be used to ensure accurate placement of the medication within the joint space and avoid nearby neurovascular structures.  PATIENT EDUCATION:  Ready to learn with no apparent learning barriers identified.  Learning preferences include listening. Explained diagnosis and treatment plan as well as treatment alternatives. Patient expressed understanding of the content.  Following denial of allergy and review of potential side effects and complications including but not necessarily limited to infection, bleeding, allergic reaction, post-injection flare, local tissue breakdown (including but not limited to potential for skin depigmentation and/or subcutaneous fat atrophy), systemic effects of corticosteroids, elevation of blood glucose, injury to soft tissue and/or nerves and seizure, patient indicated their understanding and agreed to proceed. Written and signed consent was obtained and is scanned into the chart.  PROCEDURE: Prior to the procedure, the right anterior hip joint was examined with a 4 MHz curvilinear transducer to visualize the joint space and determine the approach for the procedure.  Prior to procedure, probe and cord were prepared in a sanitary fashion. Procedure was carried out using sterile technique,  including chloroprep and sterile transducer gel.     PROCEDURAL PAUSE:  Procedural pause conducted to verify correct patient identity, procedure to be performed, and as applicable, correct side/site, correct patient position, special equipment, or special requirements.  Patient position: Supine  Transducer type: 4 MHz curvilinear  Approach: Lateral to medial parallel to long axis of transducer  Local Anesthesia: Sonographically guided 25-gauge 2 inch needle was used to anesthetize the skin and subcutaneous tissue with 5 ml of 1% Lidocaine  Aspirate: None  Injectate:  Sonographically guided 25-gauge 2.5 inch needle was directly visualized entering down into the right hip joint.  After confirming needle tip position a solution containing 1 ml of 40 mg/ml Depo Medrol and 4 ml of 1% Lidocaine was injected and seen filling the hip joint.  AFTERCARE:  Patient tolerated the procedure without complication. After a short observation period, patient was discharged under their own power and in excellent condition.    Luis Lorenzana MD [facility]    Again, thank you for allowing me to participate in the care of your patient.        Sincerely,        Luis Lorenzana MD

## 2018-03-25 RX ORDER — LIDOCAINE HYDROCHLORIDE 10 MG/ML
9 INJECTION, SOLUTION INFILTRATION; PERINEURAL ONCE
Qty: 9 ML | Refills: 0 | OUTPATIENT
Start: 2018-03-25 | End: 2018-03-25

## 2018-03-25 RX ORDER — METHYLPREDNISOLONE ACETATE 80 MG/ML
80 INJECTION, SUSPENSION INTRA-ARTICULAR; INTRALESIONAL; INTRAMUSCULAR; SOFT TISSUE ONCE
Qty: 1 ML | Refills: 0 | OUTPATIENT
Start: 2018-03-25 | End: 2018-03-25

## 2018-04-09 ENCOUNTER — OFFICE VISIT (OUTPATIENT)
Dept: NEUROLOGY | Facility: CLINIC | Age: 83
End: 2018-04-09
Payer: COMMERCIAL

## 2018-04-09 DIAGNOSIS — R20.0 NUMBNESS IN FEET: ICD-10-CM

## 2018-04-09 DIAGNOSIS — G56.22 LESION OF LEFT ULNAR NERVE: ICD-10-CM

## 2018-04-09 DIAGNOSIS — R20.2 LEFT HAND PARESTHESIA: ICD-10-CM

## 2018-04-09 DIAGNOSIS — G56.02 CARPAL TUNNEL SYNDROME OF LEFT WRIST: ICD-10-CM

## 2018-04-09 DIAGNOSIS — G60.3 IDIOPATHIC PROGRESSIVE POLYNEUROPATHY: Primary | ICD-10-CM

## 2018-04-09 NOTE — MR AVS SNAPSHOT
After Visit Summary   2018    Aundrea Treviño    MRN: [id number]           Patient Information     Date Of Birth          1934        Visit Information        Provider Department      2018 2:15 PM Lamin Guerrero MD [facility] EM[facility]        Today's Diagnoses     Idiopathic progressive polyneuropathy    -  1    Left hand paresthesia        Numbness in feet        Carpal tunnel syndrome of left wrist        Lesion of left ulnar nerve           Follow-ups after your visit        Who to contact     Please call your clinic at [phone] to:    Ask questions about your health    Make or cancel appointments    Discuss your medicines    Learn about your test results    Speak to your doctor            Additional Information About Your Visit        MyChart Information     [employer] is an electronic gateway that provides easy, online access to your medical records. With [employer], you can request a clinic appointment, read your test results, renew a prescription or communicate with your care team.     To sign up for [employer] visit the website at [email or web address]   You will be asked to enter the access code listed below, as well as some personal information. Please follow the directions to create your username and password.     Your access code is: [email or web address]  Expires: 2018  8:13 PM     Your access code will  in 90 days. If you need help or a new code, please contact your [facility] or call [phone] for assistance.        Care EveryWhere ID     This is your Care EveryWhere ID. This could be used by other organizations to access your [place] medical records  [id number]         Blood Pressure from Last 3 Encounters:   18 146/77   18 126/70   18 138/84    Weight from Last 3 Encounters:   18 73.5 kg (162 lb)   18 73.5 kg (162 lb)   18 72.8 kg (160 lb 8 oz)              We Performed the Following     EMG     HC NCS  MOTOR W OR W/O F-WAVE, 11 OR 12     HC NEEDLE EMG EA EXTREMITY W/PARASPINAL AREA LIMITED        Primary Care Provider Office Phone # Fax #    Krysten Molina [phone] [address]  [facility] [address]        Equal Access to Services     KEVIN MARTINEZ : Hadii aad ku hadasho Soomaali, waaxda luqadaha, qaybta kaalmada adeegyada, waxay idiin hayaan adeerica khamadeocharles labird mansfield. So [facility] [phone].    ATENCIÓN: Si habla español, tiene a husain disposición servicios gratuitos de asistencia lingüística. Stacylakesha al [phone].    We comply with applicable federal civil rights laws and [place] laws. We do not discriminate on the basis of race, color, national origin, age, disability, sex, sexual orientation, or gender identity.            Thank you!     Thank you for choosing [facility]  for your care. Our goal is always to provide you with excellent care. Hearing back from our patients is one way we can continue to improve our services. Please take a few minutes to complete the written survey that you may receive in the mail after your visit with us. Thank you!             Your Updated Medication List - Protect others around you: Learn how to safely use, store and throw away your medicines at www.disposemymeds.org.          This list is accurate as of 4/9/18  2:52 PM.  Always use your most recent med list.                   Brand Name Dispense Instructions for use Diagnosis    Alfalfa 650 MG Tabs      Take 2 capsules by mouth 2 times daily For arthritis pain        amLODIPine 2.5 MG tablet    NORVASC    90 tablet    TAKE ONE TABLET BY MOUTH ONE TIME DAILY    Hypertension goal BP (blood pressure) < 150/90       apixaban ANTICOAGULANT 5 MG tablet    ELIQUIS    60 tablet    Take 1 tablet (5 mg) by mouth 2 times daily    Paroxysmal atrial fibrillation (H)       blood glucose lancets standard    no brand specified    100 each    Use to test blood sugar one times daily or as directed. Dispense compatible  with insurance and glucometer - One Touch Ultra    Type 2 diabetes mellitus without complication, without long-term current use of insulin (H)       blood glucose monitoring lancets     3 Box    Use to test blood sugars once daily as directed.        blood glucose monitoring meter device kit    no brand specified    1 kit    Use to test blood sugar one times daily or as directed.  Dispense One Touch Ultra per insurance    Type 2 diabetes mellitus without complication, without long-term current use of insulin (H)       * blood glucose monitoring test strip    SAMEER CONTOUR NEXT    100 strip    1 strip by In Vitro route daily        * blood glucose monitoring test strip    no brand specified    100 strip    Use to test blood sugars 1 times daily or as directed. Needs strips that will work with her contour next ez meter.    Type 2 diabetes mellitus without complication, without long-term current use of insulin (H)       * blood glucose monitoring test strip    no brand specified    100 strip    Use to test blood sugars one times daily or as directed.  Dispense compatible with insurance and glucometer - One Touch Ultra    Type 2 diabetes mellitus without complication, without long-term current use of insulin (H)       carvedilol 25 MG tablet    COREG    180 tablet    Take 1 tablet (25 mg) by mouth 2 times daily (with meals)    SOB (shortness of breath), Essential hypertension with goal blood pressure less than 140/90       cholecalciferol 1000 UNIT tablet    vitamin D3    30    1 TABLET DAILY        Flaxseed (Linseed) Powd      1.5  tablespoon daily    Type II or unspecified type diabetes mellitus without mention of complication, not stated as uncontrolled       fluticasone 50 MCG/ACT spray    FLONASE    3 Package    Spray 1-2 sprays into both nostrils daily    Cough, Allergic rhinitis       hydrochlorothiazide 25 MG tablet    HYDRODIURIL    90 tablet    TAKE ONE TABLET BY MOUTH ONE TIME DAILY    Hypertension goal BP  (blood pressure) < 140/90       lisinopril 40 MG tablet    PRINIVIL/ZESTRIL    90 tablet    Take 1 tablet (40 mg) by mouth daily    Hypertension goal BP (blood pressure) < 140/90       oxybutynin 5 MG tablet    DITROPAN    90 tablet    TAKE 1/2 TABLET BY MOUTH TWICE DAILY    Female stress incontinence       triamcinolone 0.5 % cream    KENALOG    15 g    Apply topically 2 times daily to affected area as directed.    Itching       TUMS E-X 750 750 MG Chew   Generic drug:  calcium carbonate      Take 1,500 mg by mouth daily        * Notice:  This list has 3 medication(s) that are the same as other medications prescribed for you. Read the directions carefully, and ask your doctor or other care provider to review them with you.

## 2018-04-09 NOTE — LETTER
4/9/2018       RE: Aundrea Treviño  [address]   [facility] [address]     Dear Colleague,    Thank you for referring your patient, Aundrea Treviño, to the [facility] at [facility]. Please see a copy of my visit note below.        [facility]  Electrodiagnostic Laboratory    Nerve Conduction & EMG Report          Patient: Aundrea Treviño YOB: 1934  Patient ID: [id number] Age: 83 Years 7 Months  Gender: Female        [facility]    Electrodiagnostic Laboratory  Nerve Conduction & EMG Report            History & Examination:  83 year old woman with numbness and balance difficulty. Also reports numbness in left hand. Eval for polyneuropathy and for focal neuropathy in left hand.     Techniques: Motor and sensory conduction studies were done with surface recording electrodes. EMG was done with a concentric needle electrode. Abnormal results are denoted in bold type in the table.    Results:  Nerve conduction studies:   1. Bilateral sural and superficial peroneal sensory responses were absent.   2. Left median-D2 sensory response shows moderately reduced amplitude and moderately to severely slowed CV.   3. Left ulnar-D5 and radial sensroy responses are normal.   4. Left median-APB motor response shows moderately prolonged DL, normal amplitude and normal CV in the forearm.   5. Left ulnar-ADM motor response shows normal DL, normal amplitude and mild CV slowing across the elbow.   6. Left peroneal-EDB and tibial-AH motor responses were normal.     Needle EMG of selected left lower limb muscles was performed as tabulated below. No abnormal spontaneous activity was observed in the sampled muscles. Motor unit potential morphology and recruitment patterns were normal.     Interpretation:  This is an abnormal study. There is electrophysiologic evidence of a (1) mild axonal, length-dependant sensory polyneuropathy, (2) a moderate  left-sided median neuropathy across the wrist (e.g., carpal tunnel syndrome), and (3) a very mild left-sided ulnar neuropathy across the elbow. Clinical correlation is recommended.     Lamin Guerrero MD  Department of Neurology          Sensory NCS      Nerve / Sites Rec. Site Onset Peak Ref. NP Amp Ref. PP Amp Dist Xander Ref. Temp     ms ms ms  V  V  V cm m/s m/s  C   L MEDIAN - Dig II Anti      Wrist Dig II 3.91 5.10  4.5 10.0 10.1 14 35.8 48.0 32.5   L ULNAR - Dig V Anti      Wrist Dig V 2.50 3.07  8.8 8.0 7.3 12.5 50.0 48.0 32.6   L RADIAL - Snuff      Forearm Snuff 1.82 2.29  15.7 15.0 18.8 10 54.9 48.0 33.8   L SURAL - Lat Mall 60      Calf Ankle NR NR  NR 5.0 NR 14 NR 38.0 31.6   R SURAL - Lat Mall 60      Calf Ankle NR NR  NR 5.0 NR 14 NR 38.0 30.4   R SUP PERONEAL      Lat Leg Johnson NR NR  NR  NR 12.5 NR 38.0 30.3   L SUP PERONEAL      Lat Leg Johnson NR NR  NR  NR 12.5 NR 38.0 31.3       Motor NCS      Nerve / Sites Rec. Site Lat Ref. Amp Ref. Rel Amp Dist Xander Ref. Dur. Area Temp.     ms ms mV mV % cm m/s m/s ms %  C   L MEDIAN - APB      Wrist APB 6.20 4.40 6.2 5.0 100 8   5.52 100 33.4      Elbow APB 10.10  5.9  94.7 20 51.2 48.0 6.15 101 33.5   L ULNAR - ADM      Wrist ADM 3.02 3.50 10.3 5.0 100 8   6.46 100 32.5      B.Elbow ADM 6.46  10.1  98.1 21 61.1 48.0 6.04 98.1 32.4      A.Elbow ADM 8.54  9.4  91.7 9 43.2 48.0 6.30 97.4 32.4   L DEEP PERONEAL - EDB 60      Ankle EDB 3.91 6.00 3.1 2.0 100 8   4.74 100 31.5      FibHead EDB 12.19  3.1  101 38 45.9 38.0 5.05 98 31.5      Pop Fos EDB 13.65  3.0  99.4 7 48.0 38.0 4.84 89.9 31.3   L TIBIAL - AH      Ankle AH 4.01 6.00 7.1 4.0 100 8   5.42 100 31.7      Pop Fos AH 12.86  6.6  92.6 40 45.2 38.0 6.61 106 31.7       F  Wave      Nerve Min F Lat Max F Lat Mean FLat Temp.    ms ms ms  C   L TIBIAL 57.81 62.97 59.56 31.5       EMG Summary Table     Spontaneous MUAP Recruitment    IA Fib PSW Fasc H.F. Amp Dur. PPP Pattern   L. VAST LATERALIS N None None None None N  N None Normal   L. TIB ANTERIOR N None None None None N N None Normal   L. GASTROCN (MED) N None None None None N N None Normal                                        Again, thank you for allowing me to participate in the care of your patient.      Sincerely,    Lamin Guerrero MD

## 2018-04-09 NOTE — PROGRESS NOTES
[facility]  Electrodiagnostic Laboratory    Nerve Conduction & EMG Report          Patient: Aundrea Treviño YOB: 1934  Patient ID: [id number] Age: 83 Years 7 Months  Gender: Female        [facility]    Electrodiagnostic Laboratory  Nerve Conduction & EMG Report            History & Examination:  83 year old woman with numbness and balance difficulty. Also reports numbness in left hand. Eval for polyneuropathy and for focal neuropathy in left hand.     Techniques: Motor and sensory conduction studies were done with surface recording electrodes. EMG was done with a concentric needle electrode. Abnormal results are denoted in bold type in the table.    Results:  Nerve conduction studies:   1. Bilateral sural and superficial peroneal sensory responses were absent.   2. Left median-D2 sensory response shows moderately reduced amplitude and moderately to severely slowed CV.   3. Left ulnar-D5 and radial sensroy responses are normal.   4. Left median-APB motor response shows moderately prolonged DL, normal amplitude and normal CV in the forearm.   5. Left ulnar-ADM motor response shows normal DL, normal amplitude and mild CV slowing across the elbow.   6. Left peroneal-EDB and tibial-AH motor responses were normal.     Needle EMG of selected left lower limb muscles was performed as tabulated below. No abnormal spontaneous activity was observed in the sampled muscles. Motor unit potential morphology and recruitment patterns were normal.     Interpretation:  This is an abnormal study. There is electrophysiologic evidence of a (1) mild axonal, length-dependant sensory polyneuropathy, (2) a moderate left-sided median neuropathy across the wrist (e.g., carpal tunnel syndrome), and (3) a very mild left-sided ulnar neuropathy across the elbow. Clinical correlation is recommended.     Lamin Guerrero MD  Department of Neurology          Sensory NCS      Nerve / Sites Rec. Site Onset Peak  Ref. NP Amp Ref. PP Amp Dist Xander Ref. Temp     ms ms ms  V  V  V cm m/s m/s  C   L MEDIAN - Dig II Anti      Wrist Dig II 3.91 5.10  4.5 10.0 10.1 14 35.8 48.0 32.5   L ULNAR - Dig V Anti      Wrist Dig V 2.50 3.07  8.8 8.0 7.3 12.5 50.0 48.0 32.6   L RADIAL - Snuff      Forearm Snuff 1.82 2.29  15.7 15.0 18.8 10 54.9 48.0 33.8   L SURAL - Lat Mall 60      Calf Ankle NR NR  NR 5.0 NR 14 NR 38.0 31.6   R SURAL - Lat Mall 60      Calf Ankle NR NR  NR 5.0 NR 14 NR 38.0 30.4   R SUP PERONEAL      Lat Leg Johnson NR NR  NR  NR 12.5 NR 38.0 30.3   L SUP PERONEAL      Lat Leg Johnson NR NR  NR  NR 12.5 NR 38.0 31.3       Motor NCS      Nerve / Sites Rec. Site Lat Ref. Amp Ref. Rel Amp Dist Xander Ref. Dur. Area Temp.     ms ms mV mV % cm m/s m/s ms %  C   L MEDIAN - APB      Wrist APB 6.20 4.40 6.2 5.0 100 8   5.52 100 33.4      Elbow APB 10.10  5.9  94.7 20 51.2 48.0 6.15 101 33.5   L ULNAR - ADM      Wrist ADM 3.02 3.50 10.3 5.0 100 8   6.46 100 32.5      B.Elbow ADM 6.46  10.1  98.1 21 61.1 48.0 6.04 98.1 32.4      A.Elbow ADM 8.54  9.4  91.7 9 43.2 48.0 6.30 97.4 32.4   L DEEP PERONEAL - EDB 60      Ankle EDB 3.91 6.00 3.1 2.0 100 8   4.74 100 31.5      FibHead EDB 12.19  3.1  101 38 45.9 38.0 5.05 98 31.5      Pop Fos EDB 13.65  3.0  99.4 7 48.0 38.0 4.84 89.9 31.3   L TIBIAL - AH      Ankle AH 4.01 6.00 7.1 4.0 100 8   5.42 100 31.7      Pop Fos AH 12.86  6.6  92.6 40 45.2 38.0 6.61 106 31.7       F  Wave      Nerve Min F Lat Max F Lat Mean FLat Temp.    ms ms ms  C   L TIBIAL 57.81 62.97 59.56 31.5       EMG Summary Table     Spontaneous MUAP Recruitment    IA Fib PSW Fasc H.F. Amp Dur. PPP Pattern   L. VAST LATERALIS N None None None None N N None Normal   L. TIB ANTERIOR N None None None None N N None Normal   L. GASTROCN (MED) N None None None None N N None Normal

## 2018-04-12 DIAGNOSIS — I10 HYPERTENSION GOAL BP (BLOOD PRESSURE) < 140/90: ICD-10-CM

## 2018-04-13 NOTE — TELEPHONE ENCOUNTER
"Requested Prescriptions   Pending Prescriptions Disp Refills     lisinopril (PRINIVIL/ZESTRIL) 40 MG tablet [Pharmacy Med Name: Lisinopril Oral Tablet 40 MG] 90 tablet 0     Sig: TAKE ONE TABLET BY MOUTH ONE TIME DAILY    ACE Inhibitors (Including Combos) Protocol Failed    4/12/2018  3:16 PM       Failed - Blood pressure under 140/90 in past 12 months    BP Readings from Last 3 Encounters:   03/02/18 146/77   02/28/18 126/70   02/14/18 138/84                Passed - Recent (12 mo) or future (30 days) visit within the authorizing provider's specialty    Patient had office visit in the last 12 months or has a visit in the next 30 days with authorizing provider or within the authorizing provider's specialty.  See \"Patient Info\" tab in inbasket, or \"Choose Columns\" in Meds & Orders section of the refill encounter.           Passed - Patient is age 18 or older       Passed - No active pregnancy on record       Passed - Normal serum creatinine on file in past 12 months    Recent Labs   Lab Test  09/07/17   0954   CR  0.75            Passed - Normal serum potassium on file in past 12 months    Recent Labs   Lab Test  09/07/17   0954   POTASSIUM  3.6            Passed - No positive pregnancy test in past 12 months        Routing refill request to provider for review/approval because:   out of range:  Blood pressure        "

## 2018-04-16 RX ORDER — LISINOPRIL 40 MG/1
TABLET ORAL
Qty: 30 TABLET | Refills: 0 | Status: SHIPPED | OUTPATIENT
Start: 2018-04-16 | End: 2018-05-07

## 2018-04-16 NOTE — TELEPHONE ENCOUNTER
Filled med for one month.  Reception- please bring pt into clinic for MA BP check due to higher BP.  BETO Berg

## 2018-04-19 ENCOUNTER — TRANSFERRED RECORDS (OUTPATIENT)
Dept: HEALTH INFORMATION MANAGEMENT | Facility: CLINIC | Age: 83
End: 2018-04-19

## 2018-04-25 ENCOUNTER — OFFICE VISIT (OUTPATIENT)
Dept: NEUROLOGY | Facility: CLINIC | Age: 83
End: 2018-04-25
Payer: COMMERCIAL

## 2018-04-25 VITALS
HEART RATE: 63 BPM | BODY MASS INDEX: 27.29 KG/M2 | RESPIRATION RATE: 16 BRPM | DIASTOLIC BLOOD PRESSURE: 70 MMHG | WEIGHT: 159 LBS | SYSTOLIC BLOOD PRESSURE: 142 MMHG | OXYGEN SATURATION: 97 %

## 2018-04-25 DIAGNOSIS — G56.02 CARPAL TUNNEL SYNDROME OF LEFT WRIST: ICD-10-CM

## 2018-04-25 DIAGNOSIS — E11.42 DIABETIC POLYNEUROPATHY ASSOCIATED WITH TYPE 2 DIABETES MELLITUS (H): Primary | ICD-10-CM

## 2018-04-25 DIAGNOSIS — G56.22 ULNAR NEUROPATHY AT ELBOW, LEFT: ICD-10-CM

## 2018-04-25 PROCEDURE — 99215 OFFICE O/P EST HI 40 MIN: CPT | Performed by: PSYCHIATRY & NEUROLOGY

## 2018-04-25 NOTE — NURSING NOTE
"Chief Complaint   Patient presents with     Follow Up For     F/U-EMG       Initial /87 (BP Location: Left arm, Patient Position: Sitting, Cuff Size: Adult Regular)  Pulse 63  Resp 16  Wt 72.1 kg (159 lb)  SpO2 97%  BMI 27.29 kg/m2 Estimated body mass index is 27.29 kg/(m^2) as calculated from the following:    Height as of 3/2/18: 1.626 m (5' 4\").    Weight as of this encounter: 72.1 kg (159 lb).  Medication Reconciliation: complete     Catrina Banks MA      "

## 2018-04-25 NOTE — MR AVS SNAPSHOT
After Visit Summary   4/25/2018    Aundrea Treviño    MRN: [id number]           Patient Information     Date Of Birth          8/29/1934        Visit Information        Provider Department      4/25/2018 2:00 PM Tramaine Borja MD [facility]        Today's Diagnoses     Diabetic polyneuropathy associated with type 2 diabetes mellitus (H)    -  1    Carpal tunnel syndrome of left wrist        Ulnar neuropathy at elbow, left          Care Instructions    AFTER VISIT SUMMARY (AVS):    At today's visit we thoroughly reviewed EMG results, discussed treatment options and the plan.    Please wear left wrist splint during the day with repetitive wrist movements and every night for the next 2 months.      Please wear left elbow pad during the day and left elbow splint every night for the next 2 months.    We discussed the use of alpha lipoic acid at 600 mg daily for treatment of peripheral polyneuropathy.  Additional information regarding polyneuropathy was provided for home review.    In addition, your blood pressure continues to be high, please discuss it with your primary care provider.    Next follow-up appointment is in the next 2 months or earlier if needed.    Please do not hesitate to call me with any questions or concerns.    Thanks.    Treating Peripheral Neuropathy  Peripheral neuropathy is a disease of the nerves. It most often starts in your feet and may also eventually affect the arms. It may cause pain or may make you unable to sense pain. Sometimes, weakness occurs as well. Lack of pain and weakness makes you more likely to injure yourself without knowing it.  Learn ways to protect your feet. Check your feet daily for wounds you may not have felt. Avoid burns by testing bath water with your elbow before stepping in. Also, always wear shoes to prevent injury.    Regular foot care  If you have foot numbness, you may not notice cutting yourself while trimming your  nails. To prevent problems, your healthcare provider may ask you to visit for nail and callus trimming. See your provider for foot care as often as suggested.  Check your feet daily  Catch problems early by checking your feet every day for changes. Look at the top and bottom of your feet, your heels, and between your toes. It may help to use a mirror. If this is hard, ask someone to check for you. Call your healthcare provider if you notice a wound, ulceration, ingrown nail, or any changes in your feet. This includes increased heat, swelling, numbness, tingling, pain, and redness.  Wear proper footwear  Always wear shoes and socks, even indoors. Ask your healthcare provider how to choose the right shoe. After buying shoes, bring them to your doctor to be checked for proper fit. Take new shoes off every hour or so to check for red pressure areas on your feet. Each time you put on your shoes, use your fingers first to feel inside for foreign objects.  Common causes of peripheral neuropathy  Some common causes of peripheral neuropathy include:    Diabetes or other endocrine disorders    Toxins (such as alcohol)    Nutritional deficiencies (such as Vitamin B-12)    Kidney disease    Injury    Repetitive stress (such as carpal tunnel syndrome)    Autoimmune disease    Cancer and tumors    Chemotherapy     Arthritis    Advanced age    Heredity    Infection  Diagnosis and treatment  Diagnosis of peripheral neuropathy includes a complete history and physical exam.  Lab tests including blood work and imaging often help determine the cause. Special nerve tests are often helpful including nerve conduction velocity studies (NCV), and electromyography (EMG).  Treatment focuses on treating the underlying disorder and treating symptoms through the use of medicines, injections, TENS (transcutaneous electrical nerve stimulation), acupuncture, massage, and other methods.  Date Last Reviewed: 1/1/2018 2000-2017 [employer]. [address] [place] [address]. All rights reserved. This information is not intended as a substitute for professional medical care. Always follow your healthcare professional's instructions.        Carpal Tunnel Syndrome Prevention Tips  Carpal tunnel syndrome is a painful condition in the hand and wrist. It occurs when there is too much pressure on the median nerve at the wrist. The median nerve runs from your forearm to the palm of your hand. It may get squeezed or pressed when it passes through the carpal tunnel from your wrist to your hand. You may then feel numbness, tingling, pain, or weakness in your hand and up your forearm.  Doing the same hand activities over and over can put you at higher risk for carpal tunnel syndrome. But you can reduce your risk. Learn how to change the way you use your hands. Below are tips for at home and on the job. Also follow the hand and wrist safety policies at your workplace.      Keep your wrist in a straight (neutral) position when exercising.      Keep your wrist in neutral  Keep a straight (neutral) wrist position as often as you can. Don t use your wrist in a bent (flexed) position for long periods of time. This includes extended or twisted positions.  When you sleep, don't have your wrist flexed (don't sleep all curled up on your side). And don't put extra pressure on your wrist for long periods of time (don't sleep on your stomach with your hands under you).  Watch your   Don t use only your thumb and index finger to grasp or lift something. This can put stress on your wrist. When you can, use your whole hand and all its fingers to grasp an object.  Minimize repetition  Don t move your arms or hands the same way for long periods of time. And don't hold an object in the same way for long periods of time. Even simple, light tasks can cause injury this way. Instead, switch tasks or switch hands.  Rest your hands  Give your hands a break from  time to time with a rest. Even a few minutes once an hour can help.  Reduce speed and force  Slow down when you do a forceful, repetitive motion. This gives your wrist time to recover from the effort. Use power tools to help reduce the force.  Strengthen the muscles  Weak muscles may lead to a poor wrist or arm position. Exercises will make your hand and arm muscles stronger. This can help you keep a better position.  Date Last Reviewed: 1/1/2018 2000-2017 [employer]. [address] [place] [address]. All rights reserved. This information is not intended as a substitute for professional medical care. Always follow your healthcare professional's instructions.                Follow-ups after your visit        Your next 10 appointments already scheduled     Jun 20, 2018  2:00 PM CDT   Return Visit with Tramaine Borja MD   [facility] ([facility])    [address] [place] [id number] [phone]              Who to contact     If you have questions or need follow up information about today's clinic visit or your schedule please contact [facility] directly at [phone].  Normal or non-critical lab and imaging results will be communicated to you by MyChart, letter or phone within 4 business days after the clinic has received the results. If you do not hear from us within 7 days, please contact the clinic through MyChart or phone. If you have a critical or abnormal lab result, we will notify you by phone as soon as possible.  Submit refill requests through [email or web address] or call your pharmacy and they will forward the refill request to us. Please allow 3 business days for your refill to be completed.          Additional Information About Your Visit        Hyperpiahart Information     [email or web address] lets you send messages to your doctor, view your test results, renew your prescriptions, schedule appointments and more. To sign up, go  "to [email or web address] . Click on \"Log in\" on the left side of the screen, which will take you to the Welcome page. Then click on \"Sign up Now\" on the right side of the page.     You will be asked to enter the access code listed below, as well as some personal information. Please follow the directions to create your username and password.     Your access code is: [email or web address]  Expires: 2018  8:13 PM     Your access code will  in 90 days. If you need help or a new code, please call your [facility] clinic or [phone].        Care EveryWhere ID     This is your Care EveryWhere ID. This could be used by other organizations to access your [facility] medical records  [id number]        Your Vitals Were     Pulse Respirations Pulse Oximetry BMI (Body Mass Index)          63 16 97% 27.29 kg/m2         Blood Pressure from Last 3 Encounters:   18 142/70   18 146/77   18 126/70    Weight from Last 3 Encounters:   18 72.1 kg (159 lb)   18 73.5 kg (162 lb)   18 73.5 kg (162 lb)              Today, you had the following     No orders found for display         Today's Medication Changes          These changes are accurate as of 18  2:44 PM.  If you have any questions, ask your nurse or doctor.               Start taking these medicines.        Dose/Directions    order for DME   Used for:  Carpal tunnel syndrome of left wrist   Started by:  Tramaine Borja MD        Equipment being ordered: Left wrist splint.   Quantity:  1 each   Refills:  0            Where to get your medicines      Some of these will need a paper prescription and others can be bought over the counter.  Ask your nurse if you have questions.     Bring a paper prescription for each of these medications     order for DME                Primary Care Provider Office Phone # Fax #    Krysten Molina [phone] [address] [facility] [address]        Equal Access to " Services     [facility]: Hadii barrett landers janellesabi Sobhumiali, waaxda luqadaha, qaybta kaalmada rene, imelda gordon . So [facility] [phone].    ATENCIÓN: Si habla chris, tiene a husain disposición servicios gratuitos de asistencia lingüística. Llame al [phone].    We comply with applicable federal civil rights laws and [place] laws. We do not discriminate on the basis of race, color, national origin, age, disability, sex, sexual orientation, or gender identity.            Thank you!     Thank you for choosing [facility]  for your care. Our goal is always to provide you with excellent care. Hearing back from our patients is one way we can continue to improve our services. Please take a few minutes to complete the written survey that you may receive in the mail after your visit with us. Thank you!             Your Updated Medication List - Protect others around you: Learn how to safely use, store and throw away your medicines at www.disposemymeds.org.          This list is accurate as of 4/25/18  2:44 PM.  Always use your most recent med list.                   Brand Name Dispense Instructions for use Diagnosis    Alfalfa 650 MG Tabs      Take 2 capsules by mouth 2 times daily For arthritis pain        amLODIPine 2.5 MG tablet    NORVASC    90 tablet    TAKE ONE TABLET BY MOUTH ONE TIME DAILY    Hypertension goal BP (blood pressure) < 150/90       apixaban ANTICOAGULANT 5 MG tablet    ELIQUIS    60 tablet    Take 1 tablet (5 mg) by mouth 2 times daily    Paroxysmal atrial fibrillation (H)       blood glucose lancets standard    no brand specified    100 each    Use to test blood sugar one times daily or as directed. Dispense compatible with insurance and glucometer - One Touch Ultra    Type 2 diabetes mellitus without complication, without long-term current use of insulin (H)       blood glucose monitoring lancets     3 Box    Use to test blood sugars once daily as  directed.        blood glucose monitoring meter device kit    no brand specified    1 kit    Use to test blood sugar one times daily or as directed.  Dispense One Touch Ultra per insurance    Type 2 diabetes mellitus without complication, without long-term current use of insulin (H)       * blood glucose monitoring test strip    SAMEER CONTOUR NEXT    100 strip    1 strip by In Vitro route daily        * blood glucose monitoring test strip    no brand specified    100 strip    Use to test blood sugars 1 times daily or as directed. Needs strips that will work with her contour next ez meter.    Type 2 diabetes mellitus without complication, without long-term current use of insulin (H)       * blood glucose monitoring test strip    no brand specified    100 strip    Use to test blood sugars one times daily or as directed.  Dispense compatible with insurance and glucometer - One Touch Ultra    Type 2 diabetes mellitus without complication, without long-term current use of insulin (H)       carvedilol 25 MG tablet    COREG    180 tablet    Take 1 tablet (25 mg) by mouth 2 times daily (with meals)    SOB (shortness of breath), Essential hypertension with goal blood pressure less than 140/90       cholecalciferol 1000 UNIT tablet    vitamin D3    30    1 TABLET DAILY        Flaxseed (Linseed) Powd      1.5  tablespoon daily    Type II or unspecified type diabetes mellitus without mention of complication, not stated as uncontrolled       fluticasone 50 MCG/ACT spray    FLONASE    3 Package    Spray 1-2 sprays into both nostrils daily    Cough, Allergic rhinitis       hydrochlorothiazide 25 MG tablet    HYDRODIURIL    90 tablet    TAKE ONE TABLET BY MOUTH ONE TIME DAILY    Hypertension goal BP (blood pressure) < 140/90       lisinopril 40 MG tablet    PRINIVIL/ZESTRIL    30 tablet    TAKE ONE TABLET BY MOUTH ONE TIME DAILY    Hypertension goal BP (blood pressure) < 140/90       order for DME     1 each    Equipment being  ordered: Left wrist splint.    Carpal tunnel syndrome of left wrist       oxybutynin 5 MG tablet    DITROPAN    90 tablet    TAKE 1/2 TABLET BY MOUTH TWICE DAILY    Female stress incontinence       triamcinolone 0.5 % cream    KENALOG    15 g    Apply topically 2 times daily to affected area as directed.    Itching       TUMS E-X 750 750 MG Chew   Generic drug:  calcium carbonate      Take 1,500 mg by mouth daily        * Notice:  This list has 3 medication(s) that are the same as other medications prescribed for you. Read the directions carefully, and ask your doctor or other care provider to review them with you.

## 2018-04-25 NOTE — PATIENT INSTRUCTIONS
AFTER VISIT SUMMARY (AVS):    At today's visit we thoroughly reviewed EMG results, discussed treatment options and the plan.    Please wear left wrist splint during the day with repetitive wrist movements and every night for the next 2 months.      Please wear left elbow pad during the day and left elbow splint every night for the next 2 months.    We discussed the use of alpha lipoic acid at 600 mg daily for treatment of peripheral polyneuropathy.  Additional information regarding polyneuropathy was provided for home review.    In addition, your blood pressure continues to be high, please discuss it with your primary care provider.    Next follow-up appointment is in the next 2 months or earlier if needed.    Please do not hesitate to call me with any questions or concerns.    Thanks.    Treating Peripheral Neuropathy  Peripheral neuropathy is a disease of the nerves. It most often starts in your feet and may also eventually affect the arms. It may cause pain or may make you unable to sense pain. Sometimes, weakness occurs as well. Lack of pain and weakness makes you more likely to injure yourself without knowing it.  Learn ways to protect your feet. Check your feet daily for wounds you may not have felt. Avoid burns by testing bath water with your elbow before stepping in. Also, always wear shoes to prevent injury.    Regular foot care  If you have foot numbness, you may not notice cutting yourself while trimming your nails. To prevent problems, your healthcare provider may ask you to visit for nail and callus trimming. See your provider for foot care as often as suggested.  Check your feet daily  Catch problems early by checking your feet every day for changes. Look at the top and bottom of your feet, your heels, and between your toes. It may help to use a mirror. If this is hard, ask someone to check for you. Call your healthcare provider if you notice a wound, ulceration, ingrown nail, or any changes in your  feet. This includes increased heat, swelling, numbness, tingling, pain, and redness.  Wear proper footwear  Always wear shoes and socks, even indoors. Ask your healthcare provider how to choose the right shoe. After buying shoes, bring them to your doctor to be checked for proper fit. Take new shoes off every hour or so to check for red pressure areas on your feet. Each time you put on your shoes, use your fingers first to feel inside for foreign objects.  Common causes of peripheral neuropathy  Some common causes of peripheral neuropathy include:    Diabetes or other endocrine disorders    Toxins (such as alcohol)    Nutritional deficiencies (such as Vitamin B-12)    Kidney disease    Injury    Repetitive stress (such as carpal tunnel syndrome)    Autoimmune disease    Cancer and tumors    Chemotherapy     Arthritis    Advanced age    Heredity    Infection  Diagnosis and treatment  Diagnosis of peripheral neuropathy includes a complete history and physical exam.  Lab tests including blood work and imaging often help determine the cause. Special nerve tests are often helpful including nerve conduction velocity studies (NCV), and electromyography (EMG).  Treatment focuses on treating the underlying disorder and treating symptoms through the use of medicines, injections, TENS (transcutaneous electrical nerve stimulation), acupuncture, massage, and other methods.  Date Last Reviewed: 1/1/2018 2000-2017 The [employer]. [address] [place] [address], [place] [id number]. All rights reserved. This information is not intended as a substitute for professional medical care. Always follow your healthcare professional's instructions.        Carpal Tunnel Syndrome Prevention Tips  Carpal tunnel syndrome is a painful condition in the hand and wrist. It occurs when there is too much pressure on the median nerve at the wrist. The median nerve runs from your forearm to the palm of your hand. It may get squeezed or pressed when  it passes through the carpal tunnel from your wrist to your hand. You may then feel numbness, tingling, pain, or weakness in your hand and up your forearm.  Doing the same hand activities over and over can put you at higher risk for carpal tunnel syndrome. But you can reduce your risk. Learn how to change the way you use your hands. Below are tips for at home and on the job. Also follow the hand and wrist safety policies at your workplace.      Keep your wrist in a straight (neutral) position when exercising.      Keep your wrist in neutral  Keep a straight (neutral) wrist position as often as you can. Don t use your wrist in a bent (flexed) position for long periods of time. This includes extended or twisted positions.  When you sleep, don't have your wrist flexed (don't sleep all curled up on your side). And don't put extra pressure on your wrist for long periods of time (don't sleep on your stomach with your hands under you).  Watch your   Don t use only your thumb and index finger to grasp or lift something. This can put stress on your wrist. When you can, use your whole hand and all its fingers to grasp an object.  Minimize repetition  Don t move your arms or hands the same way for long periods of time. And don't hold an object in the same way for long periods of time. Even simple, light tasks can cause injury this way. Instead, switch tasks or switch hands.  Rest your hands  Give your hands a break from time to time with a rest. Even a few minutes once an hour can help.  Reduce speed and force  Slow down when you do a forceful, repetitive motion. This gives your wrist time to recover from the effort. Use power tools to help reduce the force.  Strengthen the muscles  Weak muscles may lead to a poor wrist or arm position. Exercises will make your hand and arm muscles stronger. This can help you keep a better position.  Date Last Reviewed: 1/1/2018 2000-2017 [employer]. [address] [place] [address]  Road, [place] [address]. All rights reserved. This information is not intended as a substitute for professional medical care. Always follow your healthcare professional's instructions.

## 2018-04-25 NOTE — PROGRESS NOTES
ESTABLISHED PATIENT NEUROLOGY NOTE    DATE OF VISIT: 4/25/2018  CLINIC LOCATION: [facility]  MRN: [id number]  PATIENT NAME: Aundrea Treviño  YOB: 1934    PCP: Krysten Molina MD.    REASON FOR VISIT:   Chief Complaint   Patient presents with     Follow Up For     F/U-EMG     SUBJECTIVE:                                                      HISTORY OF PRESENT ILLNESS: Patient presents for follow-up of her bilateral feet numbness and left hand paresthesias.  Please refer to my initial note from 02/28/2018 for further information.  The patient is accompanied by her daughter, who participates in interview.    Since the last visit, the patient reports no interval changes of her symptoms - continues to have numbness that symmetrically affects both of her feet and left hand paresthesias, predominantly in the distribution of the median nerve.  She completed the recommended EMG on 04/09/2018.  It demonstrated mild axonal length dependent sensory polyneuropathy, moderate left-sided median neuropathy across the wrist (carpal tunnel syndrome), and very mild left-sided ulnar neuropathy across the elbow.  The patient denies interval development of new focal neurological symptoms.    On review of systems, patient endorses no new active complaints.  Medications, allergies, family and social history were also reviewed.  There are no changes reported by patient.    CURRENT MEDICATIONS:   Current Outpatient Prescriptions   Medication     Alfalfa 650 MG TABS     amLODIPine (NORVASC) 2.5 MG tablet     apixaban ANTICOAGULANT (ELIQUIS) 5 MG tablet     blood glucose (NO BRAND SPECIFIED) lancets standard     blood glucose monitoring (SAMEER CONTOUR NEXT) test strip     blood glucose monitoring (SAMEER MICROLET) lancets     blood glucose monitoring (NO BRAND SPECIFIED) meter device kit     blood glucose monitoring (NO BRAND SPECIFIED) test strip     blood glucose monitoring (NO BRAND SPECIFIED) test strip      calcium carbonate (TUMS E-X 750) 750 MG CHEW     carvedilol (COREG) 25 MG tablet     FLAXSEED (LINSEED) PO POWD     fluticasone (FLONASE) 50 MCG/ACT nasal spray     hydrochlorothiazide (HYDRODIURIL) 25 MG tablet     lisinopril (PRINIVIL/ZESTRIL) 40 MG tablet     oxybutynin (DITROPAN) 5 MG tablet     triamcinolone (KENALOG) 0.5 % cream     VITAMIN D 1000 UNIT OR TABS     REVIEW OF SYSTEMS:                                                    10-system review was completed. Pertinent positives are included in HPI. The remainder of ROS is negative.  EXAM:                                                    Physical Exam:   Vitals: /87 (BP Location: Left arm, Patient Position: Sitting, Cuff Size: Adult Regular)  Pulse 63  Resp 16  Wt 72.1 kg (159 lb)  SpO2 97%  BMI 27.29 kg/m2  Repeat blood pressure: /70  Pulse 63  Resp 16  Wt 72.1 kg (159 lb)  SpO2 97%  BMI 27.29 kg/m2  General: pt is in NAD, cooperative.  Skin: normal turgor, moist mucous membranes, no lesions/rashes noticed.  HEENT: ATNC, white sclera, normal conjunctiva.  Respiratory: Symmetric lung excursion, no accessory respiratory muscle use.  Abdomen: Non distended.  Neurological: awake, cooperative, follows commands, no aphasia or dysarthria noted, cranial nerves II-XII: no ptosis, extraocular motility is full, face is symmetric, tongue is midline, equally moves all extremities, light touch sensation is symmetrically reduced in both feet, no dysmetria bilaterally, casual gait is normal.    DATA:   EMG: I personally reviewed the tabulated EMG data and agree with the formal report, as detailed in the history of present illness.  ASSESSMENT and PLAN:                                                    Assessment: 83-year-old female patient with history of diabetes mellitus presents for follow-up of bilateral feet numbness and left hand paresthesias.  Her EMG demonstrated mild axonal length dependent sensory polyneuropathy, moderate left carpal  tunnel syndrome, and mild left ulnar neuropathy.  We went over each condition, available treatment options, prognosis, and the plan.  The rest of our discussion and the plan are summarized below.    Diagnoses:    ICD-10-CM    1. Diabetic polyneuropathy associated with type 2 diabetes mellitus (H) E11.42    2. Carpal tunnel syndrome of left wrist G56.02 order for DME   3. 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    I recommended the patient to wear left wrist splint during the day with repetitive wrist movements and every night for the next 2 months.     I advised her to wear left elbow pad during the day and left elbow splint every night for the next 2 months.    We discussed the use of alpha lipoic acid at 600 mg daily for treatment of peripheral polyneuropathy.  Additional information regarding polyneuropathy was provided for home review.    Patient's blood pressure continues to be high, I recommended the patient to discuss it with her primary care provider.    Next follow-up appointment is in the next 2 months or earlier if needed.    I advised the patient to call me with any questions or concerns.    Total Time: 41 minutes with > 50% spent counseling the patient and her daughter on stated above assessment and recommendations, including review of current symptoms, EMG results, treatment options, and the plan.    Tramaine Borja MD  / Neurology

## 2018-05-07 ENCOUNTER — OFFICE VISIT (OUTPATIENT)
Dept: FAMILY MEDICINE | Facility: CLINIC | Age: 83
End: 2018-05-07
Payer: COMMERCIAL

## 2018-05-07 VITALS
HEIGHT: 64 IN | OXYGEN SATURATION: 98 % | DIASTOLIC BLOOD PRESSURE: 70 MMHG | TEMPERATURE: 98.2 F | SYSTOLIC BLOOD PRESSURE: 150 MMHG | HEART RATE: 56 BPM | WEIGHT: 159 LBS | BODY MASS INDEX: 27.14 KG/M2

## 2018-05-07 DIAGNOSIS — E11.42 TYPE 2 DIABETES MELLITUS WITH DIABETIC POLYNEUROPATHY, WITHOUT LONG-TERM CURRENT USE OF INSULIN (H): ICD-10-CM

## 2018-05-07 DIAGNOSIS — I10 HYPERTENSION GOAL BP (BLOOD PRESSURE) < 140/90: Primary | ICD-10-CM

## 2018-05-07 DIAGNOSIS — I27.20 PULMONARY HYPERTENSION, MILD (H): ICD-10-CM

## 2018-05-07 DIAGNOSIS — I48.0 PAROXYSMAL ATRIAL FIBRILLATION (H): ICD-10-CM

## 2018-05-07 LAB — HBA1C MFR BLD: 7.3 % (ref 0–5.6)

## 2018-05-07 PROCEDURE — 99214 OFFICE O/P EST MOD 30 MIN: CPT | Performed by: FAMILY MEDICINE

## 2018-05-07 PROCEDURE — 36415 COLL VENOUS BLD VENIPUNCTURE: CPT | Performed by: FAMILY MEDICINE

## 2018-05-07 PROCEDURE — 80053 COMPREHEN METABOLIC PANEL: CPT | Performed by: FAMILY MEDICINE

## 2018-05-07 PROCEDURE — 83036 HEMOGLOBIN GLYCOSYLATED A1C: CPT | Performed by: FAMILY MEDICINE

## 2018-05-07 PROCEDURE — 82043 UR ALBUMIN QUANTITATIVE: CPT | Performed by: FAMILY MEDICINE

## 2018-05-07 PROCEDURE — 99207 C FOOT EXAM  NO CHARGE: CPT | Performed by: FAMILY MEDICINE

## 2018-05-07 RX ORDER — AMLODIPINE BESYLATE 2.5 MG/1
2.5 TABLET ORAL DAILY
Qty: 90 TABLET | Refills: 3 | Status: SHIPPED | OUTPATIENT
Start: 2018-05-07 | End: 2019-02-18

## 2018-05-07 RX ORDER — LISINOPRIL 40 MG/1
40 TABLET ORAL DAILY
Qty: 90 TABLET | Refills: 3 | Status: SHIPPED | OUTPATIENT
Start: 2018-05-07 | End: 2019-02-18

## 2018-05-07 NOTE — PROGRESS NOTES
SUBJECTIVE:                                                    Aundrea Treviño is a 83 year old female who presents to clinic today for the following health issues:       Diabetes Follow-up  Patient is checking blood sugars: once daily.  Results vary depending on her intake, she may have a paper.         am     Diabetic concerns: yes, she is told that she is diabetic but her a1c is acceptable, she is unsure on what she should do     Symptoms of hypoglycemia (low blood sugar): none     Paresthesias (numbness or burning in feet) or sores: Yes. numbness     Date of last diabetic eye exam: 2017    BP Readings from Last 2 Encounters:   05/07/18 150/70   04/25/18 142/70     Hemoglobin A1C (%)   Date Value   02/14/2018 7.0 (H)   09/07/2017 6.9 (H)     LDL Cholesterol Calculated (mg/dL)   Date Value   09/07/2017 88   09/12/2016 96     Hypertension Follow-up    Outpatient blood pressures are being checked at home.  Results are within range .    Low Salt Diet: low salt      Amount of exercise or physical activity: 6-7 days/week for an average of 15-30 minutes    Problems taking medications regularly: No    Medication side effects: none    Diet: regular (no restrictions)        Problem list and histories reviewed & adjusted, as indicated.  Additional history: as documented    Patient reports that her blood pressure readings have been well controlled; most readings are in the goal range. She states that when she needs to get her eye exam done she is called and she states that she has not been contacted yet. After visiting with Dr. Borja she was told she has diabetic neuropathy. She is open to starting metformin if her A1c is still not controlled. She states that she has been taking her medications as directed, but she feels her oxybutynin is not effective.    Patient had questions about Lipoic acid supplement as she was advised to start that by Dr. Borja. She notes tht she has not statex it       Patient Active  Problem List   Diagnosis     Disorder of bone and cartilage     Female stress incontinence     Dystonia     Essential tremor     Refusal of blood transfusions as patient is [employer]     Hypertension goal BP (blood pressure) < 140/90     CARDIOVASCULAR SCREENING; LDL GOAL LESS THAN 130     Gout     Uncomplicated type 2 diabetes mellitus (H)     Venous (peripheral) insufficiency     Chronic anticoagulation     Paroxysmal atrial fibrillation (H)     Pulmonary hypertension, mild     Moderate tricuspid regurgitation     ACP (advance care planning)     Patient is [employer]     Type 2 diabetes mellitus with diabetic polyneuropathy, without long-term current use of insulin (H)     Past Surgical History:   Procedure Laterality Date     SURGICAL HISTORY OF -       hysterectomy, bladder suspension     SURGICAL HISTORY OF -       tonsillectomy       Social History   Substance Use Topics     Smoking status: Former Smoker     Quit date: 4/22/1970     Smokeless tobacco: Never Used     Alcohol use Yes      Comment: rarely, a glass of wine on a special occassion     Family History   Problem Relation Age of Onset     DIABETES Father      type 2     Hypertension Father      CEREBROVASCULAR DISEASE Father      Arthritis Father      Other Cancer Sister          Current Outpatient Prescriptions   Medication Sig Dispense Refill     [place] 650 MG TABS Take 2 capsules by mouth 2 times daily For arthritis pain       amLODIPine (NORVASC) 2.5 MG tablet TAKE ONE TABLET BY MOUTH ONE TIME DAILY  90 tablet 0     apixaban ANTICOAGULANT (ELIQUIS) 5 MG tablet Take 1 tablet (5 mg) by mouth 2 times daily 60 tablet 11     blood glucose (NO BRAND SPECIFIED) lancets standard Use to test blood sugar one times daily or as directed. Dispense compatible with insurance and glucometer - One Touch Ultra 100 each 3     blood glucose monitoring (NO BRAND SPECIFIED) meter device kit Use to test blood sugar one times daily or as directed.  Dispense  One Touch Ultra per insurance 1 kit 0     blood glucose monitoring (NO BRAND SPECIFIED) test strip Use to test blood sugars one times daily or as directed.  Dispense compatible with insurance and glucometer - One Touch Ultra 100 strip 3     calcium carbonate (TUMS E-X 750) 750 MG CHEW Take 1,500 mg by mouth daily        carvedilol (COREG) 25 MG tablet Take 1 tablet (25 mg) by mouth 2 times daily (with meals) 180 tablet 1     FLAXSEED (LINSEED) PO POWD 1.5  tablespoon daily       fluticasone (FLONASE) 50 MCG/ACT nasal spray Spray 1-2 sprays into both nostrils daily 3 Package 1     hydrochlorothiazide (HYDRODIURIL) 25 MG tablet TAKE ONE TABLET BY MOUTH ONE TIME DAILY  90 tablet 1     lisinopril (PRINIVIL/ZESTRIL) 40 MG tablet TAKE ONE TABLET BY MOUTH ONE TIME DAILY  30 tablet 0     order for DME Equipment being ordered: Left wrist splint. 1 each 0     oxybutynin (DITROPAN) 5 MG tablet TAKE 1/2 TABLET BY MOUTH TWICE DAILY 90 tablet 3     triamcinolone (KENALOG) 0.5 % cream Apply topically 2 times daily to affected area as directed. 15 g 3     VITAMIN D 1000 UNIT OR TABS 1 TABLET DAILY 30 0     [DISCONTINUED] oxybutynin (DITROPAN) 5 MG tablet Take 0.5 tablets by mouth 2 times daily. 90 tablet 2     Allergies   Allergen Reactions     Amlodipine      Swollen ankles and rash but tolerates 2.5 mg without any symptoms     Apple [Chromium]      Kiwi      Nuts      Pear      Tape [Adhesive Tape]      Recent Labs   Lab Test  02/14/18   1216  09/07/17   0954  02/20/17   1557   09/12/16   1152   06/18/15   0854   04/30/14   1057   A1C  7.0*  6.9*  7.0*   --   7.1*   < >  6.9*   < >  6.5*   LDL   --   88   --    --   96   --   97   --    --    HDL   --   53   --    --   46*   --   39*   --    --    TRIG   --   70   --    --   91   --   115   --    --    ALT   --   21  20   --   20   < >  19   < >  22   CR   --   0.75  0.74   < >  0.72   < >  0.92   < >  0.79   GFRESTIMATED   --   73  76   < >  77   < >  59*   < >  70   GFRESTBLACK  "  --   89  >90   GFR Calc     < >  >90   GFR Calc     < >  71   < >  85   POTASSIUM   --   3.6  3.8   < >  3.6   < >  3.6   < >  4.2   TSH   --    --    --    --   1.56   --    --    --   2.34    < > = values in this interval not displayed.      BP Readings from Last 3 Encounters:   05/07/18 150/70   04/25/18 142/70   03/02/18 146/77    Wt Readings from Last 3 Encounters:   05/07/18 159 lb (72.1 kg)   04/25/18 159 lb (72.1 kg)   03/02/18 162 lb (73.5 kg)        ROS:  Denies SOB or chest pain  See above    This document serves as a record of the services and decisions personally performed and made by Krysten Molina MD. It was created on his/her behalf by Marta Holguin, trained medical scribe. The creation of this document is based the provider's statements to the medical scribes.    Scribe Marta Holguin, May 7, 2018  OBJECTIVE:     /70  Pulse 56  Temp 98.2  F (36.8  C) (Oral)  Ht 5' 4\" (1.626 m)  Wt 159 lb (72.1 kg)  SpO2 98%  BMI 27.29 kg/m2  Body mass index is 27.29 kg/(m^2).     GENERAL: healthy, alert and no distress    Diagnostic Test Results:  No results found for this or any previous visit (from the past 24 hour(s)).    ASSESSMENT/PLAN:     1. Hypertension goal BP (blood pressure) < 140/90  Mildly elevated today, though goal of less than 150/90 is reasonable given her age, home pressures are in goal range.    Her blood pressure has continued in this range over th past year. Her cardiologist has recommended continuing her current medications.   - Comprehensive metabolic panel (BMP + Alb, Alk Phos, ALT, AST, Total. Bili, TP)  - Albumin Random Urine Quantitative with Creat Ratio  - lisinopril (PRINIVIL/ZESTRIL) 40 MG tablet; Take 1 tablet (40 mg) by mouth daily  Dispense: 90 tablet; Refill: 3    2. Type 2 diabetes mellitus with diabetic polyneuropathy, without long-term current use of insulin (H)  Well controlled with dietary measures. Will recheck A1C today rather " than have her return in a month for recheck.   No changes today     3. Pulmonary hypertension, mild  Stable, followed by cardiology.     4. Paroxysmal atrial fibrillation (H)  Stable, followed by cardiology. Continues on anticoagulation with Eliquis.       Patient Instructions   1) Continue your current medications   2) Schedule a follow up visit with me in 6 months and earlier as needed.   3) Schedule with Urology when you are able.   4) We will do lab work today.   5) Call your insurance company to find out about coverage for your shingles vaccination (Shingrix)       The information in this document, created by the medical scribe for me, accurately reflects the services I personally performed and the decisions made by me. I have reviewed and approved this document for accuracy. 05/07/18    Krysten Molina MD  [facility]

## 2018-05-07 NOTE — MR AVS SNAPSHOT
After Visit Summary   5/7/2018    Aundrea Treviño    MRN: [id number]           Patient Information     Date Of Birth          8/29/1934        Visit Information        Provider Department      5/7/2018 2:20 PM Krysten Molina MD [facility]        Today's Diagnoses     Hypertension goal BP (blood pressure) < 140/90    -  1    Type 2 diabetes mellitus with diabetic polyneuropathy, without long-term current use of insulin (H)        Pulmonary hypertension, mild        Paroxysmal atrial fibrillation (H)          Care Instructions    1) Continue your current medications   2) Schedule a follow up visit with me in 6 months and earlier as needed.   3) Schedule with Urology when you are able.   4) We will do lab work today.   5) Call your insurance company to find out about coverage for your shingles vaccination (Shingrix)           Follow-ups after your visit        Your next 10 appointments already scheduled     Jun 20, 2018  2:00 PM CDT   Return Visit with Tramaine Borja MD   [facility] ([facility])    [address] [place] [id number] [phone]              Who to contact     If you have questions or need follow up information about today's clinic visit or your schedule please contact [facility] directly at [phone].  Normal or non-critical lab and imaging results will be communicated to you by MyChart, letter or phone within 4 business days after the clinic has received the results. If you do not hear from us within 7 days, please contact the clinic through MyChart or phone. If you have a critical or abnormal lab result, we will notify you by phone as soon as possible.  Submit refill requests through [employer] or call your pharmacy and they will forward the refill request to us. Please allow 3 business days for your refill to be completed.          Additional Information About Your Visit        [facility]  "Information     [email or web address] lets you send messages to your doctor, view your test results, renew your prescriptions, schedule appointments and more. To sign up, go to [email or web address] . Click on \"Log in\" on the left side of the screen, which will take you to the Welcome page. Then click on \"Sign up Now\" on the right side of the page.     You will be asked to enter the access code listed below, as well as some personal information. Please follow the directions to create your username and password.     Your access code is: [email or web address]  Expires: 2018  8:13 PM     Your access code will  in 90 days. If you need help or a new code, please call your [facility] clinic or [phone].        Care EveryWhere ID     This is your Care EveryWhere ID. This could be used by other organizations to access your [facility] medical records  [id number]        Your Vitals Were     Pulse Temperature Height Pulse Oximetry BMI (Body Mass Index)       56 98.2  F (36.8  C) (Oral) 5' 4\" (1.626 m) 98% 27.29 kg/m2        Blood Pressure from Last 3 Encounters:   18 150/70   18 142/70   18 146/77    Weight from Last 3 Encounters:   18 159 lb (72.1 kg)   18 159 lb (72.1 kg)   18 162 lb (73.5 kg)              We Performed the Following     Albumin Random Urine Quantitative with Creat Ratio     Comprehensive metabolic panel (BMP + Alb, Alk Phos, ALT, AST, Total. Bili, TP)     FOOT EXAM     Hemoglobin A1c          Today's Medication Changes          These changes are accurate as of 18  2:56 PM.  If you have any questions, ask your nurse or doctor.               These medicines have changed or have updated prescriptions.        Dose/Directions    amLODIPine 2.5 MG tablet   Commonly known as:  NORVASC   This may have changed:  See the new instructions.   Used for:  Hypertension goal BP (blood pressure) < 140/90   Changed by:  Krysten Molina MD        Dose:  2.5 mg   Take 1 tablet (2.5 mg) by mouth " daily   Quantity:  90 tablet   Refills:  3       blood glucose monitoring test strip   Commonly known as:  no brand specified   This may have changed:  Another medication with the same name was removed. Continue taking this medication, and follow the directions you see here.   Used for:  Type 2 diabetes mellitus without complication, without long-term current use of insulin (H)   Changed by:  Krysten Molina MD        Use to test blood sugars one times daily or as directed.  Dispense compatible with insurance and glucometer - One Touch Ultra   Quantity:  100 strip   Refills:  3       lisinopril 40 MG tablet   Commonly known as:  PRINIVIL/ZESTRIL   This may have changed:  See the new instructions.   Used for:  Hypertension goal BP (blood pressure) < 140/90   Changed by:  Krysten Molina MD        Dose:  40 mg   Take 1 tablet (40 mg) by mouth daily   Quantity:  90 tablet   Refills:  3         Stop taking these medicines if you haven't already. Please contact your care team if you have questions.     blood glucose monitoring lancets   Stopped by:  Krysten Molina MD                Where to get your medicines      These medications were sent to [id number] - [place], [place] - [address]  [address], [place] [address]     Phone:  [phone]     amLODIPine 2.5 MG tablet    lisinopril 40 MG tablet                Primary Care Provider Office Phone # Fax #    Krysten Molina [phone] [address]  [facility] [address]        Equal Access to Services     [facility] CaliforniaRADHA : Hadii barrett landers hadmaggieo Soron, waaxda luqadaha, qaybta kaalmada adeericayada, imelda gordon . So [facility] [phone].    ATENCIÓN: Si habla español, tiene a husain disposición servicios gratuitos de asistencia lingüística. Llame al [phone].    We comply with applicable federal civil rights laws and [place] laws. We do not discriminate on the basis of race, color, national  origin, age, disability, sex, sexual orientation, or gender identity.            Thank you!     Thank you for choosing [facility]  for your care. Our goal is always to provide you with excellent care. Hearing back from our patients is one way we can continue to improve our services. Please take a few minutes to complete the written survey that you may receive in the mail after your visit with us. Thank you!             Your Updated Medication List - Protect others around you: Learn how to safely use, store and throw away your medicines at www.disposemymeds.org.          This list is accurate as of 5/7/18  2:56 PM.  Always use your most recent med list.                   Brand Name Dispense Instructions for use Diagnosis    Alfalfa 650 MG Tabs      Take 2 capsules by mouth 2 times daily For arthritis pain        amLODIPine 2.5 MG tablet    NORVASC    90 tablet    Take 1 tablet (2.5 mg) by mouth daily    Hypertension goal BP (blood pressure) < 140/90       apixaban ANTICOAGULANT 5 MG tablet    ELIQUIS    60 tablet    Take 1 tablet (5 mg) by mouth 2 times daily    Paroxysmal atrial fibrillation (H)       blood glucose lancets standard    no brand specified    100 each    Use to test blood sugar one times daily or as directed. Dispense compatible with insurance and glucometer - One Touch Ultra    Type 2 diabetes mellitus without complication, without long-term current use of insulin (H)       blood glucose monitoring meter device kit    no brand specified    1 kit    Use to test blood sugar one times daily or as directed.  Dispense One Touch Ultra per insurance    Type 2 diabetes mellitus without complication, without long-term current use of insulin (H)       blood glucose monitoring test strip    no brand specified    100 strip    Use to test blood sugars one times daily or as directed.  Dispense compatible with insurance and glucometer - One Touch Ultra    Type 2 diabetes mellitus without complication,  without long-term current use of insulin (H)       carvedilol 25 MG tablet    COREG    180 tablet    Take 1 tablet (25 mg) by mouth 2 times daily (with meals)    SOB (shortness of breath), Essential hypertension with goal blood pressure less than 140/90       cholecalciferol 1000 UNIT tablet    vitamin D3    30    1 TABLET DAILY        Flaxseed (Linseed) Powd      1.5  tablespoon daily    Type II or unspecified type diabetes mellitus without mention of complication, not stated as uncontrolled       fluticasone 50 MCG/ACT spray    FLONASE    3 Package    Spray 1-2 sprays into both nostrils daily    Cough, Allergic rhinitis       hydrochlorothiazide 25 MG tablet    HYDRODIURIL    90 tablet    TAKE ONE TABLET BY MOUTH ONE TIME DAILY    Hypertension goal BP (blood pressure) < 140/90       lisinopril 40 MG tablet    PRINIVIL/ZESTRIL    90 tablet    Take 1 tablet (40 mg) by mouth daily    Hypertension goal BP (blood pressure) < 140/90       order for DME     1 each    Equipment being ordered: Left wrist splint.    Carpal tunnel syndrome of left wrist       oxybutynin 5 MG tablet    DITROPAN    90 tablet    TAKE 1/2 TABLET BY MOUTH TWICE DAILY    Female stress incontinence       triamcinolone 0.5 % cream    KENALOG    15 g    Apply topically 2 times daily to affected area as directed.    Itching       TUMS E-X 750 750 MG Chew   Generic drug:  calcium carbonate      Take 1,500 mg by mouth daily

## 2018-05-07 NOTE — LETTER
May 10, 2018      Aundrea Treviño  [address]   [place] [address]        Dear ,    We are writing to inform you of your test results.    Your hemoglobin A1C (three month measure of your blood sugars) was a little higher than last check. We should follow up on this in 3 months. In the meantime, working on your diet and exercise can be very helpful. Let me know if you are interested in seeing a nutritionist to help with your diet as well.    Your urine protein test was normal.    The testing of your kidney function, liver function and electrolytes was normal.    Resulted Orders   Comprehensive metabolic panel (BMP + Alb, Alk Phos, ALT, AST, Total. Bili, TP)   Result Value Ref Range    Sodium 139 133 - 144 mmol/L    Potassium 3.9 3.4 - 5.3 mmol/L    Chloride 101 94 - 109 mmol/L    Carbon Dioxide 28 20 - 32 mmol/L    Anion Gap 10 3 - 14 mmol/L    Glucose 132 (H) 70 - 99 mg/dL      Comment:      Non Fasting    Urea Nitrogen 17 7 - 30 mg/dL    Creatinine 0.73 0.52 - 1.04 mg/dL    GFR Estimate 76 >60 mL/min/1.7m2      Comment:      Non  GFR Calc    GFR Estimate If Black >90 >60 mL/min/1.7m2      Comment:       GFR Calc    Calcium 9.3 8.5 - 10.1 mg/dL    Bilirubin Total 1.0 0.2 - 1.3 mg/dL    Albumin 4.1 3.4 - 5.0 g/dL    Protein Total 7.0 6.8 - 8.8 g/dL    Alkaline Phosphatase 94 40 - 150 U/L    ALT 22 0 - 50 U/L    AST 11 0 - 45 U/L   Albumin Random Urine Quantitative with Creat Ratio   Result Value Ref Range    Creatinine Urine 84 mg/dL    Albumin Urine mg/L <5 mg/L    Albumin Urine mg/g Cr Unable to calculate due to low value 0 - 25 mg/g Cr   Hemoglobin A1c   Result Value Ref Range    Hemoglobin A1C 7.3 (H) 0 - 5.6 %      Comment:      Normal <5.7% Prediabetes 5.7-6.4%  Diabetes 6.5% or higher - adopted from ADA   consensus guidelines.         If you have any questions or concerns, please call the clinic at the number listed above.       Sincerely,        Krysten  YENIFER Molina MD/nr

## 2018-05-07 NOTE — PATIENT INSTRUCTIONS
1) Continue your current medications   2) Schedule a follow up visit with me in 6 months and earlier as needed.   3) Schedule with Urology when you are able.   4) We will do lab work today.   5) Call your insurance company to find out about coverage for your shingles vaccination (Shingrix)

## 2018-05-08 LAB
ALBUMIN SERPL-MCNC: 4.1 G/DL (ref 3.4–5)
ALP SERPL-CCNC: 94 U/L (ref 40–150)
ALT SERPL W P-5'-P-CCNC: 22 U/L (ref 0–50)
ANION GAP SERPL CALCULATED.3IONS-SCNC: 10 MMOL/L (ref 3–14)
AST SERPL W P-5'-P-CCNC: 11 U/L (ref 0–45)
BILIRUB SERPL-MCNC: 1 MG/DL (ref 0.2–1.3)
BUN SERPL-MCNC: 17 MG/DL (ref 7–30)
CALCIUM SERPL-MCNC: 9.3 MG/DL (ref 8.5–10.1)
CHLORIDE SERPL-SCNC: 101 MMOL/L (ref 94–109)
CO2 SERPL-SCNC: 28 MMOL/L (ref 20–32)
CREAT SERPL-MCNC: 0.73 MG/DL (ref 0.52–1.04)
CREAT UR-MCNC: 84 MG/DL
GFR SERPL CREATININE-BSD FRML MDRD: 76 ML/MIN/1.7M2
GLUCOSE SERPL-MCNC: 132 MG/DL (ref 70–99)
MICROALBUMIN UR-MCNC: <5 MG/L
MICROALBUMIN/CREAT UR: NORMAL MG/G CR (ref 0–25)
POTASSIUM SERPL-SCNC: 3.9 MMOL/L (ref 3.4–5.3)
PROT SERPL-MCNC: 7 G/DL (ref 6.8–8.8)
SODIUM SERPL-SCNC: 139 MMOL/L (ref 133–144)

## 2018-06-20 ENCOUNTER — OFFICE VISIT (OUTPATIENT)
Dept: NEUROLOGY | Facility: CLINIC | Age: 83
End: 2018-06-20
Payer: COMMERCIAL

## 2018-06-20 VITALS
BODY MASS INDEX: 27.46 KG/M2 | DIASTOLIC BLOOD PRESSURE: 80 MMHG | RESPIRATION RATE: 14 BRPM | WEIGHT: 160 LBS | HEART RATE: 98 BPM | TEMPERATURE: 98.1 F | SYSTOLIC BLOOD PRESSURE: 152 MMHG | OXYGEN SATURATION: 97 %

## 2018-06-20 DIAGNOSIS — E11.42 DIABETIC POLYNEUROPATHY ASSOCIATED WITH TYPE 2 DIABETES MELLITUS (H): Primary | ICD-10-CM

## 2018-06-20 DIAGNOSIS — G56.22 ULNAR NEUROPATHY OF LEFT UPPER EXTREMITY: ICD-10-CM

## 2018-06-20 DIAGNOSIS — G56.02 CARPAL TUNNEL SYNDROME OF LEFT WRIST: ICD-10-CM

## 2018-06-20 PROCEDURE — 99214 OFFICE O/P EST MOD 30 MIN: CPT | Performed by: PSYCHIATRY & NEUROLOGY

## 2018-06-20 NOTE — PATIENT INSTRUCTIONS
AFTER VISIT SUMMARY (AVS):    At today's visit we discussed the current symptoms, available treatment options, and the plan.    I am pleased to hear that your left hand symptoms resolved with regular use of wrist splint.  If symptoms recur again please come back for additional evaluation.    Regarding peripheral neuropathy, if alpha lipoic acid is not helpful, you could stop it.  Given the absence of pain, no other medications are recommended at this point.  Control of your diabetes with hemoglobin A1C less than 7.0 would be important to prevent further worsening of diabetic polyneuropathy.  Regular feet care is important, as discussed.    Your blood pressure was elevated during today's visit.  Please discuss it with your primary care provider.    Next follow-up appointment is on as needed basis.    Please do not hesitate to call me with any questions or concerns.    Thanks.

## 2018-06-20 NOTE — MR AVS SNAPSHOT
After Visit Summary   6/20/2018    Aundrea Treviño    MRN: [id number]           Patient Information     Date Of Birth          8/29/1934        Visit Information        Provider Department      6/20/2018 2:00 PM Tramaine Borja MD [facility]        Today's Diagnoses     Diabetic polyneuropathy associated with type 2 diabetes mellitus (H)    -  1    Ulnar neuropathy of left upper extremity        Carpal tunnel syndrome of left wrist          Care Instructions    AFTER VISIT SUMMARY (AVS):    At today's visit we discussed the current symptoms, available treatment options, and the plan.    I am pleased to hear that your left hand symptoms improved with regular use of wrist splint.  If symptoms worsen again please come back for additional evaluation.    Regarding peripheral neuropathy, if alpha lipoic acid is not helpful, you could stop it.  Given the absence of pain, no other medications are recommended at this point.  Control of your diabetes with hemoglobin A1C less than 7.0 would be important to prevent further worsening of diabetic polyneuropathy.  Regular feet care is important, as discussed.    Your blood pressure was elevated during today's visit.  Please discuss it with your primary care provider.    Additional recommendations after the work-up.    Next follow-up appointment is on as needed basis.    Please do not hesitate to call me with any questions or concerns.    Thanks.            Follow-ups after your visit        Follow-up notes from your care team     Return if symptoms worsen or fail to improve.      Who to contact     If you have questions or need follow up information about today's clinic visit or your schedule please contact [facility] directly at [phone].  Normal or non-critical lab and imaging results will be communicated to you by MyChart, letter or phone within 4 business days after the clinic has received the results. If you  do not hear from us within 7 days, please contact the clinic through [place] or phone. If you have a critical or abnormal lab result, we will notify you by phone as soon as possible.  Submit refill requests through [employer] or call your pharmacy and they will forward the refill request to us. Please allow 3 business days for your refill to be completed.          Additional Information About Your Visit        Care EveryWhere ID     This is your Care EveryWhere ID. This could be used by other organizations to access your [facility] medical records  [id number]        Your Vitals Were     Pulse Temperature Respirations Pulse Oximetry BMI (Body Mass Index)       98 98.1  F (36.7  C) (Oral) 14 97% 27.46 kg/m2        Blood Pressure from Last 3 Encounters:   06/20/18 152/80   05/07/18 150/70   04/25/18 142/70    Weight from Last 3 Encounters:   06/20/18 72.6 kg (160 lb)   05/07/18 72.1 kg (159 lb)   04/25/18 72.1 kg (159 lb)              Today, you had the following     No orders found for display       Primary Care Provider Office Phone # Fax #    Krysten Molina [phone]       [address]  [facility] [address]        Equal Access to Services     KEVIN MARTINEZ : Hadii barrett landers hadasho Soomaali, waaxda luqadaha, qaybta kaalmada adeegyada, imelda mansfield. So [facility] [phone].    ATENCIÓN: Si habla español, tiene a husain disposición servicios gratuitos de asistencia lingüística. Llame al [phone].    We comply with applicable federal civil rights laws and [place] laws. We do not discriminate on the basis of race, color, national origin, age, disability, sex, sexual orientation, or gender identity.            Thank you!     Thank you for choosing [facility]  for your care. Our goal is always to provide you with excellent care. Hearing back from our patients is one way we can continue to improve our services. Please take a few minutes to complete the written  survey that you may receive in the mail after your visit with us. Thank you!             Your Updated Medication List - Protect others around you: Learn how to safely use, store and throw away your medicines at www.disposemymeds.org.          This list is accurate as of 6/20/18  2:24 PM.  Always use your most recent med list.                   Brand Name Dispense Instructions for use Diagnosis    Alfalfa 650 MG Tabs      Take 2 capsules by mouth 2 times daily For arthritis pain        amLODIPine 2.5 MG tablet    NORVASC    90 tablet    Take 1 tablet (2.5 mg) by mouth daily    Hypertension goal BP (blood pressure) < 140/90       apixaban ANTICOAGULANT 5 MG tablet    ELIQUIS    60 tablet    Take 1 tablet (5 mg) by mouth 2 times daily    Paroxysmal atrial fibrillation (H)       blood glucose lancets standard    no brand specified    100 each    Use to test blood sugar one times daily or as directed. Dispense compatible with insurance and glucometer - One Touch Ultra    Type 2 diabetes mellitus without complication, without long-term current use of insulin (H)       blood glucose monitoring meter device kit    no brand specified    1 kit    Use to test blood sugar one times daily or as directed.  Dispense One Touch Ultra per insurance    Type 2 diabetes mellitus without complication, without long-term current use of insulin (H)       blood glucose monitoring test strip    no brand specified    100 strip    Use to test blood sugars one times daily or as directed.  Dispense compatible with insurance and glucometer - One Touch Ultra    Type 2 diabetes mellitus without complication, without long-term current use of insulin (H)       carvedilol 25 MG tablet    COREG    180 tablet    Take 1 tablet (25 mg) by mouth 2 times daily (with meals)    SOB (shortness of breath), Essential hypertension with goal blood pressure less than 140/90       cholecalciferol 1000 UNIT tablet    vitamin D3    30    1 TABLET DAILY        Flaxseed  (Linseed) Powd      1.5  tablespoon daily    Type II or unspecified type diabetes mellitus without mention of complication, not stated as uncontrolled       fluticasone 50 MCG/ACT spray    FLONASE    3 Package    Spray 1-2 sprays into both nostrils daily    Cough, Allergic rhinitis       hydrochlorothiazide 25 MG tablet    HYDRODIURIL    90 tablet    TAKE ONE TABLET BY MOUTH ONE TIME DAILY    Hypertension goal BP (blood pressure) < 140/90       lisinopril 40 MG tablet    PRINIVIL/ZESTRIL    90 tablet    Take 1 tablet (40 mg) by mouth daily    Hypertension goal BP (blood pressure) < 140/90       order for DME     1 each    Equipment being ordered: Left wrist splint.    Carpal tunnel syndrome of left wrist       oxybutynin 5 MG tablet    DITROPAN    90 tablet    TAKE 1/2 TABLET BY MOUTH TWICE DAILY    Female stress incontinence       triamcinolone 0.5 % cream    KENALOG    15 g    Apply topically 2 times daily to affected area as directed.    Itching       TUMS E-X 750 750 MG Chew   Generic drug:  calcium carbonate      Take 1,500 mg by mouth daily

## 2018-06-20 NOTE — PROGRESS NOTES
ESTABLISHED PATIENT NEUROLOGY NOTE    DATE OF VISIT: 6/20/2018  CLINIC LOCATION: [facility]  MRN: [id number]  PATIENT NAME: Aundrea Treviño  YOB: 1934    PCP: Krysten Molina MD.    REASON FOR VISIT:   Chief Complaint   Patient presents with     Follow Up For     alpha lipic acid-600mg gave her rash so she decrease tro 400 mg and rash went away, but unsure if it is helping     SUBJECTIVE:                                                      HISTORY OF PRESENT ILLNESS: Patient is here for follow up regarding bilateral feet numbness and left hand paresthesias.  She was last seen on 04/25/2018.  At that time, the patient was recommended to wear left wrist and elbow splints for the next 2 months.  We also discussed the use of alpha lipoic acid at 600 mg daily for peripheral neuropathy.  Please refer to my initial/other prior notes for further information.    Since the last visit, the patient reports interval resolution of her left hand paresthesia/numbness with the regular use of wrist splint.  She continues to experience bilateral lower extremity numbness that slightly extended to her ankles.  She denies interval development of new focal neurological symptoms.  Her most recent hemoglobin A1C was 7.3.    She tried alpha lipoic acid at 600 mg daily, but developed severe itching.  Stopped it for a while and tried again with similar effects.  When she decreased her dose to 400 mg, itching resolved.  No other side effects.  Does not feel any effect on her symptoms.      On review of systems, patient endorses no new active complaints. Medications, allergies, family and social history were also reviewed. There are no changes reported by patient.    CURRENT MEDICATIONS:   Current Outpatient Prescriptions   Medication     Alfalfa 650 MG TABS     amLODIPine (NORVASC) 2.5 MG tablet     apixaban ANTICOAGULANT (ELIQUIS) 5 MG tablet     blood glucose (NO BRAND SPECIFIED) lancets standard     blood  glucose monitoring (NO BRAND SPECIFIED) meter device kit     blood glucose monitoring (NO BRAND SPECIFIED) test strip     calcium carbonate (TUMS E-X 750) 750 MG CHEW     carvedilol (COREG) 25 MG tablet     FLAXSEED (LINSEED) PO POWD     fluticasone (FLONASE) 50 MCG/ACT nasal spray     hydrochlorothiazide (HYDRODIURIL) 25 MG tablet     lisinopril (PRINIVIL/ZESTRIL) 40 MG tablet     order for DME     oxybutynin (DITROPAN) 5 MG tablet     triamcinolone (KENALOG) 0.5 % cream     VITAMIN D 1000 UNIT OR TABS     REVIEW OF SYSTEMS:                                                    10-system review was completed. Pertinent positives are included in HPI. The remainder of ROS is negative.  EXAM:                                                    Physical Exam:   Vitals: /88 (BP Location: Left arm, Patient Position: Sitting, Cuff Size: Adult Regular)  Pulse 103  Temp 98.1  F (36.7  C) (Oral)  Resp 14  Wt 72.6 kg (160 lb)  SpO2 97%  BMI 27.46 kg/m2  Repeat blood pressure: /80  Pulse 98  Temp 98.1  F (36.7  C) (Oral)  Resp 14  Wt 72.6 kg (160 lb)  SpO2 97%  BMI 27.46 kg/m2.  General: pt is in NAD, cooperative.  Skin: normal turgor, moist mucous membranes, no lesions/rashes noticed.  HEENT: ATNC, white sclera, normal conjunctiva.  Respiratory: Symmetric lung excursion, no accessory respiratory muscle use.  Abdomen: Non distended.  Neurological: awake, cooperative, follows commands, no aphasia or dysarthria noted, cranial nerves II-XII: no ptosis, extraocular motility is full, face is symmetric, tongue is midline, equally moves all extremities, sensation is symmetrically reduced in both feet, no dysmetria bilaterally, casual gait is normal.  ASSESSMENT and PLAN:                                                    Assessment: 83-year-old female patient with mild axonal length dependent sensory polyneuropathy, moderate left carpal tunnel syndrome, and mild left ulnar neuropathy presents for follow-up.  She  reports interval resolution of her left hand symptoms, but bilateral feet numbness continues without significant changes on alpha lipoic acid.  She denies any significant feet pain or discomfort.  We discussed that good control of her diabetes with hemoglobin A1C less than 7.0 would be important to prevent further worsening of diabetic polyneuropathy.  The rest of our discussion and the plan are summarized below.    Patient to follow up with Primary Care provider regarding elevated blood pressure.    Diagnoses:    ICD-10-CM    1. Diabetic polyneuropathy associated with type 2 diabetes mellitus (H) E11.42    2. Ulnar neuropathy of left upper extremity G56.22    3. Carpal tunnel syndrome of left wrist G56.02      Plan: At today's visit we thoroughly discussed the current symptoms, available treatment options, and the plan.    I am pleased to hear that her left hand symptoms resolved with regular use of wrist splint.  I advised her to come back if the symptoms recur again.    Regarding peripheral neuropathy, if alpha lipoic acid is not helpful, she could stop it.  Given the absence of pain, no other medications are recommended at this point.  Regular feet care is important, as discussed.    Next follow-up appointment is on as needed basis.    I encouraged the patient to call me with any questions or concerns.    Total Time: 35 minutes with > 50% spent counseling the patient on stated above assessment and recommendations, including nature of the diagnosis, available treatment options, and proposed plan.    Tramaine Borja MD  / Neurology

## 2018-07-17 DIAGNOSIS — R06.02 SOB (SHORTNESS OF BREATH): ICD-10-CM

## 2018-07-17 DIAGNOSIS — I10 HYPERTENSION GOAL BP (BLOOD PRESSURE) < 140/90: ICD-10-CM

## 2018-07-17 DIAGNOSIS — I10 ESSENTIAL HYPERTENSION WITH GOAL BLOOD PRESSURE LESS THAN 140/90: ICD-10-CM

## 2018-07-17 NOTE — TELEPHONE ENCOUNTER
"Requested Prescriptions   Pending Prescriptions Disp Refills     hydrochlorothiazide (HYDRODIURIL) 25 MG tablet [Pharmacy Med Name: HydroCHLOROthiazide Oral Tablet 25 MG]  Last Written Prescription Date:  1/11/18  Last Fill Quantity: 90 tablet,  # refills: 1   Last Office Visit: 5/7/2018   Future Office Visit:      90 tablet 0     Sig: TAKE ONE TABLET BY MOUTH ONE TIME DAILY    Diuretics (Including Combos) Protocol Failed    7/17/2018 10:51 AM       Failed - Blood pressure under 140/90 in past 12 months    BP Readings from Last 3 Encounters:   06/20/18 152/80   05/07/18 150/70   04/25/18 142/70            Passed - Recent (12 mo) or future (30 days) visit within the authorizing provider's specialty    Patient had office visit in the last 12 months or has a visit in the next 30 days with authorizing provider or within the authorizing provider's specialty.  See \"Patient Info\" tab in inbasket, or \"Choose Columns\" in Meds & Orders section of the refill encounter.           Passed - Patient is age 18 or older       Passed - No active pregancy on record       Passed - Normal serum creatinine on file in past 12 months    Recent Labs   Lab Test  05/07/18   1511   CR  0.73             Passed - Normal serum potassium on file in past 12 months    Recent Labs   Lab Test  05/07/18   1511   POTASSIUM  3.9                   Passed - Normal serum sodium on file in past 12 months    Recent Labs   Lab Test  05/07/18   1511   NA  139             Passed - No positive pregnancy test in past 12 months          "

## 2018-07-19 NOTE — TELEPHONE ENCOUNTER
Routing refill request to provider for review/approval because:  Blood pressure above threshold.  Padmini Gurrola RN

## 2018-07-19 NOTE — TELEPHONE ENCOUNTER
Patient called to inquire about the status of this request. States she was out of medication so the pharmacy gave her a few tablets to last until tomorrow. Please assist. Thanks!

## 2018-07-20 RX ORDER — CARVEDILOL 25 MG/1
25 TABLET ORAL 2 TIMES DAILY WITH MEALS
Qty: 180 TABLET | Refills: 1 | Status: SHIPPED | OUTPATIENT
Start: 2018-07-20 | End: 2019-01-11

## 2018-07-20 RX ORDER — HYDROCHLOROTHIAZIDE 25 MG/1
TABLET ORAL
Qty: 90 TABLET | Refills: 0 | Status: SHIPPED | OUTPATIENT
Start: 2018-07-20 | End: 2018-11-09

## 2018-07-26 ENCOUNTER — OFFICE VISIT (OUTPATIENT)
Dept: FAMILY MEDICINE | Facility: CLINIC | Age: 83
End: 2018-07-26
Payer: COMMERCIAL

## 2018-07-26 VITALS
OXYGEN SATURATION: 96 % | DIASTOLIC BLOOD PRESSURE: 63 MMHG | WEIGHT: 159.25 LBS | BODY MASS INDEX: 27.34 KG/M2 | TEMPERATURE: 97.8 F | SYSTOLIC BLOOD PRESSURE: 123 MMHG | HEART RATE: 61 BPM

## 2018-07-26 DIAGNOSIS — Z79.01 CHRONIC ANTICOAGULATION: ICD-10-CM

## 2018-07-26 DIAGNOSIS — I48.0 PAROXYSMAL ATRIAL FIBRILLATION (H): ICD-10-CM

## 2018-07-26 DIAGNOSIS — I10 HYPERTENSION GOAL BP (BLOOD PRESSURE) < 140/90: ICD-10-CM

## 2018-07-26 DIAGNOSIS — E11.42 TYPE 2 DIABETES MELLITUS WITH DIABETIC POLYNEUROPATHY, WITHOUT LONG-TERM CURRENT USE OF INSULIN (H): Primary | ICD-10-CM

## 2018-07-26 LAB — HBA1C MFR BLD: 6.9 % (ref 0–5.6)

## 2018-07-26 PROCEDURE — 36415 COLL VENOUS BLD VENIPUNCTURE: CPT | Performed by: FAMILY MEDICINE

## 2018-07-26 PROCEDURE — 83036 HEMOGLOBIN GLYCOSYLATED A1C: CPT | Performed by: FAMILY MEDICINE

## 2018-07-26 PROCEDURE — 99214 OFFICE O/P EST MOD 30 MIN: CPT | Performed by: FAMILY MEDICINE

## 2018-07-26 RX ORDER — METFORMIN HCL 500 MG
500 TABLET, EXTENDED RELEASE 24 HR ORAL
Qty: 30 TABLET | Refills: 1 | Status: CANCELLED | OUTPATIENT
Start: 2018-07-26

## 2018-07-26 NOTE — PROGRESS NOTES
SUBJECTIVE:   Aundrea Treviño is a 83 year old female who presents to clinic today for the following health issues:      Diabetes Follow-up    Patient is checking blood sugars: once daily.  Results are as follows:         Different times of the day and the results vary     Diabetic concerns: None     Symptoms of hypoglycemia (low blood sugar): none     Paresthesias (numbness or burning in feet) or sores: Yes strange feeling that she cant really describe      Date of last diabetic eye exam: 2018    Diabetes Management Resources    Hyperlipidemia Follow-Up      Rate your low fat/cholesterol diet?: good    Taking statin?  No    Other lipid medications/supplements?:  none    Hypertension Follow-up      Outpatient blood pressures are being checked at home.  Results are good.    Low Salt Diet: low salt    BP Readings from Last 2 Encounters:   07/26/18 123/63   06/20/18 152/80     Hemoglobin A1C (%)   Date Value   05/07/2018 7.3 (H)   02/14/2018 7.0 (H)     LDL Cholesterol Calculated (mg/dL)   Date Value   09/07/2017 88   09/12/2016 96       Amount of exercise or physical activity: 2-3 days/week and it varies depending on the day     Problems taking medications regularly: No    Medication side effects: none    Diet: low salt and low fat/cholesterol             Problem list and histories reviewed & adjusted, as indicated.  Additional history: as documented    Patient Active Problem List   Diagnosis     Disorder of bone and cartilage     Female stress incontinence     Dystonia     Essential tremor     Refusal of blood transfusions as patient is Cheondoism     Hypertension goal BP (blood pressure) < 140/90     CARDIOVASCULAR SCREENING; LDL GOAL LESS THAN 130     Gout     Uncomplicated type 2 diabetes mellitus (H)     Venous (peripheral) insufficiency     Chronic anticoagulation     Paroxysmal atrial fibrillation (H)     Pulmonary hypertension, mild     Moderate tricuspid regurgitation     ACP (advance care planning)      Patient is Sikh     Type 2 diabetes mellitus with diabetic polyneuropathy, without long-term current use of insulin (H)     Past Surgical History:   Procedure Laterality Date     SURGICAL HISTORY OF -       hysterectomy, bladder suspension     SURGICAL HISTORY OF -       tonsillectomy       Social History   Substance Use Topics     Smoking status: Former Smoker     Quit date: 4/22/1970     Smokeless tobacco: Never Used     Alcohol use Yes      Comment: rarely, a glass of wine on a special occassion     Family History   Problem Relation Age of Onset     Diabetes Father      type 2     Hypertension Father      Cerebrovascular Disease Father      Arthritis Father      Other Cancer Sister          Current Outpatient Prescriptions   Medication Sig Dispense Refill     [place] 650 MG TABS Take 2 capsules by mouth 2 times daily For arthritis pain       amLODIPine (NORVASC) 2.5 MG tablet Take 1 tablet (2.5 mg) by mouth daily 90 tablet 3     apixaban ANTICOAGULANT (ELIQUIS) 5 MG tablet Take 1 tablet (5 mg) by mouth 2 times daily 60 tablet 11     blood glucose (NO BRAND SPECIFIED) lancets standard Use to test blood sugar one times daily or as directed. Dispense compatible with insurance and glucometer - One Touch Ultra 100 each 3     blood glucose monitoring (NO BRAND SPECIFIED) meter device kit Use to test blood sugar one times daily or as directed.  Dispense One Touch Ultra per insurance 1 kit 0     blood glucose monitoring (NO BRAND SPECIFIED) test strip Use to test blood sugars one times daily or as directed.  Dispense compatible with insurance and glucometer - One Touch Ultra 100 strip 3     calcium carbonate (TUMS E-X 750) 750 MG CHEW Take 1,500 mg by mouth daily        carvedilol (COREG) 25 MG tablet Take 1 tablet (25 mg) by mouth 2 times daily (with meals) 180 tablet 1     FLAXSEED (LINSEED) PO POWD 1.5  tablespoon daily       fluticasone (FLONASE) 50 MCG/ACT nasal spray Spray 1-2 sprays into both  nostrils daily 3 Package 1     hydrochlorothiazide (HYDRODIURIL) 25 MG tablet TAKE ONE TABLET BY MOUTH ONE TIME DAILY 90 tablet 0     lisinopril (PRINIVIL/ZESTRIL) 40 MG tablet Take 1 tablet (40 mg) by mouth daily 90 tablet 3     order for DME Equipment being ordered: Left wrist splint. 1 each 0     oxybutynin (DITROPAN) 5 MG tablet TAKE 1/2 TABLET BY MOUTH TWICE DAILY 90 tablet 3     triamcinolone (KENALOG) 0.5 % cream Apply topically 2 times daily to affected area as directed. 15 g 3     VITAMIN D 1000 UNIT OR TABS 1 TABLET DAILY 30 0     [DISCONTINUED] oxybutynin (DITROPAN) 5 MG tablet Take 0.5 tablets by mouth 2 times daily. 90 tablet 2     Allergies   Allergen Reactions     Amlodipine      Swollen ankles and rash but tolerates 2.5 mg without any symptoms     Apple [Chromium]      Kiwi      Nuts      Pear      Tape [Adhesive Tape]      Recent Labs   Lab Test  05/07/18   1511  02/14/18   1216  09/07/17   0954  02/20/17   1557   09/12/16   1152   06/18/15   0854   04/30/14   1057   A1C  7.3*  7.0*  6.9*  7.0*   --   7.1*   < >  6.9*   < >  6.5*   LDL   --    --   88   --    --   96   --   97   --    --    HDL   --    --   53   --    --   46*   --   39*   --    --    TRIG   --    --   70   --    --   91   --   115   --    --    ALT  22   --   21  20   --   20   < >  19   < >  22   CR  0.73   --   0.75  0.74   < >  0.72   < >  0.92   < >  0.79   GFRESTIMATED  76   --   73  76   < >  77   < >  59*   < >  70   GFRESTBLACK  >90   --   89  >90   GFR Calc     < >  >90   GFR Calc     < >  71   < >  85   POTASSIUM  3.9   --   3.6  3.8   < >  3.6   < >  3.6   < >  4.2   TSH   --    --    --    --    --   1.56   --    --    --   2.34    < > = values in this interval not displayed.      BP Readings from Last 3 Encounters:   07/26/18 123/63   06/20/18 152/80   05/07/18 150/70    Wt Readings from Last 3 Encounters:   07/26/18 159 lb 4 oz (72.2 kg)   06/20/18 160 lb (72.6 kg)   05/07/18 159 lb  (72.1 kg)                    Reviewed and updated as needed this visit by clinical staff  Tobacco  Allergies  Meds       Reviewed and updated as needed this visit by Provider         ROS:  Denies chest pain or shortness of breath.     OBJECTIVE:     /63  Pulse 61  Temp 97.8  F (36.6  C) (Oral)  Wt 159 lb 4 oz (72.2 kg)  SpO2 96%  BMI 27.34 kg/m2  Body mass index is 27.34 kg/(m^2).  GENERAL: healthy, alert and no distress    Diagnostic Test Results:  none     ASSESSMENT/PLAN:          1. Type 2 diabetes mellitus with diabetic polyneuropathy, without long-term current use of insulin (H)  Continues on diet control.   Last eye exam was this year. Could consider starting metformin if A1C increasing. Recommend A1C recheck in 11/18    2. Hypertension goal BP (blood pressure) < 140/90  Recent bp had been elevated. Controlled today.   No changes made. Continue hydrochlorothiazide 25mg daily, lisinpirl 40mg daily, amlodipine 2.5mg daily.     3. Paroxysmal atrial fibrillation (H)  stable    4. Chronic anticoagulation  Continues on eliquis.       Patient Instructions   Your A1C improved to 6.9.     Return for recheck in November and we can discuss medication if needed at that time.       Krysten Molina MD   [facility] [place]

## 2018-07-26 NOTE — PATIENT INSTRUCTIONS
Your A1C improved to 6.9.     Return for recheck in November and we can discuss medication if needed at that time.

## 2018-10-29 ENCOUNTER — OFFICE VISIT (OUTPATIENT)
Dept: FAMILY MEDICINE | Facility: CLINIC | Age: 83
End: 2018-10-29
Payer: COMMERCIAL

## 2018-10-29 VITALS
RESPIRATION RATE: 20 BRPM | HEART RATE: 68 BPM | OXYGEN SATURATION: 98 % | HEIGHT: 64 IN | TEMPERATURE: 98 F | WEIGHT: 162.5 LBS | DIASTOLIC BLOOD PRESSURE: 63 MMHG | SYSTOLIC BLOOD PRESSURE: 138 MMHG | BODY MASS INDEX: 27.74 KG/M2

## 2018-10-29 DIAGNOSIS — M25.511 ACUTE PAIN OF RIGHT SHOULDER: ICD-10-CM

## 2018-10-29 DIAGNOSIS — Z79.01 CHRONIC ANTICOAGULATION: ICD-10-CM

## 2018-10-29 DIAGNOSIS — I10 HYPERTENSION GOAL BP (BLOOD PRESSURE) < 140/90: ICD-10-CM

## 2018-10-29 DIAGNOSIS — Z13.6 CARDIOVASCULAR SCREENING; LDL GOAL LESS THAN 130: ICD-10-CM

## 2018-10-29 DIAGNOSIS — Z23 NEED FOR PROPHYLACTIC VACCINATION AND INOCULATION AGAINST INFLUENZA: ICD-10-CM

## 2018-10-29 DIAGNOSIS — E11.42 TYPE 2 DIABETES MELLITUS WITH DIABETIC POLYNEUROPATHY, WITHOUT LONG-TERM CURRENT USE OF INSULIN (H): ICD-10-CM

## 2018-10-29 DIAGNOSIS — M10.9 GOUT, UNSPECIFIED CAUSE, UNSPECIFIED CHRONICITY, UNSPECIFIED SITE: ICD-10-CM

## 2018-10-29 DIAGNOSIS — M79.89 SWELLING OF RIGHT FOOT: Primary | ICD-10-CM

## 2018-10-29 DIAGNOSIS — I87.2 VENOUS (PERIPHERAL) INSUFFICIENCY: ICD-10-CM

## 2018-10-29 LAB
BASOPHILS # BLD AUTO: 0 10E9/L (ref 0–0.2)
BASOPHILS NFR BLD AUTO: 0.3 %
DIFFERENTIAL METHOD BLD: ABNORMAL
EOSINOPHIL # BLD AUTO: 0.1 10E9/L (ref 0–0.7)
EOSINOPHIL NFR BLD AUTO: 1.4 %
ERYTHROCYTE [DISTWIDTH] IN BLOOD BY AUTOMATED COUNT: 13.7 % (ref 10–15)
HCT VFR BLD AUTO: 43.1 % (ref 35–47)
HGB BLD-MCNC: 14.3 G/DL (ref 11.7–15.7)
LYMPHOCYTES # BLD AUTO: 1.7 10E9/L (ref 0.8–5.3)
LYMPHOCYTES NFR BLD AUTO: 26.6 %
MCH RBC QN AUTO: 27.9 PG (ref 26.5–33)
MCHC RBC AUTO-ENTMCNC: 33.2 G/DL (ref 31.5–36.5)
MCV RBC AUTO: 84 FL (ref 78–100)
MONOCYTES # BLD AUTO: 0.4 10E9/L (ref 0–1.3)
MONOCYTES NFR BLD AUTO: 6.8 %
NEUTROPHILS # BLD AUTO: 4.1 10E9/L (ref 1.6–8.3)
NEUTROPHILS NFR BLD AUTO: 64.9 %
PLATELET # BLD AUTO: 147 10E9/L (ref 150–450)
RBC # BLD AUTO: 5.12 10E12/L (ref 3.8–5.2)
WBC # BLD AUTO: 6.3 10E9/L (ref 4–11)

## 2018-10-29 PROCEDURE — 90662 IIV NO PRSV INCREASED AG IM: CPT | Performed by: FAMILY MEDICINE

## 2018-10-29 PROCEDURE — 85025 COMPLETE CBC W/AUTO DIFF WBC: CPT | Performed by: FAMILY MEDICINE

## 2018-10-29 PROCEDURE — 80053 COMPREHEN METABOLIC PANEL: CPT | Performed by: FAMILY MEDICINE

## 2018-10-29 PROCEDURE — 80061 LIPID PANEL: CPT | Performed by: FAMILY MEDICINE

## 2018-10-29 PROCEDURE — 36415 COLL VENOUS BLD VENIPUNCTURE: CPT | Performed by: FAMILY MEDICINE

## 2018-10-29 PROCEDURE — G0008 ADMIN INFLUENZA VIRUS VAC: HCPCS | Performed by: FAMILY MEDICINE

## 2018-10-29 PROCEDURE — 99214 OFFICE O/P EST MOD 30 MIN: CPT | Mod: 25 | Performed by: FAMILY MEDICINE

## 2018-10-29 PROCEDURE — 84443 ASSAY THYROID STIM HORMONE: CPT | Performed by: FAMILY MEDICINE

## 2018-10-29 NOTE — PATIENT INSTRUCTIONS
1) Let me know if you would like a referral to physical therapy for your hip or shoulder or general strengthening for deconditioning.   2) start the metformin 500mg (one tablet) daily.   3) Start using your compression stockings every day.   4) Elevate your legs when you can (above the level of your heart).   5) Return to see me in one month.   6) Schedule with the sports medicine for your shoulder.

## 2018-10-29 NOTE — LETTER
October 31, 2018      Aundrea Treviño  [address]   [place] [address]        Dear ,    We are writing to inform you of your test results.    Your lab results are stable. Please let us know if you have any questions.     Resulted Orders   CBC with platelets differential   Result Value Ref Range    WBC 6.3 4.0 - 11.0 10e9/L    RBC Count 5.12 3.8 - 5.2 10e12/L    Hemoglobin 14.3 11.7 - 15.7 g/dL    Hematocrit 43.1 35.0 - 47.0 %    MCV 84 78 - 100 fl    MCH 27.9 26.5 - 33.0 pg    MCHC 33.2 31.5 - 36.5 g/dL    RDW 13.7 10.0 - 15.0 %    Platelet Count 147 (L) 150 - 450 10e9/L    % Neutrophils 64.9 %    % Lymphocytes 26.6 %    % Monocytes 6.8 %    % Eosinophils 1.4 %    % Basophils 0.3 %    Absolute Neutrophil 4.1 1.6 - 8.3 10e9/L    Absolute Lymphocytes 1.7 0.8 - 5.3 10e9/L    Absolute Monocytes 0.4 0.0 - 1.3 10e9/L    Absolute Eosinophils 0.1 0.0 - 0.7 10e9/L    Absolute Basophils 0.0 0.0 - 0.2 10e9/L    Diff Method Automated Method    Lipid panel reflex to direct LDL Fasting   Result Value Ref Range    Cholesterol 166 <200 mg/dL    Triglycerides 103 <150 mg/dL      Comment:      Non Fasting    HDL Cholesterol 54 >49 mg/dL    LDL Cholesterol Calculated 91 <100 mg/dL      Comment:      Desirable:       <100 mg/dl    Non HDL Cholesterol 112 <130 mg/dL   TSH with free T4 reflex   Result Value Ref Range    TSH 1.53 0.40 - 4.00 mU/L   Comprehensive metabolic panel   Result Value Ref Range    Sodium 138 133 - 144 mmol/L    Potassium 3.7 3.4 - 5.3 mmol/L    Chloride 100 94 - 109 mmol/L    Carbon Dioxide 27 20 - 32 mmol/L    Anion Gap 11 3 - 14 mmol/L    Glucose 105 (H) 70 - 99 mg/dL      Comment:      Non Fasting    Urea Nitrogen 15 7 - 30 mg/dL    Creatinine 0.79 0.52 - 1.04 mg/dL    GFR Estimate 69 >60 mL/min/1.7m2      Comment:      Non  GFR Calc    GFR Estimate If Black 84 >60 mL/min/1.7m2      Comment:       GFR Calc    Calcium 9.8 8.5 - 10.1 mg/dL    Bilirubin  Total 0.9 0.2 - 1.3 mg/dL    Albumin 4.4 3.4 - 5.0 g/dL    Protein Total 7.3 6.8 - 8.8 g/dL    Alkaline Phosphatase 82 40 - 150 U/L    ALT 19 0 - 50 U/L    AST 14 0 - 45 U/L       If you have any questions or concerns, please call the clinic at the number listed above.       Sincerely,        Krysten Molina MD/nr

## 2018-10-29 NOTE — PROGRESS NOTES
SUBJECTIVE:   Aundrea Treviño is a 84 year old female who presents to clinic today for the following health issues:       She says that her whole right side is affected. She says she was diagnosed with neuropathy. Her feet had a strange sensation like they were made of cardboard. Both feet have gradually gotten worse. She has had some swelling in the right leg only. About three weeks the swelling has been present. It is not painful. It is uncomfortable. It affects her walking. She has been putting her feet up. She thinks it helps. By the time that she goes to bed at night it is more swollen. She has varicose veins. Feet seem more reddish color that what they used to be, but no recent changes. She sees speckled changes on her lower leg skin. Her left leg does not swell.     She says that her right shoulder has been bothering her for the past month or so. No swelling or redness. She has difficulty reaching behind her. It hurts when she sleeps on that shoulder. It feels like often when she lifts something or stretches it is going to go out of joint. Denies arm numbness or tingling. No neck pain on the right. She feels her strength overall has declined over time. Variable from day to day. No clear morning stiffness, can be anytime.     She had an injection and her hip is better. She still once in awhile gets pain in her groin like she should not step a certain way.     Problem list and histories reviewed & adjusted, as indicated.  Additional history: as documented    Patient Active Problem List   Diagnosis     Disorder of bone and cartilage     Female stress incontinence     Dystonia     Essential tremor     Refusal of blood transfusions as patient is Nondenominational     Hypertension goal BP (blood pressure) < 140/90     CARDIOVASCULAR SCREENING; LDL GOAL LESS THAN 130     Gout     Uncomplicated type 2 diabetes mellitus (H)     Venous (peripheral) insufficiency     Chronic anticoagulation     Paroxysmal atrial  fibrillation (H)     Pulmonary hypertension, mild (H)     Moderate tricuspid regurgitation     ACP (advance care planning)     Patient is Yarsanism     Type 2 diabetes mellitus with diabetic polyneuropathy, without long-term current use of insulin (H)     Past Surgical History:   Procedure Laterality Date     SURGICAL HISTORY OF -       hysterectomy, bladder suspension     SURGICAL HISTORY OF -       tonsillectomy       Social History   Substance Use Topics     Smoking status: Former Smoker     Quit date: 4/22/1970     Smokeless tobacco: Never Used     Alcohol use Yes      Comment: rarely, a glass of wine on a special occassion     Family History   Problem Relation Age of Onset     Diabetes Father      type 2     Hypertension Father      Cerebrovascular Disease Father      Arthritis Father      Other Cancer Sister          Current Outpatient Prescriptions   Medication Sig Dispense Refill     [place] 650 MG TABS Take 2 capsules by mouth 2 times daily For arthritis pain       amLODIPine (NORVASC) 2.5 MG tablet Take 1 tablet (2.5 mg) by mouth daily 90 tablet 3     apixaban ANTICOAGULANT (ELIQUIS) 5 MG tablet Take 1 tablet (5 mg) by mouth 2 times daily 60 tablet 11     blood glucose (NO BRAND SPECIFIED) lancets standard Use to test blood sugar one times daily or as directed. Dispense compatible with insurance and glucometer - One Touch Ultra 100 each 3     blood glucose monitoring (NO BRAND SPECIFIED) meter device kit Use to test blood sugar one times daily or as directed.  Dispense One Touch Ultra per insurance 1 kit 0     blood glucose monitoring (NO BRAND SPECIFIED) test strip Use to test blood sugars one times daily or as directed.  Dispense compatible with insurance and glucometer - One Touch Ultra 100 strip 3     calcium carbonate (TUMS E-X 750) 750 MG CHEW Take 1,500 mg by mouth daily        carvedilol (COREG) 25 MG tablet Take 1 tablet (25 mg) by mouth 2 times daily (with meals) 180 tablet 1     FLAXSEED  (LINSEED) PO POWD 1.5  tablespoon daily       fluticasone (FLONASE) 50 MCG/ACT nasal spray Spray 1-2 sprays into both nostrils daily 3 Package 1     hydrochlorothiazide (HYDRODIURIL) 25 MG tablet TAKE ONE TABLET BY MOUTH ONE TIME DAILY 90 tablet 0     lisinopril (PRINIVIL/ZESTRIL) 40 MG tablet Take 1 tablet (40 mg) by mouth daily 90 tablet 3     order for DME Equipment being ordered: Left wrist splint. 1 each 0     oxybutynin (DITROPAN) 5 MG tablet TAKE 1/2 TABLET BY MOUTH TWICE DAILY 90 tablet 3     triamcinolone (KENALOG) 0.5 % cream Apply topically 2 times daily to affected area as directed. 15 g 3     VITAMIN D 1000 UNIT OR TABS 1 TABLET DAILY 30 0     [DISCONTINUED] oxybutynin (DITROPAN) 5 MG tablet Take 0.5 tablets by mouth 2 times daily. 90 tablet 2     Allergies   Allergen Reactions     Amlodipine      Swollen ankles and rash but tolerates 2.5 mg without any symptoms     Apple [Chromium]      Kiwi      Nuts      Pear      Tape [Adhesive Tape]      Recent Labs   Lab Test  07/26/18   1219  05/07/18   1511  02/14/18   1216  09/07/17   0954  02/20/17   1557   09/12/16   1152   06/18/15   0854   04/30/14   1057   A1C  6.9*  7.3*  7.0*  6.9*  7.0*   --   7.1*   < >  6.9*   < >  6.5*   LDL   --    --    --   88   --    --   96   --   97   --    --    HDL   --    --    --   53   --    --   46*   --   39*   --    --    TRIG   --    --    --   70   --    --   91   --   115   --    --    ALT   --   22   --   21  20   --   20   < >  19   < >  22   CR   --   0.73   --   0.75  0.74   < >  0.72   < >  0.92   < >  0.79   GFRESTIMATED   --   76   --   73  76   < >  77   < >  59*   < >  70   GFRESTBLACK   --   >90   --   89  >90   GFR Calc     < >  >90   GFR Calc     < >  71   < >  85   POTASSIUM   --   3.9   --   3.6  3.8   < >  3.6   < >  3.6   < >  4.2   TSH   --    --    --    --    --    --   1.56   --    --    --   2.34    < > = values in this interval not displayed.      BP Readings from  "Last 3 Encounters:   10/29/18 138/63   07/26/18 123/63   06/20/18 152/80    Wt Readings from Last 3 Encounters:   10/29/18 162 lb 8 oz (73.7 kg)   07/26/18 159 lb 4 oz (72.2 kg)   06/20/18 160 lb (72.6 kg)              Reviewed and updated as needed this visit by clinical staff  Tobacco  Allergies  Meds       Reviewed and updated as needed this visit by Provider         ROS:      OBJECTIVE:     /63  Pulse 68  Temp 98  F (36.7  C) (Oral)  Resp 20  Ht 5' 4\" (1.626 m)  Wt 162 lb 8 oz (73.7 kg)  SpO2 98%  BMI 27.89 kg/m2  Body mass index is 27.89 kg/(m^2).  GENERAL: healthy, alert and no distress  Ext: 1+ pitting edema at the right foot and ankle and trace edema left LE. She has varicosities present on the left lower leg, primarily at the medial arch of the foot. Right LE with fewer venous insufficiency changes.      Diagnostic Test Results:  none     ASSESSMENT/PLAN:          1. Swelling of right foot  Suspect secondary to venous insufficiency. She has compression stockings at home and she will start to wear these again every day.  Also recommended elevating her legs when she is able.  - CBC with platelets differential  - Comprehensive metabolic panel    2. Venous (peripheral) insufficiency  As above    3. Hypertension goal BP (blood pressure) < 140/90  Controlled.  No changes today    4. Type 2 diabetes mellitus with diabetic polyneuropathy, without long-term current use of insulin (H)  Well controlled, though A1c has been climbing a little bit and has had some worsening neuropathic symptoms bilateral feet.  Will start metformin 500 mg daily.  She will follow-up with me in a month.  - TSH with free T4 reflex  - metFORMIN (GLUCOPHAGE) 500 MG tablet; Take 1 tablet (500 mg) by mouth daily (with dinner)  Dispense: 90 tablet; Refill: 1    5. Chronic anticoagulation  Continues on Eliquis 5 mg twice daily    6. Need for prophylactic vaccination and inoculation against influenza     - FLU VACCINE, INCREASED " ANTIGEN, PRESV FREE, AGE 65+ [63584]  - Vaccine Administration, Initial [81555]    7. Acute pain of right shoulder  She will schedule with sports medicine for further evaluation  - ORTHO  REFERRAL    8. Gout, unspecified cause, unspecified chronicity, unspecified site  No evidence of gout today,  without recent flare.    9. CARDIOVASCULAR SCREENING; LDL GOAL LESS THAN 130     - Lipid panel reflex to direct LDL Fasting    Patient Instructions   1) Let me know if you would like a referral to physical therapy for your hip or shoulder or general strengthening for deconditioning.   2) start the metformin 500mg (one tablet) daily.   3) Start using your compression stockings every day.   4) Elevate your legs when you can (above the level of your heart).   5) Return to see me in one month.   6) Schedule with the sports medicine for your shoulder.        Krysten Molina M.D.        [facility]

## 2018-10-29 NOTE — PROGRESS NOTES

## 2018-10-29 NOTE — MR AVS SNAPSHOT
After Visit Summary   10/29/2018    Aundrea Treviño    MRN: [id number]           Patient Information     Date Of Birth          8/29/1934        Visit Information        Provider Department      10/29/2018 2:00 PM Krysten Molina MD [facility]        Today's Diagnoses     Swelling of right foot    -  1    Venous (peripheral) insufficiency        Hypertension goal BP (blood pressure) < 140/90        Type 2 diabetes mellitus with diabetic polyneuropathy, without long-term current use of insulin (H)        Chronic anticoagulation        Need for prophylactic vaccination and inoculation against influenza        Acute pain of right shoulder        Gout, unspecified cause, unspecified chronicity, unspecified site        CARDIOVASCULAR SCREENING; LDL GOAL LESS THAN 130          Care Instructions    1) Let me know if you would like a referral to physical therapy for your hip or shoulder or general strengthening for deconditioning.   2) start the metformin 500mg (one tablet) daily.   3) Start using your compression stockings every day.   4) Elevate your legs when you can (above the level of your heart).   5) Return to see me in one month.   6) Schedule with the sports medicine for your shoulder.            Follow-ups after your visit        Additional Services     ORTHO  REFERRAL       [facility] is referring you to the Orthopedic  Services at [facility].       The  Representative will assist you in the coordination of your Orthopedic and Musculoskeletal Care as prescribed by your physician.    The  Representative will call you within 1 business day to help schedule your appointment, or you may contact the  Representative at:    All areas ~ [phone]     Type of Referral : Non Surgical / Sport Medicine       Timeframe requested: Routine    Coverage of these services is subject to the terms and limitations  "of your health insurance plan.  Please call member services at your health plan with any benefit or coverage questions.      If X-rays, CT or MRI's have been performed, please contact the facility where they were done to arrange for , prior to your scheduled appointment.  Please bring this referral request to your appointment and present it to your specialist.                  Follow-up notes from your care team     Return in about 1 month (around 11/29/2018) for Follow up visit.      Your next 10 appointments already scheduled     Nov 26, 2018  2:20 PM CST   SHORT with Krysten Molina MD   [facility] ([facility])    [address] [place] [id number] [phone]              Who to contact     If you have questions or need follow up information about today's clinic visit or your schedule please contact [facility] directly at [phone].  Normal or non-critical lab and imaging results will be communicated to you by MyChart, letter or phone within 4 business days after the clinic has received the results. If you do not hear from us within 7 days, please contact the clinic through MyChart or phone. If you have a critical or abnormal lab result, we will notify you by phone as soon as possible.  Submit refill requests through [employer] or call your pharmacy and they will forward the refill request to us. Please allow 3 business days for your refill to be completed.          Additional Information About Your Visit        Care EveryWhere ID     This is your Care EveryWhere ID. This could be used by other organizations to access your [facility] medical records  [id number]        Your Vitals Were     Pulse Temperature Respirations Height Pulse Oximetry BMI (Body Mass Index)    68 98  F (36.7  C) (Oral) 20 5' 4\" (1.626 m) 98% 27.89 kg/m2       Blood Pressure from Last 3 Encounters:   10/29/18 138/63   07/26/18 123/63   06/20/18 152/80    Weight from Last 3 " Encounters:   10/29/18 162 lb 8 oz (73.7 kg)   07/26/18 159 lb 4 oz (72.2 kg)   06/20/18 160 lb (72.6 kg)              We Performed the Following     CBC with platelets differential     Comprehensive metabolic panel     FLU VACCINE, INCREASED ANTIGEN, PRESV FREE, AGE 65+ [21509]     Lipid panel reflex to direct LDL Fasting     ORTHO  REFERRAL     TSH with free T4 reflex     Vaccine Administration, Initial [25341]          Today's Medication Changes          These changes are accurate as of 10/29/18  3:36 PM.  If you have any questions, ask your nurse or doctor.               Start taking these medicines.        Dose/Directions    metFORMIN 500 MG tablet   Commonly known as:  GLUCOPHAGE   Used for:  Type 2 diabetes mellitus with diabetic polyneuropathy, without long-term current use of insulin (H)   Started by:  Krysten Molina MD        Dose:  500 mg   Take 1 tablet (500 mg) by mouth daily (with dinner)   Quantity:  90 tablet   Refills:  1            Where to get your medicines      These medications were sent to [id number] [address], [place] [address]     Phone:  [phone]     metFORMIN 500 MG tablet                Primary Care Provider Office Phone # Fax #    Krysten Molina [phone] [address]  [facility] [address]        Equal Access to Services     FIDELIA MARTINEZ AH: Hadii barrett landers hadasho Soron, waaxda luqadaha, qaybta kaalmada adeegyada, imelda gordon . So [facility] [phone].    ATENCIÓN: Si habla español, tiene a husain disposición servicios gratuitos de asistencia lingüística. Llame al [phone].    We comply with applicable federal civil rights laws and [place] laws. We do not discriminate on the basis of race, color, national origin, age, disability, sex, sexual orientation, or gender identity.            Thank you!     Thank you for choosing [facility]  for  your care. Our goal is always to provide you with excellent care. Hearing back from our patients is one way we can continue to improve our services. Please take a few minutes to complete the written survey that you may receive in the mail after your visit with us. Thank you!             Your Updated Medication List - Protect others around you: Learn how to safely use, store and throw away your medicines at www.disposemymeds.org.          This list is accurate as of 10/29/18  3:36 PM.  Always use your most recent med list.                   Brand Name Dispense Instructions for use Diagnosis    Alfalfa 650 MG Tabs      Take 2 capsules by mouth 2 times daily For arthritis pain        amLODIPine 2.5 MG tablet    NORVASC    90 tablet    Take 1 tablet (2.5 mg) by mouth daily    Hypertension goal BP (blood pressure) < 140/90       apixaban ANTICOAGULANT 5 MG tablet    ELIQUIS    60 tablet    Take 1 tablet (5 mg) by mouth 2 times daily    Paroxysmal atrial fibrillation (H)       blood glucose lancets standard    no brand specified    100 each    Use to test blood sugar one times daily or as directed. Dispense compatible with insurance and glucometer - One Touch Ultra    Type 2 diabetes mellitus without complication, without long-term current use of insulin (H)       blood glucose monitoring meter device kit    no brand specified    1 kit    Use to test blood sugar one times daily or as directed.  Dispense One Touch Ultra per insurance    Type 2 diabetes mellitus without complication, without long-term current use of insulin (H)       blood glucose monitoring test strip    no brand specified    100 strip    Use to test blood sugars one times daily or as directed.  Dispense compatible with insurance and glucometer - One Touch Ultra    Type 2 diabetes mellitus without complication, without long-term current use of insulin (H)       carvedilol 25 MG tablet    COREG    180 tablet    Take 1 tablet (25 mg) by mouth 2 times daily  (with meals)    SOB (shortness of breath), Essential hypertension with goal blood pressure less than 140/90       cholecalciferol 1000 UNIT tablet    vitamin D3    30    1 TABLET DAILY        Flaxseed (Linseed) Powd      1.5  tablespoon daily    Type II or unspecified type diabetes mellitus without mention of complication, not stated as uncontrolled       fluticasone 50 MCG/ACT spray    FLONASE    3 Package    Spray 1-2 sprays into both nostrils daily    Cough, Allergic rhinitis       hydrochlorothiazide 25 MG tablet    HYDRODIURIL    90 tablet    TAKE ONE TABLET BY MOUTH ONE TIME DAILY    Hypertension goal BP (blood pressure) < 140/90       lisinopril 40 MG tablet    PRINIVIL/ZESTRIL    90 tablet    Take 1 tablet (40 mg) by mouth daily    Hypertension goal BP (blood pressure) < 140/90       metFORMIN 500 MG tablet    GLUCOPHAGE    90 tablet    Take 1 tablet (500 mg) by mouth daily (with dinner)    Type 2 diabetes mellitus with diabetic polyneuropathy, without long-term current use of insulin (H)       order for DME     1 each    Equipment being ordered: Left wrist splint.    Carpal tunnel syndrome of left wrist       oxybutynin 5 MG tablet    DITROPAN    90 tablet    TAKE 1/2 TABLET BY MOUTH TWICE DAILY    Female stress incontinence       triamcinolone 0.5 % cream    KENALOG    15 g    Apply topically 2 times daily to affected area as directed.    Itching       TUMS E-X 750 750 MG Chew   Generic drug:  calcium carbonate      Take 1,500 mg by mouth daily

## 2018-10-30 LAB
ALBUMIN SERPL-MCNC: 4.4 G/DL (ref 3.4–5)
ALP SERPL-CCNC: 82 U/L (ref 40–150)
ALT SERPL W P-5'-P-CCNC: 19 U/L (ref 0–50)
ANION GAP SERPL CALCULATED.3IONS-SCNC: 11 MMOL/L (ref 3–14)
AST SERPL W P-5'-P-CCNC: 14 U/L (ref 0–45)
BILIRUB SERPL-MCNC: 0.9 MG/DL (ref 0.2–1.3)
BUN SERPL-MCNC: 15 MG/DL (ref 7–30)
CALCIUM SERPL-MCNC: 9.8 MG/DL (ref 8.5–10.1)
CHLORIDE SERPL-SCNC: 100 MMOL/L (ref 94–109)
CHOLEST SERPL-MCNC: 166 MG/DL
CO2 SERPL-SCNC: 27 MMOL/L (ref 20–32)
CREAT SERPL-MCNC: 0.79 MG/DL (ref 0.52–1.04)
GFR SERPL CREATININE-BSD FRML MDRD: 69 ML/MIN/1.7M2
GLUCOSE SERPL-MCNC: 105 MG/DL (ref 70–99)
HDLC SERPL-MCNC: 54 MG/DL
LDLC SERPL CALC-MCNC: 91 MG/DL
NONHDLC SERPL-MCNC: 112 MG/DL
POTASSIUM SERPL-SCNC: 3.7 MMOL/L (ref 3.4–5.3)
PROT SERPL-MCNC: 7.3 G/DL (ref 6.8–8.8)
SODIUM SERPL-SCNC: 138 MMOL/L (ref 133–144)
TRIGL SERPL-MCNC: 103 MG/DL
TSH SERPL DL<=0.005 MIU/L-ACNC: 1.53 MU/L (ref 0.4–4)

## 2018-11-05 ENCOUNTER — RADIANT APPOINTMENT (OUTPATIENT)
Dept: GENERAL RADIOLOGY | Facility: CLINIC | Age: 83
End: 2018-11-05
Attending: FAMILY MEDICINE
Payer: COMMERCIAL

## 2018-11-05 ENCOUNTER — OFFICE VISIT (OUTPATIENT)
Dept: ORTHOPEDICS | Facility: CLINIC | Age: 83
End: 2018-11-05
Payer: COMMERCIAL

## 2018-11-05 VITALS
DIASTOLIC BLOOD PRESSURE: 85 MMHG | HEIGHT: 64 IN | SYSTOLIC BLOOD PRESSURE: 146 MMHG | WEIGHT: 162 LBS | BODY MASS INDEX: 27.66 KG/M2

## 2018-11-05 DIAGNOSIS — M67.911 ROTATOR CUFF DYSFUNCTION, RIGHT: ICD-10-CM

## 2018-11-05 DIAGNOSIS — M25.511 PAIN IN JOINT OF RIGHT SHOULDER: ICD-10-CM

## 2018-11-05 DIAGNOSIS — M19.011 PRIMARY OSTEOARTHRITIS OF RIGHT SHOULDER: ICD-10-CM

## 2018-11-05 DIAGNOSIS — M25.511 PAIN IN JOINT OF RIGHT SHOULDER: Primary | ICD-10-CM

## 2018-11-05 PROCEDURE — 73030 X-RAY EXAM OF SHOULDER: CPT | Mod: RT

## 2018-11-05 PROCEDURE — 99213 OFFICE O/P EST LOW 20 MIN: CPT | Performed by: FAMILY MEDICINE

## 2018-11-05 NOTE — LETTER
11/5/2018         RE: Aundrea Treviño  [address]  [facility] [address]        Dear Colleague,    Thank you for referring your patient, Aundrea Treviño, to the  SPORTS MEDICINE. Please see a copy of my visit note below.    HPI   [facility]   Clinic Visit s Nov 5, 2018    PCP: Krysten Molina      Aundrea is a 84 year old female who is seen in consultation at the request of Dr. Molina for   Chief Complaint   Patient presents with     Right Shoulder - Pain       Injury: Reports insidious onset without acute precipitating event.     Right hand dominant    Location of Pain: right shoulder posterior and lateral, nonradiating   Duration of Pain: 2 week(s)  Rating of Pain at worst: 8/10  Rating of Pain Currently: 8/10  Pain is better with: activity avoidance   Pain is worse with: overhead movement and sleeping   Treatment so far consists of: rest  Associated symptoms: no distal numbness or tingling; denies swelling or warmth  Recent imaging completed: No recent imaging completed.  Prior History of related problems: none    Social History: retired     Past Medical History:   Diagnosis Date     Chronic anticoagulation 2/19/2017     Diverticulitis of colon (without mention of hemorrhage)(562.11)      DM type 2, goal A1C below 8.0 12/3/2014     Essential and other specified forms of tremor     eval'd by neuro 2005     Hyperlipidemia LDL goal <100 5/9/2010     Hypertension goal BP (blood pressure) < 140/90      Intestinal disaccharidase deficiencies and disaccharide malabsorption      Moderate tricuspid regurgitation 2/19/2017     Osteoarthrosis, unspecified whether generalized or localized, unspecified site      Paroxysmal atrial fibrillation (H) 2/19/2017     Pulmonary hypertension, mild (H) 2/19/2017     Type 2 diabetes, HbA1c goal < 7% (H) 5/2/2010     Urge incontinence        Patient Active Problem List    Diagnosis Date Noted     Type 2 diabetes mellitus with diabetic  polyneuropathy, without long-term current use of insulin (H) 05/07/2018     Priority: Medium     Patient is Hoahaoism 10/06/2017     Priority: Medium     ACP (advance care planning) 06/01/2017     Priority: Medium     Advance Care Planning 6/1/2017: Patient is Hoahaoism.  See Health Care Directive for information on use of blood products.  Recommend Code Status in chart and patient's Advance Care Planning documents be reviewed to ensure alignment with patient's current wishes.  Most recent Advance Care Planning document is a Health Care Directive dated 11/20/16.  Dunia Garcia   Advance Care Planning Liaison       Chronic anticoagulation 02/19/2017     Priority: Medium     Paroxysmal atrial fibrillation (H) 02/19/2017     Priority: Medium     Pulmonary hypertension, mild (H) 02/19/2017     Priority: Medium     Moderate tricuspid regurgitation 02/19/2017     Priority: Medium     Venous (peripheral) insufficiency 01/07/2016     Priority: Medium     Uncomplicated type 2 diabetes mellitus (H) 12/03/2014     Priority: Medium     Gout 03/21/2012     Priority: Medium     CARDIOVASCULAR SCREENING; LDL GOAL LESS THAN 130 09/20/2011     Priority: Medium     Hypertension goal BP (blood pressure) < 140/90      Priority: Medium     Refusal of blood transfusions as patient is Hoahaoism 03/03/2010     Priority: Medium     Female stress incontinence 12/23/2008     Priority: Medium     (Problem list name updated by automated process. Provider to review and confirm.)       Dystonia 12/23/2008     Priority: Medium     Essential tremor 12/23/2008     Priority: Medium     (Problem list name updated by automated process. Provider to review and confirm.)       Disorder of bone and cartilage 07/19/2006     Priority: Medium     Problem list name updated by automated process. Provider to review         Family History   Problem Relation Age of Onset     Diabetes Father      type 2     Hypertension Father       "Cerebrovascular Disease Father      Arthritis Father      Other Cancer Sister        Social History     Social History     Marital status:      Spouse name: Scott     Number of children: 3     Years of education: 12     Occupational History      None      Social History Main Topics     Smoking status: Former Smoker     Quit date: 4/22/1970     Smokeless tobacco: Never Used     Alcohol use Yes      Comment: rarely, a glass of wine on a special occasion       Past Surgical History:   Procedure Laterality Date     SURGICAL HISTORY OF -       hysterectomy, bladder suspension     SURGICAL HISTORY OF -       tonsillectomy         Review of Systems   Musculoskeletal: Positive for joint pain.   All other systems reviewed and are negative.        Physical Exam  /85  Ht 5' 4\" (1.626 m)  Wt 162 lb (73.5 kg)  BMI 27.81 kg/m2  Constitutional:well-developed, well-nourished, and in no distress.   Cardiovascular: Intact distal pulses.    Neurological: alert. Gait Normal:   Gait, station, stance, and balance appear normal for age  Skin: Skin is warm and dry.   Psychiatric: Mood and affect normal.   Respiratory: unlabored, speaks in full sentences  Lymph: no LAD, no lymphangitis    Right Shoulder Exam     Tenderness   None    Range of Motion   Active Abduction:                       130  Passive Abduction:                    90  Extension:                                  Normal  Forward Flexion:                        100  External Rotation:                      60  Internal Rotation 0 degrees:      Lumbar  Internal Rotation 90 degrees:    N/t    Muscle Strength   Abduction:            5/5  Internal Rotation:  5/5  External Rotation: 5/5  Supraspinatus:     5/5  Subscapularis:     5/5  Biceps:                 5/5    Tests   Impingement:   Positive  Selby:          Positive  Cross Arm:      Negative  Drop Arm:        Negative  Apprehension: Negative  Sulcus:            Negative          X-ray images " Ordered and independently reviewed by me in the office today with the patient. X-ray shows: Mild degenerative changes, no fracture    ASSESSMENT/PLAN    ICD-10-CM    1. Pain in joint of right shoulder M25.511 XR Shoulder Right G/E 3 Views   2. Primary osteoarthritis of right shoulder M19.011    3. Rotator cuff dysfunction, right M67.911      She has mild glenohumeral arthritis and probable rotator cuff tendinopathy, cannot exclude complete or partial tearing, but is doing markedly better today than over the last few weeks, suggesting arthritic flare.  Discussed physical therapy or cortisone injections and today she declines both.  May call for therapy referral or return for an ultrasound-guided shoulder injection if desired.    Again, thank you for allowing me to participate in the care of your patient.        Sincerely,        Luis Lorenzana MD

## 2018-11-05 NOTE — PROGRESS NOTES
[facility]   [facility]   Clinic Visit s Nov 5, 2018    PCP: Krysten Molina      Aundrea is a 84 year old female who is seen in consultation at the request of Dr. Molina for   Chief Complaint   Patient presents with     Right Shoulder - Pain       Injury: Reports insidious onset without acute precipitating event.     Right hand dominant    Location of Pain: right shoulder posterior and lateral, nonradiating   Duration of Pain: 2 week(s)  Rating of Pain at worst: 8/10  Rating of Pain Currently: 8/10  Pain is better with: activity avoidance   Pain is worse with: overhead movement and sleeping   Treatment so far consists of: rest  Associated symptoms: no distal numbness or tingling; denies swelling or warmth  Recent imaging completed: No recent imaging completed.  Prior History of related problems: none    Social History: retired     Past Medical History:   Diagnosis Date     Chronic anticoagulation 2/19/2017     Diverticulitis of colon (without mention of hemorrhage)(562.11)      DM type 2, goal A1C below 8.0 12/3/2014     Essential and other specified forms of tremor     eval'd by neuro 2005     Hyperlipidemia LDL goal <100 5/9/2010     Hypertension goal BP (blood pressure) < 140/90      Intestinal disaccharidase deficiencies and disaccharide malabsorption      Moderate tricuspid regurgitation 2/19/2017     Osteoarthrosis, unspecified whether generalized or localized, unspecified site      Paroxysmal atrial fibrillation (H) 2/19/2017     Pulmonary hypertension, mild (H) 2/19/2017     Type 2 diabetes, HbA1c goal < 7% (H) 5/2/2010     Urge incontinence        Patient Active Problem List    Diagnosis Date Noted     Type 2 diabetes mellitus with diabetic polyneuropathy, without long-term current use of insulin (H) 05/07/2018     Priority: Medium     Patient is Sabianist 10/06/2017     Priority: Medium     ACP (advance care planning) 06/01/2017     Priority: Medium     Advance Care Planning  6/1/2017: Patient is Mormonism.  See Health Care Directive for information on use of blood products.  Recommend Code Status in chart and patient's Advance Care Planning documents be reviewed to ensure alignment with patient's current wishes.  Most recent Advance Care Planning document is a Health Care Directive dated 11/20/16.  Dunia Garcia   Advance Care Planning Liaison       Chronic anticoagulation 02/19/2017     Priority: Medium     Paroxysmal atrial fibrillation (H) 02/19/2017     Priority: Medium     Pulmonary hypertension, mild (H) 02/19/2017     Priority: Medium     Moderate tricuspid regurgitation 02/19/2017     Priority: Medium     Venous (peripheral) insufficiency 01/07/2016     Priority: Medium     Uncomplicated type 2 diabetes mellitus (H) 12/03/2014     Priority: Medium     Gout 03/21/2012     Priority: Medium     CARDIOVASCULAR SCREENING; LDL GOAL LESS THAN 130 09/20/2011     Priority: Medium     Hypertension goal BP (blood pressure) < 140/90      Priority: Medium     Refusal of blood transfusions as patient is Mormonism 03/03/2010     Priority: Medium     Female stress incontinence 12/23/2008     Priority: Medium     (Problem list name updated by automated process. Provider to review and confirm.)       Dystonia 12/23/2008     Priority: Medium     Essential tremor 12/23/2008     Priority: Medium     (Problem list name updated by automated process. Provider to review and confirm.)       Disorder of bone and cartilage 07/19/2006     Priority: Medium     Problem list name updated by automated process. Provider to review         Family History   Problem Relation Age of Onset     Diabetes Father      type 2     Hypertension Father      Cerebrovascular Disease Father      Arthritis Father      Other Cancer Sister        Social History     Social History     Marital status:      Spouse name: Scott     Number of children: 3     Years of education: 12     Occupational History     food  "demonstrator None      Social History Main Topics     Smoking status: Former Smoker     Quit date: 4/22/1970     Smokeless tobacco: Never Used     Alcohol use Yes      Comment: rarely, a glass of wine on a special occasion       Past Surgical History:   Procedure Laterality Date     SURGICAL HISTORY OF -       hysterectomy, bladder suspension     SURGICAL HISTORY OF -       tonsillectomy         Review of Systems   Musculoskeletal: Positive for joint pain.   All other systems reviewed and are negative.        Physical Exam  /85  Ht 5' 4\" (1.626 m)  Wt 162 lb (73.5 kg)  BMI 27.81 kg/m2  Constitutional:well-developed, well-nourished, and in no distress.   Cardiovascular: Intact distal pulses.    Neurological: alert. Gait Normal:   Gait, station, stance, and balance appear normal for age  Skin: Skin is warm and dry.   Psychiatric: Mood and affect normal.   Respiratory: unlabored, speaks in full sentences  Lymph: no LAD, no lymphangitis    Right Shoulder Exam     Tenderness   None    Range of Motion   Active Abduction:                       130  Passive Abduction:                    90  Extension:                                  Normal  Forward Flexion:                        100  External Rotation:                      60  Internal Rotation 0 degrees:      Lumbar  Internal Rotation 90 degrees:    N/t    Muscle Strength   Abduction:            5/5  Internal Rotation:  5/5  External Rotation: 5/5  Supraspinatus:     5/5  Subscapularis:     5/5  Biceps:                 5/5    Tests   Impingement:   Positive  Selby:          Positive  Cross Arm:      Negative  Drop Arm:        Negative  Apprehension: Negative  Sulcus:            Negative          X-ray images Ordered and independently reviewed by me in the office today with the patient. X-ray shows: Mild degenerative changes, no fracture    ASSESSMENT/PLAN    ICD-10-CM    1. Pain in joint of right shoulder M25.511 XR Shoulder Right G/E 3 Views   2. Primary " osteoarthritis of right shoulder M19.011    3. Rotator cuff dysfunction, right M67.911      She has mild glenohumeral arthritis and probable rotator cuff tendinopathy, cannot exclude complete or partial tearing, but is doing markedly better today than over the last few weeks, suggesting arthritic flare.  Discussed physical therapy or cortisone injections and today she declines both.  May call for therapy referral or return for an ultrasound-guided shoulder injection if desired.

## 2018-11-05 NOTE — MR AVS SNAPSHOT
"              After Visit Summary   11/5/2018    Aundrea Treviño    MRN: [id number]           Patient Information     Date Of Birth          8/29/1934        Visit Information        Provider Department      11/5/2018 1:00 PM Luis Lorenzana MD  Sports Medicine        Today's Diagnoses     Pain in joint of right shoulder    -  1    Primary osteoarthritis of right shoulder        Rotator cuff dysfunction, right           Follow-ups after your visit        Your next 10 appointments already scheduled     Nov 26, 2018  2:20 PM CST   SHORT with Krysten Molina MD   [facility] ([facility])    [address] [place] [address] [phone]              Who to contact     If you have questions or need follow up information about today's clinic visit or your schedule please contact  SPORTS MEDICINE directly at [phone].  Normal or non-critical lab and imaging results will be communicated to you by MyChart, letter or phone within 4 business days after the clinic has received the results. If you do not hear from us within 7 days, please contact the clinic through MyChart or phone. If you have a critical or abnormal lab result, we will notify you by phone as soon as possible.  Submit refill requests through Zipments or call your pharmacy and they will forward the refill request to us. Please allow 3 business days for your refill to be completed.          Additional Information About Your Visit        Care EveryWhere ID     This is your Care EveryWhere ID. This could be used by other organizations to access your [facility] medical records  [id number]        Your Vitals Were     Height BMI (Body Mass Index)                5' 4\" (1.626 m) 27.81 kg/m2           Blood Pressure from Last 3 Encounters:   11/05/18 146/85   10/29/18 138/63   07/26/18 123/63    Weight from Last 3 Encounters:   11/05/18 162 lb (73.5 kg)   10/29/18 162 lb 8 oz (73.7 kg)   07/26/18 159 lb 4 oz " (72.2 kg)               Primary Care Provider Office Phone # Fax #    Krysten Molina [phone] [address]  [facility] [address]        Equal Access to Services     KEVIN MARTINEZ : Hadii barrett ku janelleo Soomaali, waaxda luqadaha, qaybta kaalmada adeegyada, imelda dowlingkevon mansfield. So [facility] [phone].    ATENCIÓN: Si habla español, tiene a husain disposición servicios gratuitos de asistencia lingüística. Llame al [phone].    We comply with applicable federal civil rights laws and [place] laws. We do not discriminate on the basis of race, color, national origin, age, disability, sex, sexual orientation, or gender identity.            Thank you!     Thank you for choosing  SPORTS MEDICINE  for your care. Our goal is always to provide you with excellent care. Hearing back from our patients is one way we can continue to improve our services. Please take a few minutes to complete the written survey that you may receive in the mail after your visit with us. Thank you!             Your Updated Medication List - Protect others around you: Learn how to safely use, store and throw away your medicines at www.disposemymeds.org.          This list is accurate as of 11/5/18  4:49 PM.  Always use your most recent med list.                   Brand Name Dispense Instructions for use Diagnosis    Alfalfa 650 MG Tabs      Take 2 capsules by mouth 2 times daily For arthritis pain        amLODIPine 2.5 MG tablet    NORVASC    90 tablet    Take 1 tablet (2.5 mg) by mouth daily    Hypertension goal BP (blood pressure) < 140/90       apixaban ANTICOAGULANT 5 MG tablet    ELIQUIS    60 tablet    Take 1 tablet (5 mg) by mouth 2 times daily    Paroxysmal atrial fibrillation (H)       blood glucose lancets standard    no brand specified    100 each    Use to test blood sugar one times daily or as directed. Dispense compatible with insurance and glucometer - One Touch Ultra    Type 2 diabetes  mellitus without complication, without long-term current use of insulin (H)       blood glucose monitoring meter device kit    no brand specified    1 kit    Use to test blood sugar one times daily or as directed.  Dispense One Touch Ultra per insurance    Type 2 diabetes mellitus without complication, without long-term current use of insulin (H)       blood glucose monitoring test strip    no brand specified    100 strip    Use to test blood sugars one times daily or as directed.  Dispense compatible with insurance and glucometer - One Touch Ultra    Type 2 diabetes mellitus without complication, without long-term current use of insulin (H)       carvedilol 25 MG tablet    COREG    180 tablet    Take 1 tablet (25 mg) by mouth 2 times daily (with meals)    SOB (shortness of breath), Essential hypertension with goal blood pressure less than 140/90       cholecalciferol 1000 UNIT tablet    vitamin D3    30    1 TABLET DAILY        Flaxseed (Linseed) Powd      1.5  tablespoon daily    Type II or unspecified type diabetes mellitus without mention of complication, not stated as uncontrolled       fluticasone 50 MCG/ACT spray    FLONASE    3 Package    Spray 1-2 sprays into both nostrils daily    Cough, Allergic rhinitis       hydrochlorothiazide 25 MG tablet    HYDRODIURIL    90 tablet    TAKE ONE TABLET BY MOUTH ONE TIME DAILY    Hypertension goal BP (blood pressure) < 140/90       lisinopril 40 MG tablet    PRINIVIL/ZESTRIL    90 tablet    Take 1 tablet (40 mg) by mouth daily    Hypertension goal BP (blood pressure) < 140/90       metFORMIN 500 MG tablet    GLUCOPHAGE    90 tablet    Take 1 tablet (500 mg) by mouth daily (with dinner)    Type 2 diabetes mellitus with diabetic polyneuropathy, without long-term current use of insulin (H)       order for DME     1 each    Equipment being ordered: Left wrist splint.    Carpal tunnel syndrome of left wrist       oxybutynin 5 MG tablet    DITROPAN    90 tablet    TAKE 1/2  TABLET BY MOUTH TWICE DAILY    Female stress incontinence       triamcinolone 0.5 % cream    KENALOG    15 g    Apply topically 2 times daily to affected area as directed.    Itching       TUMS E-X 750 750 MG Chew   Generic drug:  calcium carbonate      Take 1,500 mg by mouth daily

## 2018-11-09 DIAGNOSIS — N39.3 FEMALE STRESS INCONTINENCE: ICD-10-CM

## 2018-11-09 DIAGNOSIS — I10 HYPERTENSION GOAL BP (BLOOD PRESSURE) < 140/90: ICD-10-CM

## 2018-11-09 NOTE — TELEPHONE ENCOUNTER
"Requested Prescriptions   Pending Prescriptions Disp Refills     hydrochlorothiazide (HYDRODIURIL) 25 MG tablet [Pharmacy Med Name: HydroCHLOROthiazide Oral Tablet 25 MG]  Last Written Prescription Date:  7/20/2018  Last Fill Quantity: 90 tabs,  # refills: 0   Last office visit: 10/29/2018 with prescribing provider:  House   Future Office Visit:   Next 5 appointments (look out 90 days)     Nov 26, 2018  2:20 PM CST   SHORT with Krysten Molina MD   [facility]    [address] [place] [address] [phone]                  90 tablet 0     Sig: TAKE ONE TABLET BY MOUTH ONE TIME DAILY    Diuretics (Including Combos) Protocol Failed    11/9/2018  4:31 PM       Failed - Blood pressure under 140/90 in past 12 months    BP Readings from Last 3 Encounters:   11/05/18 146/85   10/29/18 138/63   07/26/18 123/63                Passed - Recent (12 mo) or future (30 days) visit within the authorizing provider's specialty    Patient had office visit in the last 12 months or has a visit in the next 30 days with authorizing provider or within the authorizing provider's specialty.  See \"Patient Info\" tab in inbasket, or \"Choose Columns\" in Meds & Orders section of the refill encounter.             Passed - Patient is age 18 or older       Passed - No active pregancy on record       Passed - Normal serum creatinine on file in past 12 months    Recent Labs   Lab Test  10/29/18   1508   CR  0.79             Passed - Normal serum potassium on file in past 12 months    Recent Labs   Lab Test  10/29/18   1508   POTASSIUM  3.7                   Passed - Normal serum sodium on file in past 12 months    Recent Labs   Lab Test  10/29/18   1508   NA  138             Passed - No positive pregnancy test in past 12 months          "

## 2018-11-09 NOTE — TELEPHONE ENCOUNTER
"Requested Prescriptions   Pending Prescriptions Disp Refills     oxybutynin (DITROPAN-XL) 5 MG 24 hr tablet [Pharmacy Med Name: Oxybutynin Chloride ER Oral Tablet Extended Release 24 Hour 5  Last Written Prescription Date:  11/7/2017  Last Fill Quantity: 90 tabs,  # refills: 3   Last office visit: 10/29/2018 with prescribing provider:  House   Future Office Visit:   Next 5 appointments (look out 90 days)     Nov 26, 2018  2:20 PM CST   SHORT with Krysten Molina MD   [facility] ([facility])    [address] [place] [address] [phone]                  MG] 90 tablet 2     Sig: TAKE 1/2 TABLET BY MOUTH TWICE DAILY    Muscarinic Antagonists (Urinary Incontinence Agents) Passed    11/9/2018  4:31 PM       Passed - Recent (12 mo) or future (30 days) visit within the authorizing provider's specialty    Patient had office visit in the last 12 months or has a visit in the next 30 days with authorizing provider or within the authorizing provider's specialty.  See \"Patient Info\" tab in inbasket, or \"Choose Columns\" in Meds & Orders section of the refill encounter.             Passed - Medication is Oxybutynin and patient is 5 years of age or older       Passed - Patient does not have a diagnosis of glaucoma on the problem list    If glaucoma diagnosis is new, refer refill to physician.         Passed - Patient is 18 years of age or older          "

## 2018-11-12 RX ORDER — OXYBUTYNIN CHLORIDE 5 MG/1
TABLET, EXTENDED RELEASE ORAL
Qty: 90 TABLET | Refills: 3 | Status: SHIPPED | OUTPATIENT
Start: 2018-11-12 | End: 2019-07-03

## 2018-11-12 NOTE — TELEPHONE ENCOUNTER
Prescription approved per [facility] Refill Protocol.    Signed Prescriptions:                        Disp   Refills    oxybutynin (DITROPAN-XL) 5 MG 24 hr tablet 90 tab*3        Sig: TAKE 1/2 TABLET BY MOUTH TWICE DAILY  Authorizing Provider: MELVI GARCIA  Ordering User: MATTHEW VICTORIA      Closing encounter - no further actions needed at this time    Matthew Victoria RN

## 2018-11-13 RX ORDER — HYDROCHLOROTHIAZIDE 25 MG/1
TABLET ORAL
Qty: 90 TABLET | Refills: 2 | Status: SHIPPED | OUTPATIENT
Start: 2018-11-13 | End: 2019-08-09

## 2018-11-13 NOTE — TELEPHONE ENCOUNTER
LOV: 10/29/2018  Last CMP on 10/29/2018 wnl    BP Readings from Last 3 Encounters:   11/05/18 146/85   10/29/18 138/63   07/26/18 123/63         Prescription approved per [facility] Refill Protocol.  Thanks! Vivian Mcgee RN

## 2018-11-26 ENCOUNTER — OFFICE VISIT (OUTPATIENT)
Dept: FAMILY MEDICINE | Facility: CLINIC | Age: 83
End: 2018-11-26
Payer: COMMERCIAL

## 2018-11-26 VITALS
HEART RATE: 81 BPM | BODY MASS INDEX: 27.55 KG/M2 | TEMPERATURE: 98 F | WEIGHT: 160.5 LBS | SYSTOLIC BLOOD PRESSURE: 145 MMHG | OXYGEN SATURATION: 98 % | DIASTOLIC BLOOD PRESSURE: 80 MMHG

## 2018-11-26 DIAGNOSIS — Z79.01 CHRONIC ANTICOAGULATION: ICD-10-CM

## 2018-11-26 DIAGNOSIS — I10 BENIGN ESSENTIAL HYPERTENSION: ICD-10-CM

## 2018-11-26 DIAGNOSIS — M79.89 LEG SWELLING: ICD-10-CM

## 2018-11-26 DIAGNOSIS — I48.0 PAROXYSMAL ATRIAL FIBRILLATION (H): ICD-10-CM

## 2018-11-26 DIAGNOSIS — E11.9 TYPE 2 DIABETES MELLITUS WITHOUT COMPLICATION, WITHOUT LONG-TERM CURRENT USE OF INSULIN (H): Primary | ICD-10-CM

## 2018-11-26 DIAGNOSIS — I87.2 VENOUS (PERIPHERAL) INSUFFICIENCY: ICD-10-CM

## 2018-11-26 PROCEDURE — 99214 OFFICE O/P EST MOD 30 MIN: CPT | Performed by: FAMILY MEDICINE

## 2018-11-26 NOTE — MR AVS SNAPSHOT
After Visit Summary   11/26/2018    Aundrea Treviño    MRN: [id number]           Patient Information     Date Of Birth          8/29/1934        Visit Information        Provider Department      11/26/2018 2:20 PM Krysten Molina MD [facility]        Today's Diagnoses     Type 2 diabetes mellitus without complication, without long-term current use of insulin (H)    -  1    Leg swelling        Venous (peripheral) insufficiency        Benign essential hypertension        Chronic anticoagulation        Paroxysmal atrial fibrillation (H)          Care Instructions    Schedule a follow up in two months for your diabetes.           Follow-ups after your visit        Your next 10 appointments already scheduled     Feb 11, 2019  2:00 PM CST   [facility]   [facility]    [address] [place] [address] [phone]           Bring a current list of meds and any records pertaining to this visit. For Physicals, please bring immunization records and any forms needing to be filled out. Please arrive 10 minutes early to complete paperwork.              Who to contact     If you have questions or need follow up information about today's clinic visit or your schedule please contact [facility] directly at [phone].  Normal or non-critical lab and imaging results will be communicated to you by MyChart, letter or phone within 4 business days after the clinic has received the results. If you do not hear from us within 7 days, please contact the clinic through MyChart or phone. If you have a critical or abnormal lab result, we will notify you by phone as soon as possible.  Submit refill requests through [employer] or call your pharmacy and they will forward the refill request to us. Please allow 3 business days for your refill to be completed.          Additional Information About Your Visit        Care  EveryWhere ID     This is your Care EveryWhere ID. This could be used by other organizations to access your [facility] medical records  [id number]        Your Vitals Were     Pulse Temperature Pulse Oximetry BMI (Body Mass Index)          81 98  F (36.7  C) (Oral) 98% 27.55 kg/m2         Blood Pressure from Last 3 Encounters:   11/26/18 145/80   11/05/18 146/85   10/29/18 138/63    Weight from Last 3 Encounters:   11/26/18 160 lb 8 oz (72.8 kg)   11/05/18 162 lb (73.5 kg)   10/29/18 162 lb 8 oz (73.7 kg)              Today, you had the following     No orders found for display       Primary Care Provider Office Phone # Fax #    Krysten Molina [phone] [address]  [facility] [address]        Equal Access to Services     [facility]: Hadii barrett landers hadasho Soron, waaxda luqadaha, qaybta kaalmada adeericayada, imelda gordon . So [facility] [phone].    ATENCIÓN: Si habla español, tiene a husain disposición servicios gratuitos de asistencia lingüística. Maria Antonia al [phone].    We comply with applicable federal civil rights laws and [place] laws. We do not discriminate on the basis of race, color, national origin, age, disability, sex, sexual orientation, or gender identity.            Thank you!     Thank you for choosing [facility]  for your care. Our goal is always to provide you with excellent care. Hearing back from our patients is one way we can continue to improve our services. Please take a few minutes to complete the written survey that you may receive in the mail after your visit with us. Thank you!             Your Updated Medication List - Protect others around you: Learn how to safely use, store and throw away your medicines at www.disposemymeds.org.          This list is accurate as of 11/26/18  2:57 PM.  Always use your most recent med list.                   Brand Name Dispense Instructions for use Diagnosis    Alfalfa 650 MG Tabs       Take 2 capsules by mouth 2 times daily For arthritis pain        amLODIPine 2.5 MG tablet    NORVASC    90 tablet    Take 1 tablet (2.5 mg) by mouth daily    Hypertension goal BP (blood pressure) < 140/90       apixaban ANTICOAGULANT 5 MG tablet    ELIQUIS    60 tablet    Take 1 tablet (5 mg) by mouth 2 times daily    Paroxysmal atrial fibrillation (H)       blood glucose lancets standard    no brand specified    100 each    Use to test blood sugar one times daily or as directed. Dispense compatible with insurance and glucometer - One Touch Ultra    Type 2 diabetes mellitus without complication, without long-term current use of insulin (H)       blood glucose monitoring meter device kit    no brand specified    1 kit    Use to test blood sugar one times daily or as directed.  Dispense One Touch Ultra per insurance    Type 2 diabetes mellitus without complication, without long-term current use of insulin (H)       blood glucose monitoring test strip    no brand specified    100 strip    Use to test blood sugars one times daily or as directed.  Dispense compatible with insurance and glucometer - One Touch Ultra    Type 2 diabetes mellitus without complication, without long-term current use of insulin (H)       carvedilol 25 MG tablet    COREG    180 tablet    Take 1 tablet (25 mg) by mouth 2 times daily (with meals)    SOB (shortness of breath), Essential hypertension with goal blood pressure less than 140/90       Flaxseed (Linseed) Powd      1.5  tablespoon daily    Type II or unspecified type diabetes mellitus without mention of complication, not stated as uncontrolled       fluticasone 50 MCG/ACT nasal spray    FLONASE    3 Package    Spray 1-2 sprays into both nostrils daily    Cough, Allergic rhinitis       hydrochlorothiazide 25 MG tablet    HYDRODIURIL    90 tablet    TAKE ONE TABLET BY MOUTH ONE TIME DAILY    Hypertension goal BP (blood pressure) < 140/90       lisinopril 40 MG tablet    PRINIVIL/ZESTRIL     90 tablet    Take 1 tablet (40 mg) by mouth daily    Hypertension goal BP (blood pressure) < 140/90       metFORMIN 500 MG tablet    GLUCOPHAGE    90 tablet    Take 1 tablet (500 mg) by mouth daily (with dinner)    Type 2 diabetes mellitus with diabetic polyneuropathy, without long-term current use of insulin (H)       order for DME     1 each    Equipment being ordered: Left wrist splint.    Carpal tunnel syndrome of left wrist       oxybutynin ER 5 MG 24 hr tablet    DITROPAN-XL    90 tablet    TAKE 1/2 TABLET BY MOUTH TWICE DAILY    Female stress incontinence       triamcinolone 0.5 % cream    KENALOG    15 g    Apply topically 2 times daily to affected area as directed.    Itching       TUMS E-X 750 750 MG Chew   Generic drug:  calcium carbonate      Take 1,500 mg by mouth daily        vitamin D3 1000 UNIT tablet    CHOLECALCIFEROL    30    1 TABLET DAILY

## 2018-11-26 NOTE — PROGRESS NOTES
"  SUBJECTIVE:   Aundrea Treviño is a 84 year old female who presents to clinic today for the following health issues:       Diabetes Follow-up    Patient is checking blood sugars: once daily.  Results are as follows:         She takes it whenever she remembers (120-140 on average )     Diabetic concerns: None     Symptoms of hypoglycemia (low blood sugar): none     Paresthesias (numbness or burning in feet) or sores: Yes bilateral foot numbness      Date of last diabetic eye exam: 2018    BP Readings from Last 2 Encounters:   11/26/18 145/80   11/05/18 146/85     Hemoglobin A1C (%)   Date Value   07/26/2018 6.9 (H)   05/07/2018 7.3 (H)     LDL Cholesterol Calculated (mg/dL)   Date Value   10/29/2018 91   09/07/2017 88       Diabetes Management Resources    Amount of exercise or physical activity: minor exercise mostly walking if she is not experiencing pain     Problems taking medications regularly: No    Medication side effects: none    Diet: regular (no restrictions)        From 10/29/2018 clinic visit:  \"  She says that her whole right side is affected. She says she was diagnosed with neuropathy. Her feet had a strange sensation like they were made of cardboard. Both feet have gradually gotten worse. She has had some swelling in the right leg only. About three weeks the swelling has been present. It is not painful. It is uncomfortable. It affects her walking. She has been putting her feet up. She thinks it helps. By the time that she goes to bed at night it is more swollen. She has varicose veins. Feet seem more reddish color that what they used to be, but no recent changes. She sees speckled changes on her lower leg skin. Her left leg does not swell.     She says that her right shoulder has been bothering her for the past month or so. No swelling or redness. She has difficulty reaching behind her. It hurts when she sleeps on that shoulder. It feels like often when she lifts something or stretches it is going to " "go out of joint. Denies arm numbness or tingling. No neck pain on the right. She feels her strength overall has declined over time. Variable from day to day. No clear morning stiffness, can be anytime.     She had an injection and her hip is better. She still once in awhile gets pain in her groin like she should not step a certain way. \"    Problem list and histories reviewed & adjusted, as indicated.  Additional history: as documented    Patient Active Problem List   Diagnosis     Disorder of bone and cartilage     Female stress incontinence     Dystonia     Essential tremor     Refusal of blood transfusions as patient is [employer]     Hypertension goal BP (blood pressure) < 140/90     CARDIOVASCULAR SCREENING; LDL GOAL LESS THAN 130     Gout     Uncomplicated type 2 diabetes mellitus (H)     Venous (peripheral) insufficiency     Chronic anticoagulation     Paroxysmal atrial fibrillation (H)     Pulmonary hypertension, mild (H)     Moderate tricuspid regurgitation     ACP (advance care planning)     Patient is [employer]     Type 2 diabetes mellitus with diabetic polyneuropathy, without long-term current use of insulin (H)     Past Surgical History:   Procedure Laterality Date     SURGICAL HISTORY OF -       hysterectomy, bladder suspension     SURGICAL HISTORY OF -       tonsillectomy       Social History   Substance Use Topics     Smoking status: Former Smoker     Quit date: 4/22/1970     Smokeless tobacco: Never Used     Alcohol use Yes      Comment: rarely, a glass of wine on a special occassion     Family History   Problem Relation Age of Onset     Diabetes Father      type 2     Hypertension Father      Cerebrovascular Disease Father      Arthritis Father      Other Cancer Sister          Current Outpatient Prescriptions   Medication Sig Dispense Refill     [place] 650 MG TABS Take 2 capsules by mouth 2 times daily For arthritis pain       amLODIPine (NORVASC) 2.5 MG tablet Take 1 tablet (2.5 " mg) by mouth daily 90 tablet 3     apixaban ANTICOAGULANT (ELIQUIS) 5 MG tablet Take 1 tablet (5 mg) by mouth 2 times daily 60 tablet 11     blood glucose (NO BRAND SPECIFIED) lancets standard Use to test blood sugar one times daily or as directed. Dispense compatible with insurance and glucometer - One Touch Ultra 100 each 3     blood glucose monitoring (NO BRAND SPECIFIED) meter device kit Use to test blood sugar one times daily or as directed.  Dispense One Touch Ultra per insurance 1 kit 0     blood glucose monitoring (NO BRAND SPECIFIED) test strip Use to test blood sugars one times daily or as directed.  Dispense compatible with insurance and glucometer - One Touch Ultra 100 strip 3     calcium carbonate (TUMS E-X 750) 750 MG CHEW Take 1,500 mg by mouth daily        carvedilol (COREG) 25 MG tablet Take 1 tablet (25 mg) by mouth 2 times daily (with meals) 180 tablet 1     FLAXSEED (LINSEED) PO POWD 1.5  tablespoon daily       fluticasone (FLONASE) 50 MCG/ACT nasal spray Spray 1-2 sprays into both nostrils daily 3 Package 1     hydrochlorothiazide (HYDRODIURIL) 25 MG tablet TAKE ONE TABLET BY MOUTH ONE TIME DAILY 90 tablet 2     lisinopril (PRINIVIL/ZESTRIL) 40 MG tablet Take 1 tablet (40 mg) by mouth daily 90 tablet 3     metFORMIN (GLUCOPHAGE) 500 MG tablet Take 1 tablet (500 mg) by mouth daily (with dinner) 90 tablet 1     order for DME Equipment being ordered: Left wrist splint. 1 each 0     oxybutynin (DITROPAN-XL) 5 MG 24 hr tablet TAKE 1/2 TABLET BY MOUTH TWICE DAILY 90 tablet 3     triamcinolone (KENALOG) 0.5 % cream Apply topically 2 times daily to affected area as directed. 15 g 3     VITAMIN D 1000 UNIT OR TABS 1 TABLET DAILY 30 0     [DISCONTINUED] oxybutynin (DITROPAN) 5 MG tablet Take 0.5 tablets by mouth 2 times daily. 90 tablet 2     Allergies   Allergen Reactions     Amlodipine      Swollen ankles and rash but tolerates 2.5 mg without any symptoms     Apple [Chromium]      Kiwi      Nuts       Pear      Tape [Adhesive Tape]      Recent Labs   Lab Test  10/29/18   1508  07/26/18   1219  05/07/18   1511  02/14/18   1216  09/07/17   0954   09/12/16   1152   A1C   --   6.9*  7.3*  7.0*  6.9*   < >  7.1*   LDL  91   --    --    --   88   --   96   HDL  54   --    --    --   53   --   46*   TRIG  103   --    --    --   70   --   91   ALT  19   --   22   --   21   < >  20   CR  0.79   --   0.73   --   0.75   < >  0.72   GFRESTIMATED  69   --   76   --   73   < >  77   GFRESTBLACK  84   --   >90   --   89   < >  >90   GFR Calc     POTASSIUM  3.7   --   3.9   --   3.6   < >  3.6   TSH  1.53   --    --    --    --    --   1.56    < > = values in this interval not displayed.      BP Readings from Last 3 Encounters:   11/26/18 145/80   11/05/18 146/85   10/29/18 138/63    Wt Readings from Last 3 Encounters:   11/26/18 160 lb 8 oz (72.8 kg)   11/05/18 162 lb (73.5 kg)   10/29/18 162 lb 8 oz (73.7 kg)              Reviewed and updated as needed this visit by clinical staff  Tobacco  Allergies  Meds       Reviewed and updated as needed this visit by Provider         ROS:      OBJECTIVE:     /80  Pulse 81  Temp 98  F (36.7  C) (Oral)  Wt 160 lb 8 oz (72.8 kg)  SpO2 98%  BMI 27.55 kg/m2  Body mass index is 27.55 kg/(m^2).  GENERAL: healthy, alert and no distress    Diagnostic Test Results:  none     ASSESSMENT/PLAN:          1. Type 2 diabetes mellitus with diabetic polyneuropathy, without long-term current use of insulin (H)  At last visit, metformin was started 500mg daily. Tolerating it well. Will recheck in two months.    Has been well controlled, though A1c had been climbing a little bit and has had some worsening neuropathic symptoms bilateral feet.     2. swelling of right foot greater than left  Improving with compression stockings.      3 Venous (peripheral) insufficiency  As above    4. Hypertension goal BP (blood pressure) < 140/90  Controlled.  No changes today  5. Chronic  anticoagulation  6. Paroxysmal A. fib  Continues on Eliquis 5 mg twice daily         Patient Instructions   Schedule a follow up in two months for your diabetes.       Krysten Molina M.D.        [facility]

## 2019-01-11 DIAGNOSIS — I10 ESSENTIAL HYPERTENSION WITH GOAL BLOOD PRESSURE LESS THAN 140/90: ICD-10-CM

## 2019-01-11 DIAGNOSIS — R06.02 SOB (SHORTNESS OF BREATH): ICD-10-CM

## 2019-01-11 NOTE — TELEPHONE ENCOUNTER
"Requested Prescriptions   Pending Prescriptions Disp Refills     carvedilol (CORE[Pharmacy G) 25 MG tablet Med Name: Carvedilol Oral Tablet 25 MG]  Last Written Prescription Date:  7/20/2018  Last Fill Quantity: 180 tabs,  # refills: 1   Last office visit: 11/26/2018 with prescribing provider:  House   Future Office Visit:   Next 5 appointments (look out 90 days)    Feb 11, 2019  2:00 PM CST  Office Visit with Krysten Molina MD  [facility] ([facility] [address] [place] [id number] [phone]          180 tablet 0     Sig: Take 1 tablet (25 mg) by mouth 2 times daily with meals    Beta-Blockers Protocol Failed - 1/11/2019  1:09 PM       Failed - Blood pressure under 140/90 in past 12 months    BP Readings from Last 3 Encounters:   11/26/18 145/80   11/05/18 146/85   10/29/18 138/63                Passed - Patient is age 6 or older       Passed - Recent (12 mo) or future (30 days) visit within the authorizing provider's specialty    Patient had office visit in the last 12 months or has a visit in the next 30 days with authorizing provider or within the authorizing provider's specialty.  See \"Patient Info\" tab in inbasket, or \"Choose Columns\" in Meds & Orders section of the refill encounter.             Passed - Medication is active on med list          "

## 2019-01-13 NOTE — TELEPHONE ENCOUNTER
Routing refill request to provider for review/approval because:  BP elevated.    Patient has appt scheduled on 2/11/19 with Dr. Molina.      Dr. Molina-Please sign if agree. One month supply pended.    Thank you!  BENITEZ Peña, MIGUELN, RN

## 2019-01-14 RX ORDER — CARVEDILOL 25 MG/1
TABLET ORAL
Qty: 180 TABLET | Refills: 3 | Status: SHIPPED | OUTPATIENT
Start: 2019-01-14 | End: 2020-01-16

## 2019-01-14 NOTE — TELEPHONE ENCOUNTER
Pt called to check on the status of this medication refill request. Pt needs this to be filled by Wednesday morning as she is going out of town. Please assist! Thanks!     Niurka Rodriguez  [employer] Patient Rep

## 2019-01-14 NOTE — TELEPHONE ENCOUNTER
Provider plan 11/26/18    4. Hypertension goal BP (blood pressure) < 140/90  Controlled.  No changes today    Prescription approved per G Refill Protocol.    Signed Prescriptions:                        Disp   Refills    carvedilol (COREG) 25 MG tablet            180 ta*3        Sig: Take 1 tablet (25 mg) by mouth 2 times daily with           meals  Authorizing Provider: NITISH CA  Ordering User: MATTHEW VICTORIA    Notified patient    Closing encounter - no further actions needed at this time    Matthew Victoria RN

## 2019-02-13 NOTE — PROGRESS NOTES
"  SUBJECTIVE:   Aundrea Treviño is a 84 year old female who presents to clinic today for the following health issues:         Diabetes Follow-up      Patient is checking blood sugars: occ- mornings are always higher then in the afternoon but it is normally around 150 in the morning     Diabetic concerns: other - her feet have been bothering her more then normally      Symptoms of hypoglycemia (low blood sugar): none     Paresthesias (numbness or burning in feet) or sores: Yes      Date of last diabetic eye exam: 2018    Diabetes Management Resources    Hyperlipidemia Follow-Up      Rate your low fat/cholesterol diet?: good    Taking statin?  No    Other lipid medications/supplements?:  none    Hypertension Follow-up      Outpatient blood pressures are being checked at home.  Results have Improved .    Low Salt Diet: low salt    BP Readings from Last 2 Encounters:   02/18/19 134/66   11/26/18 145/80     Hemoglobin A1C (%)   Date Value   02/18/2019 6.7 (H)   07/26/2018 6.9 (H)     LDL Cholesterol Calculated (mg/dL)   Date Value   10/29/2018 91   09/07/2017 88       Amount of exercise or physical activity: not as much as she used to due to the pain that she is experiencing     Problems taking medications regularly: No    Medication side effects: none    Diet: regular (no restrictions)         From 10/29/2018 clinic visit:  \"  She says that her whole right side is affected. She says she was diagnosed with neuropathy. Her feet had a strange sensation like they were made of cardboard. Both feet have gradually gotten worse. She has had some swelling in the right leg only. About three weeks the swelling has been present. It is not painful. It is uncomfortable. It affects her walking. She has been putting her feet up. She thinks it helps. By the time that she goes to bed at night it is more swollen. She has varicose veins. Feet seem more reddish color that what they used to be, but no recent changes. She sees speckled " "changes on her lower leg skin. Her left leg does not swell.     She says that her right shoulder has been bothering her for the past month or so. No swelling or redness. She has difficulty reaching behind her. It hurts when she sleeps on that shoulder. It feels like often when she lifts something or stretches it is going to go out of joint. Denies arm numbness or tingling. No neck pain on the right. She feels her strength overall has declined over time. Variable from day to day. No clear morning stiffness, can be anytime.     She had an injection and her hip is better. She still once in awhile gets pain in her groin like she should not step a certain way. \"    Problem list and histories reviewed & adjusted, as indicated.  Additional history: as documented    Patient Active Problem List   Diagnosis     Disorder of bone and cartilage     Female stress incontinence     Dystonia     Essential tremor     Refusal of blood transfusions as patient is [employer]     Hypertension goal BP (blood pressure) < 140/90     CARDIOVASCULAR SCREENING; LDL GOAL LESS THAN 130     Gout     Uncomplicated type 2 diabetes mellitus (H)     Venous (peripheral) insufficiency     Chronic anticoagulation     Paroxysmal atrial fibrillation (H)     Pulmonary hypertension, mild (H)     Moderate tricuspid regurgitation     ACP (advance care planning)     Patient is [employer]     Type 2 diabetes mellitus with diabetic polyneuropathy, without long-term current use of insulin (H)     Past Surgical History:   Procedure Laterality Date     SURGICAL HISTORY OF -       hysterectomy, bladder suspension     SURGICAL HISTORY OF -       tonsillectomy       Social History     Tobacco Use     Smoking status: Former Smoker     Last attempt to quit: 1970     Years since quittin.8     Smokeless tobacco: Never Used   Substance Use Topics     Alcohol use: Yes     Comment: rarely, a glass of wine on a special occassion     Family History "   Problem Relation Age of Onset     Diabetes Father         type 2     Hypertension Father      Cerebrovascular Disease Father      Arthritis Father      Other Cancer Sister          Current Outpatient Medications   Medication Sig Dispense Refill     [place] 650 MG TABS Take 2 capsules by mouth 2 times daily For arthritis pain       amLODIPine (NORVASC) 2.5 MG tablet Take 1 tablet (2.5 mg) by mouth daily 90 tablet 3     apixaban ANTICOAGULANT (ELIQUIS) 5 MG tablet Take 1 tablet (5 mg) by mouth 2 times daily 60 tablet 11     blood glucose (NO BRAND SPECIFIED) lancets standard Use to test blood sugar one times daily or as directed. Dispense compatible with insurance and glucometer - One Touch Ultra 100 each 3     blood glucose monitoring (NO BRAND SPECIFIED) meter device kit Use to test blood sugar one times daily or as directed.  Dispense One Touch Ultra per insurance 1 kit 0     blood glucose monitoring (NO BRAND SPECIFIED) test strip Use to test blood sugars one times daily or as directed.  Dispense compatible with insurance and glucometer - One Touch Ultra 100 strip 3     calcium carbonate (TUMS E-X 750) 750 MG CHEW Take 1,500 mg by mouth daily        carvedilol (COREG) 25 MG tablet Take 1 tablet (25 mg) by mouth 2 times daily with meals 180 tablet 3     FLAXSEED (LINSEED) PO POWD 1.5  tablespoon daily       fluticasone (FLONASE) 50 MCG/ACT nasal spray Spray 1-2 sprays into both nostrils daily 3 Package 1     hydrochlorothiazide (HYDRODIURIL) 25 MG tablet TAKE ONE TABLET BY MOUTH ONE TIME DAILY 90 tablet 2     lisinopril (PRINIVIL/ZESTRIL) 40 MG tablet Take 1 tablet (40 mg) by mouth daily 90 tablet 3     metFORMIN (GLUCOPHAGE) 500 MG tablet Take 1 tablet (500 mg) by mouth daily (with dinner) 90 tablet 1     order for DME Equipment being ordered: Left wrist splint. 1 each 0     oxybutynin (DITROPAN-XL) 5 MG 24 hr tablet TAKE 1/2 TABLET BY MOUTH TWICE DAILY 90 tablet 3     triamcinolone (KENALOG) 0.5 % cream Apply  topically 2 times daily to affected area as directed. 15 g 3     VITAMIN D 1000 UNIT OR TABS 1 TABLET DAILY 30 0     Allergies   Allergen Reactions     Amlodipine      Swollen ankles and rash but tolerates 2.5 mg without any symptoms     Apple [Chromium]      Kiwi      Nuts      Pear      Tape [Adhesive Tape]      Recent Labs   Lab Test 02/18/19  0944 10/29/18  1508 07/26/18  1219 05/07/18  1511  09/07/17  0954  09/12/16  1152   A1C 6.7*  --  6.9* 7.3*   < > 6.9*   < > 7.1*   LDL  --  91  --   --   --  88  --  96   HDL  --  54  --   --   --  53  --  46*   TRIG  --  103  --   --   --  70  --  91   ALT  --  19  --  22  --  21   < > 20   CR  --  0.79  --  0.73  --  0.75   < > 0.72   GFRESTIMATED  --  69  --  76  --  73   < > 77   GFRESTBLACK  --  84  --  >90  --  89   < > >90   GFR Calc     POTASSIUM  --  3.7  --  3.9  --  3.6   < > 3.6   TSH  --  1.53  --   --   --   --   --  1.56    < > = values in this interval not displayed.      BP Readings from Last 3 Encounters:   02/18/19 134/66   11/26/18 145/80   11/05/18 146/85    Wt Readings from Last 3 Encounters:   02/18/19 71.8 kg (158 lb 4 oz)   11/26/18 72.8 kg (160 lb 8 oz)   11/05/18 73.5 kg (162 lb)              Reviewed and updated as needed this visit by clinical staff  Tobacco  Allergies  Meds       Reviewed and updated as needed this visit by Provider         ROS:  No complaint of chest pain or shortness of breath.     OBJECTIVE:     /66   Pulse 73   Temp 98  F (36.7  C) (Oral)   Wt 71.8 kg (158 lb 4 oz)   SpO2 98%   BMI 27.16 kg/m    Body mass index is 27.16 kg/m .  GENERAL: healthy, alert and no distress    Diagnostic Test Results:  none     ASSESSMENT/PLAN:          1. Type 2 diabetes mellitus with diabetic polyneuropathy, without long-term current use of insulin (H)   Controlled. continue metformin 500mg daily. Tolerating it well. Peripheral neuropathy bilateral feet unchanged.     2.Hypertension goal BP (blood pressure) <  140/90  Controlled.  No changes today.  Continues amlodipine 2.5 mg daily, carvedilol 25 mg twice daily, hydrochlorothiazide 25 mg daily, lisinopril 40 mg daily.       3 Venous (peripheral) insufficiency.   4. Swelling of right foot greater than left  Improving with compression stockings.    5. Chronic anticoagulation  6. Paroxysmal A. fib  Continues on Eliquis 5 mg twice daily.  Followed by cardiology    7.  Female stress incontinence  Continues oxybutynin 2.5 mg twice daily--unchanged for years. Started at the 5mg once daily dose, then was told by her previous provider to cut in half and take twice daily since she was having side effect of dry mouth. She will schedule with urology. She does not want to make any change to her medication at this time as she would prefer to visit with urology first.            Patient Instructions   1) Schedule with Urology. There are several other treatments for urinary incontinence than the oxybutinin.   2) Follow-up with me in 6 months  3)  Continue metformin 500 mg daily.  4) Continue the rest of your medications unchanged.  5) I recommend getting the new shingles vaccine, Shingrix.  You can call your insurance company to find out if this vaccine is covered.  You may also ask our pharmacy to check if your insurance covers Shingrix if given at the pharmacy.             Krysten Molina M.D.        [facility]

## 2019-02-18 ENCOUNTER — OFFICE VISIT (OUTPATIENT)
Dept: FAMILY MEDICINE | Facility: CLINIC | Age: 84
End: 2019-02-18
Payer: COMMERCIAL

## 2019-02-18 VITALS
DIASTOLIC BLOOD PRESSURE: 66 MMHG | SYSTOLIC BLOOD PRESSURE: 134 MMHG | HEART RATE: 73 BPM | OXYGEN SATURATION: 98 % | WEIGHT: 158.25 LBS | TEMPERATURE: 98 F | BODY MASS INDEX: 27.16 KG/M2

## 2019-02-18 DIAGNOSIS — N39.46 MIXED INCONTINENCE URGE AND STRESS (MALE)(FEMALE): ICD-10-CM

## 2019-02-18 DIAGNOSIS — E11.9 TYPE 2 DIABETES MELLITUS WITHOUT COMPLICATION, WITHOUT LONG-TERM CURRENT USE OF INSULIN (H): Primary | ICD-10-CM

## 2019-02-18 DIAGNOSIS — N39.3 FEMALE STRESS INCONTINENCE: ICD-10-CM

## 2019-02-18 DIAGNOSIS — I10 HYPERTENSION GOAL BP (BLOOD PRESSURE) < 140/90: ICD-10-CM

## 2019-02-18 DIAGNOSIS — I48.0 PAROXYSMAL ATRIAL FIBRILLATION (H): ICD-10-CM

## 2019-02-18 DIAGNOSIS — E11.42 TYPE 2 DIABETES MELLITUS WITH DIABETIC POLYNEUROPATHY, WITHOUT LONG-TERM CURRENT USE OF INSULIN (H): ICD-10-CM

## 2019-02-18 DIAGNOSIS — Z79.01 CHRONIC ANTICOAGULATION: ICD-10-CM

## 2019-02-18 DIAGNOSIS — M79.89 LEG SWELLING: ICD-10-CM

## 2019-02-18 DIAGNOSIS — I87.2 VENOUS (PERIPHERAL) INSUFFICIENCY: ICD-10-CM

## 2019-02-18 LAB — HBA1C MFR BLD: 6.7 % (ref 0–5.6)

## 2019-02-18 PROCEDURE — 99214 OFFICE O/P EST MOD 30 MIN: CPT | Performed by: FAMILY MEDICINE

## 2019-02-18 PROCEDURE — 36415 COLL VENOUS BLD VENIPUNCTURE: CPT | Performed by: FAMILY MEDICINE

## 2019-02-18 PROCEDURE — 83036 HEMOGLOBIN GLYCOSYLATED A1C: CPT | Performed by: FAMILY MEDICINE

## 2019-02-18 RX ORDER — AMLODIPINE BESYLATE 2.5 MG/1
2.5 TABLET ORAL DAILY
Qty: 90 TABLET | Refills: 3 | Status: SHIPPED | OUTPATIENT
Start: 2019-02-18 | End: 2019-07-03

## 2019-02-18 RX ORDER — LISINOPRIL 40 MG/1
40 TABLET ORAL DAILY
Qty: 90 TABLET | Refills: 3 | Status: SHIPPED | OUTPATIENT
Start: 2019-02-18 | End: 2020-02-19

## 2019-02-18 NOTE — PATIENT INSTRUCTIONS
1) Schedule with Urology. There are several other treatments for urinary incontinence than the oxybutinin.   2) Follow-up with me in 6 months  3)  Continue metformin 500 mg daily.  4) Continue the rest of your medications unchanged.  5) I recommend getting the new shingles vaccine, Shingrix.  You can call your insurance company to find out if this vaccine is covered.  You may also ask our pharmacy to check if your insurance covers Shingrix if given at the pharmacy.

## 2019-02-21 ENCOUNTER — PRE VISIT (OUTPATIENT)
Dept: UROLOGY | Facility: CLINIC | Age: 84
End: 2019-02-21

## 2019-02-21 NOTE — TELEPHONE ENCOUNTER
MEDICAL RECORDS REQUEST   [facility]  Urology Clinic  [address] [place] [address]  PHONE: [phone]  Fax: [phone]        FUTURE VISIT INFORMATION                                                   Aundrea Treviño, : 1934 scheduled for future visit at [facility]    APPOINTMENT INFORMATION:    Date: 2019    Provider:  DESI GOMEZ    Reason for Visit/Diagnosis: MIXED INCONTINENCE    REFERRAL INFORMATION:    Referring provider:  MELVI GARCIA    Specialty: MD    Referring providers clinic:  [facility] contact number:  [phone]    RECORDS REQUESTED FOR VISIT                                                     NOTES  STATUS/DETAILS   OFFICE NOTE from referring provider  yes   OFFICE NOTE from other specialist  no   DISCHARGE SUMMARY from hospital  no   DISCHARGE REPORT from the ER  no   OPERATIVE REPORT  no   MEDICATION LIST  yes       PRE-VISIT CHECKLIST      Record collection complete Yes   Appointment appropriately scheduled           (right time/right provider) Yes   MyChart activation Yes   Questionnaire complete If no, please explain IN PROCESS     Completed by: Elida Britt

## 2019-02-26 ENCOUNTER — DOCUMENTATION ONLY (OUTPATIENT)
Dept: CARE COORDINATION | Facility: CLINIC | Age: 84
End: 2019-02-26

## 2019-03-12 ENCOUNTER — PRE VISIT (OUTPATIENT)
Dept: UROLOGY | Facility: CLINIC | Age: 84
End: 2019-03-12

## 2019-03-12 NOTE — TELEPHONE ENCOUNTER
Reason for Visit: Consult    Diagnosis: Mixed urinary incontinence    Contact Patient: n/a    Rooming Requirements: UA dip / PVR (unless patient is bothered more by stress incontinence - then no urine)      Jennifer No LPN  03/12/19  3:05 PM

## 2019-03-20 ENCOUNTER — OFFICE VISIT (OUTPATIENT)
Dept: ORTHOPEDICS | Facility: CLINIC | Age: 84
End: 2019-03-20
Payer: COMMERCIAL

## 2019-03-20 ENCOUNTER — OFFICE VISIT (OUTPATIENT)
Dept: UROLOGY | Facility: CLINIC | Age: 84
End: 2019-03-20
Attending: FAMILY MEDICINE
Payer: COMMERCIAL

## 2019-03-20 VITALS
SYSTOLIC BLOOD PRESSURE: 145 MMHG | HEIGHT: 64 IN | WEIGHT: 158 LBS | DIASTOLIC BLOOD PRESSURE: 79 MMHG | HEART RATE: 82 BPM | BODY MASS INDEX: 26.98 KG/M2

## 2019-03-20 DIAGNOSIS — N39.492 POSTURAL URINARY INCONTINENCE: Primary | ICD-10-CM

## 2019-03-20 DIAGNOSIS — N95.2 ATROPHIC VAGINITIS: ICD-10-CM

## 2019-03-20 DIAGNOSIS — M16.11 PRIMARY OSTEOARTHRITIS OF RIGHT HIP: Primary | ICD-10-CM

## 2019-03-20 PROCEDURE — 20611 DRAIN/INJ JOINT/BURSA W/US: CPT | Mod: RT | Performed by: FAMILY MEDICINE

## 2019-03-20 RX ORDER — LIDOCAINE HYDROCHLORIDE 10 MG/ML
5 INJECTION, SOLUTION INFILTRATION; PERINEURAL
Status: DISCONTINUED | OUTPATIENT
Start: 2019-03-20 | End: 2019-07-03

## 2019-03-20 RX ORDER — TRIAMCINOLONE ACETONIDE 40 MG/ML
40 INJECTION, SUSPENSION INTRA-ARTICULAR; INTRAMUSCULAR
Status: DISCONTINUED | OUTPATIENT
Start: 2019-03-20 | End: 2019-07-03

## 2019-03-20 RX ORDER — LIDOCAINE HYDROCHLORIDE 10 MG/ML
4 INJECTION, SOLUTION INFILTRATION; PERINEURAL
Status: DISCONTINUED | OUTPATIENT
Start: 2019-03-20 | End: 2019-07-03

## 2019-03-20 RX ADMIN — TRIAMCINOLONE ACETONIDE 40 MG: 40 INJECTION, SUSPENSION INTRA-ARTICULAR; INTRAMUSCULAR at 13:46

## 2019-03-20 RX ADMIN — LIDOCAINE HYDROCHLORIDE 4 ML: 10 INJECTION, SOLUTION INFILTRATION; PERINEURAL at 13:46

## 2019-03-20 RX ADMIN — LIDOCAINE HYDROCHLORIDE 5 ML: 10 INJECTION, SOLUTION INFILTRATION; PERINEURAL at 13:46

## 2019-03-20 ASSESSMENT — ENCOUNTER SYMPTOMS
TREMORS: 0
EXERCISE INTOLERANCE: 0
LIGHT-HEADEDNESS: 1
PARALYSIS: 0
HEADACHES: 0
DIZZINESS: 1
DIFFICULTY URINATING: 0
MUSCLE CRAMPS: 0
PALPITATIONS: 1
MUSCLE WEAKNESS: 1
HOARSE VOICE: 1
SINUS PAIN: 0
TROUBLE SWALLOWING: 0
SYNCOPE: 0
DYSURIA: 0
HEMATURIA: 0
FLANK PAIN: 0
DISTURBANCES IN COORDINATION: 0
LOSS OF CONSCIOUSNESS: 0
HYPOTENSION: 0
HYPERTENSION: 1
TINGLING: 1
BACK PAIN: 1
SPEECH CHANGE: 0
SINUS CONGESTION: 0
SEIZURES: 0
MYALGIAS: 1
TASTE DISTURBANCE: 1
ORTHOPNEA: 0
NECK PAIN: 0
LEG PAIN: 1
ARTHRALGIAS: 1
SORE THROAT: 0
NUMBNESS: 1
STIFFNESS: 1
JOINT SWELLING: 1
SMELL DISTURBANCE: 1
WEAKNESS: 1
SLEEP DISTURBANCES DUE TO BREATHING: 0
MEMORY LOSS: 1
NECK MASS: 0

## 2019-03-20 ASSESSMENT — MIFFLIN-ST. JEOR: SCORE: 1151.68

## 2019-03-20 ASSESSMENT — PAIN SCALES - GENERAL: PAINLEVEL: NO PAIN (0)

## 2019-03-20 NOTE — LETTER
3/20/2019         RE: Aundrea Treviño  [address]  [place] [address]        Dear Colleague,    Thank you for referring your patient, Aundrea Treviño, to the  [facility]. Please see a copy of my visit note below.    Large Joint Injection/Arthocentesis: R hip joint  Date/Time: 3/20/2019 1:46 PM  Performed by: Luis Lorenzana MD  Authorized by: Luis Lorenzana MD     Indications:  Pain and osteoarthritis  Needle Size:  22 G  Guidance: ultrasound    Approach:  Anterior  Location:  Hip  Site:  R hip joint  Medications:  4 mL lidocaine 1 %; 5 mL lidocaine 1 %; 40 mg triamcinolone 40 MG/ML  Outcome:  Tolerated well, no immediate complications  Procedure discussed: discussed risks, benefits, and alternatives    Consent Given by:  Patient  Timeout: timeout called immediately prior to procedure    Prep: patient was prepped and draped in usual sterile fashion            Returns for repeat injection of the right hip, having had very good results until December for same injection last March.    Again, thank you for allowing me to participate in the care of your patient.        Sincerely,        Luis Lorenzana MD

## 2019-03-20 NOTE — PROGRESS NOTES
CC:  Poor bladder control    HPI:  Aundrea Treviño is a 84 year old female asked to be seen in consultation by Dr. Ocasio  for the above.  This problem has been going on for almost a year and has been getting worse.  It is associated with some incontinence that seems to related to gettng up from a sitting position.  She takes ditropan XL which helps a little with the urgency.  She has multiple episodes of incontinence per day and has been using some pads per day.  The patient voids qfew hours, nocturia X 0.  She drinks caffeine free cola and decaf coffee and water.  She denies any dysuria,  hematuria, hesitancy, intermittency, feeling of incomplete emptying, or any recent hx of UTI's or stones.    The patient has no constipation or splinting.  She is not sexually active and denies any dyspareunia or pelvic pain.   She denies any vaginal bulge.  She has no neurological or balance problems.     Obstetric Hx:  She is .  The weight of her largest baby was 7 lbs 8oz.  Babies were delivered vaginally.  Menopause mid 40 but had a partial hysterecomy in , HRT:  none    Past Medical History:   Diagnosis Date     Chronic anticoagulation 2017     Diverticulitis of colon (without mention of hemorrhage)(562.11)      DM type 2, goal A1C below 8.0 12/3/2014     Essential and other specified forms of tremor     eval'd by neuro      Hyperlipidemia LDL goal <100 2010     Hypertension goal BP (blood pressure) < 140/90      Intestinal disaccharidase deficiencies and disaccharide malabsorption      Moderate tricuspid regurgitation 2017     Osteoarthrosis, unspecified whether generalized or localized, unspecified site      Paroxysmal atrial fibrillation (H) 2017     Pulmonary hypertension, mild (H) 2017     Type 2 diabetes, HbA1c goal < 7% (H) 2010     Urge incontinence        Past Surgical History:   Procedure Laterality Date     SURGICAL HISTORY OF -       hysterectomy, bladder suspension      "SURGICAL HISTORY OF -       tonsillectomy       Meds, Allergies, FHx and SHx reviewed per nurse's intake note.    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  All other positive and pertinent information is mentioned in the HPI.    PEx:   Blood pressure 145/79, pulse 82, height 1.626 m (5' 4\"), weight 71.7 kg (158 lb).  5' 4\", Body mass index is 27.12 kg/m ., 158 lbs 0 oz  Gen appearance:  Well groomed  HEENT:  EOMI, AT NC  Psych:  Normal Affect  Neuro:  A/O X 3  Skin:  Warm to touch  Resp:  No increased respiratory effort  Vasc:  RRR  lymph:  mild LE edema  Musk:  Full ROM in extremities  :  deferred    UA: UA RESULTS:  Recent Labs   Lab Test 02/14/18  1210   COLOR Yellow   APPEARANCE Clear   URINEGLC Negative   URINEBILI Negative   URINEKETONE Negative   SG 1.020   UBLD Negative   URINEPH 7.0   PROTEIN Negative   UROBILINOGEN 0.2   NITRITE Negative   LEUKEST Negative       Office Visit on 02/18/2019   Component Date Value Ref Range Status     Hemoglobin A1C 02/18/2019 6.7* 0 - 5.6 % Final    Comment: Normal <5.7% Prediabetes 5.7-6.4%  Diabetes 6.5% or higher - adopted from ADA   consensus guidelines.       ASSESSMENT and PLAN:  This is a 84 year old female with urinary incontinence that may be related to ISD or stress induced urge incontinence.  Different management options were discussed with the patient including observation, other medication and further w/u.  Given that she has tried the oxybutynin and that she may benefit from bulking it was decided that UDS would be a good next step and we will have her try to sit up during this test to see if it makes a difference.    -start estrogen cream  -schedule UDS and f/u to review for cystoscopy    Thank you for allowing me to participate in Ms. Treviño's care.  I will keep you updated on her progress.    Irma Painter MD    Answers for HPI/ROS submitted by the patient on 3/20/2019   General Symptoms: " No  Skin Symptoms: No  HENT Symptoms: Yes  EYE SYMPTOMS: No  HEART SYMPTOMS: Yes  LUNG SYMPTOMS: No  INTESTINAL SYMPTOMS: No  URINARY SYMPTOMS: Yes  GYNECOLOGIC SYMPTOMS: No  BREAST SYMPTOMS: No  SKELETAL SYMPTOMS: Yes  BLOOD SYMPTOMS: No  NERVOUS SYSTEM SYMPTOMS: Yes  MENTAL HEALTH SYMPTOMS: No  Ear pain: No  Ear discharge: No  Hearing loss: Yes  Tinnitus: Yes  Nosebleeds: No  Congestion: No  Sinus pain: No  Trouble swallowing: No   Voice hoarseness: Yes  Mouth sores: No  Sore throat: No  Tooth pain: No  Gum tenderness: No  Bleeding gums: No  Change in taste: Yes  Change in sense of smell: Yes  Dry mouth: No  Hearing aid used: No  Neck lump: No  Chest pain or pressure: No  Fast or irregular heartbeat: Yes  Pain in legs with walking: Yes  Trouble breathing while lying down: No  Fingers or toes appear blue: No  High blood pressure: Yes  Low blood pressure: No  Fainting: No  Murmurs: No  Pacemaker: No  Varicose veins: Yes  Edema or swelling: No  Wake up at night with shortness of breath: No  Light-headedness: Yes  Exercise intolerance: No  Trouble holding urine or incontinence: Yes  Pain or burning: No  Trouble starting or stopping: Yes  Increased frequency of urination: Yes  Blood in urine: No  Decreased frequency of urination: No  Frequent nighttime urination: No  Flank pain: No  Difficulty emptying bladder: No  Back pain: Yes  Muscle aches: Yes  Neck pain: No  Swollen joints: Yes  Joint pain: Yes  Bone pain: Yes  Muscle cramps: No  Muscle weakness: Yes  Joint stiffness: Yes  Bone fracture: No  Trouble with coordination: No  Dizziness or trouble with balance: Yes  Fainting or black-out spells: No  Memory loss: Yes  Headache: No  Seizures: No  Speech problems: No  Tingling: Yes  Tremor: No  Weakness: Yes  Difficulty walking: Yes  Paralysis: No  Numbness: Yes

## 2019-03-20 NOTE — PROGRESS NOTES
Returns for repeat injection of the right hip, having had very good results until December for same injection last March.

## 2019-03-20 NOTE — PATIENT INSTRUCTIONS
Schedule Urodynamics (with fluoro xray).  Complete bladder diary prior to urodynamics and bring it in with you to review.  Follow up with Dr. Painter for Cystoscopy and to review the UDS results.        It was a pleasure meeting with you today.  Thank you for allowing me and my team the privilege of caring for you today.  YOU are the reason we are here, and I truly hope we provided you with the excellent service you deserve.  Please let us know if there is anything else we can do for you so that we can be sure you are leaving completely satisfied with your care experience.

## 2019-03-20 NOTE — NURSING NOTE
"Chief Complaint   Patient presents with     Consult     Stress incontinence       Blood pressure 145/79, pulse 82, height 1.626 m (5' 4\"), weight 71.7 kg (158 lb). Body mass index is 27.12 kg/m .    Patient Active Problem List   Diagnosis     Disorder of bone and cartilage     Female stress incontinence     Dystonia     Essential tremor     Refusal of blood transfusions as patient is [place]     Hypertension goal BP (blood pressure) < 140/90     CARDIOVASCULAR SCREENING; LDL GOAL LESS THAN 130     Gout     Uncomplicated type 2 diabetes mellitus (H)     Venous (peripheral) insufficiency     Chronic anticoagulation     Paroxysmal atrial fibrillation (H)     Pulmonary hypertension, mild (H)     Moderate tricuspid regurgitation     ACP (advance care planning)     Patient is [place]     Type 2 diabetes mellitus with diabetic polyneuropathy, without long-term current use of insulin (H)       Allergies   Allergen Reactions     Amlodipine      Swollen ankles and rash but tolerates 2.5 mg without any symptoms     Apple [Chromium]      Kiwi      Nuts      Pear      Tape [Adhesive Tape]        Current Outpatient Medications   Medication Sig Dispense Refill     [place] 650 MG TABS Take 2 capsules by mouth 2 times daily For arthritis pain       amLODIPine (NORVASC) 2.5 MG tablet Take 1 tablet (2.5 mg) by mouth daily 90 tablet 3     apixaban ANTICOAGULANT (ELIQUIS) 5 MG tablet Take 1 tablet (5 mg) by mouth 2 times daily 60 tablet 11     blood glucose (NO BRAND SPECIFIED) lancets standard Use to test blood sugar one times daily or as directed. Dispense compatible with insurance and glucometer - One Touch Ultra 100 each 3     blood glucose monitoring (NO BRAND SPECIFIED) meter device kit Use to test blood sugar one times daily or as directed.  Dispense One Touch Ultra per insurance 1 kit 0     blood glucose monitoring (NO BRAND SPECIFIED) test strip Use to test blood sugars one times daily or as directed.  " Dispense compatible with insurance and glucometer - One Touch Ultra 100 strip 3     calcium carbonate (TUMS E-X 750) 750 MG CHEW Take 1,500 mg by mouth daily        carvedilol (COREG) 25 MG tablet Take 1 tablet (25 mg) by mouth 2 times daily with meals 180 tablet 3     FLAXSEED (LINSEED) PO POWD 1.5  tablespoon daily       fluticasone (FLONASE) 50 MCG/ACT nasal spray Spray 1-2 sprays into both nostrils daily 3 Package 1     hydrochlorothiazide (HYDRODIURIL) 25 MG tablet TAKE ONE TABLET BY MOUTH ONE TIME DAILY 90 tablet 2     lisinopril (PRINIVIL/ZESTRIL) 40 MG tablet Take 1 tablet (40 mg) by mouth daily 90 tablet 3     metFORMIN (GLUCOPHAGE) 500 MG tablet Take 1 tablet (500 mg) by mouth daily (with dinner) 90 tablet 1     order for DME Equipment being ordered: Left wrist splint. 1 each 0     oxybutynin (DITROPAN-XL) 5 MG 24 hr tablet TAKE 1/2 TABLET BY MOUTH TWICE DAILY 90 tablet 3     triamcinolone (KENALOG) 0.5 % cream Apply topically 2 times daily to affected area as directed. 15 g 3     VITAMIN D 1000 UNIT OR TABS 1 TABLET DAILY 30 0       Social History     Tobacco Use     Smoking status: Former Smoker     Last attempt to quit: 1970     Years since quittin.9     Smokeless tobacco: Never Used   Substance Use Topics     Alcohol use: Yes     Comment: rarely, a glass of wine on a special occassion     Drug use: No       Jennifer No LPN  3/20/2019  10:20 AM    Compound estriol cream ordered and faxed to [facility] at [phone]. Phone number [phone].  Jennifer No LPN  3/20/2019  11:36 AM

## 2019-03-20 NOTE — LETTER
3/20/2019       RE: Aundrea Treviño  [address]  [facility] [id number]     Dear Colleague,    Thank you for referring your patient, Aundrea Treviño, to the [facility] AND [facility] PROSTATE AND UROLOGIC CANCERS at [facility]. Please see a copy of my visit note below.    CC:  Poor bladder control    HPI:  Aundrae Treviño is a 84 year old female asked to be seen in consultation by Dr. Ocasio  for the above.  This problem has been going on for almost a year and has been getting worse.  It is associated with some incontinence that seems to related to gettng up from a sitting position.  She takes ditropan XL which helps a little with the urgency.  She has multiple episodes of incontinence per day and has been using some pads per day.  The patient voids qfew hours, nocturia X 0.  She drinks caffeine free cola and decaf coffee and water.  She denies any dysuria,  hematuria, hesitancy, intermittency, feeling of incomplete emptying, or any recent hx of UTI's or stones.    The patient has no constipation or splinting.  She is not sexually active and denies any dyspareunia or pelvic pain.   She denies any vaginal bulge.  She has no neurological or balance problems.     Obstetric Hx:  She is .  The weight of her largest baby was 7 lbs 8oz.  Babies were delivered vaginally.  Menopause mid 40 but had a partial hysterecomy in , HRT:  none    Past Medical History:   Diagnosis Date     Chronic anticoagulation 2017     Diverticulitis of colon (without mention of hemorrhage)(562.11)      DM type 2, goal A1C below 8.0 12/3/2014     Essential and other specified forms of tremor     eval'd by neuro      Hyperlipidemia LDL goal <100 2010     Hypertension goal BP (blood pressure) < 140/90      Intestinal disaccharidase deficiencies and disaccharide malabsorption      Moderate tricuspid regurgitation 2017     Osteoarthrosis, unspecified whether generalized  "or localized, unspecified site      Paroxysmal atrial fibrillation (H) 2/19/2017     Pulmonary hypertension, mild (H) 2/19/2017     Type 2 diabetes, HbA1c goal < 7% (H) 5/2/2010     Urge incontinence        Past Surgical History:   Procedure Laterality Date     SURGICAL HISTORY OF -       hysterectomy, bladder suspension     SURGICAL HISTORY OF -       tonsillectomy       Meds, Allergies, FHx and SHx reviewed per nurse's intake note.    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  All other positive and pertinent information is mentioned in the HPI.    PEx:   Blood pressure 145/79, pulse 82, height 1.626 m (5' 4\"), weight 71.7 kg (158 lb).  5' 4\", Body mass index is 27.12 kg/m ., 158 lbs 0 oz  Gen appearance:  Well groomed  HEENT:  EOMI, AT NC  Psych:  Normal Affect  Neuro:  A/O X 3  Skin:  Warm to touch  Resp:  No increased respiratory effort  Vasc:  RRR  lymph:  mild LE edema  Musk:  Full ROM in extremities  :  deferred    UA: UA RESULTS:  Recent Labs   Lab Test 02/14/18  1210   COLOR Yellow   APPEARANCE Clear   URINEGLC Negative   URINEBILI Negative   URINEKETONE Negative   SG 1.020   UBLD Negative   URINEPH 7.0   PROTEIN Negative   UROBILINOGEN 0.2   NITRITE Negative   LEUKEST Negative       Office Visit on 02/18/2019   Component Date Value Ref Range Status     Hemoglobin A1C 02/18/2019 6.7* 0 - 5.6 % Final    Comment: Normal <5.7% Prediabetes 5.7-6.4%  Diabetes 6.5% or higher - adopted from ADA   consensus guidelines.       ASSESSMENT and PLAN:  This is a 84 year old female with urinary incontinence that may be related to ISD or stress induced urge incontinence.  Different management options were discussed with the patient including observation, other medication and further w/u.  Given that she has tried the oxybutynin and that she may benefit from bulking it was decided that UDS would be a good next step and we will have her try to sit up during this " test to see if it makes a difference.    -start estrogen cream  -schedule UDS and f/u to review for cystoscopy    Thank you for allowing me to participate in Ms. Treviño's care.  I will keep you updated on her progress.    Irma Painter MD

## 2019-03-20 NOTE — PROGRESS NOTES
Large Joint Injection/Arthocentesis: R hip joint  Date/Time: 3/20/2019 1:46 PM  Performed by: Luis Lorenzana MD  Authorized by: Luis Lorenzana MD     Indications:  Pain and osteoarthritis  Needle Size:  22 G  Guidance: ultrasound    Approach:  Anterior  Location:  Hip  Site:  R hip joint  Medications:  4 mL lidocaine 1 %; 5 mL lidocaine 1 %; 40 mg triamcinolone 40 MG/ML  Outcome:  Tolerated well, no immediate complications  Procedure discussed: discussed risks, benefits, and alternatives    Consent Given by:  Patient  Timeout: timeout called immediately prior to procedure    Prep: patient was prepped and draped in usual sterile fashion

## 2019-04-04 ENCOUNTER — TRANSFERRED RECORDS (OUTPATIENT)
Dept: HEALTH INFORMATION MANAGEMENT | Facility: CLINIC | Age: 84
End: 2019-04-04

## 2019-04-25 ENCOUNTER — PRE VISIT (OUTPATIENT)
Dept: UROLOGY | Facility: CLINIC | Age: 84
End: 2019-04-25

## 2019-05-03 ENCOUNTER — PRE VISIT (OUTPATIENT)
Dept: UROLOGY | Facility: CLINIC | Age: 84
End: 2019-05-03

## 2019-05-03 NOTE — TELEPHONE ENCOUNTER
Reason for Visit: Cystoscopy and UDS review    Diagnosis: urinary incontinence    Orders/Procedures/Records: UDS prior    Contact Patient: n/a    Rooming Requirements: UA dip prior to getting ready for cystoscopy. If positive for Leuks and/or Nitrites, will not do cystoscopy. Send urine for official UA / UC.      Jennifer No LPN  05/03/19  9:56 AM

## 2019-05-08 ENCOUNTER — ANCILLARY PROCEDURE (OUTPATIENT)
Dept: RADIOLOGY | Facility: AMBULATORY SURGERY CENTER | Age: 84
End: 2019-05-08
Attending: PHYSICIAN ASSISTANT
Payer: COMMERCIAL

## 2019-05-08 ENCOUNTER — OFFICE VISIT (OUTPATIENT)
Dept: UROLOGY | Facility: CLINIC | Age: 84
End: 2019-05-08
Payer: COMMERCIAL

## 2019-05-08 VITALS
SYSTOLIC BLOOD PRESSURE: 146 MMHG | BODY MASS INDEX: 26.98 KG/M2 | WEIGHT: 158 LBS | HEART RATE: 61 BPM | DIASTOLIC BLOOD PRESSURE: 74 MMHG | HEIGHT: 64 IN

## 2019-05-08 DIAGNOSIS — N39.492 POSTURAL URINARY INCONTINENCE: Primary | ICD-10-CM

## 2019-05-08 DIAGNOSIS — N39.3 FEMALE STRESS INCONTINENCE: ICD-10-CM

## 2019-05-08 LAB
APPEARANCE UR: CLEAR
BILIRUB UR QL: NORMAL
COLOR UR: YELLOW
GLUCOSE URINE: NORMAL MG/DL
HGB UR QL: NORMAL
KETONES UR QL: 5 MG/DL
LEUKOCYTE ESTERASE URINE: NORMAL
NITRITE UR QL STRIP: NORMAL
PH UR STRIP: 7 PH (ref 5–7)
PROTEIN ALBUMIN URINE: NORMAL MG/DL
SOURCE: NORMAL
SP GR UR STRIP: 1.01 (ref 1–1.03)
UROBILINOGEN UR QL STRIP: 0.2 EU/DL (ref 0.2–1)

## 2019-05-08 RX ORDER — SULFAMETHOXAZOLE/TRIMETHOPRIM 800-160 MG
1 TABLET ORAL ONCE
Status: COMPLETED | OUTPATIENT
Start: 2019-05-08 | End: 2019-05-08

## 2019-05-08 RX ADMIN — SULFAMETHOXAZOLE AND TRIMETHOPRIM 1 TABLET: 800; 160 TABLET ORAL at 11:26

## 2019-05-08 ASSESSMENT — MIFFLIN-ST. JEOR: SCORE: 1151.68

## 2019-05-08 ASSESSMENT — PAIN SCALES - GENERAL: PAINLEVEL: NO PAIN (0)

## 2019-05-08 NOTE — NURSING NOTE
Invasive Procedure Safety Checklist:    Procedure: Urodynamics Study    Action: Complete sections and checkboxes as appropriate.    Pre-procedure:  1. Patient ID Verified with 2 identifiers (Yessica and  or MRN) : YES    2. Procedure and site verified with patient/designee (when able) : YES    3. Accurate consent documentation in medical record : YES    4. H&P (or appropriate assessment) documented in medical record : NO  H&P must be up to 30 days prior to procedure an updated within 24 hours of                 Procedure as applicable.     5. Relevant diagnostic and radiology test results appropriately labeled and displayed as applicable : NO    6. Blood products, implants, devices, and/or special equipment available for the procedure as applicable : NO    7. Procedure site(s) marked with provider initials [Exclusions: ] : NO    8. Marking not required. Reason : Yes  Procedure does not require site marking    Time Out:     Time-Out performed immediately prior to starting procedure, including verbal and active participation of all team members addressing: YES    1. Correct patient identity.  2. Confirmed that the correct side and site are marked.  3. An accurate procedure to be done.  4. Agreement on the procedure to be done.  5. Correct patient position.  6. Relevant images and results are properly labeled and appropriately displayed.  7. The need to administer antibiotics or fluids for irrigation purposes during the procedure as applicable.  8. Safety precautions based on patient history or medication use.    During Procedure: Verification of correct person, site, and procedure occurs any time the responsibility for care of the patient is transferred to another member of the care team.    KARSTEN Aragon

## 2019-05-08 NOTE — PATIENT INSTRUCTIONS
Follow up with Dr. Painter to discuss the results of your Urodynamics Study, and further treatment options.    It was a pleasure meeting with you today.  Thank you for allowing me and my team the privilege of caring for you today.  YOU are the reason we are here, and I truly hope we provided you with the excellent service you deserve.  Please let us know if there is anything else we can do for you so that we can be sure you are leaving completely satisfied with your care experience.        KARSTEN Aragon

## 2019-05-08 NOTE — LETTER
5/8/2019     RE: Aundrea Treviño  [address]  [facility] [address]     Dear Colleague,    Thank you for referring your patient, Aundrea Treviño, to the [facility] UROLOGY AND INST FOR PROSTATE AND UROLOGIC CANCERS at [facility]. Please see a copy of my visit note below.    PREPROCEDURE DIAGNOSES:    1. Postural urinary incontinence    POSTPROCEDURE DIAGNOSES:  -Borderline small bladder capacity (310 mL) with normal filling sensations.  -Normal bladder compliance with questionable very mild stress-induced DO without incontinence.   -Multiple episodes of stress incontinence at the following points throughout filling. High Pabd pressures are suggestive for urethral hypermobility.    Pabd 177 cm H2O at a volume of 217 mL.    Pabd 127 cm H2O at a volume of 260 mL.  -Fair detrusor contraction during voiding to max Pdet 10 cm H2O which she supplements with abdominal straining.  -Good flow rate (Qmax 18 mL/s) with a bell-shape flow curve (with few intermittent peaks) and complete bladder emptying (final PVR 0 mL).  -Mildly increased EMG activity during voiding that correlates with abdominal straining.  -No evidence for bladder outlet obstruction.  -Fluoroscopy reveals a mildly trabeculated bladder wall without diverticuli or VUR. Bladder neck is primarily closed during filling but appears open during episodes of stress incontinence and voiding.    PROCEDURE:    1. Sterile urethral catheterization for measurement of postvoid residual urine volume.  2. Complex filling cystometrogram with measurement of bladder and rectal pressures.  3. Complex voiding cystometrogram with measurement of bladder and rectal pressures.  4. Electromyography of the pelvic floor during urodynamics.  5. Fluoroscopic imaging of the bladder during urodynamics, at least 3 views.    6. Interpretation of urodynamics and flouroscopic imaging.      INDICATIONS FOR PROCEDURE:  Ms. Aundrea Treviño is a  pleasant 84 year old female with postural urinary incontinence. Baseline video urodynamic assessment is requested today by Dr. Painter to better characterize Ms. Aundrea Treviño's voiding dysfunction.      VOIDING DIARY:  Voids every 2-4 hours during the day, nocturia x 1.  Episodes of incontinence: multiple per day - goes through 5 pads per day  Incontinence associated with: walking, coughing.  Total Volume Intake: 1300 mL; mostly water, 6 oz coffee, 5 oz OJ.  Total Volume Output: 800 mL; average voided volume 100 mL, largest voided volume 175 mL.    DESCRIPTION OF PROCEDURE:  Risks, benefits, and alternatives to urodynamics were discussed with the patient and she wished to proceed.  Urodynamics are planned to better assess the primary etiology for Ms. Treviño's urologic dysfunction. After informed consent was obtained, the patient was taken to the procedure room where uroflowmetry was performed. Findings below.     PRE-STUDY UROFLOWMETRY:  No flow data as patient voided into the toilet in conjunction with a bowel movement.  Postvoid residual by catheter: 10 mL.  Pretest urine dipstick was negative for leukocytes and nitrites.    Next a 7F double-lumen urodynamics catheter was inserted into the bladder under sterile technique via urethra.  A 7F abdominal manometry catheter was placed in the rectum.  EMG pads were placed on both sides of the anal verge.  The bladder was filled with 200 mL of Cystografin at 40 mL/minute and serial pressures were recorded.  With coughing there was an appropriate rise in vesical and abdominal pressures with no change in detrusor pressure, confirming good study catheter placement.    DURING THE FILLING PHASE:  First sensation: 160 mL.  First Desire: 211 mL.  Strong Desire: 240 mL.  Maximum Capacity: 271 mL.    Uninhibited detrusor contractions: possible very mild stress-induced DO without incontinence.  Compliance: normal. PDet=2.8 cmH20 at capacity. Compliance ratio of 97.  Continence:  multiple stress leaks at the following points throughout filling:  -Pabd 177 cm H2O at a volume of 217 mL.  -Pabd 127 cm H2O at a volume of 260 mL.  EMG: concordant during filling.    DURING THE VOIDING PHASE:  Maximum detrusor contraction with void: 10 cm of H2O pressure.  She supplements her mild detrusor contraction with abdominal straining.  Voided volume: 309 mL.  Maximum flow rate: 18 mL/sec.  Average flow rate: 8.9 mL/sec.  Postvoid Residual: 0 mL.  EMG activity: mildly increased that likely correlates with abdominal straining.  Character of voiding curve: bell-shape with some intermittent peaks.  BOOI: -27.3 (suggesting no obstruction - see key below)  [obstructed (JEFF index [BOOI] ? 40); equivocal (no definite   obstruction; BOOI 20-40); and no obstruction (BOOI ? 20)]    FLUOROSCOPIC IMAGING OF THE BLADDER DURING URODYNAMICS:  Please note, image numbers on UDS tracings correlate with iSite series numbers on PACS images. Fluoroscopy during today's procedure demonstrated a mildly trabeculated bladder wall without diverticulae or cellules.  No vesicoureteral reflux was observed.  The bladder neck was closed during filling and appeared open during episodes of incontinence and during voiding.  After voiding to completion, all catheters were removed and the patient was brought back into the consultation room to further discuss today's study results.      ASSESSMENT/PLAN:  Ms. Aundrea Treviño is a pleasant 84 year old female with postural urinary incontinence who demonstrated the following findings today on urodynamic evaluation:    -Borderline small bladder capacity (310 mL) with normal filling sensations.  -Normal bladder compliance with questionable very mild stress-induced DO without incontinence.   -Multiple episodes of stress incontinence at the following points throughout filling. High Pabd pressures are suggestive for urethral hypermobility.    Pabd 177 cm H2O at a volume of 217 mL.    Pabd 127 cm H2O at  a volume of 260 mL.  -Fair detrusor contraction during voiding to max Pdet 10 cm H2O which she supplements with abdominal straining.  -Good flow rate (Qmax 18 mL/s) with a bell-shape flow curve (with few intermittent peaks) and complete bladder emptying (final PVR 0 mL).  -Mildly increased EMG activity during voiding that correlates with abdominal straining.  -No evidence for bladder outlet obstruction.  -Fluoroscopy reveals a mildly trabeculated bladder wall without diverticuli or VUR. Bladder neck is primarily closed during filling but appears open during episodes of stress incontinence and voiding.    The patient will follow up as scheduled with Dr. Painter for cystoscopy and to further discuss today's study results and make plans for how best to proceed.      - A single Bactrim antibiotic was provided for UTI prophylaxis following completion of today's study per department protocol.  The risk of UTI with VUDS is low at ~2.5-3%.      Thank you for allowing me to participate in the care of Ms. Aundrea Treviño and please don't hesitate to contact me with any questions or concerns.      Brooke Porter PA-C  Urology Physician Assistant

## 2019-05-08 NOTE — PROGRESS NOTES
PREPROCEDURE DIAGNOSES:    1. Postural urinary incontinence    POSTPROCEDURE DIAGNOSES:  -Borderline small bladder capacity (310 mL) with normal filling sensations.  -Normal bladder compliance with questionable very mild stress-induced DO without incontinence.   -Multiple episodes of stress incontinence at the following points throughout filling. High Pabd pressures are suggestive for urethral hypermobility.    Pabd 177 cm H2O at a volume of 217 mL.    Pabd 127 cm H2O at a volume of 260 mL.  -Fair detrusor contraction during voiding to max Pdet 10 cm H2O which she supplements with abdominal straining.  -Good flow rate (Qmax 18 mL/s) with a bell-shape flow curve (with few intermittent peaks) and complete bladder emptying (final PVR 0 mL).  -Mildly increased EMG activity during voiding that correlates with abdominal straining.  -No evidence for bladder outlet obstruction.  -Fluoroscopy reveals a mildly trabeculated bladder wall without diverticuli or VUR. Bladder neck is primarily closed during filling but appears open during episodes of stress incontinence and voiding.    PROCEDURE:    1. Sterile urethral catheterization for measurement of postvoid residual urine volume.  2. Complex filling cystometrogram with measurement of bladder and rectal pressures.  3. Complex voiding cystometrogram with measurement of bladder and rectal pressures.  4. Electromyography of the pelvic floor during urodynamics.  5. Fluoroscopic imaging of the bladder during urodynamics, at least 3 views.    6. Interpretation of urodynamics and flouroscopic imaging.      INDICATIONS FOR PROCEDURE:  Ms. Aundrea Treviño is a pleasant 84 year old female with postural urinary incontinence. Baseline video urodynamic assessment is requested today by Dr. Painter to better characterize Ms. Aundrea Treviño's voiding dysfunction.      VOIDING DIARY:  Voids every 2-4 hours during the day, nocturia x 1.  Episodes of incontinence: multiple per day - goes  through 5 pads per day  Incontinence associated with: walking, coughing.  Total Volume Intake: 1300 mL; mostly water, 6 oz coffee, 5 oz OJ.  Total Volume Output: 800 mL; average voided volume 100 mL, largest voided volume 175 mL.    DESCRIPTION OF PROCEDURE:  Risks, benefits, and alternatives to urodynamics were discussed with the patient and she wished to proceed.  Urodynamics are planned to better assess the primary etiology for Ms. Treviño's urologic dysfunction. After informed consent was obtained, the patient was taken to the procedure room where uroflowmetry was performed. Findings below.     PRE-STUDY UROFLOWMETRY:  No flow data as patient voided into the toilet in conjunction with a bowel movement.  Postvoid residual by catheter: 10 mL.  Pretest urine dipstick was negative for leukocytes and nitrites.    Next a 7F double-lumen urodynamics catheter was inserted into the bladder under sterile technique via urethra.  A 7F abdominal manometry catheter was placed in the rectum.  EMG pads were placed on both sides of the anal verge.  The bladder was filled with 200 mL of Cystografin at 40 mL/minute and serial pressures were recorded.  With coughing there was an appropriate rise in vesical and abdominal pressures with no change in detrusor pressure, confirming good study catheter placement.    DURING THE FILLING PHASE:  First sensation: 160 mL.  First Desire: 211 mL.  Strong Desire: 240 mL.  Maximum Capacity: 271 mL.    Uninhibited detrusor contractions: possible very mild stress-induced DO without incontinence.  Compliance: normal. PDet=2.8 cmH20 at capacity. Compliance ratio of 97.  Continence: multiple stress leaks at the following points throughout filling:  -Pabd 177 cm H2O at a volume of 217 mL.  -Pabd 127 cm H2O at a volume of 260 mL.  EMG: concordant during filling.    DURING THE VOIDING PHASE:  Maximum detrusor contraction with void: 10 cm of H2O pressure.  She supplements her mild detrusor contraction  with abdominal straining.  Voided volume: 309 mL.  Maximum flow rate: 18 mL/sec.  Average flow rate: 8.9 mL/sec.  Postvoid Residual: 0 mL.  EMG activity: mildly increased that likely correlates with abdominal straining.  Character of voiding curve: bell-shape with some intermittent peaks.  BOOI: -27.3 (suggesting no obstruction - see key below)  [obstructed (JEFF index [BOOI] ? 40); equivocal (no definite   obstruction; BOOI 20-40); and no obstruction (BOOI ? 20)]    FLUOROSCOPIC IMAGING OF THE BLADDER DURING URODYNAMICS:  Please note, image numbers on UDS tracings correlate with iSite series numbers on PACS images. Fluoroscopy during today's procedure demonstrated a mildly trabeculated bladder wall without diverticulae or cellules.  No vesicoureteral reflux was observed.  The bladder neck was closed during filling and appeared open during episodes of incontinence and during voiding.  After voiding to completion, all catheters were removed and the patient was brought back into the consultation room to further discuss today's study results.      ASSESSMENT/PLAN:  Ms. Aundrea Treviño is a pleasant 84 year old female with postural urinary incontinence who demonstrated the following findings today on urodynamic evaluation:    -Borderline small bladder capacity (310 mL) with normal filling sensations.  -Normal bladder compliance with questionable very mild stress-induced DO without incontinence.   -Multiple episodes of stress incontinence at the following points throughout filling. High Pabd pressures are suggestive for urethral hypermobility.    Pabd 177 cm H2O at a volume of 217 mL.    Pabd 127 cm H2O at a volume of 260 mL.  -Fair detrusor contraction during voiding to max Pdet 10 cm H2O which she supplements with abdominal straining.  -Good flow rate (Qmax 18 mL/s) with a bell-shape flow curve (with few intermittent peaks) and complete bladder emptying (final PVR 0 mL).  -Mildly increased EMG activity during voiding  that correlates with abdominal straining.  -No evidence for bladder outlet obstruction.  -Fluoroscopy reveals a mildly trabeculated bladder wall without diverticuli or VUR. Bladder neck is primarily closed during filling but appears open during episodes of stress incontinence and voiding.    The patient will follow up as scheduled with Dr. Painter for cystoscopy and to further discuss today's study results and make plans for how best to proceed.      - A single Bactrim antibiotic was provided for UTI prophylaxis following completion of today's study per department protocol.  The risk of UTI with VUDS is low at ~2.5-3%.      Thank you for allowing me to participate in the care of Ms. Aundrea Treviño and please don't hesitate to contact me with any questions or concerns.      Brooke Porter PA-C  Urology Physician Assistant

## 2019-05-08 NOTE — NURSING NOTE
The following medication was given:     MEDICATION:  Bactrim DS  ROUTE: PO  SITE: Medication was given orally   DOSE: 800 mg/ 160mg  LOT #: [id number]  : Major  EXPIRATION DATE: 09/2019  NDC#: 4222-7777-06   Was there drug waste? No      Vidhya Salmeron CMA  May 8, 2019

## 2019-05-15 ENCOUNTER — OFFICE VISIT (OUTPATIENT)
Dept: UROLOGY | Facility: CLINIC | Age: 84
End: 2019-05-15
Payer: COMMERCIAL

## 2019-05-15 VITALS
DIASTOLIC BLOOD PRESSURE: 78 MMHG | BODY MASS INDEX: 26.98 KG/M2 | SYSTOLIC BLOOD PRESSURE: 133 MMHG | HEART RATE: 61 BPM | WEIGHT: 158 LBS | HEIGHT: 64 IN

## 2019-05-15 DIAGNOSIS — N39.492 POSTURAL URINARY INCONTINENCE: Primary | ICD-10-CM

## 2019-05-15 LAB
APPEARANCE UR: CLEAR
BILIRUB UR QL: NORMAL
COLOR UR: YELLOW
GLUCOSE URINE: NORMAL MG/DL
HGB UR QL: NORMAL
KETONES UR QL: NORMAL MG/DL
LEUKOCYTE ESTERASE URINE: NORMAL
NITRITE UR QL STRIP: NORMAL
PH UR STRIP: 7.5 PH (ref 5–7)
PROTEIN ALBUMIN URINE: NORMAL MG/DL
SOURCE: NORMAL
SP GR UR STRIP: 1.01 (ref 1–1.03)
UROBILINOGEN UR QL STRIP: 1 EU/DL (ref 0.2–1)

## 2019-05-15 RX ORDER — LIDOCAINE HYDROCHLORIDE 20 MG/ML
JELLY TOPICAL ONCE
Status: COMPLETED | OUTPATIENT
Start: 2019-05-15 | End: 2019-05-15

## 2019-05-15 RX ADMIN — LIDOCAINE HYDROCHLORIDE: 20 JELLY TOPICAL at 11:36

## 2019-05-15 ASSESSMENT — MIFFLIN-ST. JEOR: SCORE: 1151.68

## 2019-05-15 ASSESSMENT — PAIN SCALES - GENERAL
PAINLEVEL: NO PAIN (0)
PAINLEVEL: NO PAIN (0)

## 2019-05-15 NOTE — LETTER
5/15/2019     RE: Aundrea Treviño  [address]  [facility] [address]     Dear Colleague,    Thank you for referring your patient, Aundrea Treviño, to the [facility] UROLOGY AND INST FOR PROSTATE AND UROLOGIC CANCERS at [facility]. Please see a copy of my visit note below.    Reason for Visit:  Cystoscopy    Clinical Data: Ms. Aundrea Treviño is a 84 year old female with a hx of urinary incontinence.  She underwent uds and presents for cystoscopy.    UDS 5/8/19:  -Borderline small bladder capacity (310 mL) with normal filling sensations.  -Normal bladder compliance with questionable very mild stress-induced DO without incontinence.   -Multiple episodes of stress incontinence at the following points throughout filling. High Pabd pressures are suggestive for urethral hypermobility.    Pabd 177 cm H2O at a volume of 217 mL.    Pabd 127 cm H2O at a volume of 260 mL.  -Fair detrusor contraction during voiding to max Pdet 10 cm H2O which she supplements with abdominal straining.  -Good flow rate (Qmax 18 mL/s) with a bell-shape flow curve (with few intermittent peaks) and complete bladder emptying (final PVR 0 mL).  -Mildly increased EMG activity during voiding that correlates with abdominal straining.  -No evidence for bladder outlet obstruction.  -Fluoroscopy reveals a mildly trabeculated bladder wall without diverticuli or VUR. Bladder neck is primarily closed during filling but appears open during episodes of stress incontinence and voiding.    Cystoscopy procedure:  Pt. Was consented and placed in the lithotomy position.  She was cleaned and preparred in the usual fashion.  Lidocain gel was inserted into the urethra and given time to take effect.  A 16 fr flexible cystoscope was then inserted through the urethra and into the bladder.  The urethra was wnl.  The bladder was with 1+ trabeculation.  No tumors, diverticulae, or stones.  Bilateral u/o's were effluxing clear  urine.  The cystoscope was then withdrawn.  The pt. Tolerated the procedure well.    A/P:  84 year old female with stress incontinence as well as possible stress induced urge incontinence.  We discussed different management options including observation, pelvic floor PT, periurethral bulking, and midurethral sling.    -she would like to try impressa to see if a pessary would help.  -f/u in a month and if helps then consider pessary and if not then consider midurethral sling.  -continue estrogen cream.    Thank you for allowing me to participate in the care of  Ms. Aundrea Treviño and I will keep you updated on her progress.    Irma Painter MD

## 2019-05-15 NOTE — PATIENT INSTRUCTIONS
"Follow up with Dr. Painter in approximately one month to discuss pessary.      AFTER YOUR CYSTOSCOPY        You have just completed a cystoscopy, or \"cysto\", which allowed your physician to learn more about your bladder (or to remove a stent placed after surgery). We suggest that you continue to avoid caffeine, fruit juice, and alcohol for the next 24 hours, however, you are encouraged to return to your normal activities.         A few things that are considered normal after your cystoscopy:     * Small amount of bleeding (or spotting) that clears within the next 24 hours     * Slight burning sensation with urination     * Sensation to of needing to avoid more frequently     * The feeling of \"air\" in your urine     * Mild discomfort that is relieved with Tylenol        Please contact our office promptly if you:     * Develop a fever above 101 degrees     * Are unable to urinate     * Develop bright red blood that does not stop     * Severe pain or swelling       "

## 2019-05-15 NOTE — PROGRESS NOTES
Reason for Visit:  Cystoscopy    Clinical Data: Ms. Aundrea Treviño is a 84 year old female with a hx of urinary incontinence.  She underwent uds and presents for cystoscopy.    UDS 5/8/19:  -Borderline small bladder capacity (310 mL) with normal filling sensations.  -Normal bladder compliance with questionable very mild stress-induced DO without incontinence.   -Multiple episodes of stress incontinence at the following points throughout filling. High Pabd pressures are suggestive for urethral hypermobility.    Pabd 177 cm H2O at a volume of 217 mL.    Pabd 127 cm H2O at a volume of 260 mL.  -Fair detrusor contraction during voiding to max Pdet 10 cm H2O which she supplements with abdominal straining.  -Good flow rate (Qmax 18 mL/s) with a bell-shape flow curve (with few intermittent peaks) and complete bladder emptying (final PVR 0 mL).  -Mildly increased EMG activity during voiding that correlates with abdominal straining.  -No evidence for bladder outlet obstruction.  -Fluoroscopy reveals a mildly trabeculated bladder wall without diverticuli or VUR. Bladder neck is primarily closed during filling but appears open during episodes of stress incontinence and voiding.    Cystoscopy procedure:  Pt. Was consented and placed in the lithotomy position.  She was cleaned and preparred in the usual fashion.  Lidocain gel was inserted into the urethra and given time to take effect.  A 16 fr flexible cystoscope was then inserted through the urethra and into the bladder.  The urethra was wnl.  The bladder was with 1+ trabeculation.  No tumors, diverticulae, or stones.  Bilateral u/o's were effluxing clear urine.  The cystoscope was then withdrawn.  The pt. Tolerated the procedure well.    A/P:  84 year old female with stress incontinence as well as possible stress induced urge incontinence.  We discussed different management options including observation, pelvic floor PT, periurethral bulking, and midurethral sling.    -she  would like to try impressa to see if a pessary would help.  -f/u in a month and if helps then consider pessary and if not then consider midurethral sling.  -continue estrogen cream.    Thank you for allowing me to participate in the care of  Ms. Aundrea Treviño and I will keep you updated on her progress.    Irma Painter MD

## 2019-05-15 NOTE — NURSING NOTE
"Chief Complaint   Patient presents with     Cystoscopy     incontinence       Blood pressure 133/78, pulse 61, height 1.626 m (5' 4\"), weight 71.7 kg (158 lb). Body mass index is 27.12 kg/m .    Patient Active Problem List   Diagnosis     Disorder of bone and cartilage     Female stress incontinence     Dystonia     Essential tremor     Refusal of blood transfusions as patient is [employer]     Hypertension goal BP (blood pressure) < 140/90     CARDIOVASCULAR SCREENING; LDL GOAL LESS THAN 130     Gout     Uncomplicated type 2 diabetes mellitus (H)     Venous (peripheral) insufficiency     Chronic anticoagulation     Paroxysmal atrial fibrillation (H)     Pulmonary hypertension, mild (H)     Moderate tricuspid regurgitation     ACP (advance care planning)     Patient is [employer]     Type 2 diabetes mellitus with diabetic polyneuropathy, without long-term current use of insulin (H)       Allergies   Allergen Reactions     Amlodipine      Swollen ankles and rash but tolerates 2.5 mg without any symptoms     Apple [Chromium]      Kiwi      Nuts      Pear      Tape [Adhesive Tape]        Current Outpatient Medications   Medication Sig Dispense Refill     [place] 650 MG TABS Take 2 capsules by mouth 2 times daily For arthritis pain       amLODIPine (NORVASC) 2.5 MG tablet Take 1 tablet (2.5 mg) by mouth daily 90 tablet 3     apixaban ANTICOAGULANT (ELIQUIS) 5 MG tablet Take 1 tablet (5 mg) by mouth 2 times daily 60 tablet 11     blood glucose (NO BRAND SPECIFIED) lancets standard Use to test blood sugar one times daily or as directed. Dispense compatible with insurance and glucometer - One Touch Ultra 100 each 3     blood glucose monitoring (NO BRAND SPECIFIED) meter device kit Use to test blood sugar one times daily or as directed.  Dispense One Touch Ultra per insurance 1 kit 0     blood glucose monitoring (NO BRAND SPECIFIED) test strip Use to test blood sugars one times daily or as directed.  Dispense " compatible with insurance and glucometer - One Touch Ultra 100 strip 3     calcium carbonate (TUMS E-X 750) 750 MG CHEW Take 1,500 mg by mouth daily        carvedilol (COREG) 25 MG tablet Take 1 tablet (25 mg) by mouth 2 times daily with meals 180 tablet 3     COMPOUNDED NON-CONTROLLED SUBSTANCE (CMPD RX) - PHARMACY TO MIX COMPOUNDED MEDICATION Estriol 1 mg/g  Apply small amount to finger and apply to inside vagina daily for 2 weeks then twice weekly  Route: vaginally 30 g 11     FLAXSEED (LINSEED) PO POWD 1.5  tablespoon daily       fluticasone (FLONASE) 50 MCG/ACT nasal spray Spray 1-2 sprays into both nostrils daily 3 Package 1     hydrochlorothiazide (HYDRODIURIL) 25 MG tablet TAKE ONE TABLET BY MOUTH ONE TIME DAILY 90 tablet 2     lisinopril (PRINIVIL/ZESTRIL) 40 MG tablet Take 1 tablet (40 mg) by mouth daily 90 tablet 3     metFORMIN (GLUCOPHAGE) 500 MG tablet Take 1 tablet (500 mg) by mouth daily (with dinner) 90 tablet 1     order for DME Equipment being ordered: Left wrist splint. 1 each 0     oxybutynin (DITROPAN-XL) 5 MG 24 hr tablet TAKE 1/2 TABLET BY MOUTH TWICE DAILY (Patient not taking: Reported on 2019) 90 tablet 3     triamcinolone (KENALOG) 0.5 % cream Apply topically 2 times daily to affected area as directed. 15 g 3     VITAMIN D 1000 UNIT OR TABS 1 TABLET DAILY 30 0       Social History     Tobacco Use     Smoking status: Former Smoker     Last attempt to quit: 1970     Years since quittin.0     Smokeless tobacco: Never Used   Substance Use Topics     Alcohol use: Yes     Comment: rarely, a glass of wine on a special occassion     Drug use: No       Invasive Procedure Safety Checklist:    Procedure: cystoscopy    Action: Complete sections and checkboxes as appropriate.    Pre-procedure:  1. Patient ID Verified with 2 identifiers (Yess[place] and  or MRN) : YES    2. Procedure and site verified with patient/designee (when able) : YES    3. Accurate consent documentation in medical  record : YES    4. H&P (or appropriate assessment) documented in medical record : N/A  H&P must be up to 30 days prior to procedure an updated within 24 hours of                 Procedure as applicable.     5. Relevant diagnostic and radiology test results appropriately labeled and displayed as applicable : YES    6. Blood products, implants, devices, and/or special equipment available for the procedure as applicable : YES    7. Procedure site(s) marked with provider initials [Exclusions: none] : NO    8. Marking not required. Reason : Yes  Procedure does not require site marking    Time Out:     Time-Out performed immediately prior to starting procedure, including verbal and active participation of all team members addressing: YES    1. Correct patient identity.  2. Confirmed that the correct side and site are marked.  3. An accurate procedure to be done.  4. Agreement on the procedure to be done.  5. Correct patient position.  6. Relevant images and results are properly labeled and appropriately displayed.  7. The need to administer antibiotics or fluids for irrigation purposes during the procedure as applicable.  8. Safety precautions based on patient history or medication use.    During Procedure: Verification of correct person, site, and procedure occurs any time the responsibility for care of the patient is transferred to another member of the care team.    The following medication was given:     MEDICATION:  Lidocaine without epinephrine 2% jelly  ROUTE: urethral  SITE: urethral  DOSE: 10 mL  LOT #: [id number]  : [employer]  EXPIRATION DATE: 1/2021  NDC#: [address]-07   Was there drug waste? No    Prior to med admin, verified patient identity using patient's name and date of birth.  Due to med administration, patient instructed to remain in clinic for 15 minutes  afterwards, and to report any adverse reaction to me immediately.    Drug Amount Wasted:  None.  Vial/Syringe:  Syringe      Jennifer No LPN  5/15/2019  11:27 AM

## 2019-05-20 ENCOUNTER — OFFICE VISIT (OUTPATIENT)
Dept: FAMILY MEDICINE | Facility: CLINIC | Age: 84
End: 2019-05-20
Payer: COMMERCIAL

## 2019-05-20 VITALS
WEIGHT: 157 LBS | HEART RATE: 72 BPM | SYSTOLIC BLOOD PRESSURE: 136 MMHG | DIASTOLIC BLOOD PRESSURE: 80 MMHG | TEMPERATURE: 97.7 F | BODY MASS INDEX: 26.95 KG/M2 | OXYGEN SATURATION: 97 %

## 2019-05-20 DIAGNOSIS — M25.50 POLYARTHRALGIA: Primary | ICD-10-CM

## 2019-05-20 DIAGNOSIS — E11.9 TYPE 2 DIABETES MELLITUS WITHOUT COMPLICATION, WITHOUT LONG-TERM CURRENT USE OF INSULIN (H): ICD-10-CM

## 2019-05-20 DIAGNOSIS — R53.83 FATIGUE, UNSPECIFIED TYPE: ICD-10-CM

## 2019-05-20 LAB
BASOPHILS # BLD AUTO: 0 10E9/L (ref 0–0.2)
BASOPHILS NFR BLD AUTO: 0.3 %
CRP SERPL-MCNC: 62 MG/L (ref 0–8)
DIFFERENTIAL METHOD BLD: NORMAL
EOSINOPHIL # BLD AUTO: 0 10E9/L (ref 0–0.7)
EOSINOPHIL NFR BLD AUTO: 0.6 %
ERYTHROCYTE [DISTWIDTH] IN BLOOD BY AUTOMATED COUNT: 13.8 % (ref 10–15)
ERYTHROCYTE [SEDIMENTATION RATE] IN BLOOD BY WESTERGREN METHOD: 15 MM/H (ref 0–30)
HBA1C MFR BLD: 6.9 % (ref 0–5.6)
HCT VFR BLD AUTO: 39.9 % (ref 35–47)
HGB BLD-MCNC: 13.3 G/DL (ref 11.7–15.7)
LYMPHOCYTES # BLD AUTO: 0.9 10E9/L (ref 0.8–5.3)
LYMPHOCYTES NFR BLD AUTO: 12.4 %
MCH RBC QN AUTO: 28.2 PG (ref 26.5–33)
MCHC RBC AUTO-ENTMCNC: 33.3 G/DL (ref 31.5–36.5)
MCV RBC AUTO: 85 FL (ref 78–100)
MONOCYTES # BLD AUTO: 0.6 10E9/L (ref 0–1.3)
MONOCYTES NFR BLD AUTO: 8.2 %
NEUTROPHILS # BLD AUTO: 5.4 10E9/L (ref 1.6–8.3)
NEUTROPHILS NFR BLD AUTO: 78.5 %
PLATELET # BLD AUTO: 175 10E9/L (ref 150–450)
RBC # BLD AUTO: 4.71 10E12/L (ref 3.8–5.2)
WBC # BLD AUTO: 6.9 10E9/L (ref 4–11)

## 2019-05-20 PROCEDURE — 85025 COMPLETE CBC W/AUTO DIFF WBC: CPT | Performed by: FAMILY MEDICINE

## 2019-05-20 PROCEDURE — 86431 RHEUMATOID FACTOR QUANT: CPT | Performed by: FAMILY MEDICINE

## 2019-05-20 PROCEDURE — 80053 COMPREHEN METABOLIC PANEL: CPT | Performed by: FAMILY MEDICINE

## 2019-05-20 PROCEDURE — 36415 COLL VENOUS BLD VENIPUNCTURE: CPT | Performed by: FAMILY MEDICINE

## 2019-05-20 PROCEDURE — 83036 HEMOGLOBIN GLYCOSYLATED A1C: CPT | Performed by: FAMILY MEDICINE

## 2019-05-20 PROCEDURE — 86140 C-REACTIVE PROTEIN: CPT | Performed by: FAMILY MEDICINE

## 2019-05-20 PROCEDURE — 99214 OFFICE O/P EST MOD 30 MIN: CPT | Performed by: FAMILY MEDICINE

## 2019-05-20 PROCEDURE — 86200 CCP ANTIBODY: CPT | Performed by: FAMILY MEDICINE

## 2019-05-20 PROCEDURE — 84443 ASSAY THYROID STIM HORMONE: CPT | Performed by: FAMILY MEDICINE

## 2019-05-20 PROCEDURE — 85652 RBC SED RATE AUTOMATED: CPT | Performed by: FAMILY MEDICINE

## 2019-05-20 NOTE — PATIENT INSTRUCTIONS
1) We will do some labs today to look for potential causes of your pain.   2) Start checking your blood sugar again, once daily and more often if you are feeling weak and shaky (like your blood sugar might be low).   3) Schedule with rheumatology  4) see ortho in the next few days. Call to schedule an appointment. If you develop worsening pain, swelling or redness in a joint, I would recommend going to the ortho walk-in clinic (at [place] or [facility]).   5) I would recommend starting tylenol on a schedule for now. You can take tylenol 1000mg three times a day for your pain. You can also try topical lidocaine cream or icy hot.

## 2019-05-20 NOTE — PROGRESS NOTES
SUBJECTIVE:   Aundrea Treviño is a 84 year old female who presents to clinic today for the following health issues:    Joint Pain    Onset: about 5 days ago     Description:   Location: neck then moved to her right shoulder, left wrist and right hip  Character: Sharp, throbbing in her shoulder     Intensity: moderate    Progression of Symptoms: worse    Accompanying Signs & Symptoms:  Other symptoms: numbness, tingling and swelling in her left hand     History:   Previous similar pain: YES      Precipitating factors:   Trauma or overuse: no- due to arthritis but has never been this bad in the past     Alleviating factors:  Improved by: tylenol only     Therapies Tried and outcome: tylenol has been helping slightly especially with sleep     She has not been checking her blood sugar regularly. Last checked a couple of weeks ago.     She reports that about 5 days ago, she developed significant left neck pain.  The pain is along the muscles of her left neck and on top of her shoulder.  That pain improved, then she developed pain at her right neck, shoulder, and upper arm.  She denies any swelling or redness in these areas.  She denies any injury.  The right shoulder continues to be painful and is difficult for her to do most things with her right arm.  She then developed some pain at the left wrist.  In this area, she developed some swelling and mild redness as well.  She does have some mild paresthesias in her fingers, but that is not new.  She also has some diabetic neuropathy in her feet.  This is unchanged.  The pain and swelling in her left hand were more prominent yesterday and have improved today, as has the redness.  Her thumb and wrist were very sensitive yesterday.  Much less so today.  The most bothersome areas her right shoulder.  She is never had anything like this happen in the past.  She denies any fevers, chills, night sweats, weight loss.    She has noticed that she feels a little more tired than she  usually does.  Her appetite is decreased somewhat.    She denies any vision changes or headaches.    She has not been checking her blood sugars.  She does not feel like they have been abnormal.  She denies any polyuria or polydipsia.      Wt Readings from Last 5 Encounters:   19 71.2 kg (157 lb)   05/15/19 71.7 kg (158 lb)   19 71.7 kg (158 lb)   19 71.7 kg (158 lb)   19 71.8 kg (158 lb 4 oz)      Problem list and histories reviewed & adjusted, as indicated.  Additional history: as documented    Patient Active Problem List   Diagnosis     Disorder of bone and cartilage     Female stress incontinence     Dystonia     Essential tremor     Refusal of blood transfusions as patient is [employer]     Hypertension goal BP (blood pressure) < 140/90     CARDIOVASCULAR SCREENING; LDL GOAL LESS THAN 130     Gout     Uncomplicated type 2 diabetes mellitus (H)     Venous (peripheral) insufficiency     Chronic anticoagulation     Paroxysmal atrial fibrillation (H)     Pulmonary hypertension, mild (H)     Moderate tricuspid regurgitation     ACP (advance care planning)     Patient is [employer]     Type 2 diabetes mellitus with diabetic polyneuropathy, without long-term current use of insulin (H)     Past Surgical History:   Procedure Laterality Date     SURGICAL HISTORY OF -       hysterectomy, bladder suspension     SURGICAL HISTORY OF -       tonsillectomy       Social History     Tobacco Use     Smoking status: Former Smoker     Last attempt to quit: 1970     Years since quittin.1     Smokeless tobacco: Never Used   Substance Use Topics     Alcohol use: Yes     Comment: rarely, a glass of wine on a special occassion     Family History   Problem Relation Age of Onset     Diabetes Father         type 2     Hypertension Father      Cerebrovascular Disease Father      Arthritis Father      Other Cancer Sister          Current Outpatient Medications   Medication Sig Dispense Refill      Alfalfa 650 MG TABS Take 2 capsules by mouth 2 times daily For arthritis pain       amLODIPine (NORVASC) 2.5 MG tablet Take 1 tablet (2.5 mg) by mouth daily 90 tablet 3     apixaban ANTICOAGULANT (ELIQUIS) 5 MG tablet Take 1 tablet (5 mg) by mouth 2 times daily 60 tablet 11     blood glucose (NO BRAND SPECIFIED) lancets standard Use to test blood sugar one times daily or as directed. Dispense compatible with insurance and glucometer - One Touch Ultra 100 each 3     blood glucose monitoring (NO BRAND SPECIFIED) meter device kit Use to test blood sugar one times daily or as directed.  Dispense One Touch Ultra per insurance 1 kit 0     blood glucose monitoring (NO BRAND SPECIFIED) test strip Use to test blood sugars one times daily or as directed.  Dispense compatible with insurance and glucometer - One Touch Ultra 100 strip 3     calcium carbonate (TUMS E-X 750) 750 MG CHEW Take 1,500 mg by mouth daily        carvedilol (COREG) 25 MG tablet Take 1 tablet (25 mg) by mouth 2 times daily with meals 180 tablet 3     COMPOUNDED NON-CONTROLLED SUBSTANCE (CMPD RX) - PHARMACY TO MIX COMPOUNDED MEDICATION Estriol 1 mg/g  Apply small amount to finger and apply to inside vagina daily for 2 weeks then twice weekly  Route: vaginally 30 g 11     FLAXSEED (LINSEED) PO POWD 1.5  tablespoon daily       fluticasone (FLONASE) 50 MCG/ACT nasal spray Spray 1-2 sprays into both nostrils daily 3 Package 1     hydrochlorothiazide (HYDRODIURIL) 25 MG tablet TAKE ONE TABLET BY MOUTH ONE TIME DAILY 90 tablet 2     lisinopril (PRINIVIL/ZESTRIL) 40 MG tablet Take 1 tablet (40 mg) by mouth daily 90 tablet 3     metFORMIN (GLUCOPHAGE) 500 MG tablet Take 1 tablet (500 mg) by mouth daily (with dinner) 90 tablet 1     order for DME Equipment being ordered: Left wrist splint. 1 each 0     oxybutynin (DITROPAN-XL) 5 MG 24 hr tablet TAKE 1/2 TABLET BY MOUTH TWICE DAILY 90 tablet 3     triamcinolone (KENALOG) 0.5 % cream Apply topically 2 times daily  to affected area as directed. 15 g 3     VITAMIN D 1000 UNIT OR TABS 1 TABLET DAILY 30 0     Allergies   Allergen Reactions     Amlodipine      Swollen ankles and rash but tolerates 2.5 mg without any symptoms     Apple [Chromium]      Kiwi      Nuts      Pear      Tape [Adhesive Tape]      Recent Labs   Lab Test 05/20/19  1224 02/18/19  0944 10/29/18  1508 07/26/18  1219 05/07/18  1511  09/07/17  0954  09/12/16  1152   A1C 6.9* 6.7*  --  6.9* 7.3*   < > 6.9*   < > 7.1*   LDL  --   --  91  --   --   --  88  --  96   HDL  --   --  54  --   --   --  53  --  46*   TRIG  --   --  103  --   --   --  70  --  91   ALT  --   --  19  --  22  --  21   < > 20   CR  --   --  0.79  --  0.73  --  0.75   < > 0.72   GFRESTIMATED  --   --  69  --  76  --  73   < > 77   GFRESTBLACK  --   --  84  --  >90  --  89   < > >90   GFR Calc     POTASSIUM  --   --  3.7  --  3.9  --  3.6   < > 3.6   TSH  --   --  1.53  --   --   --   --   --  1.56    < > = values in this interval not displayed.      BP Readings from Last 3 Encounters:   05/20/19 136/80   05/15/19 133/78   05/08/19 146/74    Wt Readings from Last 3 Encounters:   05/20/19 71.2 kg (157 lb)   05/15/19 71.7 kg (158 lb)   05/08/19 71.7 kg (158 lb)              Reviewed and updated as needed this visit by clinical staff  Tobacco  Allergies  Meds       Reviewed and updated as needed this visit by Provider         ROS:  Constitutional, HEENT, cardiovascular, pulmonary, GI, , musculoskeletal, neuro, skin, endocrine and psych systems are negative, except as otherwise noted.    OBJECTIVE:     /80 (BP Location: Right arm, Patient Position: Sitting, Cuff Size: Adult Regular)   Pulse 72   Temp 97.7  F (36.5  C) (Tympanic)   Wt 71.2 kg (157 lb)   SpO2 97%   BMI 26.95 kg/m    Body mass index is 26.95 kg/m .    Gen: alert, no acute distress  Eyes: anicteric, normal lids and conjunctiva; PERRL  ENT: OP normal  NECK: no masses, no thyromegaly appreciated  Resp:  CTAB, normal respiratory effort  CV: Regular rate and rhythm, no MGR, no peripheral edema  ABD; soft, nontender, no appreciable masses or hepatosplenomegaly  Psych: A and O x 3, appropriate affect   EXT: Left neck there is some tenderness to palpation over the neck musculature including the trapezius.  Nontender over the cervical spine.  She is more tender to palpation over the right shoulder and upper arm muscles.  She is unable to do much range of motion on the right shoulder due to pain.  Left wrist with mild swelling present and mild erythema.  Very mild tenderness to palpation of the wrist.  Normal range of motion of her wrist and her elbow as well as her fingers.    Diagnostic Test Results:  Results for orders placed or performed in visit on 05/20/19 (from the past 24 hour(s))   ESR: Erythrocyte sedimentation rate   Result Value Ref Range    Sed Rate 15 0 - 30 mm/h   CBC with platelets differential   Result Value Ref Range    WBC 6.9 4.0 - 11.0 10e9/L    RBC Count 4.71 3.8 - 5.2 10e12/L    Hemoglobin 13.3 11.7 - 15.7 g/dL    Hematocrit 39.9 35.0 - 47.0 %    MCV 85 78 - 100 fl    MCH 28.2 26.5 - 33.0 pg    MCHC 33.3 31.5 - 36.5 g/dL    RDW 13.8 10.0 - 15.0 %    Platelet Count 175 150 - 450 10e9/L    % Neutrophils 78.5 %    % Lymphocytes 12.4 %    % Monocytes 8.2 %    % Eosinophils 0.6 %    % Basophils 0.3 %    Absolute Neutrophil 5.4 1.6 - 8.3 10e9/L    Absolute Lymphocytes 0.9 0.8 - 5.3 10e9/L    Absolute Monocytes 0.6 0.0 - 1.3 10e9/L    Absolute Eosinophils 0.0 0.0 - 0.7 10e9/L    Absolute Basophils 0.0 0.0 - 0.2 10e9/L    Diff Method Automated Method    Hemoglobin A1c   Result Value Ref Range    Hemoglobin A1C 6.9 (H) 0 - 5.6 %       ASSESSMENT/PLAN:           1. Polyarthralgia  2. Fatigue  unclear etiology with uncertain prognosis, requires further workup    Will evaluate for possible inflammatory conditions including PMR.  Recommended scheduling with rheumatology.  We will do some labs right now.  She  will schedule with Ortho within the next week.  See recommendations below. Unable to take NSAIDs as she is on anticoagulation.     Labs as below     - COMPREHENSIVE METABOLIC PANEL  - ESR: Erythrocyte sedimentation rate  - Rheumatoid factor  - Cyclic Citrullinated Peptide Antibody IgG  - CRP, inflammation  - RHEUMATOLOGY REFERRAL  - CBC with platelets differential  - ORTHO  REFERRAL    3. Type 2 diabetes mellitus without complication, without long-term current use of insulin (H)  Generally well controlled.  Has not been checking her blood sugars recently.  - TSH WITH FREE T4 REFLEX  - Hemoglobin A1c    4. .Hypertension goal BP (blood pressure) < 140/90  Controlled.  No changes today.  Continues amlodipine 2.5 mg daily, carvedilol 25 mg twice daily, hydrochlorothiazide 25 mg daily, lisinopril 40 mg daily.       5. Chronic anticoagulation  6. Paroxysmal A. fib  Continues on Eliquis 5 mg twice daily.  Followed by cardiology    7.  Female stress incontinence  She has seen urology Dr. Painter. Plan is trial of impressa, possible pessary, continues estrogen cream.     Patient Instructions   1) We will do some labs today to look for potential causes of your pain.   2) Start checking your blood sugar again, once daily and more often if you are feeling weak and shaky (like your blood sugar might be low).   3) Schedule with rheumatology  4) see ortho in the next few days. Call to schedule an appointment. If you develop worsening pain, swelling or redness in a joint, I would recommend going to the ortho walk-in clinic (at [place] or [facility]).   5) I would recommend starting tylenol on a schedule for now. You can take tylenol 1000mg three times a day for your pain. You can also try topical lidocaine cream or icy hot.         Krysten Molina M.D.        [facility] [place]

## 2019-05-21 LAB
ALBUMIN SERPL-MCNC: 3.4 G/DL (ref 3.4–5)
ALP SERPL-CCNC: 88 U/L (ref 40–150)
ALT SERPL W P-5'-P-CCNC: 30 U/L (ref 0–50)
ANION GAP SERPL CALCULATED.3IONS-SCNC: 8 MMOL/L (ref 3–14)
AST SERPL W P-5'-P-CCNC: 28 U/L (ref 0–45)
BILIRUB SERPL-MCNC: 1.1 MG/DL (ref 0.2–1.3)
BUN SERPL-MCNC: 18 MG/DL (ref 7–30)
CALCIUM SERPL-MCNC: 9.5 MG/DL (ref 8.5–10.1)
CCP AB SER IA-ACNC: 1 U/ML
CHLORIDE SERPL-SCNC: 99 MMOL/L (ref 94–109)
CO2 SERPL-SCNC: 27 MMOL/L (ref 20–32)
CREAT SERPL-MCNC: 0.75 MG/DL (ref 0.52–1.04)
GFR SERPL CREATININE-BSD FRML MDRD: 72 ML/MIN/{1.73_M2}
GLUCOSE SERPL-MCNC: 189 MG/DL (ref 70–99)
POTASSIUM SERPL-SCNC: 3.7 MMOL/L (ref 3.4–5.3)
PROT SERPL-MCNC: 6.9 G/DL (ref 6.8–8.8)
RHEUMATOID FACT SER NEPH-ACNC: <20 IU/ML (ref 0–20)
SODIUM SERPL-SCNC: 134 MMOL/L (ref 133–144)
TSH SERPL DL<=0.005 MIU/L-ACNC: 1.26 MU/L (ref 0.4–4)

## 2019-05-22 ENCOUNTER — OFFICE VISIT (OUTPATIENT)
Dept: ORTHOPEDICS | Facility: CLINIC | Age: 84
End: 2019-05-22
Payer: COMMERCIAL

## 2019-05-22 VITALS
HEIGHT: 64 IN | SYSTOLIC BLOOD PRESSURE: 125 MMHG | WEIGHT: 157 LBS | DIASTOLIC BLOOD PRESSURE: 66 MMHG | BODY MASS INDEX: 26.8 KG/M2

## 2019-05-22 DIAGNOSIS — M19.011 PRIMARY OSTEOARTHRITIS OF RIGHT SHOULDER: Primary | ICD-10-CM

## 2019-05-22 DIAGNOSIS — M18.12 PRIMARY OSTEOARTHRITIS OF FIRST CARPOMETACARPAL JOINT OF LEFT HAND: ICD-10-CM

## 2019-05-22 DIAGNOSIS — R79.82 ELEVATED C-REACTIVE PROTEIN (CRP): ICD-10-CM

## 2019-05-22 PROCEDURE — 99214 OFFICE O/P EST MOD 30 MIN: CPT | Performed by: FAMILY MEDICINE

## 2019-05-22 RX ORDER — PREDNISONE 5 MG/1
5 TABLET ORAL DAILY
Qty: 10 TABLET | Refills: 0 | Status: SHIPPED | OUTPATIENT
Start: 2019-05-22 | End: 2019-07-03

## 2019-05-22 ASSESSMENT — MIFFLIN-ST. JEOR: SCORE: 1147.15

## 2019-05-22 NOTE — PATIENT INSTRUCTIONS
Patient Education     Understanding Polymyalgia Rheumatica  Polymyalgia rheumatica (PMR) is an inflammatory condition that can cause aching and stiffness. It tends to affect the neck, shoulders, and hips. The aching and stiffness are usually worse in the morning.  PMR can come on suddenly. For some it seems to occur overnight. For others it can take days or weeks to develop. PMR affects only older adults. It becomes more common with age. PMR occurs most often between the ages of 70 and 80. It is more common in women than in men, and it seems to run in some families.  What causes polymyalgia rheumatica?  Researchers are working to understand what causes PMR. Because it can happen quickly and tends to occur at certain times of year, some think that an infection may cause it. It may be related to immune system problems. Genes may be part of the cause. PMR can run in some families.  Symptoms of polymyalgia rheumatica  The main symptoms of PMR are aching and stiffness of the shoulders, neck, and hips. The aching can extend to the upper arms and thighs. PMR tends to affect both sides of the body equally. Symptoms are often worse in the morning or after long periods of no activity. Movement can make the pain worse.  The symptoms of PMR usually affect the shoulders the most. You may have trouble raising your arms above the level of your shoulders. This can make it hard to get dressed. You may have trouble rolling over in bed, getting out of bed, and getting up from sitting. You may also have trouble sleeping because of your symptoms.  Other symptoms can occur. These include:    Swelling of the hands, wrists, feet, and ankles    Numbness, tingling, or pain in the hand, wrist, or forearm    Feeling of weakness    General feeling of being unwell    Feeling tired    Loss of appetite    Weight loss    Low-grade fever  Diagnosing polymyalgia rheumatica  Your healthcare provider will ask about your health history and your  symptoms. He or she will give you a physical exam. The exam will check your range of motion, strength, and painful areas.  Diagnosing PMR can be difficult. Your healthcare provider will need to make sure you have PMR. Many conditions can cause aching and stiffness. These include rheumatoid arthritis and fibromyalgia. You may need tests such as:    Blood tests to look for signs of inflammation, blood count problems, and muscle damage    Muscle biopsy to check for damage    Biopsy of a blood vessel in your temple    X-rays to look at your joints    MRI for detailed pictures of your joints and tissues    Ultrasound to look most closely at the soft tissues around your joints  Your healthcare provider may also diagnose you by giving you medicine. PMR often responds quickly to steroid medicine. This can help show if you have PMR. You may also be referred to a rheumatologist for diagnosis.  Date Last Reviewed: 5/1/2017 2000-2018 [employer]. [address] [place], [place] [id number]. All rights reserved. This information is not intended as a substitute for professional medical care. Always follow your healthcare professional's instructions.           Patient Education     Treatment for Polymyalgia Rheumatica  Polymyalgia rheumatica (PMR) is an inflammatory condition that can cause aching and stiffness. It tends to affect the neck, shoulders, and hips. The aching and stiffness are usually worse in the morning.  Types of treatment  Steroid medicine is the main treatment for PMR. Your healthcare provider will start you on a low dose of this medicine. You should start to feel better soon after starting. When your symptoms are better, your healthcare provider will slowly lower the amount of medicine. If your symptoms return, he or she will increase the dose. You may need to take steroid medicine for a few years. Return of symptoms is common, so you may need to take steroid medicine again in the future. If  untreated, PMR may go away on its own after several years. But symptoms will likely return.  Watching for giant cell arteritis  Some people with PMR also have a condition called giant cell arteritis. It is also called temporal arteritis or Cabral arteritis. This is inflammation of blood vessels in the head, neck, and arms. This can narrow or block the blood vessels. It can cause problems from less blood flow through those vessels. Giant cell arteritis can cause symptoms such as:    Headaches    Changes in vision    Jaw pain, especially when chewing    Scalp pain    Scalp sores (ulcers)    High fevers  Possible complications of giant cell arteritis may include blindness or stroke.  Giant cell arteritis can also be treated with steroid medicine. Research on a medicine that suppresses the immune system shows that it may be used to treat giant cell arteritis and PMR in the future. The medicine is called tocilizumab.  Risks of long-term steroid use  Steroid medicine has some risks. Talk with your healthcare provider about the risks and benefits for you. Some of the possible risks of taking steroids for a long time can include:    High blood pressure    Diabetes    Glaucoma    Cataracts    Osteoporosis    Fluid retention    Weight gain    Roundness of the face    Stomach irritation    Trouble sleeping    Muscle wasting    Skin thinning    Easy bruising    Poor wound healing    Higher risk for infections  Living with polymyalgia rheumatica  If you have PMR, your symptoms will get better with treatment. Once you start feeling better, you can return to your normal activities. Your healthcare provider will track your symptoms and adjust your steroid dose until you are on the lowest dose needed. Small changes in steroid doses can have a big effect on your symptoms. Make sure to follow your healthcare provider s instructions.  When to call 911  Call 911 if you have symptoms of a stroke, such as:    Severe headache    Trouble  seeing    Balance or coordination problems    Weakness or numbness    Confusion    Trouble speaking  If you think you are having a stroke, note the time when your symptoms started.      When to call your healthcare provider  Call your healthcare provider if you have any of the following:    Symptoms that don t get better with treatment    Symptoms that get worse    Symptoms of giant cell arteritis   Date Last Reviewed: 5/1/2017 2000-2018 [employer]. [address] [place] [address]. All rights reserved. This information is not intended as a substitute for professional medical care. Always follow your healthcare professional's instructions.

## 2019-05-22 NOTE — PROGRESS NOTES
"[facility]   Follow-up Visit s May 22, 2019    PCP: Krysten Molina      Subjective:  Aundrea is a 84 year old female who is seen in follow up for evaluation of   Chief Complaint   Patient presents with     Right Hip - Pain     Right Shoulder - Pain     Her last visit was on 11/5/2019.  Since that time, symptoms have been better than before in her hip but worse than before in her right shoulder that started a week ago. Aundrea Treviño is accompanied today by sibling.     RIGHT shoulder pain persists, LEFT wrist is much better (no longer swollen).    Feels \"fuzzy\", low energy, not depressed; no viral illnesses reported.     Patient's past medical, surgical, social and family histories are reviewed today.    History from previous visit on 11/5/2019  Injury: Reports insidious onset without acute precipitating event.     Right hand dominant    Location of Pain: right shoulder posterior and lateral, nonradiating   Duration of Pain: 2 week(s)  Rating of Pain at worst: 8/10  Rating of Pain Currently: 8/10  Pain is better with: activity avoidance   Pain is worse with: overhead movement and sleeping   Treatment so far consists of: rest  Associated symptoms: no distal numbness or tingling; denies swelling or warmth  Recent imaging completed: No recent imaging completed.  Prior History of related problems: none    Social History: retired     Past Medical History:   Diagnosis Date     Chronic anticoagulation 2/19/2017     Diverticulitis of colon (without mention of hemorrhage)(562.11)      DM type 2, goal A1C below 8.0 12/3/2014     Essential and other specified forms of tremor     eval'd by neuro 2005     Hyperlipidemia LDL goal <100 5/9/2010     Hypertension goal BP (blood pressure) < 140/90      Intestinal disaccharidase deficiencies and disaccharide malabsorption      Moderate tricuspid regurgitation 2/19/2017     Osteoarthrosis, unspecified whether generalized or localized, unspecified site  "     Paroxysmal atrial fibrillation (H) 2/19/2017     Pulmonary hypertension, mild (H) 2/19/2017     Type 2 diabetes, HbA1c goal < 7% (H) 5/2/2010     Urge incontinence        Patient Active Problem List    Diagnosis Date Noted     Type 2 diabetes mellitus with diabetic polyneuropathy, without long-term current use of insulin (H) 05/07/2018     Priority: Medium     Patient is Evangelical 10/06/2017     Priority: Medium     ACP (advance care planning) 06/01/2017     Priority: Medium     Advance Care Planning 6/1/2017: Patient is Evangelical.  See Health Care Directive for information on use of blood products.  Recommend Code Status in chart and patient's Advance Care Planning documents be reviewed to ensure alignment with patient's current wishes.  Most recent Advance Care Planning document is a Health Care Directive dated 11/20/16.  Dunia Garcia   Advance Care Planning Liaison       Chronic anticoagulation 02/19/2017     Priority: Medium     Paroxysmal atrial fibrillation (H) 02/19/2017     Priority: Medium     Pulmonary hypertension, mild (H) 02/19/2017     Priority: Medium     Moderate tricuspid regurgitation 02/19/2017     Priority: Medium     Venous (peripheral) insufficiency 01/07/2016     Priority: Medium     Uncomplicated type 2 diabetes mellitus (H) 12/03/2014     Priority: Medium     Gout 03/21/2012     Priority: Medium     CARDIOVASCULAR SCREENING; LDL GOAL LESS THAN 130 09/20/2011     Priority: Medium     Hypertension goal BP (blood pressure) < 140/90      Priority: Medium     Refusal of blood transfusions as patient is Evangelical 03/03/2010     Priority: Medium     Female stress incontinence 12/23/2008     Priority: Medium     (Problem list name updated by automated process. Provider to review and confirm.)       Dystonia 12/23/2008     Priority: Medium     Essential tremor 12/23/2008     Priority: Medium     (Problem list name updated by automated process. Provider to review and  "confirm.)       Disorder of bone and cartilage 07/19/2006     Priority: Medium     Problem list name updated by automated process. Provider to review         Family History   Problem Relation Age of Onset     Diabetes Father         type 2     Hypertension Father      Cerebrovascular Disease Father      Arthritis Father      Other Cancer Sister        Social History     Social History     Marital status:      Spouse name: Scott     Number of children: 3     Years of education: 12     Occupational History      None      Social History Main Topics     Smoking status: Former Smoker     Quit date: 4/22/1970     Smokeless tobacco: Never Used     Alcohol use Yes      Comment: rarely, a glass of wine on a special occasion       Past Surgical History:   Procedure Laterality Date     SURGICAL HISTORY OF -       hysterectomy, bladder suspension     SURGICAL HISTORY OF -       tonsillectomy         Review of Systems   Musculoskeletal: Positive for joint pain.   All other systems reviewed and are negative.        Physical Exam  /66   Ht 1.626 m (5' 4\")   Wt 71.2 kg (157 lb)   BMI 26.95 kg/m    Constitutional:well-developed, well-nourished, and in no distress.   Cardiovascular: Intact distal pulses.    Neurological: alert. Gait Abnormal:   Gait with shuffling steps  Skin: Skin is warm and dry.   Psychiatric: Mood and affect normal.   Respiratory: unlabored, speaks in full sentences  Lymph: no LAD, no lymphangitis    Right Shoulder Exam     Tenderness   None    Range of Motion   Active Abduction:                       130  Passive Abduction:                    90  Extension:                                  Normal  Forward Flexion:                        100  External Rotation:                      60  Internal Rotation 0 degrees:      Lumbar  Internal Rotation 90 degrees:    N/t    Muscle Strength   Abduction:            5/5  Internal Rotation:  5/5  External Rotation: 5/5  Supraspinatus:     " 5/5  Subscapularis:     5/5  Biceps:                 5/5    Tests   Impingement:   Positive  Selby:          Positive  Cross Arm:      Negative  Drop Arm:        Negative  Apprehension: Negative  Sulcus:            Negative          X-ray images previously ordered and independently reviewed by me in the office today with the patient. X-ray shows: XR SHOULDER RT G/E 3 VW 11/5/2018 1:26 PM     HISTORY: Right shoulder pain.     COMPARISON: None.     FINDINGS: Mild osseous degenerative change at the acromioclavicular  and glenohumeral joints. No fracture or malalignment.                                                                      IMPRESSION: Mild osteoarthritis.     JOSE URRUTIA MD    ASSESSMENT/PLAN    ICD-10-CM    1. Primary osteoarthritis of right shoulder M19.011    2. Primary osteoarthritis of first carpometacarpal joint of left hand M18.12    3. Elevated C-reactive protein (CRP) R79.82 predniSONE (DELTASONE) 5 MG tablet     Recent left wrist swelling and inflammation and now with pain of the right shoulder, greater than would be anticipated for osteoarthritis, along with elevated labs especially CRP recently.  Rheumatologic process such as polymyalgia rheumatica discussed with patient.  She agrees to undergo a short trial of oral prednisone at a low dose given concern for adverse effects, at least for a week or 2.  She has a rheumatologic consultation at the end of June.  Consider right shoulder injection if not relieved with the prednisone.

## 2019-05-22 NOTE — LETTER
"    5/22/2019         RE: Aundrea Treviño  [address]  [facility] [address]        Dear Colleague,    Thank you for referring your patient, Aundrea Treviño, to the  SP[facility]TS [facility]. Please see a copy of my visit note below.    [facility]   Follow-up Visit s May 22, 2019    PCP: Krysten Molina      Subjective:  Aundrea is a 84 year old female who is seen in follow up for evaluation of   Chief Complaint   Patient presents with     Right Hip - Pain     Right Shoulder - Pain     Her last visit was on 11/5/2019.  Since that time, symptoms have been better than before in her hip but worse than before in her right shoulder that started a week ago. Aundrea Treviño is accompanied today by sibling.     RIGHT shoulder pain persists, LEFT wrist is much better (no longer swollen).    Feels \"fuzzy\", low energy, not depressed; no viral illnesses reported.     Patient's past medical, surgical, social and family histories are reviewed today.    History from previous visit on 11/5/2019  Injury: Reports insidious onset without acute precipitating event.     Right hand dominant    Location of Pain: right shoulder posterior and lateral, nonradiating   Duration of Pain: 2 week(s)  Rating of Pain at worst: 8/10  Rating of Pain Currently: 8/10  Pain is better with: activity avoidance   Pain is worse with: overhead movement and sleeping   Treatment so far consists of: rest  Associated symptoms: no distal numbness or tingling; denies swelling or warmth  Recent imaging completed: No recent imaging completed.  Prior History of related problems: none    Social History: retired     Past Medical History:   Diagnosis Date     Chronic anticoagulation 2/19/2017     Diverticulitis of colon (without mention of hemorrhage)(562.11)      DM type 2, goal A1C below 8.0 12/3/2014     Essential and other specified forms of tremor     eval'd by neuro 2005     Hyperlipidemia LDL goal <100 5/9/2010     " Hypertension goal BP (blood pressure) < 140/90      Intestinal disaccharidase deficiencies and disaccharide malabsorption      Moderate tricuspid regurgitation 2/19/2017     Osteoarthrosis, unspecified whether generalized or localized, unspecified site      Paroxysmal atrial fibrillation (H) 2/19/2017     Pulmonary hypertension, mild (H) 2/19/2017     Type 2 diabetes, HbA1c goal < 7% (H) 5/2/2010     Urge incontinence        Patient Active Problem List    Diagnosis Date Noted     Type 2 diabetes mellitus with diabetic polyneuropathy, without long-term current use of insulin (H) 05/07/2018     Priority: Medium     Patient is Mandaen 10/06/2017     Priority: Medium     ACP (advance care planning) 06/01/2017     Priority: Medium     Advance Care Planning 6/1/2017: Patient is Mandaen.  See Health Care Directive for information on use of blood products.  Recommend Code Status in chart and patient's Advance Care Planning documents be reviewed to ensure alignment with patient's current wishes.  Most recent Advance Care Planning document is a Health Care Directive dated 11/20/16.  Dunia Garcia   Advance Care Planning Liaison       Chronic anticoagulation 02/19/2017     Priority: Medium     Paroxysmal atrial fibrillation (H) 02/19/2017     Priority: Medium     Pulmonary hypertension, mild (H) 02/19/2017     Priority: Medium     Moderate tricuspid regurgitation 02/19/2017     Priority: Medium     Venous (peripheral) insufficiency 01/07/2016     Priority: Medium     Uncomplicated type 2 diabetes mellitus (H) 12/03/2014     Priority: Medium     Gout 03/21/2012     Priority: Medium     CARDIOVASCULAR SCREENING; LDL GOAL LESS THAN 130 09/20/2011     Priority: Medium     Hypertension goal BP (blood pressure) < 140/90      Priority: Medium     Refusal of blood transfusions as patient is Mandaen 03/03/2010     Priority: Medium     Female stress incontinence 12/23/2008     Priority: Medium     (Problem  "list name updated by automated process. Provider to review and confirm.)       Dystonia 12/23/2008     Priority: Medium     Essential tremor 12/23/2008     Priority: Medium     (Problem list name updated by automated process. Provider to review and confirm.)       Disorder of bone and cartilage 07/19/2006     Priority: Medium     Problem list name updated by automated process. Provider to review         Family History   Problem Relation Age of Onset     Diabetes Father         type 2     Hypertension Father      Cerebrovascular Disease Father      Arthritis Father      Other Cancer Sister        Social History     Social History     Marital status:      Spouse name: Scott     Number of children: 3     Years of education: 12     Occupational History      None      Social History Main Topics     Smoking status: Former Smoker     Quit date: 4/22/1970     Smokeless tobacco: Never Used     Alcohol use Yes      Comment: rarely, a glass of wine on a special occasion       Past Surgical History:   Procedure Laterality Date     SURGICAL HISTORY OF -       hysterectomy, bladder suspension     SURGICAL HISTORY OF -       tonsillectomy         Review of Systems   Musculoskeletal: Positive for joint pain.   All other systems reviewed and are negative.        Physical Exam  /66   Ht 1.626 m (5' 4\")   Wt 71.2 kg (157 lb)   BMI 26.95 kg/m     Constitutional:well-developed, well-nourished, and in no distress.   Cardiovascular: Intact distal pulses.    Neurological: alert. Gait Abnormal:   Gait with shuffling steps  Skin: Skin is warm and dry.   Psychiatric: Mood and affect normal.   Respiratory: unlabored, speaks in full sentences  Lymph: no LAD, no lymphangitis    Right Shoulder Exam     Tenderness   None    Range of Motion   Active Abduction:                       130  Passive Abduction:                    90  Extension:                                  Normal  Forward Flexion:                      "   100  External Rotation:                      60  Internal Rotation 0 degrees:      Lumbar  Internal Rotation 90 degrees:    N/t    Muscle Strength   Abduction:            5/5  Internal Rotation:  5/5  External Rotation: 5/5  Supraspinatus:     5/5  Subscapularis:     5/5  Biceps:                 5/5    Tests   Impingement:   Positive  Selby:          Positive  Cross Arm:      Negative  Drop Arm:        Negative  Apprehension: Negative  Sulcus:            Negative          X-ray images previously ordered and independently reviewed by me in the office today with the patient. X-ray shows: XR SHOULDER RT G/E 3 VW 11/5/2018 1:26 PM     HISTORY: Right shoulder pain.     COMPARISON: None.     FINDINGS: Mild osseous degenerative change at the acromioclavicular  and glenohumeral joints. No fracture or malalignment.                                                                      IMPRESSION: Mild osteoarthritis.     JOSE URRUTIA MD    ASSESSMENT/PLAN    ICD-10-CM    1. Primary osteoarthritis of right shoulder M19.011    2. Primary osteoarthritis of first carpometacarpal joint of left hand M18.12    3. Elevated C-reactive protein (CRP) R79.82 predniSONE (DELTASONE) 5 MG tablet     Recent left wrist swelling and inflammation and now with pain of the right shoulder, greater than would be anticipated for osteoarthritis, along with elevated labs especially CRP recently.  Rheumatologic process such as polymyalgia rheumatica discussed with patient.  She agrees to undergo a short trial of oral prednisone at a low dose given concern for adverse effects, at least for a week or 2.  She has a rheumatologic consultation at the end of June.  Consider right shoulder injection if not relieved with the prednisone.    Again, thank you for allowing me to participate in the care of your patient.        Sincerely,        Luis Lorenzana MD

## 2019-05-23 ENCOUNTER — TELEPHONE (OUTPATIENT)
Dept: FAMILY MEDICINE | Facility: CLINIC | Age: 84
End: 2019-05-23

## 2019-05-23 NOTE — TELEPHONE ENCOUNTER
RN -- Please call Aundrea to let her know that her lab results have returned. They were normal except for an elevated CRP, a marker of inflammation. I do still recommend a rheumatology visit. Please let her know. I see she saw sports medicine yesterday and they felt the shoulder pain was osteoarthritis.     Krysten Molina M.D.        Results for orders placed or performed in visit on 05/20/19   COMPREHENSIVE METABOLIC PANEL   Result Value Ref Range    Sodium 134 133 - 144 mmol/L    Potassium 3.7 3.4 - 5.3 mmol/L    Chloride 99 94 - 109 mmol/L    Carbon Dioxide 27 20 - 32 mmol/L    Anion Gap 8 3 - 14 mmol/L    Glucose 189 (H) 70 - 99 mg/dL    Urea Nitrogen 18 7 - 30 mg/dL    Creatinine 0.75 0.52 - 1.04 mg/dL    GFR Estimate 72 >60 mL/min/[1.73_m2]    GFR Estimate If Black 84 >60 mL/min/[1.73_m2]    Calcium 9.5 8.5 - 10.1 mg/dL    Bilirubin Total 1.1 0.2 - 1.3 mg/dL    Albumin 3.4 3.4 - 5.0 g/dL    Protein Total 6.9 6.8 - 8.8 g/dL    Alkaline Phosphatase 88 40 - 150 U/L    ALT 30 0 - 50 U/L    AST 28 0 - 45 U/L   TSH WITH FREE T4 REFLEX   Result Value Ref Range    TSH 1.26 0.40 - 4.00 mU/L   ESR: Erythrocyte sedimentation rate   Result Value Ref Range    Sed Rate 15 0 - 30 mm/h   Rheumatoid factor   Result Value Ref Range    Rheumatoid Factor <20 <20 IU/mL   Cyclic Citrullinated Peptide Antibody IgG   Result Value Ref Range    Cyclic Citrullinated Peptide Antibody, IgG 1 <7 U/mL   CRP, inflammation   Result Value Ref Range    CRP Inflammation 62.0 (H) 0.0 - 8.0 mg/L   CBC with platelets differential   Result Value Ref Range    WBC 6.9 4.0 - 11.0 10e9/L    RBC Count 4.71 3.8 - 5.2 10e12/L    Hemoglobin 13.3 11.7 - 15.7 g/dL    Hematocrit 39.9 35.0 - 47.0 %    MCV 85 78 - 100 fl    MCH 28.2 26.5 - 33.0 pg    MCHC 33.3 31.5 - 36.5 g/dL    RDW 13.8 10.0 - 15.0 %    Platelet Count 175 150 - 450 10e9/L    % Neutrophils 78.5 %    % Lymphocytes 12.4 %    % Monocytes 8.2 %    % Eosinophils 0.6 %    % Basophils 0.3 %    Absolute  Neutrophil 5.4 1.6 - 8.3 10e9/L    Absolute Lymphocytes 0.9 0.8 - 5.3 10e9/L    Absolute Monocytes 0.6 0.0 - 1.3 10e9/L    Absolute Eosinophils 0.0 0.0 - 0.7 10e9/L    Absolute Basophils 0.0 0.0 - 0.2 10e9/L    Diff Method Automated Method    Hemoglobin A1c   Result Value Ref Range    Hemoglobin A1C 6.9 (H) 0 - 5.6 %

## 2019-05-24 NOTE — TELEPHONE ENCOUNTER
Called patient discussed results - she says she started on prednisone and is feel much better - with decreased pain per ortho - has appointment with rheumatology in June - she agrees with plan    Closing encounter - no further actions needed at this time    Jackie Brown RN

## 2019-05-27 PROBLEM — M19.011 PRIMARY OSTEOARTHRITIS OF RIGHT SHOULDER: Status: ACTIVE | Noted: 2019-05-27

## 2019-05-27 PROBLEM — M18.12 PRIMARY OSTEOARTHRITIS OF FIRST CARPOMETACARPAL JOINT OF LEFT HAND: Status: ACTIVE | Noted: 2019-05-27

## 2019-05-27 PROBLEM — R79.82 ELEVATED C-REACTIVE PROTEIN (CRP): Status: ACTIVE | Noted: 2019-05-27

## 2019-06-05 ENCOUNTER — TELEPHONE (OUTPATIENT)
Dept: RHEUMATOLOGY | Facility: CLINIC | Age: 84
End: 2019-06-05

## 2019-06-05 NOTE — TELEPHONE ENCOUNTER
[facility] Call Center    Phone Message    May a detailed message be left on voicemail: yes    Reason for Call: Other: New Rheum / Polyarthralgia / Referral and Orders in Epic / Referral of Dr. Krysten Molina / No Outside Records / Please Call Pt to Schedule     Action Taken: Message routed to:  [facility] ([facility]): Rheumatology Clinic

## 2019-06-11 NOTE — TELEPHONE ENCOUNTER
Notification of referral has been received in the Rheumatology clinic for polyarthralgia on 6/5/19. Our referral process is as follows:     The Rheumatology Clinic will follow up with the patient in 2-3 weeks to start the intake process by completing an intake form and discuss their medical history with them over the phone. In some cases, a Rheumatologist here at [facility] may not be the right doctor to for the patient. Alternative options will be suggested if that is the case.   Vidya Toscano CMA  6/11/2019 1:08 PM

## 2019-06-25 ENCOUNTER — PRE VISIT (OUTPATIENT)
Dept: UROLOGY | Facility: CLINIC | Age: 84
End: 2019-06-25

## 2019-06-25 NOTE — TELEPHONE ENCOUNTER
Reason for Visit: Follow up symptom check    Diagnosis: DELFIN/postural urinary incontinence    Orders/Procedures/Records: n/a    Contact Patient: n/a    Rooming Requirements: normal      Jennifer No LPN  06/25/19  12:14 PM

## 2019-06-27 ENCOUNTER — TRANSFERRED RECORDS (OUTPATIENT)
Dept: HEALTH INFORMATION MANAGEMENT | Facility: CLINIC | Age: 84
End: 2019-06-27

## 2019-07-03 ENCOUNTER — ANESTHESIA EVENT (OUTPATIENT)
Dept: SURGERY | Facility: CLINIC | Age: 84
End: 2019-07-03

## 2019-07-03 ENCOUNTER — OFFICE VISIT (OUTPATIENT)
Dept: UROLOGY | Facility: CLINIC | Age: 84
End: 2019-07-03
Payer: COMMERCIAL

## 2019-07-03 ENCOUNTER — OFFICE VISIT (OUTPATIENT)
Dept: SURGERY | Facility: CLINIC | Age: 84
End: 2019-07-03
Payer: COMMERCIAL

## 2019-07-03 VITALS
HEART RATE: 68 BPM | SYSTOLIC BLOOD PRESSURE: 150 MMHG | HEIGHT: 64 IN | RESPIRATION RATE: 18 BRPM | BODY MASS INDEX: 25.61 KG/M2 | WEIGHT: 150 LBS | DIASTOLIC BLOOD PRESSURE: 85 MMHG | OXYGEN SATURATION: 96 % | TEMPERATURE: 97.6 F

## 2019-07-03 VITALS
BODY MASS INDEX: 25.61 KG/M2 | HEIGHT: 64 IN | HEART RATE: 68 BPM | WEIGHT: 150 LBS | SYSTOLIC BLOOD PRESSURE: 150 MMHG | DIASTOLIC BLOOD PRESSURE: 85 MMHG

## 2019-07-03 DIAGNOSIS — N39.3 STRESS INCONTINENCE: Primary | ICD-10-CM

## 2019-07-03 DIAGNOSIS — Z01.818 PREOP EXAMINATION: Primary | ICD-10-CM

## 2019-07-03 DIAGNOSIS — N95.2 ATROPHIC VAGINITIS: ICD-10-CM

## 2019-07-03 RX ORDER — CEFAZOLIN SODIUM 2 G/50ML
2 SOLUTION INTRAVENOUS
Status: CANCELLED | OUTPATIENT
Start: 2019-07-03

## 2019-07-03 RX ORDER — SAW/PYGEUM/BETA/HERB/D3/B6/ZN 30 MG-25MG
200 CAPSULE ORAL DAILY
COMMUNITY
End: 2024-06-13 | Stop reason: DRUGHIGH

## 2019-07-03 RX ORDER — PREDNISONE 5 MG/1
5 TABLET ORAL DAILY PRN
COMMUNITY
End: 2024-06-13

## 2019-07-03 RX ORDER — AMLODIPINE BESYLATE 5 MG/1
5 TABLET ORAL EVERY EVENING
Refills: 3 | Status: ON HOLD | COMMUNITY
Start: 2019-05-28 | End: 2019-10-09

## 2019-07-03 RX ORDER — CEFAZOLIN SODIUM 1 G/50ML
1 INJECTION, SOLUTION INTRAVENOUS SEE ADMIN INSTRUCTIONS
Status: CANCELLED | OUTPATIENT
Start: 2019-07-03

## 2019-07-03 ASSESSMENT — MIFFLIN-ST. JEOR
SCORE: 1115.4
SCORE: 1115.4

## 2019-07-03 ASSESSMENT — COPD QUESTIONNAIRES: COPD: 0

## 2019-07-03 ASSESSMENT — PAIN SCALES - GENERAL: PAINLEVEL: NO PAIN (0)

## 2019-07-03 ASSESSMENT — ENCOUNTER SYMPTOMS: DYSRHYTHMIAS: 1

## 2019-07-03 NOTE — PHARMACY - PREOPERATIVE ASSESSMENT CENTER
Preoperative Assessment Center medication history for July 3, 2019 is complete.    See Epic admission navigator for prior to admission medications.   Operating room staff will still need to confirm medications and last dose information on day of surgery.     Medication history interview sources:  patient    Changes made to PTA medication list (reason)  Added: alpha lipoic acid.   Deleted: flonase, oxybutinyn  Changed: tums, prednisone    Additional medication history information (including reliability of information, actions taken by pharmacist):    -- No recent (within 30 days) course of antibiotics   -- did have 10 day course of prednisone within the past 30 days.   -- No recent (within 30 days) chronic daily medications stopped   -- Patient declines being on any other prescription or over-the-counter medications    Prior to Admission medications    Medication Sig Last Dose Taking? Auth Provider   [place] 650 MG TABS Take 2 capsules by mouth 2 times daily For arthritis pain Taking Yes Reported, Patient   ALPHA LIPOIC ACID PO Take 200 mg by mouth daily Taking Yes Unknown, Entered By History   amLODIPine (NORVASC) 2.5 MG tablet Take 2.5 mg by mouth every evening  Taking Yes Reported, Patient   apixaban ANTICOAGULANT (ELIQUIS) 5 MG tablet Take 1 tablet (5 mg) by mouth 2 times daily Taking Yes Krysten Molina MD   calcium carbonate (TUMS E-X 750) 750 MG CHEW Take 750 mg by mouth 2 times daily  Taking Yes Reported, Patient   carvedilol (COREG) 25 MG tablet Take 1 tablet (25 mg) by mouth 2 times daily with meals Taking Yes Tracy Olivera APRN CNP   FLAXSEED (LINSEED) PO POWD 1.5  tablespoon daily Taking Yes Johana John MD   hydrochlorothiazide (HYDRODIURIL) 25 MG tablet TAKE ONE TABLET BY MOUTH ONE TIME DAILY Taking Yes Tracy Olivera APRN CNP   lisinopril (PRINIVIL/ZESTRIL) 40 MG tablet Take 1 tablet (40 mg) by mouth daily  Patient taking differently: Take 40 mg by mouth every evening   Taking Yes Krysten Molina MD   metFORMIN (GLUCOPHAGE) 500 MG tablet Take 1 tablet (500 mg) by mouth daily (with dinner) Taking Yes Krysten Molina MD   triamcinolone (KENALOG) 0.5 % cream Apply topically 2 times daily to affected area as directed.  Patient taking differently: Apply topically 2 times daily as needed to affected area as directed. Taking Yes Jes Barboza MD   VITAMIN D 1000 UNIT OR TABS 1 TABLET DAILY AT BEDTIME Taking Yes Terry Mota MD   blood glucose (NO BRAND SPECIFIED) lancets standard Use to test blood sugar one times daily or as directed. Dispense compatible with insurance and glucometer - One Touch Ultra   Krysten Molina MD   blood glucose monitoring (NO BRAND SPECIFIED) meter device kit Use to test blood sugar one times daily or as directed.  Dispense One Touch Ultra per insurance   Krysten Molina MD   blood glucose monitoring (NO BRAND SPECIFIED) test strip Use to test blood sugars one times daily or as directed.  Dispense compatible with insurance and glucometer - One Touch Ultra   Krysten Molina MD   COMPOUNDED NON-CONTROLLED SUBSTANCE (CMPD RX) - PHARMACY TO MIX COMPOUNDED MEDICATION Estriol 1 mg/g  Apply small amount to finger and apply to inside vagina daily for 2 weeks then twice weekly  Route: vaginally  Patient not taking: Reported on 7/3/2019 Not Taking  Irma Painter MD   order for DME Equipment being ordered: Left wrist splint.   Tramaine Borja MD   predniSONE (DELTASONE) 5 MG tablet Take 5 mg by mouth daily as needed (for painful flare) Not Taking  Unknown, Entered By History            Medication history completed by: Yimi Silva, [facility]

## 2019-07-03 NOTE — PROGRESS NOTES
Reason for Visit:  Urinary incontinence follow up      HPI: Ms. Aundrea Treviño is a 84 year old female with a hx of urinary incontinence refractory to medical therapy.  She underwent uds and recent cystoscopy which was unremarkable. She has multiple episodes of incontinence per day and has been using some pads per day.  The patient voids qfew hours, nocturia X 0.  She drinks caffeine free cola and decaf coffee and water.  She denies any dysuria,  hematuria, hesitancy, intermittency, feeling of incomplete emptying, or any recent hx of UTI's or stones.     The patient has no constipation or splinting.  She is not sexually active and denies any dyspareunia or pelvic pain.   She denies any vaginal bulge.  She has no neurological or balance problems. She was advised to try Impressa and if symptom improvement we would proceed with pessary. The patient did not try the impressa and is presently considering moving forward with Pessary placement. Of note, patient takes daily Eliquis.        UDS 5/8/19:  -Borderline small bladder capacity (310 mL) with normal filling sensations.  -Normal bladder compliance with questionable very mild stress-induced DO without incontinence.   -Multiple episodes of stress incontinence at the following points throughout filling. High Pabd pressures are suggestive for urethral hypermobility.    Pabd 177 cm H2O at a volume of 217 mL.    Pabd 127 cm H2O at a volume of 260 mL.  -Fair detrusor contraction during voiding to max Pdet 10 cm H2O which she supplements with abdominal straining.  -Good flow rate (Qmax 18 mL/s) with a bell-shape flow curve (with few intermittent peaks) and complete bladder emptying (final PVR 0 mL).  -Mildly increased EMG activity during voiding that correlates with abdominal straining.  -No evidence for bladder outlet obstruction.  -Fluoroscopy reveals a mildly trabeculated bladder wall without diverticuli or VUR. Bladder neck is primarily closed during filling but appears  "open during episodes of stress incontinence and voiding.    PEx:   Blood pressure 145/79, pulse 82, height 1.626 m (5' 4\"), weight 71.7 kg (158 lb).  5' 4\", Body mass index is 27.12 kg/m ., 158 lbs 0 oz  Gen appearance:  Well groomed  HEENT:  EOMI, AT NC  Psych:  Normal Affect  Neuro:  A/O X 3  Skin:  Warm to touch  Resp:  No increased respiratory effort  Vasc:  RRR  lymph:  mild LE edema  Musk:  Full ROM in extremities  :  deferred     A/P:  84 year old female with stress incontinence as well as possible stress induced urge incontinence.  We again discussed different management options including observation, pelvic floor PT, periurethral bulking, and midurethral sling.  We discussed the use of mesh in surgery and the risks which include but are not limited to infection, bleeding, exposure of mesh, vaginal pain, injury to the bladder/urethra/rectum or any structures in the abdomen or pelvis, as well as risk of incontinence or urinary retention. There is also risk of need for catheterization and possible further surgery.  The pt. Verbalized understanding and had a chance to ask her questions which were all answered.  - After discussion, the patient would like to instead proceed with sling placement. Patient will need to be evaluation by PAC given her advanced age.   - Continue estrogen cream as previously prescribed.       Thank you for allowing me to participate in the care of  Ms. Aundrea Treviño and I will keep you updated on her progress.    Irma Painter MD    Patient was seen, evaluated and plan was formulated in conjunction with me and I agree with the above.              "

## 2019-07-03 NOTE — ANESTHESIA PREPROCEDURE EVALUATION
Anesthesia Pre-Procedure Evaluation    Patient: Aundrea Treviño   MRN:     [id number] Gender:   female   Age:    84 year old :      1934        Preoperative Diagnosis: * No surgery found *        Past Medical History:   Diagnosis Date     Chronic anticoagulation 2017     Diverticulitis of colon (without mention of hemorrhage)(562.11)      DM type 2, goal A1C below 8.0 12/3/2014     Essential and other specified forms of tremor     eval'd by neuro      Hyperlipidemia LDL goal <100 2010     Hypertension goal BP (blood pressure) < 140/90      Intestinal disaccharidase deficiencies and disaccharide malabsorption      Moderate tricuspid regurgitation 2017     Osteoarthrosis, unspecified whether generalized or localized, unspecified site      Paroxysmal atrial fibrillation (H) 2017     Pulmonary hypertension, mild (H) 2017     Type 2 diabetes, HbA1c goal < 7% (H) 2010     Urge incontinence       Past Surgical History:   Procedure Laterality Date     SURGICAL HISTORY OF -       hysterectomy, bladder suspension     SURGICAL HISTORY OF -       tonsillectomy          Anesthesia Evaluation     . Pt has had prior anesthetic. Type: General    No history of anesthetic complications          ROS/MED HX    ENT/Pulmonary:     (+)ARIANE risk factors snores loudly, hypertension, , . .   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      Cardiovascular:     (+) hypertension----. : . . . :. dysrhythmias a-fib, . pulmonary hypertension (per echo, 2017), Previous cardiac testing Echodate:2017results:date: results: date: results: date: results:          METS/Exercise Tolerance:  1 - Eating, dressing   Hematologic:        (-) history of blood clots and History of Transfusion   Musculoskeletal: Comment: Right hip   Right shoulder arthritis  (+) arthritis,  -       GI/Hepatic:  - neg GI/hepatic ROS      "  Renal/Genitourinary: Comment: Stress incontinence        Endo:     (+) type II DM Last HgA1c: 6.9 date: 5/20/2019 Not using insulin Diabetic complications: neuropathy, .      Psychiatric:  - neg psychiatric ROS       Infectious Disease:  - neg infectious disease ROS       Malignancy:      - no malignancy   Other:    (+) no H/O Chronic Pain,                       PHYSICAL EXAM:   Mental Status/Neuro: A/A/O; Age Appropriate   Airway: Facies: Feasible  Mallampati: I  Mouth/Opening: Full  TM distance: > 6 cm  Neck ROM: Full   Respiratory: Auscultation: CTAB     Resp. Rate: Normal     Resp. Effort: Normal      CV: Rhythm: Regular  Rate: Age appropriate  Heart: Normal Sounds   Comments:                    Lab Results   Component Value Date    WBC 6.9 05/20/2019    HGB 13.3 05/20/2019    HCT 39.9 05/20/2019     05/20/2019    CRP 62.0 (H) 05/20/2019    SED 15 05/20/2019     05/20/2019    POTASSIUM 3.7 05/20/2019    CHLORIDE 99 05/20/2019    CO2 27 05/20/2019    BUN 18 05/20/2019    CR 0.75 05/20/2019     (H) 05/20/2019    KAIDEN 9.5 05/20/2019    ALBUMIN 3.4 05/20/2019    PROTTOTAL 6.9 05/20/2019    ALT 30 05/20/2019    AST 28 05/20/2019    ALKPHOS 88 05/20/2019    BILITOTAL 1.1 05/20/2019    TSH 1.26 05/20/2019       Preop Vitals  BP Readings from Last 3 Encounters:   07/03/19 150/85   07/03/19 150/85   05/22/19 125/66    Pulse Readings from Last 3 Encounters:   07/03/19 68   07/03/19 68   05/20/19 72      Resp Readings from Last 3 Encounters:   07/03/19 18   10/29/18 20   06/20/18 14    SpO2 Readings from Last 3 Encounters:   07/03/19 96%   05/20/19 97%   02/18/19 98%      Temp Readings from Last 1 Encounters:   07/03/19 97.6  F (36.4  C) (Oral)    Ht Readings from Last 1 Encounters:   07/03/19 1.626 m (5' 4\")      Wt Readings from Last 1 Encounters:   07/03/19 68 kg (150 lb)    Estimated body mass index is 25.75 kg/m  as calculated from the following:    Height as of this encounter: 1.626 m (5' 4\").    " Weight as of this encounter: 68 kg (150 lb).     LDA:            JZG FV AN PLAN NO PONV RULE         PAC Discussion and Assessment    ASA Classification: 3  Case is suitable for: ASC  Anesthetic techniques and relevant risks discussed: MAC with GA as backup  Invasive monitoring and risk discussed: No  Types:   Possibility and Risk of blood transfusion discussed: No  NPO instructions given:   Additional anesthetic preparation and risks discussed:   Needs early admission to pre-op area:   Other:     PAC Resident/NP Anesthesia Assessment:  Aundrea Treviño is an 84 year old scheduled for midurethral sling and cystoscopy on 7/23/2019 by Dr. Painter in treatment of stress incontinence.  PAC referral for risk assessment and optimization for anesthesia with comorbid conditions of hypertension, paroxysmal atrial fibrillation, DMII, osteoarthritis, gout, refusal of blood products as patient is a Gnosticism:    Pre-operative considerations:  1.  Cardiac:  Functional status- METS 1. Pt function severely limited due to pain.  She has right hip pain that keeps her from walking.  She lives independently and cleans her own home. Low risk surgery with 0.4% (RCRI #) risk of major adverse cardiac event.   2.  Pulm:  Airway feasible.  ARIANE risk: intermediate   3.  GI:  Risk of PONV score = 2.  If > 2, anti-emetic intervention recommended.    VTE risk: 1.8% (age and history of provoked DVT after childbirth in 1956)    #Cardiac  -Denies CP, SOB, BRAN, palpitations  -paroxysmal atrial fibrillation on Eliquis. Will hold 48 hours prior to DOS.  -hypertension, will hold hydrochlorothiazide DOS.  Lisinopril taken at bedtime.  Pt will take carvediolol and amlodipine DOS.  -echo 12/2017 with EF of 60%, moderate tricuspid regurgitation and RVSP of 33 mmHg plus RAP.  No change from previous echo 11/23/2016.    -followed by [facility]ology at [facility] but cleared for prn follow up 12/2017    #Pulmonary  -former smoker, quit 1960  -denies  symptoms    #Heme  -remote history of provoked DVT after childbirth 1956  -no recurrence    #Endo  -DM treated with metformin.   -most recent A1c 5/20/2019 of 6.9  -diabetic neuropathy in bilateral feet    #musculoskeletal  -osteoarthritis in right hip and right shoulder.  -gout, no recent flare, no chronic medications for this  -evaluated [facility]     Patient is optimized and is acceptable candidate for the proposed procedure.  No further diagnostic evaluation is needed.     Patient discussed with Dr. Edward.    **For further details of assessment, testing, and physical exam please see H and P completed on same date.          Zulma Pascual PA-C, [facility]      Reviewed and Signed by PAC Mid-Level Provider/Resident  Mid-Level Provider/Resident: Zulma Pascual  Date: 7/3/2019  Time:     Attending Anesthesiologist Anesthesia Assessment:        Anesthesiologist:   Date:   Time:   Pass/Fail:   Disposition:     PAC Pharmacist Assessment:        Pharmacist:   Date:   Time:        Zulma Pascual PA-C

## 2019-07-03 NOTE — H&P
Pre-Operative H & P     CC:  Preoperative exam to assess for increased cardiopulmonary risk while undergoing surgery and anesthesia.    Date of Encounter: 7/3/2019  Primary Care Physician:  Krysten Molina  Associated Diagnosis: urinary stress incontinence    LAVON Treviño is a 84 year old female who presents for pre-operative H & P in preparation for midurethral sling and cystoscopy with Dr. Painter on 7/23/2019 at [facility] under [facility].    This is a 84-year-old female patient with a history of hypertension, paroxysmal atrial fibrillation on Eliquis, remote history of provoked DVT, diabetic induced polyneuropathy, essential tremor, non-insulin-dependent diabetes, osteoarthritis, and gout.    The patient was referred to Dr. Ballesteros in urology several months ago in March to evaluate her stress incontinence that she has had for well over one year.  The patient has tried medical therapy with Ditropan.  She reports that her frequent incontinence is very burdensome to her and affects her daily quality of life.  She has undergone both urodynamics as well as cystoscopy and now she would like to proceed with the above procedure.        History is obtained from the patient.     Past Medical History  Past Medical History:   Diagnosis Date     Chronic anticoagulation 2/19/2017     Diverticulitis of colon (without mention of hemorrhage)(562.11)      DM type 2, goal A1C below 8.0 12/3/2014     Essential and other specified forms of tremor     eval'd by neuro 2005     Hyperlipidemia LDL goal <100 5/9/2010     Hypertension goal BP (blood pressure) < 140/90      Intestinal disaccharidase deficiencies and disaccharide malabsorption      Moderate tricuspid regurgitation 2/19/2017     Osteoarthrosis, unspecified whether generalized or localized, unspecified site      Paroxysmal atrial fibrillation (H) 2/19/2017     Pulmonary hypertension, mild (H) 2/19/2017     Type 2 diabetes, HbA1c goal < 7% (H) 5/2/2010      Urge incontinence        Past Surgical History  Past Surgical History:   Procedure Laterality Date     SURGICAL HISTORY OF -       hysterectomy, bladder suspension     SURGICAL HISTORY OF -       tonsillectomy       Hx of Blood transfusions/reactions: none     Hx of abnormal bleeding or anti-platelet use: pt will hold Eliquis 48 hours prior to DOS    Menstrual history: No LMP recorded. Patient is postmenopausal.:     Steroid use in the last year: pt finished 10 days course of prednisone in late May 2019.    Personal or FH with difficulty with Anesthesia:  none    Prior to Admission Medications  Current Outpatient Medications   Medication Sig Dispense Refill     Hartley 650 MG TABS Take 2 capsules by mouth 2 times daily For arthritis pain       ALPHA LIPOIC ACID PO Take 200 mg by mouth daily       amLODIPine (NORVASC) 2.5 MG tablet Take 2.5 mg by mouth every evening   3     apixaban ANTICOAGULANT (ELIQUIS) 5 MG tablet Take 1 tablet (5 mg) by mouth 2 times daily 60 tablet 11     blood glucose (NO BRAND SPECIFIED) lancets standard Use to test blood sugar one times daily or as directed. Dispense compatible with insurance and glucometer - One Touch Ultra 100 each 3     blood glucose monitoring (NO BRAND SPECIFIED) meter device kit Use to test blood sugar one times daily or as directed.  Dispense One Touch Ultra per insurance 1 kit 0     blood glucose monitoring (NO BRAND SPECIFIED) test strip Use to test blood sugars one times daily or as directed.  Dispense compatible with insurance and glucometer - One Touch Ultra 100 strip 3     calcium carbonate (TUMS E-X 750) 750 MG CHEW Take 750 mg by mouth 2 times daily        carvedilol (COREG) 25 MG tablet Take 1 tablet (25 mg) by mouth 2 times daily with meals 180 tablet 3     COMPOUNDED NON-CONTROLLED SUBSTANCE (CMPD RX) - PHARMACY TO MIX COMPOUNDED MEDICATION Estriol 1 mg/g  Apply small amount to finger and apply to inside vagina daily for 2 weeks then twice  weekly  Route: vaginally (Patient not taking: Reported on 7/3/2019) 30 g 11     FLAXSEED (LINSEED) PO POWD 1.5  tablespoon daily       hydrochlorothiazide (HYDRODIURIL) 25 MG tablet TAKE ONE TABLET BY MOUTH ONE TIME DAILY 90 tablet 2     lisinopril (PRINIVIL/ZESTRIL) 40 MG tablet Take 1 tablet (40 mg) by mouth daily (Patient taking differently: Take 40 mg by mouth every evening ) 90 tablet 3     metFORMIN (GLUCOPHAGE) 500 MG tablet Take 1 tablet (500 mg) by mouth daily (with dinner) 90 tablet 1     order for DME Equipment being ordered: Left wrist splint. 1 each 0     predniSONE (DELTASONE) 5 MG tablet Take 5 mg by mouth daily as needed (for painful flare)       triamcinolone (KENALOG) 0.5 % cream Apply topically 2 times daily to affected area as directed. (Patient taking differently: Apply topically 2 times daily as needed to affected area as directed.) 15 g 3     VITAMIN D 1000 UNIT OR TABS 1 TABLET DAILY AT BEDTIME 30 0       Allergies  Allergies   Allergen Reactions     Amlodipine      Swollen ankles and rash but tolerates 2.5 mg without any symptoms     Apple [Chromium]      Kiwi      Nuts      Pear      Tape [Adhesive Tape]        Social History  Social History     Socioeconomic History     Marital status:      Spouse name: yumiko     Number of children: 3     Years of education: 12     Highest education level: Not on file   Occupational History     Occupation:      Employer: NONE    Social Needs     Financial resource strain: Not on file     Food insecurity:     Worry: Not on file     Inability: Not on file     Transportation needs:     Medical: Not on file     Non-medical: Not on file   Tobacco Use     Smoking status: Former Smoker     Last attempt to quit: 1960     Years since quittin.2     Smokeless tobacco: Never Used   Substance and Sexual Activity     Alcohol use: Yes     Comment: rarely, a glass of wine on a special occassion     Drug use: No     Sexual activity: Not  Currently   Lifestyle     Physical activity:     Days per week: Not on file     Minutes per session: Not on file     Stress: Not on file   Relationships     Social connections:     Talks on phone: Not on file     Gets together: Not on file     Attends [employer] service: Not on file     Active member of club or organization: Not on file     Attends meetings of clubs or organizations: Not on file     Relationship status: Not on file     Intimate partner violence:     Fear of current or ex partner: Not on file     Emotionally abused: Not on file     Physically abused: Not on file     Forced sexual activity: Not on file   Other Topics Concern     Parent/sibling w/ CABG, MI or angioplasty before 65F 55M? No      Service Not Asked     Blood Transfusions Not Asked     Caffeine Concern No     Comment: diet cola occassionally     Occupational Exposure Not Asked     Hobby Hazards Not Asked     Sleep Concern Not Asked     Stress Concern Not Asked     Weight Concern Not Asked     Special Diet No     Comment: decreasing carbs.     Back Care Not Asked     Exercise No     Comment: walking     Bike Helmet Not Asked     Seat Belt Not Asked     Self-Exams Not Asked   Social History Narrative    2008    Balanced Diet - Yes    Osteoporosis Prevention Measures - Dairy servings per day: 3 servings daily    Regular Exercise -  Yes Describe walk and dance    Dental Exam - YES - Date: 2008    Eye Exam - YES - Date: 12-08    Self Breast Exam - Yes    Abuse: Current or Past (Physical, Sexual or Emotional)- Not Asked    Do you feel safe in your environment - Yes    Guns stored in the home - No    Sunscreen used - Yes, when she needs it    Seatbelts used - Yes    Lipids -  YES - Date: 7-16-07    Glucose -  YES - Date: 7-16-07    Colon Cancer Screening - Colonoscopy 10-18-06(date completed)    Hemoccults - YES - Date: 4-22-05    Pap Test -  YES - Date: 7-21-03    Do you have any concerns about STD's -  Not Asked    Mammography - YES -  "Date: 9-13-07    DEXA - YES - Date: 8-18-05    Immunizations reviewed and up to date - Yes, last TD was 11-1-00    Linnea Matute MA                   Family History  Family History   Problem Relation Age of Onset     No Known Problems Mother      Diabetes Father         type 2     Hypertension Father      Cerebrovascular Disease Father      Arthritis Father      Other Cancer Sister        ROS/MED HX  The complete review of systems is negative other than noted in the HPI or here.   ENT/Pulmonary:     (+)ARIANE risk factors snores loudly, hypertension, , . .   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      Cardiovascular:     (+) hypertension----. : . . . :. dysrhythmias a-fib, . pulmonary hypertension (per echo, 12/2017), Previous cardiac testing Echodate:12/2017results:date: results: date: results: date: results:          METS/Exercise Tolerance:  1 - Eating, dressing   Hematologic:        (-) history of blood clots and History of Transfusion   Musculoskeletal: Comment: Right hip   Right shoulder arthritis  (+) arthritis,  -       GI/Hepatic:  - neg GI/hepatic ROS       Renal/Genitourinary: Comment: Stress incontinence        Endo:     (+) type II DM Last HgA1c: 6.9 date: 5/20/2019 Not using insulin Diabetic complications: neuropathy, .      Psychiatric:  - neg psychiatric ROS       Infectious Disease:  - neg infectious disease ROS       Malignancy:      - no malignancy   Other:    (+) no H/O Chronic Pain,         Temp: 97.6  F (36.4  C) Temp src: Oral BP: 150/85 Pulse: 68   Resp: 18 SpO2: 96 %         150 lbs 0 oz  5' 4\"   Body mass index is 25.75 kg/m .       Physical Exam  Constitutional: Awake, alert, cooperative, no apparent distress, and appears stated age.  Eyes: Pupils equal, round and reactive to light, extra ocular muscles intact, sclera clear, conjunctiva normal.  HENT: Normocephalic, oral pharynx with moist mucus " membranes, good dentition. No goiter appreciated.   Respiratory: Clear to auscultation bilaterally, no crackles or wheezing.  Cardiovascular: Regular rate and rhythm, normal S1 and S2, and no murmur noted.  Carotids +2, no bruits. No edema. Palpable pulses to radial  DP and PT arteries.   GI: Normal bowel sounds, soft, non-distended, non-tender.  Lymph/Hematologic: No cervical lymphadenopathy and no supraclavicular lymphadenopathy.  Genitourinary:  deferred  Skin: Warm and dry.    Musculoskeletal: Full ROM of neck. There is no redness, warmth, or swelling of the joints. Gross motor strength is normal.    Neurologic: Awake, alert, oriented to name, place and time. Cranial nerves II-XII are grossly intact. Gait is normal.   Neuropsychiatric: Calm, cooperative. Normal affect.     Labs: (personally reviewed)  Hemoglobin A1C   Date Value Ref Range Status   05/20/2019 6.9 (H) 0 - 5.6 % Final     Comment:     Normal <5.7% Prediabetes 5.7-6.4%  Diabetes 6.5% or higher - adopted from ADA   consensus guidelines.       Lab Results   Component Value Date    WBC 6.9 05/20/2019     Lab Results   Component Value Date    RBC 4.71 05/20/2019     Lab Results   Component Value Date    HGB 13.3 05/20/2019     Lab Results   Component Value Date    HCT 39.9 05/20/2019     Lab Results   Component Value Date    MCV 85 05/20/2019     Lab Results   Component Value Date    MCH 28.2 05/20/2019     Lab Results   Component Value Date    MCHC 33.3 05/20/2019     Lab Results   Component Value Date    RDW 13.8 05/20/2019     Lab Results   Component Value Date     05/20/2019     Last Comprehensive Metabolic Panel:  Sodium   Date Value Ref Range Status   05/20/2019 134 133 - 144 mmol/L Final     Potassium   Date Value Ref Range Status   05/20/2019 3.7 3.4 - 5.3 mmol/L Final     Chloride   Date Value Ref Range Status   05/20/2019 99 94 - 109 mmol/L Final     Carbon Dioxide   Date Value Ref Range Status   05/20/2019 27 20 - 32 mmol/L Final      Anion Gap   Date Value Ref Range Status   05/20/2019 8 3 - 14 mmol/L Final     Glucose   Date Value Ref Range Status   05/20/2019 189 (H) 70 - 99 mg/dL Final     Urea Nitrogen   Date Value Ref Range Status   05/20/2019 18 7 - 30 mg/dL Final     Creatinine   Date Value Ref Range Status   05/20/2019 0.75 0.52 - 1.04 mg/dL Final     GFR Estimate   Date Value Ref Range Status   05/20/2019 72 >60 mL/min/[1.73_m2] Final     Comment:     Non  GFR Calc  Starting 12/18/2018, serum creatinine based estimated GFR (eGFR) will be   calculated using the Chronic Kidney Disease Epidemiology Collaboration   (CKD-EPI) equation.       Calcium   Date Value Ref Range Status   05/20/2019 9.5 8.5 - 10.1 mg/dL Final       EKG: not indicated    Cardiac echo: 12/4/2017    Interpretation Summary     1. The left ventricle is normal in size. The visual ejection fraction is  estimated at 60%.  2. The right ventricle is mildly dilated. The right ventricular systolic  function is normal.  3. There is moderate (2+) tricuspid regurgitation.  4. The right ventricular systolic pressure is approximated at 33mmHg plus the  right atrial pressure.     No changes from echo 11/2016.      Outside records reviewed from: n/a    ASSESSMENT and PLAN  Aundrea Treviño is an 84 year old scheduled for midurethral sling and cystoscopy on 7/23/2019 by Dr. Painter in treatment of stress incontinence.  PAC referral for risk assessment and optimization for anesthesia with comorbid conditions of hypertension, paroxysmal atrial fibrillation, DMII, osteoarthritis, gout, refusal of blood products as patient is a [employer]:    Pre-operative considerations:  1.  Cardiac:  Functional status- METS 1. Pt function severely limited due to pain.  She has right hip pain that keeps her from walking.  She lives independently and cleans her own home. Low risk surgery with 0.4% (RCRI #) risk of major adverse cardiac event.   2.  Pulm:  Airway feasible.  ARIANE risk:  intermediate   3.  GI:  Risk of PONV score = 2.  If > 2, anti-emetic intervention recommended.    VTE risk: 1.8% (age and history of provoked DVT after childbirth in 1956)    #Cardiac  -Denies CP, SOB, BRAN, palpitations  -paroxysmal atrial fibrillation on Eliquis. Will hold 48 hours prior to DOS.  -hypertension, will hold hydrochlorothiazide DOS.  Lisinopril taken at bedtime.  Pt will take carvediolol and amlodipine DOS.  -echo 12/2017 with EF of 60%, moderate tricuspid regurgitation and RVSP of 33 mmHg plus RAP.  No change from previous echo 11/23/2016.    -followed by cardiology at [facility] but cleared for prn follow up 12/2017    #Pulmonary  -former smoker, quit 1960  -denies symptoms    #Heme  -remote history of provoked DVT after childbirth 1956  -no recurrence    #Endo  -DM treated with metformin.   -most recent A1c 5/20/2019 of 6.9  -diabetic neuropathy in bilateral feet    #musculoskeletal  -osteoarthritis in right hip and right shoulder.  -gout, no recent flare, no chronic medications for this  -evaluated recently by [facility]     Patient is optimized and is acceptable candidate for the proposed procedure.  No further diagnostic evaluation is needed.     Patient was discussed with Dr Edward.    CELESTINO GonzalezC  Preoperative Assessment Center  [facility]  [facility] and [facility] Center  Phone: [phone]  Fax: [phone]

## 2019-07-03 NOTE — NURSING NOTE
Chief Complaint   Patient presents with     RECHECK     Follow up symptom check       Krysten Mcgregor MA

## 2019-07-03 NOTE — LETTER
7/3/2019       RE: Aundrea Treviño  [address]  [place] [facility] [place] [address]     Dear Colleague,    Thank you for referring your patient, Aundrea Treviño, to the [facility] UROLOGY AND INST FOR PROSTATE AND UROLOGIC CANCERS at [facility]. Please see a copy of my visit note below.    Reason for Visit:  Urinary incontinence follow up      HPI: Ms. Aundrea Treviño is a 84 year old female with a hx of urinary incontinence refractory to medical therapy.  She underwent uds and recent cystoscopy which was unremarkable. She has multiple episodes of incontinence per day and has been using some pads per day.  The patient voids qfew hours, nocturia X 0.  She drinks caffeine free cola and decaf coffee and water.  She denies any dysuria,  hematuria, hesitancy, intermittency, feeling of incomplete emptying, or any recent hx of UTI's or stones.     The patient has no constipation or splinting.  She is not sexually active and denies any dyspareunia or pelvic pain.   She denies any vaginal bulge.  She has no neurological or balance problems. She was advised to try Impressa and if symptom improvement we would proceed with pessary. The patient did not try the impressa and is presently considering moving forward with Pessary placement. Of note, patient takes daily Eliquis.        UDS 5/8/19:  -Borderline small bladder capacity (310 mL) with normal filling sensations.  -Normal bladder compliance with questionable very mild stress-induced DO without incontinence.   -Multiple episodes of stress incontinence at the following points throughout filling. High Pabd pressures are suggestive for urethral hypermobility.    Pabd 177 cm H2O at a volume of 217 mL.    Pabd 127 cm H2O at a volume of 260 mL.  -Fair detrusor contraction during voiding to max Pdet 10 cm H2O which she supplements with abdominal straining.  -Good flow rate (Qmax 18 mL/s) with a bell-shape flow curve (with few intermittent  "peaks) and complete bladder emptying (final PVR 0 mL).  -Mildly increased EMG activity during voiding that correlates with abdominal straining.  -No evidence for bladder outlet obstruction.  -Fluoroscopy reveals a mildly trabeculated bladder wall without diverticuli or VUR. Bladder neck is primarily closed during filling but appears open during episodes of stress incontinence and voiding.    PEx:   Blood pressure 145/79, pulse 82, height 1.626 m (5' 4\"), weight 71.7 kg (158 lb).  5' 4\", Body mass index is 27.12 kg/m ., 158 lbs 0 oz  Gen appearance:  Well groomed  HEENT:  EOMI, AT NC  Psych:  Normal Affect  Neuro:  A/O X 3  Skin:  Warm to touch  Resp:  No increased respiratory effort  Vasc:  RRR  lymph:  mild LE edema  Musk:  Full ROM in extremities  :  deferred     A/P:  84 year old female with stress incontinence as well as possible stress induced urge incontinence.  We  again discussed different management options including observation, pelvic floor PT, periurethral bulking, and midurethral sling.  We discussed the use of mesh in surgery and the risks which include but are not limited to infection, bleeding, exposure of mesh, vaginal pain, injury to the bladder/urethra/rectum or any structures in the abdomen or pelvis, as well as risk of incontinence or urinary retention. There is also risk of need for catheterization and possible further surgery.  The pt. Verbalized understanding and had a chance to ask her questions which were all answered.  - After discussion, the patient would like to instead proceed with sling placement. Patient will need to be evaluation by PAC given her advanced age.   - Continue estrogen cream as previously prescribed.       Thank you for allowing me to participate in the care of  Ms. Aundrea Treviño and I will keep you updated on her progress.    Irma Painter MD    Patient was seen, evaluated and plan was formulated in conjunction with me and I agree with the above.       "

## 2019-07-03 NOTE — PHARMACY - PREOPERATIVE ASSESSMENT CENTER
Anticoagulation Note - Preoperative Assessment Center (PAC) Pharmacist     Patient seen and interviewed during time of PAC Clinic appointment July 3, 2019.  The purpose of this note is to document the perioperative anticoagulation plan outlined by the providers caring for Aundrea Treviño.     Current Regimen  Anticoagulation Regimen as of July 3, 2019: apixaban (ELIQUIS) 5 mg PO BID  Indication: atrial fibrillation  Prescriber:  Krysten Molina  Expected Duration of therapy: indefinite  Current medications that may interact with this include: none  Previously has held apixaban without complication for other procedures.  Does have a remote history of provoked DVT after childbirth 1956 and patient is [employer].    Perioperative plan  Aundrea Treviño is scheduled for Midurethral Sling and Cystoscopy on 7/23/19 with Dr. Painter and the perioperative anticoagulation plan outlined by PAC staff is hold apixaban x2 days prior to surgery.       Resumption of anticoagulation after procedure will be based on surgery team assessment of bleeding risks and complications.  This plan may require re-assessment and modification by her primary team in the perioperative setting depending on patients clinical situation.        Yimi Silva RPH  July 3, 2019  2:44 PM

## 2019-07-03 NOTE — PATIENT INSTRUCTIONS
Preparing for Your Surgery      Name:  Aundrea Treviño   MRN:  [id number]   :  1934   Today's Date:  7/3/2019     Arriving for surgery:  Surgery date:  19  Arrival time:  8:00 am  Please come to:     [facility]  [address] [place] [id number]     Parking is available in front of the Clinics and Surgery Center building from 5:30AM to 8:00PM.  -  Proceed to the 5th floor to check into the Ambulatory Surgery Center.              >> There will be patient concierges on the 1st and 5th floor, for assistance or an escort, if you would like.              >> Please call [phone] with any questions.    What can I eat or drink?  -  You may have solid food or milk products until 8 hours prior to your surgery (1:30 am).  -  You may have water, apple juice or 7up/Sprite until 2 hours prior to your surgery (7:30 am).    Which medicines can I take?  Stop Aspirin, vitamins and supplements one week prior to surgery.  Hold Ibuprofen for 24 hours and/or Naproxen for 48 hours prior to surgery.   -  Do NOT take these medications in the morning, the day of surgery:  Apixaban (Eliquis) hold 48 hours prior to surgery   Hydrochlorothiazide (Hydrodiuril)    Calcium Carbonate (Tums)    Metformin (Glucophage)     -  Please take these medications the day of surgery:  Carvedilol (Coreg)             How do I prepare myself?  -  Take two showers: one the night before surgery; and one the morning of surgery.         Use Scrubcare or Hibiclens to wash from neck down.  You may use your own shampoo and conditioner. No other hair products.   -  Do NOT use lotion, powder, deodorant, or antiperspirant the day of your surgery.  -  Do NOT wear any makeup, fingernail polish or jewelry.  -  Do not bring your own medications to the hospital.  -  Bring your ID and insurance card.    -If you are scheduled to go home the Same Day as surgery you must have a responsible adult as a  and to stay with  you overnight the first 24 hours after surgery.     Questions or Concerns:  -If you have questions or concerns regarding the day of surgery, please call [phone].     -For questions after surgery please call your surgeons office.           AFTER YOUR SURGERY  Breathing exercises   Breathing exercises help you recover faster. Take deep breaths and let the air out slowly. This will:     Help you wake up after surgery.    Help prevent complications like pneumonia.  Preventing complications will help you go home sooner.   We may give you a breathing device (incentive spirometer) to encourage you to breathe deeply.   Nausea and vomiting   You may feel sick to your stomach after surgery; if so, let your nurse know.    Pain control:  After surgery, you may have pain. Our goal is to help you manage your pain. Pain medicine will help you feel comfortable enough to do activities that will help you heal.  These activities may include breathing exercises, walking and physical therapy.   To help your health care team treat your pain we will ask: 1) If you have pain  2) where it is located 3) describe your pain in your words  Methods of pain control include medications given by mouth, vein or by nerve block for some surgeries.  Sequential Compression Device (SCD) or Pneumo Boots:  You may need to wear SCD S on your legs or feet. These are wraps connected to a machine that pumps in air and releases it. The repeated pumping helps prevent blood clots from forming.

## 2019-07-16 DIAGNOSIS — N39.3 STRESS INCONTINENCE: Primary | ICD-10-CM

## 2019-07-19 DIAGNOSIS — N39.3 STRESS INCONTINENCE: ICD-10-CM

## 2019-07-19 PROCEDURE — 87086 URINE CULTURE/COLONY COUNT: CPT | Performed by: UROLOGY

## 2019-07-20 LAB
BACTERIA SPEC CULT: NORMAL
BACTERIA SPEC CULT: NORMAL
SPECIMEN SOURCE: NORMAL

## 2019-07-22 ENCOUNTER — ANESTHESIA EVENT (OUTPATIENT)
Dept: SURGERY | Facility: AMBULATORY SURGERY CENTER | Age: 84
End: 2019-07-22

## 2019-07-23 ENCOUNTER — ANESTHESIA (OUTPATIENT)
Dept: SURGERY | Facility: AMBULATORY SURGERY CENTER | Age: 84
End: 2019-07-23

## 2019-07-23 ENCOUNTER — HOSPITAL ENCOUNTER (OUTPATIENT)
Facility: AMBULATORY SURGERY CENTER | Age: 84
End: 2019-07-23
Attending: UROLOGY
Payer: COMMERCIAL

## 2019-07-23 VITALS
SYSTOLIC BLOOD PRESSURE: 97 MMHG | BODY MASS INDEX: 25.61 KG/M2 | OXYGEN SATURATION: 94 % | HEIGHT: 64 IN | DIASTOLIC BLOOD PRESSURE: 53 MMHG | TEMPERATURE: 96 F | RESPIRATION RATE: 16 BRPM | HEART RATE: 74 BPM | WEIGHT: 150 LBS

## 2019-07-23 DIAGNOSIS — N39.3 STRESS INCONTINENCE OF URINE: Primary | ICD-10-CM

## 2019-07-23 DIAGNOSIS — I48.0 PAROXYSMAL ATRIAL FIBRILLATION (H): ICD-10-CM

## 2019-07-23 LAB — GLUCOSE BLDC GLUCOMTR-MCNC: 161 MG/DL (ref 70–99)

## 2019-07-23 DEVICE — SLING SOLYX TRANSVAGINAL MESH M0068507000: Type: IMPLANTABLE DEVICE | Site: VAGINA | Status: FUNCTIONAL

## 2019-07-23 RX ORDER — BUPIVACAINE HYDROCHLORIDE AND EPINEPHRINE 2.5; 5 MG/ML; UG/ML
INJECTION, SOLUTION INFILTRATION; PERINEURAL PRN
Status: DISCONTINUED | OUTPATIENT
Start: 2019-07-23 | End: 2019-07-23 | Stop reason: HOSPADM

## 2019-07-23 RX ORDER — LIDOCAINE 40 MG/G
CREAM TOPICAL
Status: DISCONTINUED | OUTPATIENT
Start: 2019-07-23 | End: 2019-07-23 | Stop reason: HOSPADM

## 2019-07-23 RX ORDER — CEFAZOLIN SODIUM 1 G/50ML
1 SOLUTION INTRAVENOUS SEE ADMIN INSTRUCTIONS
Status: DISCONTINUED | OUTPATIENT
Start: 2019-07-23 | End: 2019-07-23 | Stop reason: HOSPADM

## 2019-07-23 RX ORDER — ACETAMINOPHEN 325 MG/1
975 TABLET ORAL ONCE
Status: COMPLETED | OUTPATIENT
Start: 2019-07-23 | End: 2019-07-23

## 2019-07-23 RX ORDER — ONDANSETRON 2 MG/ML
INJECTION INTRAMUSCULAR; INTRAVENOUS PRN
Status: DISCONTINUED | OUTPATIENT
Start: 2019-07-23 | End: 2019-07-23

## 2019-07-23 RX ORDER — AMOXICILLIN 250 MG
1 CAPSULE ORAL 2 TIMES DAILY PRN
Qty: 15 TABLET | Refills: 0 | Status: SHIPPED | OUTPATIENT
Start: 2019-07-23 | End: 2024-06-13

## 2019-07-23 RX ORDER — SODIUM CHLORIDE, SODIUM LACTATE, POTASSIUM CHLORIDE, CALCIUM CHLORIDE 600; 310; 30; 20 MG/100ML; MG/100ML; MG/100ML; MG/100ML
INJECTION, SOLUTION INTRAVENOUS CONTINUOUS
Status: DISCONTINUED | OUTPATIENT
Start: 2019-07-23 | End: 2019-07-23 | Stop reason: HOSPADM

## 2019-07-23 RX ORDER — NALOXONE HYDROCHLORIDE 0.4 MG/ML
.1-.4 INJECTION, SOLUTION INTRAMUSCULAR; INTRAVENOUS; SUBCUTANEOUS
Status: DISCONTINUED | OUTPATIENT
Start: 2019-07-23 | End: 2019-07-24 | Stop reason: HOSPADM

## 2019-07-23 RX ORDER — CEFAZOLIN SODIUM 2 G/50ML
2 SOLUTION INTRAVENOUS
Status: COMPLETED | OUTPATIENT
Start: 2019-07-23 | End: 2019-07-23

## 2019-07-23 RX ORDER — SODIUM CHLORIDE, SODIUM LACTATE, POTASSIUM CHLORIDE, CALCIUM CHLORIDE 600; 310; 30; 20 MG/100ML; MG/100ML; MG/100ML; MG/100ML
INJECTION, SOLUTION INTRAVENOUS CONTINUOUS
Status: DISCONTINUED | OUTPATIENT
Start: 2019-07-23 | End: 2019-07-24 | Stop reason: HOSPADM

## 2019-07-23 RX ORDER — LIDOCAINE HYDROCHLORIDE 20 MG/ML
INJECTION, SOLUTION INFILTRATION; PERINEURAL PRN
Status: DISCONTINUED | OUTPATIENT
Start: 2019-07-23 | End: 2019-07-23

## 2019-07-23 RX ORDER — PROPOFOL 10 MG/ML
INJECTION, EMULSION INTRAVENOUS CONTINUOUS PRN
Status: DISCONTINUED | OUTPATIENT
Start: 2019-07-23 | End: 2019-07-23

## 2019-07-23 RX ORDER — ONDANSETRON 2 MG/ML
4 INJECTION INTRAMUSCULAR; INTRAVENOUS EVERY 30 MIN PRN
Status: DISCONTINUED | OUTPATIENT
Start: 2019-07-23 | End: 2019-07-24 | Stop reason: HOSPADM

## 2019-07-23 RX ORDER — FENTANYL CITRATE 50 UG/ML
25-50 INJECTION, SOLUTION INTRAMUSCULAR; INTRAVENOUS
Status: DISCONTINUED | OUTPATIENT
Start: 2019-07-23 | End: 2019-07-23 | Stop reason: HOSPADM

## 2019-07-23 RX ORDER — FENTANYL CITRATE 50 UG/ML
INJECTION, SOLUTION INTRAMUSCULAR; INTRAVENOUS PRN
Status: DISCONTINUED | OUTPATIENT
Start: 2019-07-23 | End: 2019-07-23

## 2019-07-23 RX ORDER — SODIUM CHLORIDE, SODIUM LACTATE, POTASSIUM CHLORIDE, CALCIUM CHLORIDE 600; 310; 30; 20 MG/100ML; MG/100ML; MG/100ML; MG/100ML
INJECTION, SOLUTION INTRAVENOUS CONTINUOUS PRN
Status: DISCONTINUED | OUTPATIENT
Start: 2019-07-23 | End: 2019-07-23

## 2019-07-23 RX ORDER — OXYCODONE HYDROCHLORIDE 5 MG/1
2.5-5 TABLET ORAL EVERY 6 HOURS PRN
Qty: 5 TABLET | Refills: 0 | Status: ON HOLD | OUTPATIENT
Start: 2019-07-23 | End: 2019-10-12

## 2019-07-23 RX ORDER — ONDANSETRON 4 MG/1
4 TABLET, ORALLY DISINTEGRATING ORAL EVERY 30 MIN PRN
Status: DISCONTINUED | OUTPATIENT
Start: 2019-07-23 | End: 2019-07-24 | Stop reason: HOSPADM

## 2019-07-23 RX ADMIN — CEFAZOLIN SODIUM 2 G: 2 SOLUTION INTRAVENOUS at 10:49

## 2019-07-23 RX ADMIN — SODIUM CHLORIDE, SODIUM LACTATE, POTASSIUM CHLORIDE, CALCIUM CHLORIDE: 600; 310; 30; 20 INJECTION, SOLUTION INTRAVENOUS at 10:30

## 2019-07-23 RX ADMIN — SODIUM CHLORIDE, SODIUM LACTATE, POTASSIUM CHLORIDE, CALCIUM CHLORIDE: 600; 310; 30; 20 INJECTION, SOLUTION INTRAVENOUS at 09:43

## 2019-07-23 RX ADMIN — ACETAMINOPHEN 975 MG: 325 TABLET ORAL at 09:43

## 2019-07-23 RX ADMIN — FENTANYL CITRATE 25 MCG: 50 INJECTION, SOLUTION INTRAMUSCULAR; INTRAVENOUS at 10:39

## 2019-07-23 RX ADMIN — ONDANSETRON 4 MG: 2 INJECTION INTRAMUSCULAR; INTRAVENOUS at 11:06

## 2019-07-23 RX ADMIN — LIDOCAINE HYDROCHLORIDE 60 MG: 20 INJECTION, SOLUTION INFILTRATION; PERINEURAL at 10:38

## 2019-07-23 RX ADMIN — PROPOFOL 100 MCG/KG/MIN: 10 INJECTION, EMULSION INTRAVENOUS at 10:39

## 2019-07-23 ASSESSMENT — ENCOUNTER SYMPTOMS: DYSRHYTHMIAS: 1

## 2019-07-23 ASSESSMENT — LIFESTYLE VARIABLES: TOBACCO_USE: 1

## 2019-07-23 ASSESSMENT — MIFFLIN-ST. JEOR: SCORE: 1115.4

## 2019-07-23 NOTE — ANESTHESIA CARE TRANSFER NOTE
Patient: Aundrea Treviño    Procedure(s):  Midurethral Sling and Cystoscopy (Support the Urethra with Mesh and Look in the Bladder)    Diagnosis: Stress Incontinence  Diagnosis Additional Information: No value filed.    Anesthesia Type:   No value filed.     Note:  Airway :Room Air  Patient transferred to:Phase II  Comments: VSS and WNL, comfortable, no PONV, report to Dilma PÉREZHandoff Report: Identifed the Patient, Identified the Reponsible Provider, Reviewed the pertinent medical history, Discussed the surgical course, Reviewed Intra-OP anesthesia mangement and issues during anesthesia, Set expectations for post-procedure period and Allowed opportunity for questions and acknowledgement of understanding      Vitals: (Last set prior to Anesthesia Care Transfer)    CRNA VITALS  7/23/2019 1042 - 7/23/2019 1121      7/23/2019             Resp Rate (set):  10                Electronically Signed By: TIMOTEO Mukherjee CRNA  July 23, 2019  11:21 AM

## 2019-07-23 NOTE — BRIEF OP NOTE
[facility]    Brief Operative Note    Pre-operative diagnosis: Stress Incontinence  Post-operative diagnosis * No post-op diagnosis entered *  Procedure: Procedure(s):  Midurethral Sling and Cystoscopy (Support the Urethra with Mesh and Look in the Bladder)  Surgeon: Surgeon(s) and Role:     * Irma Painter MD - Primary  Anesthesia: Monitor Anesthesia Care   Estimated blood loss: Less than 50 ml  Drains: None  Specimens: * No specimens in log *  Findings:   No significant. Please refer to op report for additional details..  Complications: None.  Implants:    Implant Name Type Inv. Item Serial No.  Lot No. LRB No. Used   SLING SOLYX TRANSVAGINAL MESH [id number] Mesh SLING SOLYX TRANSVAGINAL MESH [id number]  [employer] [id number] N/A 1          Post-op plan:  -Transfer for PACU  -Discharge home once PACU criteria are met  -Follow up with Dr. Painter in 2 weeks in clinic

## 2019-07-23 NOTE — DISCHARGE INSTRUCTIONS
[facility]  Home Care Following Anesthesia  For 24 hours after surgery:  1. Get plenty of rest.  A responsible adult must stay with you for at least 24 hours after you leave the surgery center.  2. Do not drive or use heavy equipment.  If you have weakness or tingling, don't drive or use heavy equipment until this feeling goes away.   3. Do not drink alcohol.   4. Avoid strenuous or risky activities.  Ask for help when climbing stairs.  5. You may feel lightheaded.  IF so, sit for a few minutes before standing.  Have someone help you get up.   6. If you have nausea (feel sick to your stomach): Drink only clear liquids such as apple juice, ginger ale, broth or 7-Up.  Rest may also help.  Be sure to drink enough fluids.  Move to a regular diet as you feel able.   7. You may have a slight fever.  Call the doctor if your fever is over 100 F (37.7 C) (taken under the tongue) or lasts longer than 24 hours.  8. You may have a dry mouth, a sore throat, muscle aches or trouble sleeping. These should go away after 24 hours.  9. Do not make important or legal decisions.             Tips for taking pain medications  To get the best pain relief possible, remember these points:    Take pain medications as directed, before pain becomes severe.    Pain medication can upset your stomach: taking it with food may help.    Constipation is a common side effect of pain medication. Drink plenty of  fluids.    Eat foods high in fiber. Take a stool softener if recommended by your doctor or pharmacist.    Do not drink alcohol, drive or operate machinery while taking pain medications.    Ask about other ways to control pain, such as with heat, ice or relaxation.    Tylenol/Acetaminophen Consumption  To help encourage the safe use of acetaminophen, the makers of TYLENOL  have lowered the maximum daily dose for single-ingredient Extra Strength TYLENOL  (acetaminophen) products sold in the [place] from 8 pills  per day (4,000 mg) to 6 pills per day (3,000 mg). The dosing interval has also changed from 2 pills every 4-6 hours to 2 pills every 6 hours.    If you feel your pain relief is insufficient, you may take Tylenol/Acetaminophen in addition to your narcotic pain medication.     Be careful not to exceed 3,000 mg of Tylenol/Acetaminophen in a 24 hour period from all sources.    If you are taking extra strength Tylenol/acetaminophen (500 mg), the maximum dose is 6 tablets in 24 hours.    If you are taking regular strength acetaminophen (325 mg), the maximum dose is 9 tablets in 24 hours.    Tylenol 975mg given at 9:43am. Next dose available after 3:43pm. Then follow package instructions.     Call a doctor for any of the followin. Signs of infection (fever, growing tenderness at the surgery site, a large amount of drainage or bleeding, severe pain, foul-smelling drainage, redness, swelling).  2. It has been over 8 to 10 hours since surgery and you are still not able to urinate (pass water).  3. Headache for over 24 hours.    Your doctor is:       Dr. Irma Painter, Prostate and Urology: [phone]               Or dial [phone] and ask for the resident on call for:  Prostate Urology  For emergency care, call the:  [place]:  [phone] (TTY for hearing impaired: [phone])

## 2019-07-23 NOTE — ANESTHESIA POSTPROCEDURE EVALUATION
Anesthesia POST Procedure Evaluation    Patient: Aundrea Treviño   MRN:     [id number] Gender:   female   Age:    84 year old :      1934        Preoperative Diagnosis: Stress Incontinence   Procedure(s):  Midurethral Sling and Cystoscopy (Support the Urethra with Mesh and Look in the Bladder)   Postop Comments: No value filed.       Anesthesia Type:  MAC  No value filed.    Reportable Event: NO     PAIN: Uncomplicated   Sign Out status: Comfortable, Well controlled pain     PONV: No PONV   Sign Out status:  No Nausea or Vomiting     Neuro/Psych: Uneventful perioperative course   Sign Out Status: Preoperative baseline; Age appropriate mentation     Airway/Resp.: Uneventful perioperative course   Sign Out Status: Non labored breathing, age appropriate RR; Resp. Status within EXPECTED Parameters     CV: Uneventful perioperative course   Sign Out status: Appropriate BP and perfusion indices; Appropriate HR/Rhythm     Disposition:   Sign Out in:  PACU  Disposition:  Phase II; Home  Recovery Course: Uneventful  Follow-Up: Not required           Last Anesthesia Record Vitals:  CRNA VITALS  2019 1042 - 2019 1142      2019             Resp Rate (set):  10          Last PACU Vitals:  Vitals Value Taken Time   BP 96/50 2019 11:18 AM   Temp 35.6  C (96  F) 2019 11:18 AM   Pulse     Resp 16 2019 11:18 AM   SpO2 94 % 2019 11:18 AM   Temp src     NIBP 102/55 2019 11:11 AM   Pulse 79 2019 11:13 AM   SpO2 99 % 2019 11:13 AM   Resp     Temp     Ht Rate 64 2019 11:10 AM   Temp 2           Electronically Signed By: Roel Holly MD, MD, 2019, 12:09 PM

## 2019-07-23 NOTE — ANESTHESIA PREPROCEDURE EVALUATION
Anesthesia Pre-Procedure Evaluation    Patient: Aundrea Treviño   MRN:     [id number] Gender:   female   Age:    84 year old :      1934        Preoperative Diagnosis: Stress Incontinence   Procedure(s):  Midurethral Sling and Cystoscopy (Support the Urethra with Mesh and Look in the Bladder)     Past Medical History:   Diagnosis Date     Chronic anticoagulation 2017     Diverticulitis of colon (without mention of hemorrhage)(562.11)      DM type 2, goal A1C below 8.0 12/3/2014     Essential and other specified forms of tremor     eval'd by neuro      Hyperlipidemia LDL goal <100 2010     Hypertension goal BP (blood pressure) < 140/90      Intestinal disaccharidase deficiencies and disaccharide malabsorption      Moderate tricuspid regurgitation 2017     Osteoarthrosis, unspecified whether generalized or localized, unspecified site      Paroxysmal atrial fibrillation (H) 2017     Pulmonary hypertension, mild (H) 2017     Type 2 diabetes, HbA1c goal < 7% (H) 2010     Urge incontinence       Past Surgical History:   Procedure Laterality Date     SURGICAL HISTORY OF -       hysterectomy, bladder suspension     SURGICAL HISTORY OF -       tonsillectomy          Anesthesia Evaluation     . Pt has had prior anesthetic. Type: General    No history of anesthetic complications          ROS/MED HX    ENT/Pulmonary:  - neg pulmonary ROS   (+)tobacco use, Past use , . .    Neurologic:  - neg neurologic ROS     Cardiovascular: Comment: Mild pulmonary hypertension    (+) hypertension----. Taking blood thinners : . . . :. dysrhythmias a-fib, valvular problems/murmurs TR:.       METS/Exercise Tolerance:  3 - Able to walk 1-2 blocks without stopping   Hematologic:  - neg hematologic  ROS       Musculoskeletal:  - neg musculoskeletal ROS       GI/Hepatic:  - neg GI/hepatic ROS       Renal/Genitourinary:  - ROS Renal section negative       Endo:     (+) type II DM .      Psychiatric:        "  Infectious Disease:  - neg infectious disease ROS       Malignancy:         Other:                         PHYSICAL EXAM:   Mental Status/Neuro: A/A/O   Airway: Facies: Feasible  Mallampati: I  Mouth/Opening: Full  TM distance: > 6 cm  Neck ROM: Full   Respiratory: Auscultation: CTAB     Resp. Rate: Normal     Resp. Effort: Normal      CV: Rhythm: Regular  Rate: Age appropriate  Heart: Normal Sounds   Comments:      Dental: Normal                  Lab Results   Component Value Date    WBC 6.9 05/20/2019    HGB 13.3 05/20/2019    HCT 39.9 05/20/2019     05/20/2019    CRP 62.0 (H) 05/20/2019    SED 15 05/20/2019     05/20/2019    POTASSIUM 3.7 05/20/2019    CHLORIDE 99 05/20/2019    CO2 27 05/20/2019    BUN 18 05/20/2019    CR 0.75 05/20/2019     (H) 05/20/2019    KAIDEN 9.5 05/20/2019    ALBUMIN 3.4 05/20/2019    PROTTOTAL 6.9 05/20/2019    ALT 30 05/20/2019    AST 28 05/20/2019    ALKPHOS 88 05/20/2019    BILITOTAL 1.1 05/20/2019    TSH 1.26 05/20/2019       Preop Vitals  BP Readings from Last 3 Encounters:   07/23/19 134/78   07/03/19 150/85   07/03/19 150/85    Pulse Readings from Last 3 Encounters:   07/23/19 74   07/03/19 68   07/03/19 68      Resp Readings from Last 3 Encounters:   07/23/19 18   07/03/19 18   10/29/18 20    SpO2 Readings from Last 3 Encounters:   07/23/19 97%   07/03/19 96%   05/20/19 97%      Temp Readings from Last 1 Encounters:   07/23/19 36.6  C (97.9  F) (Oral)    Ht Readings from Last 1 Encounters:   07/23/19 1.626 m (5' 4\")      Wt Readings from Last 1 Encounters:   07/23/19 68 kg (150 lb)    Estimated body mass index is 25.75 kg/m  as calculated from the following:    Height as of this encounter: 1.626 m (5' 4\").    Weight as of this encounter: 68 kg (150 lb).     LDA:  Peripheral IV 07/23/19 Left Hand (Active)   Site Assessment WDL 7/23/2019 10:00 AM   Line Status Infusing 7/23/2019 10:00 AM   Phlebitis Scale 0-->no symptoms 7/23/2019 10:00 AM   Infiltration Scale 0 " 7/23/2019 10:00 AM   Number of days: 0            Assessment:   ASA SCORE: 3       Documentation: H&P complete; Preop Testing complete; Consents complete   Proceeding: Proceed without further delay  Tobacco Use:  NO Active use of Tobacco/UNKNOWN Tobacco use status     Plan:   Anes. Type:  MAC      Induction:  Not applicable   Airway: Native Airway   Access/Monitoring: PIV   Maintenance: N/a   Emergence: N/a   Logistics: Same Day Surgery     Postop Pain/Sedation Strategy:  Standard-Options: Opioids PRN     PONV Management:  Adult Risk Factors: Female, Non-Smoker, Postop Opioids  Prevention: Ondansetron; Dexamethasone     CONSENT: Direct conversation   Plan and risks discussed with: Patient                            Roel Holly MD, MD

## 2019-07-23 NOTE — OP NOTE
PREOPERATIVE DIAGNOSES: 1. Stress urinary incontinence. 2. Urethral hypermobility.  POSTOPERATIVE DIAGNOSES: Same  PROCEDURE PERFORMED: 1. Mid urethral sling ([employer] Solyx). 2. Cystoscopy.     ATTENDING PHYSICIAN: Irma Painter MD   RESIDENT SURGEON: Zion Singh MD  ANESTHESIA: Monitored anesthesia care with 5 mL of 1% lidocaine with epinephrine and 10 mL Xylocaine jelly per urethra.   ESTIMATED BLOOD LOSS: 5 mL.   FINDINGS:  No mesh in the bladder  SPECIMENS:  None      HISTORY OF PRESENT ILLNESS: Aundrea Treviño is a 84 year old year-old woman with a history of stress urinary incontinence. Differing management options were discussed with the patient and she has elected to proceed with a mid urethral sling.    DESCRIPTION OF PROCEDURE: After informed consent was obtained, the patient was identified, marked and taken to the operating room in her usual state of health. After induction with monitored anesthesia care, she was positioned in modified lithotomy position. Pneumo boots were placed. A timeout was performed to ensure proper patient, procedure and positioning. The patient received 1 gram of Ancef prior to initiation of surgery. She was prepped with Betadine. Xylocaine jelly of 10 mL were inserted into the urethra and a 18-Ugandan Joshi catheter was placed.     An Allis clamp was used to grasp the vaginal mucosa approximately 1 cm from the urethral meatus at the mid urethra. A 25-gauge needle, we injected 10 mL of 1% lidocaine with epinephrine to hydrodissect the tissues. We then made a 1-cm incision through the vaginal mucosa with a #15 blade. We then dissected this laterally out passed the fornices of the vagina in the pubocervical fascia, out laterally to the pelvic side wall, first on the right-hand side and then on the left hand side. We then placed the sling on the trocar and placed this first through the obturator membrane on the left side and then deployed it and then placed it on the  right hand side. The sling lay flat.  We then placed a 20-Yi cystoscope through the urethra and into the bladder, noting bilateral ureteral orifices. The floor of the bladder was intact as well as the bladder neck and the lateral edges, no mesh was seen. The urethra was without tape. We then withdrew the cystoscope with a full bladder and pressed on the suprapubic region and did not note any incontinence from the urethral meatus. Next the sling was deployed.  At this time the wound was irrigated.  A running 2-0 Vicryl was then used to reapproximate the mucosa of the vagina. The patient was then returned to the supine position and transported to recovery in stable condition.     ASSESSMENT AND PLAN: Aundrea Treviño is a 84 year old year-old woman who underwent a midurethral sling for stress urinary incontinence. We will plan to follow up with her in 2 weeks in Urology Clinic. Obtain residual urine and evaluate for obstructive urinary symptoms.     I performed the procedure.    Irma Painter MD

## 2019-07-26 ENCOUNTER — PATIENT OUTREACH (OUTPATIENT)
Dept: UROLOGY | Facility: CLINIC | Age: 84
End: 2019-07-26

## 2019-07-26 NOTE — PROGRESS NOTES
Left message to call to discuss how she is after her surgery on Tuesday with Dr Inocencio Acosta, RN   Care Coordinator Urology

## 2019-08-05 ENCOUNTER — OFFICE VISIT (OUTPATIENT)
Dept: UROLOGY | Facility: CLINIC | Age: 84
End: 2019-08-05
Payer: COMMERCIAL

## 2019-08-05 VITALS — OXYGEN SATURATION: 98 % | SYSTOLIC BLOOD PRESSURE: 165 MMHG | DIASTOLIC BLOOD PRESSURE: 105 MMHG | HEART RATE: 102 BPM

## 2019-08-05 DIAGNOSIS — N95.2 ATROPHIC VAGINITIS: ICD-10-CM

## 2019-08-05 DIAGNOSIS — N39.41 URGE INCONTINENCE: ICD-10-CM

## 2019-08-05 DIAGNOSIS — R82.90 NONSPECIFIC FINDING ON EXAMINATION OF URINE: ICD-10-CM

## 2019-08-05 DIAGNOSIS — N39.3 STRESS INCONTINENCE: Primary | ICD-10-CM

## 2019-08-05 LAB
ALBUMIN UR-MCNC: NEGATIVE MG/DL
APPEARANCE UR: CLEAR
BACTERIA #/AREA URNS HPF: ABNORMAL /HPF
BILIRUB UR QL STRIP: NEGATIVE
COLOR UR AUTO: YELLOW
GLUCOSE UR STRIP-MCNC: NEGATIVE MG/DL
HGB UR QL STRIP: NEGATIVE
KETONES UR STRIP-MCNC: NEGATIVE MG/DL
LEUKOCYTE ESTERASE UR QL STRIP: ABNORMAL
MUCOUS THREADS #/AREA URNS LPF: PRESENT /LPF
NITRATE UR QL: NEGATIVE
NON-SQ EPI CELLS #/AREA URNS LPF: ABNORMAL /LPF
PH UR STRIP: 6.5 PH (ref 5–7)
RBC #/AREA URNS AUTO: ABNORMAL /HPF
SOURCE: ABNORMAL
SP GR UR STRIP: 1.01 (ref 1–1.03)
UROBILINOGEN UR STRIP-MCNC: NORMAL MG/DL (ref 0–2)
WBC #/AREA URNS AUTO: ABNORMAL /HPF

## 2019-08-05 PROCEDURE — 81001 URINALYSIS AUTO W/SCOPE: CPT | Performed by: UROLOGY

## 2019-08-05 PROCEDURE — 87086 URINE CULTURE/COLONY COUNT: CPT | Performed by: UROLOGY

## 2019-08-05 PROCEDURE — 99024 POSTOP FOLLOW-UP VISIT: CPT | Performed by: UROLOGY

## 2019-08-05 RX ORDER — TROSPIUM CHLORIDE ER 60 MG/1
60 CAPSULE ORAL EVERY MORNING
Qty: 30 CAPSULE | Refills: 11 | Status: SHIPPED | OUTPATIENT
Start: 2019-08-05 | End: 2020-12-16

## 2019-08-05 NOTE — PROGRESS NOTES
Reason for Visit: 2 week post op for A midurethral sling on 7/26/19.    Clinical Data: Ms. Aundrea Treviño  is a 84 year old year old   female with a history of the above.   She returns today for her post operative visit.  She states that she is doing well.  No stress incontinence, but continues to have some urge incontinence although slightly improved.    On exam:  Residual mild urethral mobility  Vaginal incision with suture  No evidence of mesh extrusion or erosion    RU: 0 cc on bladder scan by nursing.    A/P s/p midurethral sling doing well with some continued urge incontinence.  We discussed medication and she would like to try one.  She has tried oxybutynin in the past which did not help.  -sanctura 60 mg   -f/u in 4 weeks.  -continue restrictions of lifting and sexual activity      Thank you for allowing me to participate in the care of  Ms.Ramona RADHA Treviño   and I will keep you updated on her progress.    Irma Painter MD

## 2019-08-05 NOTE — NURSING NOTE
Aundrea Treviño's goals for this visit include:   Chief Complaint   Patient presents with     RECHECK     post op        She requests these members of her care team be copied on today's visit information: yes     PCP: Krysten Molina    Referring Provider:  Krysten Molina MD  [address] [place] [address]    BP (!) 165/105   Pulse 102   SpO2 98%     Do you need any medication refills at today's visit? No    post void residual  0ml    Mersadiez Baltazar

## 2019-08-06 LAB
BACTERIA SPEC CULT: NORMAL
SPECIMEN SOURCE: NORMAL

## 2019-08-20 ENCOUNTER — TRANSFERRED RECORDS (OUTPATIENT)
Dept: HEALTH INFORMATION MANAGEMENT | Facility: CLINIC | Age: 84
End: 2019-08-20

## 2019-08-22 ENCOUNTER — ALLIED HEALTH/NURSE VISIT (OUTPATIENT)
Dept: NURSING | Facility: CLINIC | Age: 84
End: 2019-08-22
Payer: COMMERCIAL

## 2019-08-22 VITALS — OXYGEN SATURATION: 98 % | DIASTOLIC BLOOD PRESSURE: 58 MMHG | HEART RATE: 75 BPM | SYSTOLIC BLOOD PRESSURE: 120 MMHG

## 2019-08-22 DIAGNOSIS — Z01.30 BP CHECK: Primary | ICD-10-CM

## 2019-08-22 PROCEDURE — 99207 ZZC NO CHARGE NURSE ONLY: CPT

## 2019-08-22 NOTE — NURSING NOTE
Aundrea Treviño is a 84 year old patient who comes in today for a Blood Pressure check.  Initial BP:  /58 (BP Location: Left arm, Patient Position: Sitting, Cuff Size: Adult Regular)   Pulse 75   SpO2 98%      75  Disposition: follow-up as previously indicated by provider    Zulma Garay CMA on 8/22/2019 at 2:49 PM

## 2019-09-09 DIAGNOSIS — I10 HYPERTENSION GOAL BP (BLOOD PRESSURE) < 140/90: ICD-10-CM

## 2019-09-09 NOTE — TELEPHONE ENCOUNTER
"Requested Prescriptions   Pending Prescriptions Disp Refills     hydrochlorothiazide (HYDRODIURIL) 25 MG tablet [Pharmacy Med Name: hydroCHLOROthiazide Oral Tablet 25 MG] 30 tablet 0     Sig: TAKE ONE TABLET BY MOUTH ONE TIME DAILY  Last Written Prescription Date:  8/16/2019  Last Fill Quantity: 30 tablet,  # refills: 0   Last Office Visit: 5/20/2019   Future Office Visit:    Next 5 appointments (look out 90 days)    Sep 23, 2019  2:00 PM CDT  Pre-Op physical with Krysten Molina MD  [facility] ([facility]) [address] [place] [id number] [phone]              Diuretics (Including Combos) Protocol Passed - 9/9/2019 12:17 PM        Passed - Blood pressure under 140/90 in past 12 months     BP Readings from Last 3 Encounters:   08/22/19 120/58   08/05/19 (!) 165/105   07/23/19 97/53           Passed - Recent (12 mo) or future (30 days) visit within the authorizing provider's specialty     Patient had office visit in the last 12 months or has a visit in the next 30 days with authorizing provider or within the authorizing provider's specialty.  See \"Patient Info\" tab in inbasket, or \"Choose Columns\" in Meds & Orders section of the refill encounter.            Passed - Medication is active on med list        Passed - Patient is age 18 or older        Passed - No active pregancy on record        Passed - Normal serum creatinine on file in past 12 months     Recent Labs   Lab Test 05/20/19  1224   CR 0.75            Passed - Normal serum potassium on file in past 12 months     Recent Labs   Lab Test 05/20/19  1224   POTASSIUM 3.7            Passed - Normal serum sodium on file in past 12 months     Recent Labs   Lab Test 05/20/19  1224               Passed - No positive pregnancy test in past 12 months          "

## 2019-09-11 RX ORDER — HYDROCHLOROTHIAZIDE 25 MG/1
TABLET ORAL
Qty: 30 TABLET | Refills: 0 | Status: SHIPPED | OUTPATIENT
Start: 2019-09-11 | End: 2019-10-25

## 2019-09-11 NOTE — TELEPHONE ENCOUNTER
Prescription approved per [facility] Refill Protocol.  Pt has upcoming appt  Krissy Corrales RN

## 2019-09-23 ENCOUNTER — OFFICE VISIT (OUTPATIENT)
Dept: FAMILY MEDICINE | Facility: CLINIC | Age: 84
End: 2019-09-23
Payer: COMMERCIAL

## 2019-09-23 VITALS
SYSTOLIC BLOOD PRESSURE: 144 MMHG | BODY MASS INDEX: 26.52 KG/M2 | TEMPERATURE: 98.1 F | WEIGHT: 154.5 LBS | OXYGEN SATURATION: 96 % | HEART RATE: 77 BPM | DIASTOLIC BLOOD PRESSURE: 82 MMHG

## 2019-09-23 DIAGNOSIS — M16.11 OSTEOARTHRITIS OF RIGHT HIP, UNSPECIFIED OSTEOARTHRITIS TYPE: ICD-10-CM

## 2019-09-23 DIAGNOSIS — E11.42 TYPE 2 DIABETES MELLITUS WITH DIABETIC POLYNEUROPATHY, WITHOUT LONG-TERM CURRENT USE OF INSULIN (H): ICD-10-CM

## 2019-09-23 DIAGNOSIS — Z01.818 PREOP GENERAL PHYSICAL EXAM: Primary | ICD-10-CM

## 2019-09-23 DIAGNOSIS — I10 ESSENTIAL HYPERTENSION WITH GOAL BLOOD PRESSURE LESS THAN 140/90: ICD-10-CM

## 2019-09-23 DIAGNOSIS — I48.0 PAROXYSMAL ATRIAL FIBRILLATION (H): ICD-10-CM

## 2019-09-23 DIAGNOSIS — N39.3 FEMALE STRESS INCONTINENCE: ICD-10-CM

## 2019-09-23 DIAGNOSIS — Z79.01 CHRONIC ANTICOAGULATION: ICD-10-CM

## 2019-09-23 DIAGNOSIS — Z23 NEED FOR PROPHYLACTIC VACCINATION AND INOCULATION AGAINST INFLUENZA: ICD-10-CM

## 2019-09-23 DIAGNOSIS — L84 FOOT CALLUS: ICD-10-CM

## 2019-09-23 LAB
HBA1C MFR BLD: 6.1 % (ref 0–5.6)
HGB BLD-MCNC: 14.2 G/DL (ref 11.7–15.7)
MRSA DNA SPEC QL NAA+PROBE: NEGATIVE
SPECIMEN SOURCE: NORMAL

## 2019-09-23 PROCEDURE — 80048 BASIC METABOLIC PNL TOTAL CA: CPT | Performed by: FAMILY MEDICINE

## 2019-09-23 PROCEDURE — G0008 ADMIN INFLUENZA VIRUS VAC: HCPCS | Performed by: FAMILY MEDICINE

## 2019-09-23 PROCEDURE — 93000 ELECTROCARDIOGRAM COMPLETE: CPT | Performed by: FAMILY MEDICINE

## 2019-09-23 PROCEDURE — 99207 C PAF COMPLETED  NO CHARGE: CPT | Performed by: FAMILY MEDICINE

## 2019-09-23 PROCEDURE — 36415 COLL VENOUS BLD VENIPUNCTURE: CPT | Performed by: FAMILY MEDICINE

## 2019-09-23 PROCEDURE — 83036 HEMOGLOBIN GLYCOSYLATED A1C: CPT | Performed by: FAMILY MEDICINE

## 2019-09-23 PROCEDURE — 90662 IIV NO PRSV INCREASED AG IM: CPT | Performed by: FAMILY MEDICINE

## 2019-09-23 PROCEDURE — 87641 MR-STAPH DNA AMP PROBE: CPT | Performed by: FAMILY MEDICINE

## 2019-09-23 PROCEDURE — 85018 HEMOGLOBIN: CPT | Performed by: FAMILY MEDICINE

## 2019-09-23 PROCEDURE — 87640 STAPH A DNA AMP PROBE: CPT | Mod: 59 | Performed by: FAMILY MEDICINE

## 2019-09-23 PROCEDURE — 99215 OFFICE O/P EST HI 40 MIN: CPT | Mod: 25 | Performed by: FAMILY MEDICINE

## 2019-09-23 NOTE — PATIENT INSTRUCTIONS
Before Your Surgery      Call your surgeon if there is any change in your health. This includes signs of a cold or flu (such as a sore throat, runny nose, cough, rash or fever).    Do not smoke, drink alcohol or take over the counter medicine (unless your surgeon or primary care doctor tells you to) for the 24 hours before and after surgery.    If you take prescribed drugs: Follow your doctor s orders about which medicines to take and which to stop until after surgery.    Eating and drinking prior to surgery: follow the instructions from your surgeon    Take a shower or bath the night before surgery. Use the soap your surgeon gave you to gently clean your skin. If you do not have soap from your surgeon, use your regular soap. Do not shave or scrub the surgery site.  Wear clean pajamas and have clean sheets on your bed.     1) Do not take your apixaban (Eliquis) for the two days prior to your procedure.   2) Do not take the lisinopril the night before your procedure.   3) Do not take your metformin the morning of your procedure while you are not eating.   4) Take your other medications as usual with a sip of water on the day of your procedure.     In the future, you could maybe try capsaicin cream for the nerve symptoms in your feet (not on any open wounds).     Consider scheduling with podiatry for your toenail and callus care.

## 2019-09-23 NOTE — PROGRESS NOTES
[facility]IAWATH  [address] [place] [id number] [phone]  Dept: [phone]    PRE-OP EVALUATION:  Today's date: 2019    Aundrea Treviño (: 1934) presents for pre-operative evaluation assessment as requested by Valentino Kincaid MD.  She requires evaluation and anesthesia risk assessment prior to undergoing surgery/procedure for treatment of RIGHT TOTAL HIP ARTHROPLASTY (ESQUIVEL AND NEPHEW) .    Proposed Surgery/ Procedure: RIGHT TOTAL HIP ARTHROPLASTY (ESQUIVEL AND NEPHEW)  Date of Surgery/ Procedure: 10/09/2019  Time of Surgery/ Procedure: 9:50am  Hospital/Surgical Facility: [facility]  Primary Physician: Krysten Molina  Type of Anesthesia Anticipated: General    Patient has a Health Care Directive or Living Will:  YES     1. NO - Do you have a history of heart attack, stroke, stent, bypass or surgery on an artery in the head, neck, heart or legs?  2. NO - Do you ever have any pain or discomfort in your chest?  3. NO - Do you have a history of  Heart Failure?  4. NO - Are you troubled by shortness of breath when: walking on the level, up a slight hill or at night?  5. NO - Do you currently have a cold, bronchitis or other respiratory infection?  6. NO - Do you have a cough, shortness of breath or wheezing?  7. NO - Do you sometimes get pains in the calves of your legs when you walk?  8. YES - Do you or anyone in your family have previous history of blood clots? Her father had a stroke, she had a blood clot when her first child was born   9. NO - Do you or does anyone in your family have a serious bleeding problem such as prolonged bleeding following surgeries or cuts?  10. NO - Have you ever had problems with anemia or been told to take iron pills?  11. NO - Have you had any abnormal blood loss such as black, tarry or bloody stools, or abnormal vaginal bleeding?  12. NO - Have you ever had a blood transfusion?  13. NO - Have you or any of your relatives  ever had problems with anesthesia?  14. NO - Do you have sleep apnea, excessive snoring or daytime drowsiness?  15. NO - Do you have any prosthetic heart valves?  16. NO - Do you have prosthetic joints?  17. NO - Is there any chance that you may be pregnant?      HPI:     HPI related to upcoming procedure: Aundrea Treviño is a 85 year old female who presents today for preop for right total hip arthroplasty for her hip osteoarthritis. She has had increasing problems with her right hip for the past couple of years. She has had two hip injections. The first was helpful, the second was not has helpful. Her ambulation is limited due to her pain. She uses a walker.   A-FIB - Patient has a longstanding history of chronic A-fib. Current treatment regimen includes Apixaban for stroke prevention and denies significant symptoms of lightheadedness, palpitations or dyspnea.     DIABETES - Patient has a longstanding history of DiabetesType Type II . Patient is being treated with oral agents and denies significant side effects. Control has been good. Complicating factors include but are not limited to: hypertension and neuropathy.     HYPERTENSION - Patient has longstanding history of HTN.  Blood pressure readings have been in normal range. Current medication regimen is as listed below. Patient denies any side effects of medication.       MEDICAL HISTORY:     Patient Active Problem List    Diagnosis Date Noted     Primary osteoarthritis of right shoulder 05/27/2019     Priority: Medium     Primary osteoarthritis of first carpometacarpal joint of left hand 05/27/2019     Priority: Medium     Elevated C-reactive protein (CRP) 05/27/2019     Priority: Medium     Type 2 diabetes mellitus with diabetic polyneuropathy, without long-term current use of insulin (H) 05/07/2018     Priority: Medium     Patient is [employer] 10/06/2017     Priority: Medium     ACP (advance care planning) 06/01/2017     Priority: Medium     Advance  Care Planning 6/1/2017: Patient is [employer].  See Health Care Directive for information on use of blood products.  Recommend Code Status in chart and patient's Advance Care Planning documents be reviewed to ensure alignment with patient's current wishes.  Most recent Advance Care Planning document is a Health Care Directive dated 11/20/16.  Dunia Garcia   Advance Care Planning Liaison       Chronic anticoagulation 02/19/2017     Priority: Medium     Paroxysmal atrial fibrillation (H) 02/19/2017     Priority: Medium     Pulmonary hypertension, mild (H) 02/19/2017     Priority: Medium     Moderate tricuspid regurgitation 02/19/2017     Priority: Medium     Venous (peripheral) insufficiency 01/07/2016     Priority: Medium     Uncomplicated type 2 diabetes mellitus (H) 12/03/2014     Priority: Medium     Gout 03/21/2012     Priority: Medium     CARDIOVASCULAR SCREENING; LDL GOAL LESS THAN 130 09/20/2011     Priority: Medium     Hypertension goal BP (blood pressure) < 140/90      Priority: Medium     Refusal of blood transfusions as patient is [employer]ism 03/03/2010     Priority: Medium     Female stress incontinence 12/23/2008     Priority: Medium     (Problem list name updated by automated process. Provider to review and confirm.)       Dystonia 12/23/2008     Priority: Medium     Essential tremor 12/23/2008     Priority: Medium     (Problem list name updated by automated process. Provider to review and confirm.)       Disorder of bone and cartilage 07/19/2006     Priority: Medium     Problem list name updated by automated process. Provider to review        Past Medical History:   Diagnosis Date     Chronic anticoagulation 2/19/2017     Diverticulitis of colon (without mention of hemorrhage)(562.11)      DM type 2, goal A1C below 8.0 12/3/2014     Essential and other specified forms of tremor     eval'd by neuro 2005     Hyperlipidemia LDL goal <100 5/9/2010     Hypertension goal BP (blood pressure) <  140/90      Intestinal disaccharidase deficiencies and disaccharide malabsorption      Moderate tricuspid regurgitation 2/19/2017     Osteoarthrosis, unspecified whether generalized or localized, unspecified site      Paroxysmal atrial fibrillation (H) 2/19/2017     Pulmonary hypertension, mild (H) 2/19/2017     Type 2 diabetes, HbA1c goal < 7% (H) 5/2/2010     Urge incontinence      Past Surgical History:   Procedure Laterality Date     CYSTOSCOPY, SLING TRANSVAGINAL N/A 7/23/2019    Procedure: Midurethral Sling and Cystoscopy (Support the Urethra with Mesh and Look in the Bladder);  Surgeon: Irma Painter MD;  Location: [facility] OR     SURGICAL HISTORY OF -       hysterectomy, bladder suspension     SURGICAL HISTORY OF -       tonsillectomy     Current Outpatient Medications   Medication Sig Dispense Refill     [place] 650 MG TABS Take 2 capsules by mouth 2 times daily For arthritis pain       ALPHA LIPOIC ACID PO Take 200 mg by mouth daily       amLODIPine (NORVASC) 2.5 MG tablet Take 2.5 mg by mouth every evening   3     apixaban ANTICOAGULANT (ELIQUIS) 5 MG tablet Take 1 tablet (5 mg) by mouth 2 times daily 60 tablet 11     blood glucose (NO BRAND SPECIFIED) lancets standard Use to test blood sugar one times daily or as directed. Dispense compatible with insurance and glucometer - One Touch Ultra 100 each 3     blood glucose monitoring (NO BRAND SPECIFIED) meter device kit Use to test blood sugar one times daily or as directed.  Dispense One Touch Ultra per insurance 1 kit 0     blood glucose monitoring (NO BRAND SPECIFIED) test strip Use to test blood sugars one times daily or as directed.  Dispense compatible with insurance and glucometer - One Touch Ultra 100 strip 3     calcium carbonate (TUMS E-X 750) 750 MG CHEW Take 750 mg by mouth 2 times daily        carvedilol (COREG) 25 MG tablet Take 1 tablet (25 mg) by mouth 2 times daily with meals 180 tablet 3     COMPOUNDED NON-CONTROLLED SUBSTANCE (CMPD RX) -  PHARMACY TO MIX COMPOUNDED MEDICATION Estriol 1 mg/g  Apply small amount to finger and apply to inside vagina daily for 2 weeks then twice weekly  Route: vaginally 30 g 11     FLAXSEED (LINSEED) PO POWD 1.5  tablespoon daily       hydrochlorothiazide (HYDRODIURIL) 25 MG tablet TAKE ONE TABLET BY MOUTH ONE TIME DAILY 30 tablet 0     lisinopril (PRINIVIL/ZESTRIL) 40 MG tablet Take 1 tablet (40 mg) by mouth daily (Patient taking differently: Take 40 mg by mouth every evening ) 90 tablet 3     metFORMIN (GLUCOPHAGE) 500 MG tablet Take 1 tablet (500 mg) by mouth daily (with dinner) 90 tablet 1     order for DME Equipment being ordered: Left wrist splint. 1 each 0     triamcinolone (KENALOG) 0.5 % cream Apply topically 2 times daily to affected area as directed. (Patient taking differently: Apply topically 2 times daily as needed to affected area as directed.) 15 g 3     trospium (SANCTURA XR) 60 MG CP24 24 hr capsule Take 1 capsule (60 mg) by mouth every morning 30 capsule 11     VITAMIN D 1000 UNIT OR TABS 1 TABLET DAILY AT BEDTIME 30 0     oxyCODONE (ROXICODONE) 5 MG tablet Take 0.5-1 tablets (2.5-5 mg) by mouth every 6 hours as needed for severe pain (Patient not taking: Reported on 2019) 5 tablet 0     predniSONE (DELTASONE) 5 MG tablet Take 5 mg by mouth daily as needed (for painful flare)       senna-docusate (SENOKOT-S/PERICOLACE) 8.6-50 MG tablet Take 1 tablet by mouth 2 times daily as needed for constipation (Take while taking narcotic pain medications.) (Patient not taking: Reported on 2019) 15 tablet 0     OTC products: None, except as noted above    Allergies   Allergen Reactions     Amlodipine      Swollen ankles and rash but tolerates 2.5 mg without any symptoms     Apple [Chromium]      Kiwi      Nuts      Pear      Tape [Adhesive Tape]       Latex Allergy: No    Social History     Tobacco Use     Smoking status: Former Smoker     Last attempt to quit: 1960     Years since quittin.4      Smokeless tobacco: Never Used   Substance Use Topics     Alcohol use: Yes     Comment: rarely, a glass of wine on a special occassion     History   Drug Use No       REVIEW OF SYSTEMS:   Constitutional, neuro, ENT, endocrine, pulmonary, cardiac, gastrointestinal, genitourinary, musculoskeletal, integument and psychiatric systems are negative, except as otherwise noted.    EXAM:   BP (!) 144/82 (BP Location: Left arm, Patient Position: Sitting, Cuff Size: Adult Regular)   Pulse 77   Temp 98.1  F (36.7  C) (Oral)   Wt 70.1 kg (154 lb 8 oz)   SpO2 96%   BMI 26.52 kg/m      GENERAL APPEARANCE: healthy, alert and no distress     EYES: EOMI, PERRL     HENT: ear canals and TM's normal and nose and mouth without ulcers or lesions     NECK: no adenopathy, no asymmetry, masses, or scars and thyroid normal to palpation     RESP: lungs clear to auscultation - no rales, rhonchi or wheezes     CV: regular rates and rhythm, normal S1 S2, no S3 or S4 and no murmur, click or rub     ABDOMEN:  soft, nontender, no HSM or masses and bowel sounds normal     MS: extremities normal- no gross deformities noted. Walks with a walker     SKIN: no suspicious lesions or rashes     NEURO: Normal strength and tone, sensory exam grossly normal, mentation intact and speech normal     PSYCH: mentation appears normal. and affect normal/bright     LYMPHATICS: No cervical adenopathy    DIAGNOSTICS:     EKG: atrial fibrillation, rate 80, new q waves in the inferior leads sent for cardiology review  Hemoglobin (indicated for history of anemia or procedure with significant blood loss such as tonsillectomy, major intraperitoneal surgery, vascular surgery, major spine surgery, total joint replacement)  Serum Potassium  Serum Creatinine  Hemoglobin A1C  MRSA/MSSA screening for elective joint replacement surgery    Labs Drawn and in Process:   Unresulted Labs Ordered in the Past 30 Days of this Admission     No orders found from 8/24/2019 to 9/24/2019.           Recent Labs   Lab Test 05/20/19  1224 02/18/19  0944 10/29/18  1508   HGB 13.3  --  14.3     --  147*     --  138   POTASSIUM 3.7  --  3.7   CR 0.75  --  0.79   A1C 6.9* 6.7*  --       Echocardiogram 12/4/2017:  Interpretation Summary     1. The left ventricle is normal in size. The visual ejection fraction is  estimated at 60%.  2. The right ventricle is mildly dilated. The right ventricular systolic  function is normal.  3. There is moderate (2+) tricuspid regurgitation.  4. The right ventricular systolic pressure is approximated at 33mmHg plus the  right atrial pressure.     No changes from echo 11/2016.    IMPRESSION:   Reason for surgery/procedure: Right hip osteoarthritis  Diagnosis/reason for consult: Preop for right total hip replacement    The proposed surgical procedure is considered INTERMEDIATE risk.    REVISED CARDIAC RISK INDEX  The patient has the following serious cardiovascular risks for perioperative complications such as (MI, PE, VFib and 3  AV Block):  Asymptomatic, new Q waves in inferior leads, sending for cardiology eval     The patient has the following additional risks for perioperative complications:  No identified additional risks      ICD-10-CM    1. Preop general physical exam Z01.818 Hemoglobin     MRSA MSSA Presurgical Screen     EKG 12-lead complete w/read - Clinics   2. Osteoarthritis of right hip, unspecified osteoarthritis type M16.11    3. Type 2 diabetes mellitus with diabetic polyneuropathy, without long-term current use of insulin (H) E11.42 Hemoglobin A1c     Basic metabolic panel   4. Paroxysmal atrial fibrillation (H) I48.0    5. Chronic anticoagulation Z79.01    6. Female stress incontinence N39.3    7. Essential hypertension with goal blood pressure less than 140/90 I10 Basic metabolic panel       RECOMMENDATIONS:     --Consult hospital rounder / IM to assist post-op medical management     --Patient is to take all scheduled medications on the day of  surgery EXCEPT for modifications listed below.    Cardiovascular:   EKG has changed with new Q waves in the inferior leads. I have recommended that she schedule with cardiology for recommendations regarding need for additional eval with echo or stress test prior to her surgery    Diabetes Medication Use  -----Hold usual oral and non-insulin diabetic meds (e.g. Metformin, Actos, Glipizide) while NPO.       Anticoagulant or Antiplatelet Medication Use  ELIQUIS: Bleeding risk is at least moderate for this procedure and apixaban (Eliquis) should be stopped at least 48 hours prior to procedure based on a creatinine clearance greater than or equal to 30.        ACE Inhibitor or Angiotensin Receptor Blocker (ARB) Use  Ace inhibitor or Angiotensin Receptor Blocker (ARB) and should HOLD this medication for the 24 hours prior to surgery.      APPROVAL GIVEN to proceed with proposed procedure, pending eval and final approval of cardiology        Signed Electronically by: Krysten Molina MD    Copy of this evaluation report is provided to requesting physician.    [facility] Preop Guidelines    Revised Cardiac Risk Index

## 2019-09-23 NOTE — NURSING NOTE
Injectable Influenza Immunization Documentation    1.  Is the person to be vaccinated sick today?   No    2. Does the person to be vaccinated have an allergy to a component   of the vaccine?   No  Egg Allergy Algorithm Link    3. Has the person to be vaccinated ever had a serious reaction   to influenza vaccine in the past?   No    4. Has the person to be vaccinated ever had Guillain-Barré syndrome?   No    Form completed by Zulma Garay CMA on 9/23/2019 at 2:16 PM

## 2019-09-24 LAB
ANION GAP SERPL CALCULATED.3IONS-SCNC: 6 MMOL/L (ref 3–14)
BUN SERPL-MCNC: 14 MG/DL (ref 7–30)
CALCIUM SERPL-MCNC: 9.2 MG/DL (ref 8.5–10.1)
CHLORIDE SERPL-SCNC: 95 MMOL/L (ref 94–109)
CO2 SERPL-SCNC: 29 MMOL/L (ref 20–32)
CREAT SERPL-MCNC: 0.68 MG/DL (ref 0.52–1.04)
GFR SERPL CREATININE-BSD FRML MDRD: 80 ML/MIN/{1.73_M2}
GLUCOSE SERPL-MCNC: 105 MG/DL (ref 70–99)
POTASSIUM SERPL-SCNC: 3.5 MMOL/L (ref 3.4–5.3)
SODIUM SERPL-SCNC: 130 MMOL/L (ref 133–144)

## 2019-09-26 ENCOUNTER — OFFICE VISIT (OUTPATIENT)
Dept: CARDIOLOGY | Facility: CLINIC | Age: 84
End: 2019-09-26
Payer: COMMERCIAL

## 2019-09-26 VITALS
HEART RATE: 101 BPM | SYSTOLIC BLOOD PRESSURE: 138 MMHG | BODY MASS INDEX: 26.65 KG/M2 | DIASTOLIC BLOOD PRESSURE: 78 MMHG | OXYGEN SATURATION: 98 % | TEMPERATURE: 97.5 F | WEIGHT: 155.25 LBS

## 2019-09-26 DIAGNOSIS — I48.20 CHRONIC ATRIAL FIBRILLATION (H): Primary | ICD-10-CM

## 2019-09-26 DIAGNOSIS — I10 HYPERTENSION GOAL BP (BLOOD PRESSURE) < 140/90: ICD-10-CM

## 2019-09-26 DIAGNOSIS — Z01.810 PRE-OPERATIVE CARDIOVASCULAR EXAMINATION: ICD-10-CM

## 2019-09-26 DIAGNOSIS — E11.42 TYPE 2 DIABETES MELLITUS WITH DIABETIC POLYNEUROPATHY, WITHOUT LONG-TERM CURRENT USE OF INSULIN (H): ICD-10-CM

## 2019-09-26 PROCEDURE — 36415 COLL VENOUS BLD VENIPUNCTURE: CPT | Performed by: INTERNAL MEDICINE

## 2019-09-26 PROCEDURE — 80048 BASIC METABOLIC PNL TOTAL CA: CPT | Performed by: INTERNAL MEDICINE

## 2019-09-26 PROCEDURE — 99215 OFFICE O/P EST HI 40 MIN: CPT | Performed by: INTERNAL MEDICINE

## 2019-09-26 NOTE — PATIENT INSTRUCTIONS
You were seen today in the Cardiovascular Clinic at [facility].     Cardiology Providers you saw during your visit: Dr. Geeta Christian    Diagnosis:  Hypertension, atrial fibrillation    Results: discussed with patient      Orders:   none    Medication Changes:   none    Recommendations:   As above    Follow-up:    As needed- For medication refills please contact your Primary Care Provider for future refills.     Please follow up with primary care provider for medication refills     Please feel free to call me with any questions or concerns.        Questions and [phone]      For Radiology / Imaging [phone]       On Call Cardiologist for after hours or on weekends: [phone]   option #4 and ask to speak to the on-call Cardiologist.          If you need a medication refill please contact your pharmacy.  Please allow 3 business days for your refill to be completed.

## 2019-09-26 NOTE — LETTER
9/26/2019      RE: Aundrea Treviño  [address]  [facility] [id number]       Dear Colleague,    Thank you for the opportunity to participate in the care of your patient, Aundrea Treviño, at the [facility]. Please see a copy of my visit note below.    I am delighted to see Aundrea Treviño in consultation for possible EKG abnormality.  She is here with her daughter.    History of Present Illness:  As you know, the patient is a 85 year old  Female with hypertension, chronic Afib, who is scheduled for elective right hip replacement 10/9/19. On 9/23/19 she saw PCP for preoperative evaluation and an EKG suggested prior inferior myocardial infarction.    She has always been very active. She's a [employer] and walks around neighborhood frequently. However in the last year or so her activities have been limited by right hip pain. She still lives alone, no chest discomfort, dizziness, dyspnea, syncope, orthopnea. Has lower extremity edema right worse than left, better early in morning.    The following portions of the patient's history were reviewed and updated as appropriate: allergies, current medications, past family history, past medical history, past social history, past surgical history, and the problem list.    Past Medical History:  Atrial fibrillation, incidentally noted on EKG during office visit 11/2016, apixaban started  Diabetes type II  Hypertension  Hyperlipidemia    Medications:   Amlodipine 2.5 every day - had lower extremity swelling with higher doses  Carvedilol 25 bid  Apixaban 5 bid  hydrochlorothiazide 25 every day  Lisinopril 40 every day  Metformin 500 qd    Allergies:    Allergies   Allergen Reactions     Amlodipine      Swollen ankles and rash but tolerates 2.5 mg without any symptoms     Apple [Chromium]      Kiwi      Nuts      Pear      Tape [Adhesive Tape]        Family History:    Family History   Problem Relation Age of Onset     No Known Problems Mother      Diabetes Father         type 2     Hypertension Father      Cerebrovascular Disease Father      Arthritis Father      Other Cancer Sister        Psychosocial history:  reports that she quit smoking about 59 years ago. She has never used smokeless tobacco. She reports current alcohol use. She reports that she does not use drugs.    Review of systems:   Cardiovascular: No palpitations, chest pain, shortness of breath at rest, dyspnea with exertion, orthopnea, paroxysmal nocturia dyspnea, nocturia, dizziness, syncope.    In addition,   Constitutional: No change in weight, sleep or appetite.  Normal energy.  No fever or chills  Eyes: Negative for vision changes or eye problems  ENT: No problems with ears, nose or throat.  No difficulty swallowing.  Resp: No coughing, wheezing or shortness of breath  GI: No nausea, vomiting,  heartburn, abdominal pain, diarrhea, constipation or change in bowel habits  : No urinary frequency or dysuria, bladder or kidney problems  Musculoskeletal: No significant muscle or joint pains  Neurologic: No headaches, numbness, tingling, weakness, problems with balance or coordination  Psychiatric: No problems with anxiety, depression or mental health  Heme/immune/allergy: No history of bleeding or clotting problems or anemia.  No allergies or immune system problems  Integumentary: No rashes,worrisome lesions or skin problems      Physical examination  Vitals: 138/78, 80 bpm  BMI= 70 kg    Constitutional: In general, the patient is a pleasant female in no apparent distress.    Eyes: PERRLA.  EOMI.  Sclerae white, not injected.  ENT/mouth: Normiocephalic and atraumatic.  Nares clear.  Pharynx without erythema or exudate.  Dentition intact.  No adenopathy.  No thyromegaly. Carotids +2/2 bilaterally without bruits.  No jugular venous distension.   Card/Vasc: The PMI is in the 5th ICS in the midclavicular line. There  is no heave. Irregularly irregular. Normal S1, S2. No murmur, rub, click, or gallop. Pulses are normal bilaterally throughout. 1+  peripheral edema right > left.  Respiratory: Clear to asculation.  No ronchi, wheezes, rales.  No dullness to percussion.   GI: Abdomen is soft, nontender, nondistended. No organomegaly. No AAA.  No bruits.   Integument: No significant bruises or rashes  Neurological: The neurological examination reveal a patient who was oriented to person, place, and time.    Psych: Normal  Heme/Lymph/Immun: no significant adenopathy      I have reviewed the following labs/imaging:  Labs: 10/29/18 - cholesterol 166, HDL 54, LDL 91,   19 - Na 130, K 3.5, cr 0.68, hgb 14, A1C 6.1  Echo: 17 - 60%, mildly dilated RV with moderate TR, RVSP 33m Hg    I have personally and independently reviewed the following:  EK19 - afib 80 bpm, isolated Q wave in lead III, poor R wave progression (machine read as old IMI and old AMI). No significant change change compared to 19: sinus 72 bpm with OR 210ms and PACs (last sinus EKG)      Assessment :  1. Cardiovascular evaluation prior to elective hip surgery. She's had an isolated Q wave in lead III for many years, and this is a nonspecific finding. Poor R wave progression likely due to position/lead placement. In fact, she states that the EKG 19 was done in sitting position since she was unable to get on the table due to hip pain. There are no clinically significant changes on her EKG overall. Difficult to assess functional status because of her hip, but she was active several months ago without cardiovascular symptoms. I do not feel further testing would be of any prognostic benefit.  2. Hypertension. Controlled  3. Atrial fibrillation, chronic, rate controlled. On appropriate dose of apixaban  4. Diabetes type II, controlled    Plan:  She is stable to proceed with surgery without any further cardiovascular evaluation  Recommend  continuing all BP meds and careful monitoring of BP post op  Can hold apixaban prior to surgery without bridging    The patient is to return as needed . The patient understood the treatment plan as outlined above.  There were no barriers to learning.      Geeta Christian MD

## 2019-09-26 NOTE — PROGRESS NOTES
I am delighted to see Aundrea RADHA Treviño in consultation for possible EKG abnormality.  She is here with her daughter.    History of Present Illness:  As you know, the patient is a 85 year old  Female with hypertension, chronic Afib, who is scheduled for elective right hip replacement 10/9/19. On 9/23/19 she saw PCP for preoperative evaluation and an EKG suggested prior inferior myocardial infarction.    She has always been very active. She's a Muslim and walks around neighborhood frequently. However in the last year or so her activities have been limited by right hip pain. She still lives alone, no chest discomfort, dizziness, dyspnea, syncope, orthopnea. Has lower extremity edema right worse than left, better early in morning.    The following portions of the patient's history were reviewed and updated as appropriate: allergies, current medications, past family history, past medical history, past social history, past surgical history, and the problem list.    Past Medical History:  Atrial fibrillation, incidentally noted on EKG during office visit 11/2016, apixaban started  Diabetes type II  Hypertension  Hyperlipidemia    Medications:   Amlodipine 2.5 every day - had lower extremity swelling with higher doses  Carvedilol 25 bid  Apixaban 5 bid  hydrochlorothiazide 25 every day  Lisinopril 40 every day  Metformin 500 qd    Allergies:    Allergies   Allergen Reactions     Amlodipine      Swollen ankles and rash but tolerates 2.5 mg without any symptoms     Apple [Chromium]      Kiwi      Nuts      Pear      Tape [Adhesive Tape]        Family History:   Family History   Problem Relation Age of Onset     No Known Problems Mother      Diabetes Father         type 2     Hypertension Father      Cerebrovascular Disease Father      Arthritis Father      Other Cancer Sister        Psychosocial history:  reports that she quit smoking about 59 years ago. She has never used smokeless tobacco. She reports current  alcohol use. She reports that she does not use drugs.    Review of systems:   Cardiovascular: No palpitations, chest pain, shortness of breath at rest, dyspnea with exertion, orthopnea, paroxysmal nocturia dyspnea, nocturia, dizziness, syncope.    In addition,   Constitutional: No change in weight, sleep or appetite.  Normal energy.  No fever or chills  Eyes: Negative for vision changes or eye problems  ENT: No problems with ears, nose or throat.  No difficulty swallowing.  Resp: No coughing, wheezing or shortness of breath  GI: No nausea, vomiting,  heartburn, abdominal pain, diarrhea, constipation or change in bowel habits  : No urinary frequency or dysuria, bladder or kidney problems  Musculoskeletal: No significant muscle or joint pains  Neurologic: No headaches, numbness, tingling, weakness, problems with balance or coordination  Psychiatric: No problems with anxiety, depression or mental health  Heme/immune/allergy: No history of bleeding or clotting problems or anemia.  No allergies or immune system problems  Integumentary: No rashes,worrisome lesions or skin problems      Physical examination  Vitals: 138/78, 80 bpm  BMI= 70 kg    Constitutional: In general, the patient is a pleasant female in no apparent distress.    Eyes: PERRLA.  EOMI.  Sclerae white, not injected.  ENT/mouth: Normiocephalic and atraumatic.  Nares clear.  Pharynx without erythema or exudate.  Dentition intact.  No adenopathy.  No thyromegaly. Carotids +2/2 bilaterally without bruits.  No jugular venous distension.   Card/Vasc: The PMI is in the 5th ICS in the midclavicular line. There is no heave. Irregularly irregular. Normal S1, S2. No murmur, rub, click, or gallop. Pulses are normal bilaterally throughout. 1+  peripheral edema right > left.  Respiratory: Clear to asculation.  No ronchi, wheezes, rales.  No dullness to percussion.   GI: Abdomen is soft, nontender, nondistended. No organomegaly. No AAA.  No bruits.   Integument: No  significant bruises or rashes  Neurological: The neurological examination reveal a patient who was oriented to person, place, and time.    Psych: Normal  Heme/Lymph/Immun: no significant adenopathy      I have reviewed the following labs/imaging:  Labs: 10/29/18 - cholesterol 166, HDL 54, LDL 91,   19 - Na 130, K 3.5, cr 0.68, hgb 14, A1C 6.1  Echo: 17 - 60%, mildly dilated RV with moderate TR, RVSP 33m Hg    I have personally and independently reviewed the following:  EK19 - afib 80 bpm, isolated Q wave in lead III, poor R wave progression (machine read as old IMI and old AMI). No significant change change compared to 19: sinus 72 bpm with MO 210ms and PACs (last sinus EKG)      Assessment :  1. Cardiovascular evaluation prior to elective hip surgery. She's had an isolated Q wave in lead III for many years, and this is a nonspecific finding. Poor R wave progression likely due to position/lead placement. In fact, she states that the EKG 19 was done in sitting position since she was unable to get on the table due to hip pain. There are no clinically significant changes on her EKG overall. Difficult to assess functional status because of her hip, but she was active several months ago without cardiovascular symptoms. I do not feel further testing would be of any prognostic benefit.  2. Hypertension. Controlled  3. Atrial fibrillation, chronic, rate controlled. On appropriate dose of apixaban  4. Diabetes type II, controlled    Plan:  She is stable to proceed with surgery without any further cardiovascular evaluation  Recommend continuing all BP meds and careful monitoring of BP post op  Can hold apixaban prior to surgery without bridging    The patient is to return as needed . The patient understood the treatment plan as outlined above.  There were no barriers to learning.      Geeta Christian MD

## 2019-09-27 LAB
ANION GAP SERPL CALCULATED.3IONS-SCNC: 11 MMOL/L (ref 3–14)
BUN SERPL-MCNC: 13 MG/DL (ref 7–30)
CALCIUM SERPL-MCNC: 9.1 MG/DL (ref 8.5–10.1)
CHLORIDE SERPL-SCNC: 95 MMOL/L (ref 94–109)
CO2 SERPL-SCNC: 25 MMOL/L (ref 20–32)
CREAT SERPL-MCNC: 0.69 MG/DL (ref 0.52–1.04)
GFR SERPL CREATININE-BSD FRML MDRD: 79 ML/MIN/{1.73_M2}
GLUCOSE SERPL-MCNC: 136 MG/DL (ref 70–99)
POTASSIUM SERPL-SCNC: 3.8 MMOL/L (ref 3.4–5.3)
SODIUM SERPL-SCNC: 131 MMOL/L (ref 133–144)

## 2019-09-30 ENCOUNTER — HOSPITAL ENCOUNTER (OUTPATIENT)
Dept: LAB | Facility: CLINIC | Age: 84
End: 2019-09-30
Attending: ORTHOPAEDIC SURGERY
Payer: COMMERCIAL

## 2019-10-02 DIAGNOSIS — I10 HYPERTENSION GOAL BP (BLOOD PRESSURE) < 140/90: Primary | ICD-10-CM

## 2019-10-02 NOTE — TELEPHONE ENCOUNTER
RN -- please call Aundrea to let her know that her results have returned. Her sodium level is still a little bit low. I am not sure of the cause, but a common cause is a diuretic. She is scheduled for right total hip arthroplasty in a week. If she is symptomatic, I would recommend ER visit (confusion, headache, nausea, vomiting, tremor). Otherwise, I recommend stopping her hydrochlorothiazide and increasing her amlodipine to 5mg daily. She should come back to have her blood pressure and BMP rechecked on Monday morning. Please also let her surgeon know that I recommend rechecking her sodium level on the day of surgery. Krysten Molina M.D.          Results for orders placed or performed in visit on 09/26/19   Basic metabolic panel   Result Value Ref Range    Sodium 131 (L) 133 - 144 mmol/L    Potassium 3.8 3.4 - 5.3 mmol/L    Chloride 95 94 - 109 mmol/L    Carbon Dioxide 25 20 - 32 mmol/L    Anion Gap 11 3 - 14 mmol/L    Glucose 136 (H) 70 - 99 mg/dL    Urea Nitrogen 13 7 - 30 mg/dL    Creatinine 0.69 0.52 - 1.04 mg/dL    GFR Estimate 79 >60 mL/min/[1.73_m2]    GFR Estimate If Black >90 >60 mL/min/[1.73_m2]    Calcium 9.1 8.5 - 10.1 mg/dL      BP Readings from Last 6 Encounters:   09/26/19 138/78   09/23/19 (!) 144/82   08/22/19 120/58   08/05/19 (!) 165/105   07/23/19 97/53   07/03/19 150/85

## 2019-10-02 NOTE — TELEPHONE ENCOUNTER
Krysten Molina MD,  Called patient, relayed provider message below, patient verbalized understanding. Patient not currently having symptoms    Patient requests Rx for amlodipine  - Rx cued    BMP cued for recheck    Patient has lab only and MA only appointment scheduled - 10/07/201    Please advise    Thank You!  Cyn Castillo, RN  Triage Nurse

## 2019-10-03 RX ORDER — AMLODIPINE BESYLATE 5 MG/1
5 TABLET ORAL DAILY
Qty: 30 TABLET | Refills: 0 | Status: ON HOLD | OUTPATIENT
Start: 2019-10-03 | End: 2019-10-12

## 2019-10-07 ENCOUNTER — ALLIED HEALTH/NURSE VISIT (OUTPATIENT)
Dept: NURSING | Facility: CLINIC | Age: 84
End: 2019-10-07
Payer: COMMERCIAL

## 2019-10-07 VITALS — DIASTOLIC BLOOD PRESSURE: 74 MMHG | SYSTOLIC BLOOD PRESSURE: 126 MMHG | HEART RATE: 72 BPM | OXYGEN SATURATION: 99 %

## 2019-10-07 DIAGNOSIS — Z01.30 BP CHECK: Primary | ICD-10-CM

## 2019-10-07 DIAGNOSIS — I10 HYPERTENSION GOAL BP (BLOOD PRESSURE) < 140/90: ICD-10-CM

## 2019-10-07 PROCEDURE — 80048 BASIC METABOLIC PNL TOTAL CA: CPT | Performed by: FAMILY MEDICINE

## 2019-10-07 PROCEDURE — 36415 COLL VENOUS BLD VENIPUNCTURE: CPT | Performed by: FAMILY MEDICINE

## 2019-10-07 PROCEDURE — 99207 ZZC NO CHARGE NURSE ONLY: CPT

## 2019-10-07 NOTE — NURSING NOTE
Aundrea Treviño is a 85 year old patient who comes in today for a Blood Pressure check.  Initial BP:  /74 (BP Location: Right arm, Patient Position: Sitting, Cuff Size: Adult Regular)   Pulse 72   SpO2 99%      72  Disposition: follow-up as previously indicated by provider and results routed to provider    Lexie Long MA on 10/7/2019 at 2:20 PM

## 2019-10-08 ENCOUNTER — TELEPHONE (OUTPATIENT)
Dept: FAMILY MEDICINE | Facility: CLINIC | Age: 84
End: 2019-10-08

## 2019-10-08 LAB
ANION GAP SERPL CALCULATED.3IONS-SCNC: 7 MMOL/L (ref 3–14)
BUN SERPL-MCNC: 12 MG/DL (ref 7–30)
CALCIUM SERPL-MCNC: 9.2 MG/DL (ref 8.5–10.1)
CHLORIDE SERPL-SCNC: 100 MMOL/L (ref 94–109)
CO2 SERPL-SCNC: 28 MMOL/L (ref 20–32)
CREAT SERPL-MCNC: 0.67 MG/DL (ref 0.52–1.04)
GFR SERPL CREATININE-BSD FRML MDRD: 80 ML/MIN/{1.73_M2}
GLUCOSE SERPL-MCNC: 102 MG/DL (ref 70–99)
POTASSIUM SERPL-SCNC: 4.5 MMOL/L (ref 3.4–5.3)
SODIUM SERPL-SCNC: 135 MMOL/L (ref 133–144)

## 2019-10-08 NOTE — TELEPHONE ENCOUNTER
Dr. Molina-Please review.  Patient wanted you to be aware of her left pedal edema.    Patient called back and was informed of message per Dr. Molina.    Patient verbalized understanding and in agreement with plan.    Patient reported she is noticing left pedal edema at night and it resolves every AM.  Patient thinks perhaps discontinuation of hydrochlorothiazide is possible cause of this edema.    Patient will notify her orthopedic surgeon's office about normal sodium level of 135 and nightly pedal edema.    Thank you!  MIGUEL NgN, RN

## 2019-10-08 NOTE — TELEPHONE ENCOUNTER
RN --please call her monitor to let her know that her sodium level has improved.  It is back in the normal range.  I see her blood pressure was in normal range yesterday as well.  I think she can proceed to surgery without any further evaluation or changes.     Krysten Molina M.D.        Results for orders placed or performed in visit on 10/07/19   **Basic metabolic panel FUTURE 1yr   Result Value Ref Range    Sodium 135 133 - 144 mmol/L    Potassium 4.5 3.4 - 5.3 mmol/L    Chloride 100 94 - 109 mmol/L    Carbon Dioxide 28 20 - 32 mmol/L    Anion Gap 7 3 - 14 mmol/L    Glucose 102 (H) 70 - 99 mg/dL    Urea Nitrogen 12 7 - 30 mg/dL    Creatinine 0.67 0.52 - 1.04 mg/dL    GFR Estimate 80 >60 mL/min/[1.73_m2]    GFR Estimate If Black >90 >60 mL/min/[1.73_m2]    Calcium 9.2 8.5 - 10.1 mg/dL

## 2019-10-08 NOTE — TELEPHONE ENCOUNTER
The edema is likely secondary to a combination of being off the hydrochlorothiazide and being on the amlodipine as amlodipine can contribute to swelling. As long as she is not having PND or orthopnea and is otherwise feeling well, no worsening shortness of breath or new chest pain, etc, I think it is fine to proceed with surgery. Krysten Molina M.D.

## 2019-10-08 NOTE — TELEPHONE ENCOUNTER
Left message to call back and ask to speak with an available triage nurse.  BENITEZ Peña, MIGUELN, RN

## 2019-10-08 NOTE — TELEPHONE ENCOUNTER
"PT called back.  Reviewed message from Dr Apple.    No PND or orthopnea   PT is feeling \"good.\"  No worsening shortness of breath or new chest pain.    Pt has some mild congestion from allergies. Not unusual.  I did tell pt to discuss this common allergies, so anesthesia is aware.  Pt is lookin forward to hip surgery tomorrow. She is excited to get back to her normal life.  BETO Berg        "

## 2019-10-09 ENCOUNTER — APPOINTMENT (OUTPATIENT)
Dept: GENERAL RADIOLOGY | Facility: CLINIC | Age: 84
DRG: 470 | End: 2019-10-09
Attending: ORTHOPAEDIC SURGERY
Payer: COMMERCIAL

## 2019-10-09 ENCOUNTER — ANESTHESIA EVENT (OUTPATIENT)
Dept: SURGERY | Facility: CLINIC | Age: 84
DRG: 470 | End: 2019-10-09
Payer: COMMERCIAL

## 2019-10-09 ENCOUNTER — ANESTHESIA (OUTPATIENT)
Dept: SURGERY | Facility: CLINIC | Age: 84
DRG: 470 | End: 2019-10-09
Payer: COMMERCIAL

## 2019-10-09 ENCOUNTER — APPOINTMENT (OUTPATIENT)
Dept: PHYSICAL THERAPY | Facility: CLINIC | Age: 84
DRG: 470 | End: 2019-10-09
Attending: ORTHOPAEDIC SURGERY
Payer: COMMERCIAL

## 2019-10-09 ENCOUNTER — HOSPITAL ENCOUNTER (INPATIENT)
Facility: CLINIC | Age: 84
LOS: 3 days | Discharge: NURSING, REHAB OR LONG-TERM CARE | DRG: 470 | End: 2019-10-12
Attending: ORTHOPAEDIC SURGERY | Admitting: ORTHOPAEDIC SURGERY
Payer: COMMERCIAL

## 2019-10-09 DIAGNOSIS — N39.3 STRESS INCONTINENCE OF URINE: ICD-10-CM

## 2019-10-09 DIAGNOSIS — I10 HYPERTENSION GOAL BP (BLOOD PRESSURE) < 140/90: ICD-10-CM

## 2019-10-09 DIAGNOSIS — Z96.641 STATUS POST TOTAL REPLACEMENT OF RIGHT HIP: Primary | ICD-10-CM

## 2019-10-09 PROBLEM — Z96.649 S/P TOTAL HIP ARTHROPLASTY: Status: ACTIVE | Noted: 2019-10-09

## 2019-10-09 LAB
GLUCOSE BLDC GLUCOMTR-MCNC: 135 MG/DL (ref 70–99)
GLUCOSE BLDC GLUCOMTR-MCNC: 136 MG/DL (ref 70–99)
GLUCOSE BLDC GLUCOMTR-MCNC: 186 MG/DL (ref 70–99)
GLUCOSE BLDC GLUCOMTR-MCNC: 274 MG/DL (ref 70–99)

## 2019-10-09 PROCEDURE — 71000012 ZZH RECOVERY PHASE 1 LEVEL 1 FIRST HR: Performed by: ORTHOPAEDIC SURGERY

## 2019-10-09 PROCEDURE — 36000093 ZZH SURGERY LEVEL 4 1ST 30 MIN: Performed by: ORTHOPAEDIC SURGERY

## 2019-10-09 PROCEDURE — 25000132 ZZH RX MED GY IP 250 OP 250 PS 637: Performed by: ORTHOPAEDIC SURGERY

## 2019-10-09 PROCEDURE — 40000985 XR PELVIS AD HIP PORTABLE RIGHT 1 VIEW

## 2019-10-09 PROCEDURE — 40000169 ZZH STATISTIC PRE-PROCEDURE ASSESSMENT I: Performed by: ORTHOPAEDIC SURGERY

## 2019-10-09 PROCEDURE — 25000128 H RX IP 250 OP 636: Performed by: NURSE ANESTHETIST, CERTIFIED REGISTERED

## 2019-10-09 PROCEDURE — 25000566 ZZH SEVOFLURANE, EA 15 MIN: Performed by: ORTHOPAEDIC SURGERY

## 2019-10-09 PROCEDURE — 37000009 ZZH ANESTHESIA TECHNICAL FEE, EACH ADDTL 15 MIN: Performed by: ORTHOPAEDIC SURGERY

## 2019-10-09 PROCEDURE — 71000013 ZZH RECOVERY PHASE 1 LEVEL 1 EA ADDTL HR: Performed by: ORTHOPAEDIC SURGERY

## 2019-10-09 PROCEDURE — 0SR906A REPLACEMENT OF RIGHT HIP JOINT WITH OXIDIZED ZIRCONIUM ON POLYETHYLENE SYNTHETIC SUBSTITUTE, UNCEMENTED, OPEN APPROACH: ICD-10-PCS | Performed by: ORTHOPAEDIC SURGERY

## 2019-10-09 PROCEDURE — 25000132 ZZH RX MED GY IP 250 OP 250 PS 637: Performed by: NURSE PRACTITIONER

## 2019-10-09 PROCEDURE — 25000125 ZZHC RX 250: Performed by: NURSE ANESTHETIST, CERTIFIED REGISTERED

## 2019-10-09 PROCEDURE — 27210794 ZZH OR GENERAL SUPPLY STERILE: Performed by: ORTHOPAEDIC SURGERY

## 2019-10-09 PROCEDURE — 25000128 H RX IP 250 OP 636: Performed by: ORTHOPAEDIC SURGERY

## 2019-10-09 PROCEDURE — 00000146 ZZHCL STATISTIC GLUCOSE BY METER IP

## 2019-10-09 PROCEDURE — 99207 ZZC CONSULT E&M CHANGED TO INITIAL LEVEL: CPT | Performed by: NURSE PRACTITIONER

## 2019-10-09 PROCEDURE — 97161 PT EVAL LOW COMPLEX 20 MIN: CPT | Mod: GP

## 2019-10-09 PROCEDURE — 25800030 ZZH RX IP 258 OP 636: Performed by: ORTHOPAEDIC SURGERY

## 2019-10-09 PROCEDURE — 25800030 ZZH RX IP 258 OP 636: Performed by: ANESTHESIOLOGY

## 2019-10-09 PROCEDURE — 97530 THERAPEUTIC ACTIVITIES: CPT | Mod: GP

## 2019-10-09 PROCEDURE — 25000128 H RX IP 250 OP 636: Performed by: ANESTHESIOLOGY

## 2019-10-09 PROCEDURE — 12000000 ZZH R&B MED SURG/OB

## 2019-10-09 PROCEDURE — 97116 GAIT TRAINING THERAPY: CPT | Mod: GP

## 2019-10-09 PROCEDURE — 27810169 ZZH OR IMPLANT GENERAL: Performed by: ORTHOPAEDIC SURGERY

## 2019-10-09 PROCEDURE — 37000008 ZZH ANESTHESIA TECHNICAL FEE, 1ST 30 MIN: Performed by: ORTHOPAEDIC SURGERY

## 2019-10-09 PROCEDURE — 25000125 ZZHC RX 250: Performed by: ORTHOPAEDIC SURGERY

## 2019-10-09 PROCEDURE — C1776 JOINT DEVICE (IMPLANTABLE): HCPCS | Performed by: ORTHOPAEDIC SURGERY

## 2019-10-09 PROCEDURE — 99222 1ST HOSP IP/OBS MODERATE 55: CPT | Performed by: NURSE PRACTITIONER

## 2019-10-09 PROCEDURE — 25800025 ZZH RX 258: Performed by: ORTHOPAEDIC SURGERY

## 2019-10-09 PROCEDURE — 36000063 ZZH SURGERY LEVEL 4 EA 15 ADDTL MIN: Performed by: ORTHOPAEDIC SURGERY

## 2019-10-09 PROCEDURE — 25000131 ZZH RX MED GY IP 250 OP 636 PS 637: Performed by: NURSE PRACTITIONER

## 2019-10-09 PROCEDURE — 97110 THERAPEUTIC EXERCISES: CPT | Mod: GP

## 2019-10-09 DEVICE — IMP SHELL SNR ACET R3 3H 50MM 71335550: Type: IMPLANTABLE DEVICE | Site: HIP | Status: FUNCTIONAL

## 2019-10-09 DEVICE — IMPLANTABLE DEVICE: Type: IMPLANTABLE DEVICE | Site: HIP | Status: FUNCTIONAL

## 2019-10-09 DEVICE — IMP HOLE COVER SNN ACETAB CUP REFLEC INTERFIT 71336500: Type: IMPLANTABLE DEVICE | Site: HIP | Status: FUNCTIONAL

## 2019-10-09 RX ORDER — LIDOCAINE HYDROCHLORIDE 20 MG/ML
INJECTION, SOLUTION INFILTRATION; PERINEURAL PRN
Status: DISCONTINUED | OUTPATIENT
Start: 2019-10-09 | End: 2019-10-09

## 2019-10-09 RX ORDER — MAGNESIUM HYDROXIDE 1200 MG/15ML
LIQUID ORAL PRN
Status: DISCONTINUED | OUTPATIENT
Start: 2019-10-09 | End: 2019-10-09 | Stop reason: HOSPADM

## 2019-10-09 RX ORDER — LIDOCAINE 40 MG/G
CREAM TOPICAL
Status: DISCONTINUED | OUTPATIENT
Start: 2019-10-09 | End: 2019-10-12 | Stop reason: HOSPADM

## 2019-10-09 RX ORDER — SODIUM CHLORIDE, SODIUM LACTATE, POTASSIUM CHLORIDE, CALCIUM CHLORIDE 600; 310; 30; 20 MG/100ML; MG/100ML; MG/100ML; MG/100ML
INJECTION, SOLUTION INTRAVENOUS CONTINUOUS
Status: DISCONTINUED | OUTPATIENT
Start: 2019-10-09 | End: 2019-10-09 | Stop reason: HOSPADM

## 2019-10-09 RX ORDER — AMOXICILLIN 250 MG
2 CAPSULE ORAL 2 TIMES DAILY
Status: DISCONTINUED | OUTPATIENT
Start: 2019-10-09 | End: 2019-10-12 | Stop reason: HOSPADM

## 2019-10-09 RX ORDER — ACETAMINOPHEN 325 MG/1
650 TABLET ORAL EVERY 4 HOURS PRN
Status: DISCONTINUED | OUTPATIENT
Start: 2019-10-12 | End: 2019-10-12 | Stop reason: HOSPADM

## 2019-10-09 RX ORDER — CEFAZOLIN SODIUM 2 G/100ML
2 INJECTION, SOLUTION INTRAVENOUS
Status: COMPLETED | OUTPATIENT
Start: 2019-10-09 | End: 2019-10-09

## 2019-10-09 RX ORDER — GABAPENTIN 100 MG/1
100 CAPSULE ORAL 3 TIMES DAILY
Status: COMPLETED | OUTPATIENT
Start: 2019-10-09 | End: 2019-10-12

## 2019-10-09 RX ORDER — CEFAZOLIN SODIUM 1 G/3ML
1 INJECTION, POWDER, FOR SOLUTION INTRAMUSCULAR; INTRAVENOUS SEE ADMIN INSTRUCTIONS
Status: DISCONTINUED | OUTPATIENT
Start: 2019-10-09 | End: 2019-10-09 | Stop reason: HOSPADM

## 2019-10-09 RX ORDER — HYDROMORPHONE HYDROCHLORIDE 1 MG/ML
.3-.5 INJECTION, SOLUTION INTRAMUSCULAR; INTRAVENOUS; SUBCUTANEOUS EVERY 5 MIN PRN
Status: DISCONTINUED | OUTPATIENT
Start: 2019-10-09 | End: 2019-10-09 | Stop reason: HOSPADM

## 2019-10-09 RX ORDER — AMOXICILLIN 250 MG
1 CAPSULE ORAL 2 TIMES DAILY
Status: DISCONTINUED | OUTPATIENT
Start: 2019-10-09 | End: 2019-10-12 | Stop reason: HOSPADM

## 2019-10-09 RX ORDER — NALOXONE HYDROCHLORIDE 0.4 MG/ML
.1-.4 INJECTION, SOLUTION INTRAMUSCULAR; INTRAVENOUS; SUBCUTANEOUS
Status: DISCONTINUED | OUTPATIENT
Start: 2019-10-09 | End: 2019-10-09

## 2019-10-09 RX ORDER — ONDANSETRON 2 MG/ML
INJECTION INTRAMUSCULAR; INTRAVENOUS PRN
Status: DISCONTINUED | OUTPATIENT
Start: 2019-10-09 | End: 2019-10-09

## 2019-10-09 RX ORDER — KETOROLAC TROMETHAMINE 15 MG/ML
15 INJECTION, SOLUTION INTRAMUSCULAR; INTRAVENOUS EVERY 6 HOURS
Status: COMPLETED | OUTPATIENT
Start: 2019-10-09 | End: 2019-10-10

## 2019-10-09 RX ORDER — ONDANSETRON 2 MG/ML
4 INJECTION INTRAMUSCULAR; INTRAVENOUS EVERY 6 HOURS PRN
Status: DISCONTINUED | OUTPATIENT
Start: 2019-10-09 | End: 2019-10-12 | Stop reason: HOSPADM

## 2019-10-09 RX ORDER — ONDANSETRON 4 MG/1
4 TABLET, ORALLY DISINTEGRATING ORAL EVERY 30 MIN PRN
Status: DISCONTINUED | OUTPATIENT
Start: 2019-10-09 | End: 2019-10-09 | Stop reason: HOSPADM

## 2019-10-09 RX ORDER — EPHEDRINE SULFATE 50 MG/ML
INJECTION, SOLUTION INTRAMUSCULAR; INTRAVENOUS; SUBCUTANEOUS PRN
Status: DISCONTINUED | OUTPATIENT
Start: 2019-10-09 | End: 2019-10-09

## 2019-10-09 RX ORDER — NALOXONE HYDROCHLORIDE 0.4 MG/ML
.1-.4 INJECTION, SOLUTION INTRAMUSCULAR; INTRAVENOUS; SUBCUTANEOUS
Status: DISCONTINUED | OUTPATIENT
Start: 2019-10-09 | End: 2019-10-12 | Stop reason: HOSPADM

## 2019-10-09 RX ORDER — TOLTERODINE 4 MG/1
4 CAPSULE, EXTENDED RELEASE ORAL EVERY MORNING
Status: DISCONTINUED | OUTPATIENT
Start: 2019-10-10 | End: 2019-10-12 | Stop reason: HOSPADM

## 2019-10-09 RX ORDER — FENTANYL CITRATE 50 UG/ML
INJECTION, SOLUTION INTRAMUSCULAR; INTRAVENOUS PRN
Status: DISCONTINUED | OUTPATIENT
Start: 2019-10-09 | End: 2019-10-09

## 2019-10-09 RX ORDER — NEOSTIGMINE METHYLSULFATE 1 MG/ML
VIAL (ML) INJECTION PRN
Status: DISCONTINUED | OUTPATIENT
Start: 2019-10-09 | End: 2019-10-09

## 2019-10-09 RX ORDER — ONDANSETRON 2 MG/ML
4 INJECTION INTRAMUSCULAR; INTRAVENOUS EVERY 30 MIN PRN
Status: DISCONTINUED | OUTPATIENT
Start: 2019-10-09 | End: 2019-10-09 | Stop reason: HOSPADM

## 2019-10-09 RX ORDER — CEFAZOLIN SODIUM 1 G/3ML
1 INJECTION, POWDER, FOR SOLUTION INTRAMUSCULAR; INTRAVENOUS EVERY 8 HOURS
Status: COMPLETED | OUTPATIENT
Start: 2019-10-09 | End: 2019-10-10

## 2019-10-09 RX ORDER — GLYCOPYRROLATE 0.2 MG/ML
INJECTION, SOLUTION INTRAMUSCULAR; INTRAVENOUS PRN
Status: DISCONTINUED | OUTPATIENT
Start: 2019-10-09 | End: 2019-10-09

## 2019-10-09 RX ORDER — HYDROMORPHONE HYDROCHLORIDE 1 MG/ML
.3-.5 INJECTION, SOLUTION INTRAMUSCULAR; INTRAVENOUS; SUBCUTANEOUS
Status: DISCONTINUED | OUTPATIENT
Start: 2019-10-09 | End: 2019-10-12 | Stop reason: HOSPADM

## 2019-10-09 RX ORDER — ACETAMINOPHEN 325 MG/1
975 TABLET ORAL EVERY 8 HOURS
Status: COMPLETED | OUTPATIENT
Start: 2019-10-09 | End: 2019-10-12

## 2019-10-09 RX ORDER — SODIUM CHLORIDE 9 MG/ML
INJECTION, SOLUTION INTRAVENOUS CONTINUOUS
Status: DISCONTINUED | OUTPATIENT
Start: 2019-10-09 | End: 2019-10-12 | Stop reason: HOSPADM

## 2019-10-09 RX ORDER — DIPHENHYDRAMINE HCL 12.5MG/5ML
12.5 LIQUID (ML) ORAL EVERY 6 HOURS PRN
Status: DISCONTINUED | OUTPATIENT
Start: 2019-10-09 | End: 2019-10-12 | Stop reason: HOSPADM

## 2019-10-09 RX ORDER — CARVEDILOL 25 MG/1
25 TABLET ORAL 2 TIMES DAILY WITH MEALS
Status: DISCONTINUED | OUTPATIENT
Start: 2019-10-09 | End: 2019-10-12 | Stop reason: HOSPADM

## 2019-10-09 RX ORDER — NICOTINE POLACRILEX 4 MG
15-30 LOZENGE BUCCAL
Status: DISCONTINUED | OUTPATIENT
Start: 2019-10-09 | End: 2019-10-12 | Stop reason: HOSPADM

## 2019-10-09 RX ORDER — AMLODIPINE BESYLATE 5 MG/1
5 TABLET ORAL AT BEDTIME
Status: DISCONTINUED | OUTPATIENT
Start: 2019-10-09 | End: 2019-10-11

## 2019-10-09 RX ORDER — FENTANYL CITRATE 50 UG/ML
25-50 INJECTION, SOLUTION INTRAMUSCULAR; INTRAVENOUS
Status: DISCONTINUED | OUTPATIENT
Start: 2019-10-09 | End: 2019-10-09 | Stop reason: HOSPADM

## 2019-10-09 RX ORDER — DEXAMETHASONE SODIUM PHOSPHATE 4 MG/ML
INJECTION, SOLUTION INTRA-ARTICULAR; INTRALESIONAL; INTRAMUSCULAR; INTRAVENOUS; SOFT TISSUE PRN
Status: DISCONTINUED | OUTPATIENT
Start: 2019-10-09 | End: 2019-10-09

## 2019-10-09 RX ORDER — ONDANSETRON 4 MG/1
4 TABLET, ORALLY DISINTEGRATING ORAL EVERY 6 HOURS PRN
Status: DISCONTINUED | OUTPATIENT
Start: 2019-10-09 | End: 2019-10-12 | Stop reason: HOSPADM

## 2019-10-09 RX ORDER — OXYCODONE HYDROCHLORIDE 5 MG/1
5-10 TABLET ORAL EVERY 4 HOURS PRN
Status: DISCONTINUED | OUTPATIENT
Start: 2019-10-09 | End: 2019-10-12 | Stop reason: HOSPADM

## 2019-10-09 RX ORDER — DIPHENHYDRAMINE HYDROCHLORIDE 50 MG/ML
12.5 INJECTION INTRAMUSCULAR; INTRAVENOUS EVERY 6 HOURS PRN
Status: DISCONTINUED | OUTPATIENT
Start: 2019-10-09 | End: 2019-10-12 | Stop reason: HOSPADM

## 2019-10-09 RX ORDER — DEXTROSE MONOHYDRATE 25 G/50ML
25-50 INJECTION, SOLUTION INTRAVENOUS
Status: DISCONTINUED | OUTPATIENT
Start: 2019-10-09 | End: 2019-10-12 | Stop reason: HOSPADM

## 2019-10-09 RX ORDER — PROPOFOL 10 MG/ML
INJECTION, EMULSION INTRAVENOUS PRN
Status: DISCONTINUED | OUTPATIENT
Start: 2019-10-09 | End: 2019-10-09

## 2019-10-09 RX ADMIN — INSULIN ASPART 1 UNITS: 100 INJECTION, SOLUTION INTRAVENOUS; SUBCUTANEOUS at 18:08

## 2019-10-09 RX ADMIN — ASPIRIN 325 MG: 325 TABLET, DELAYED RELEASE ORAL at 14:56

## 2019-10-09 RX ADMIN — KETOROLAC TROMETHAMINE 15 MG: 15 INJECTION, SOLUTION INTRAMUSCULAR; INTRAVENOUS at 20:51

## 2019-10-09 RX ADMIN — ROCURONIUM BROMIDE 40 MG: 10 INJECTION INTRAVENOUS at 10:09

## 2019-10-09 RX ADMIN — SODIUM CHLORIDE: 9 INJECTION, SOLUTION INTRAVENOUS at 14:56

## 2019-10-09 RX ADMIN — Medication 5 MG: at 10:59

## 2019-10-09 RX ADMIN — GABAPENTIN 100 MG: 100 CAPSULE ORAL at 16:20

## 2019-10-09 RX ADMIN — KETOROLAC TROMETHAMINE 15 MG: 15 INJECTION, SOLUTION INTRAMUSCULAR; INTRAVENOUS at 14:56

## 2019-10-09 RX ADMIN — HYDROMORPHONE HYDROCHLORIDE 0.3 MG: 1 INJECTION, SOLUTION INTRAMUSCULAR; INTRAVENOUS; SUBCUTANEOUS at 12:04

## 2019-10-09 RX ADMIN — ACETAMINOPHEN 975 MG: 325 TABLET, FILM COATED ORAL at 21:37

## 2019-10-09 RX ADMIN — Medication 7 MG: at 11:05

## 2019-10-09 RX ADMIN — ONDANSETRON 4 MG: 2 INJECTION INTRAMUSCULAR; INTRAVENOUS at 11:01

## 2019-10-09 RX ADMIN — SODIUM CHLORIDE, POTASSIUM CHLORIDE, SODIUM LACTATE AND CALCIUM CHLORIDE: 600; 310; 30; 20 INJECTION, SOLUTION INTRAVENOUS at 09:24

## 2019-10-09 RX ADMIN — NEOSTIGMINE METHYLSULFATE 3.5 MG: 1 INJECTION, SOLUTION INTRAVENOUS at 11:15

## 2019-10-09 RX ADMIN — AMLODIPINE BESYLATE 5 MG: 5 TABLET ORAL at 21:37

## 2019-10-09 RX ADMIN — PROPOFOL 40 MG: 10 INJECTION, EMULSION INTRAVENOUS at 10:11

## 2019-10-09 RX ADMIN — FENTANYL CITRATE 100 MCG: 50 INJECTION, SOLUTION INTRAMUSCULAR; INTRAVENOUS at 10:09

## 2019-10-09 RX ADMIN — GLYCOPYRROLATE 0.6 MG: 0.2 INJECTION, SOLUTION INTRAMUSCULAR; INTRAVENOUS at 11:15

## 2019-10-09 RX ADMIN — PROPOFOL 90 MG: 10 INJECTION, EMULSION INTRAVENOUS at 10:09

## 2019-10-09 RX ADMIN — HYDROMORPHONE HYDROCHLORIDE 0.5 MG: 1 INJECTION, SOLUTION INTRAMUSCULAR; INTRAVENOUS; SUBCUTANEOUS at 11:11

## 2019-10-09 RX ADMIN — CEFAZOLIN 1 G: 1 INJECTION, POWDER, FOR SOLUTION INTRAMUSCULAR; INTRAVENOUS at 17:18

## 2019-10-09 RX ADMIN — CEFAZOLIN SODIUM 2 G: 2 INJECTION, SOLUTION INTRAVENOUS at 10:10

## 2019-10-09 RX ADMIN — CARVEDILOL 25 MG: 25 TABLET, FILM COATED ORAL at 17:20

## 2019-10-09 RX ADMIN — LIDOCAINE HYDROCHLORIDE 60 MG: 20 INJECTION, SOLUTION INFILTRATION; PERINEURAL at 10:09

## 2019-10-09 RX ADMIN — ACETAMINOPHEN 975 MG: 325 TABLET, FILM COATED ORAL at 14:56

## 2019-10-09 RX ADMIN — DEXAMETHASONE SODIUM PHOSPHATE 4 MG: 4 INJECTION, SOLUTION INTRA-ARTICULAR; INTRALESIONAL; INTRAMUSCULAR; INTRAVENOUS; SOFT TISSUE at 10:47

## 2019-10-09 RX ADMIN — SENNOSIDES AND DOCUSATE SODIUM 2 TABLET: 8.6; 5 TABLET ORAL at 20:52

## 2019-10-09 RX ADMIN — GABAPENTIN 100 MG: 100 CAPSULE ORAL at 21:37

## 2019-10-09 ASSESSMENT — ACTIVITIES OF DAILY LIVING (ADL)
ADLS_ACUITY_SCORE: 17
ADLS_ACUITY_SCORE: 15

## 2019-10-09 ASSESSMENT — LIFESTYLE VARIABLES: TOBACCO_USE: 1

## 2019-10-09 ASSESSMENT — ENCOUNTER SYMPTOMS: DYSRHYTHMIAS: 1

## 2019-10-09 ASSESSMENT — MIFFLIN-ST. JEOR: SCORE: 1129.9

## 2019-10-09 NOTE — OP NOTE
Procedure Date: 10/09/2019      PREOPERATIVE DIAGNOSIS:  Degenerative joint disease, right hip.      POSTOPERATIVE DIAGNOSIS:  Degenerative joint disease, right hip.      PROCEDURE:  Right total hip arthroplasty.      ANESTHESIA:  General.        SURGEON:  Valentino Vizcarra MD.        FIRST ASSISTANT:  JENNIFER De Anda.      INDICATIONS:  This is an 85-year-old woman who has had chronic increasing problems with right hip pain and has failed a nonoperative treatment program.  Appropriate risks and alternatives have been discussed at length, and she has elected to proceed with surgical intervention.      DESCRIPTION OF PROCEDURE:  The patient was brought to the operating room, given a general anesthetic, positioned left side down and right hip prepped and draped sterilely in the usual manner.      I made our standard lateral incision.  Dissection was carried down to the fascia.  Fascia was split in line with its fibers.  The anterior one-half of the abductors was dissected free off the greater trochanter, capsule opened and the hip dislocated anteriorly.  Femoral neck was osteotomized at appropriate level.  Inspection of the joint revealed complete loss of articular cartilage.      We used the reamers to prepare the acetabulum to accept a 50 mm cup.  The 50 mm R3 cup was driven into position, 45 degrees of inclination and 40 degrees of anteversion.  Excellent fixation was achieved.      Attention was turned back to the femur.  Progressively larger reamers and broaches were used until we sized for a #13 component.  Trial reduction was done and with a -3 head, we had restored leg length with full range of motion and excellent stability.      Hip was redislocated and trial components were removed.  Bony surfaces water picked with antibiotic solution.  We placed the real polyethylene in the acetabular component.  We drove the femoral component with excellent fixation.  We retrialed the head and again chose a -3, 32 mm  Oxinium head.  This was driven onto the C taper.  Final reduction again gave excellent restoration of leg length with full range of motion, no impingement.      Wound was irrigated copiously with antibiotic solution.  Hemostasis was obtained by electrocautery.  Closure done in layers, closing the abductors back to the greater trochanter with #1 Vicryl, fascia with #1 Vicryl, subcutaneous tissue with 2-0 Vicryl, Insorb on the skin.  Sterile compressive dressing applied.  The patient was awakened and taken to the recovery room in good condition.  There were no intraoperative complications and minimal blood loss.         DESHAWN ALDANA MD             D: 10/09/2019   T: 10/09/2019   MT: NEIL      Name:     ABIEL VILLALPANDO   MRN:      [id number]        Account:        [id number]   :      1934           Procedure Date: 10/09/2019      Document: [id number]       cc: Deshawn Aldana MD

## 2019-10-09 NOTE — PLAN OF CARE
PT:  Patient plan: Go to TCU on Saturday per pt and daughter, state the surgeon suggested it.  Current status: Orders received, eval completed, treatment initiated. Pt is POD 0 R MIKAYLA, no hip precautions. Prior to admit pt was living alone in an independent apartment, was recently using a FWW due to hip pain, otherwise independent with all mobility and ADLs. Currently requires min A for supine to sit, CGA sit to stand with FWW, CGA for gait of 75 ft with FWW, mod A for sit to supine and able to scoot independently. Participated well in strengthening and ROM activities. Pt demonstrates pain, dec strength, ROM, balance, activity tolerance and difficulty ambulating and transferring and would benefit from skilled PT services in order to improve this.  Anticipated status at discharge: Pt will need to be assist of 1-2 with all mobility using FWW before going to TCU, be able to transfer and ambulate short distances.

## 2019-10-09 NOTE — ANESTHESIA POSTPROCEDURE EVALUATION
Patient: Aundrea Treviño    Procedure(s):  RIGHT TOTAL HIP ARTHROPLASTY    Diagnosis:DJD RIGHT HIP  Diagnosis Additional Information: No value filed.    Anesthesia Type:  General, ETT    Note:  Anesthesia Post Evaluation    Patient location during evaluation: PACU  Patient participation: Able to fully participate in evaluation  Level of consciousness: awake  Pain management: adequate  Airway patency: patent  Cardiovascular status: acceptable  Respiratory status: acceptable  Hydration status: acceptable  PONV: none     Anesthetic complications: None          Last vitals:  Vitals:    10/09/19 1300 10/09/19 1320 10/09/19 1326   BP: 116/76 115/74    Pulse: 68 67    Resp: 13 13    Temp: 35.6  C (96.1  F) 35.6  C (96.1  F)    SpO2: 93% 95% 96%         Electronically Signed By: Reji Givens MD  October 9, 2019  1:54 PM

## 2019-10-09 NOTE — ANESTHESIA PREPROCEDURE EVALUATION
Anesthesia Pre-Procedure Evaluation    Patient: Aundrea Treviño   MRN: [id number] : 1934          Preoperative Diagnosis: DJD RIGHT HIP    Procedure(s):  RIGHT TOTAL HIP ARTHROPLASTY    Past Medical History:   Diagnosis Date     Chronic anticoagulation 2017     Diverticulitis of colon (without mention of hemorrhage)(562.11)      DM type 2, goal A1C below 8.0 12/3/2014     Essential and other specified forms of tremor     eval'd by neuro      Hyperlipidemia LDL goal <100 2010     Hypertension goal BP (blood pressure) < 140/90      Intestinal disaccharidase deficiencies and disaccharide malabsorption      Moderate tricuspid regurgitation 2017     Osteoarthrosis, unspecified whether generalized or localized, unspecified site      Paroxysmal atrial fibrillation (H) 2017     Pulmonary hypertension, mild (H) 2017     Type 2 diabetes, HbA1c goal < 7% (H) 2010     Urge incontinence      Past Surgical History:   Procedure Laterality Date     CYSTOSCOPY, SLING TRANSVAGINAL N/A 2019    Procedure: Midurethral Sling and Cystoscopy (Support the Urethra with Mesh and Look in the Bladder);  Surgeon: Irma Painter MD;  Location: [facility] OR     SURGICAL HISTORY OF -       hysterectomy, bladder suspension     SURGICAL HISTORY OF -       tonsillectomy       Anesthesia Evaluation     . Pt has had prior anesthetic. Type: General    No history of anesthetic complications          ROS/MED HX    ENT/Pulmonary:  - neg pulmonary ROS   (+)tobacco use, Past use , . .    Neurologic:  - neg neurologic ROS     Cardiovascular: Comment: Mild pulmonary hypertension    (+) hypertension----. Taking blood thinners : . . . :. dysrhythmias a-fib, valvular problems/murmurs TR:.       METS/Exercise Tolerance:  3 - Able to walk 1-2 blocks without stopping   Hematologic:  - neg hematologic  ROS       Musculoskeletal:  - neg musculoskeletal ROS (+) arthritis,  -       GI/Hepatic:  - neg GI/hepatic ROS      "  Renal/Genitourinary:  - ROS Renal section negative       Endo:     (+) type II DM .      Psychiatric:         Infectious Disease:  - neg infectious disease ROS       Malignancy:         Other:                          Physical Exam  Normal systems: dental    Airway   Mallampati: II  TM distance: >3 FB  Neck ROM: full    Dental     Cardiovascular   Rhythm and rate: regular and normal      Pulmonary    breath sounds clear to auscultation            Lab Results   Component Value Date    WBC 6.9 05/20/2019    HGB 14.2 09/23/2019    HCT 39.9 05/20/2019     05/20/2019    CRP 62.0 (H) 05/20/2019    SED 15 05/20/2019     10/07/2019    POTASSIUM 4.5 10/07/2019    CHLORIDE 100 10/07/2019    CO2 28 10/07/2019    BUN 12 10/07/2019    CR 0.67 10/07/2019     (H) 10/07/2019    KAIDEN 9.2 10/07/2019    ALBUMIN 3.4 05/20/2019    PROTTOTAL 6.9 05/20/2019    ALT 30 05/20/2019    AST 28 05/20/2019    ALKPHOS 88 05/20/2019    BILITOTAL 1.1 05/20/2019    TSH 1.26 05/20/2019       Preop Vitals  BP Readings from Last 3 Encounters:   10/09/19 109/74   10/07/19 126/74   09/26/19 138/78    Pulse Readings from Last 3 Encounters:   10/09/19 72   10/07/19 72   09/26/19 101      Resp Readings from Last 3 Encounters:   10/09/19 17   07/23/19 16   07/03/19 18    SpO2 Readings from Last 3 Encounters:   10/09/19 100%   10/07/19 99%   09/26/19 98%      Temp Readings from Last 1 Encounters:   10/09/19 35.3  C (95.5  F)    Ht Readings from Last 1 Encounters:   10/09/19 1.626 m (5' 4\")      Wt Readings from Last 1 Encounters:   10/09/19 70 kg (154 lb 4.8 oz)    Estimated body mass index is 26.49 kg/m  as calculated from the following:    Height as of this encounter: 1.626 m (5' 4\").    Weight as of this encounter: 70 kg (154 lb 4.8 oz).       Anesthesia Plan      History & Physical Review  History and physical reviewed and following examination; no interval change.    ASA Status:  2 .    NPO Status:  > 8 hours    Plan for General and " ETT with Intravenous and Propofol induction.   PONV prophylaxis:  Ondansetron (or other 5HT-3) and Dexamethasone or Solumedrol       Postoperative Care  Postoperative pain management:  IV analgesics and Oral pain medications.      Consents  Anesthetic plan, risks, benefits and alternatives discussed with:  Patient.  Use of blood products discussed: Yes.   Blood product refusal consent signed.   Reason for refusal: Sabianism.                 Reji Givens MD

## 2019-10-09 NOTE — PLAN OF CARE
Pt arrived from PACU @ 1345. Lethargic on arrival, A&O x 4 now. Repositioned per pt's comfort, Complains of minimal discomfort on R hip, managed w/ ice and toradol. Denies chest pain or SOB. IVF infusing. DTV.  PT scheduled @ 1530. Will continue to monitor.

## 2019-10-09 NOTE — CONSULTS
Consult Date:  10/09/2019      PRIMARY CARE PROVIDER:  Krysten Molina MD      REQUESTING PROVIDER:  Valentino Vizcarra MD      REASON FOR CONSULTATION:  Hospitalist consult.      HISTORY OF PRESENT ILLNESS:  Ms. 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  Procedure was completed by Dr. Valentino Vizcarra under general anesthesia with an estimated blood loss of 100 mL.      Presently, the patient is seen on the postoperative orthopedic unit with her daughter present at the bedside.  The patient is lying in bed, awake, alert, in no obvious distress.  The patient reports no current or recent chest pain, shortness of breath, nausea, vomiting, diarrhea, constipation, fevers or chills.  Of note, the patient confirms Jehovah Witness and requests no blood products be administered if needed.      PAST MEDICAL HISTORY:   1.  Mild pulmonary hypertension.   2.  Hypertension.   3.  Paroxysmal atrial fibrillation on chronic anticoagulation.   4.  Moderate tricuspid regurgitation.   5.  Diabetes mellitus type 2 with peripheral neuropathy, not on longstanding insulin.   6.  Venous insufficiency.   7.  Gout.   8.  Stress incontinence.     9.  Essential tremor.      PAST SURGICAL HISTORY:   1.  Hysterectomy.   2.  Bladder sling.   3.  Tonsillectomy.      SOCIAL HISTORY:  Tobacco:  Remote history.  Alcohol:  Drinks wine rarely, special occasions.  Illicit drugs:  None.  Lives in an apartment by herself.      FAMILY HISTORY:  Father, cerebrovascular accident.  Sister, ovarian cancer.      ALLERGIES:   1.  Amlodipine to higher doses.   2.  Tape.   3.   Food allergies.      MEDICATIONS:   Prior to Admission medications    Medication Sig Last Dose Taking? Auth Provider   [place] 650 MG TABS Take 2 capsules by mouth 2 times daily For arthritis pain 10/6/2019 at hs Yes Reported, Patient   Alpha Lipoic Acid 200 MG CAPS Take 200 mg by mouth daily  10/7/2019 at am Yes Unknown, Entered By History   amLODIPine (NORVASC) 5 MG tablet Take 1 tablet (5 mg) by mouth daily 10/8/2019 at 2300 Yes Krysten Molina MD   apixaban ANTICOAGULANT (ELIQUIS) 5 MG tablet Take 1 tablet (5 mg) by mouth 2 times daily 10/6/2019 at hs Yes Krysten Molina MD   calcium carbonate (TUMS E-X 750) 750 MG CHEW Take 750 mg by mouth 2 times daily  10/6/2019 at hs Yes Reported, Patient   carvedilol (COREG) 25 MG tablet Take 1 tablet (25 mg) by mouth 2 times daily with meals 10/9/2019 at 0600 Yes Tracy Olivera, APRN CNP   COMPOUNDED NON-CONTROLLED SUBSTANCE (CMPD RX) - PHARMACY TO MIX COMPOUNDED MEDICATION Estriol 1 mg/g  Apply small amount to finger and apply to inside vagina daily for 2 weeks then twice weekly  Route: vaginally Over 2 weeks ago at prn Yes Irma Painter MD   FLAXSEED (LINSEED) PO POWD Take 1 Tablespoonful by mouth every morning  10/7/2019 at am Yes Johana John MD   lisinopril (PRINIVIL/ZESTRIL) 40 MG tablet Take 1 tablet (40 mg) by mouth daily  Patient taking differently: Take 40 mg by mouth every evening  10/8/2019 at Unknown time Yes Krysten Molina MD   metFORMIN (GLUCOPHAGE) 500 MG tablet Take 1 tablet (500 mg) by mouth daily (with dinner) 10/8/2019 at 1500 Yes Krysten Molina MD   oxyCODONE (ROXICODONE) 5 MG tablet Take 0.5-1 tablets (2.5-5 mg) by mouth every 6 hours as needed for severe pain Never taken. at prn Yes Zion Blanco MD   predniSONE (DELTASONE) 5 MG tablet Take 5 mg by mouth daily as needed (for painful flare) May 2019 at prn Yes Unknown, Entered By History   senna-docusate (SENOKOT-S/PERICOLACE) 8.6-50 MG tablet Take 1 tablet by mouth 2  times daily as needed for constipation (Take while taking narcotic pain medications.) June 2019 at prn Yes Zion Blanco MD   triamcinolone (KENALOG) 0.5 % cream Apply topically 2 times daily to affected area as directed.  Patient taking differently: Apply topically 2 times daily as needed to affected area as directed. Over 1 month ago at prn Yes Jes Barboza MD   trospium (SANCTURA XR) 60 MG CP24 24 hr capsule Take 1 capsule (60 mg) by mouth every morning 10/8/2019 at am Yes Irma Painter MD   VITAMIN D 1000 UNIT OR TABS Take 1,000 Units by mouth At Bedtime  10/6/2019 at hs Yes Terry Mota MD   blood glucose (NO BRAND SPECIFIED) lancets standard Use to test blood sugar one times daily or as directed. Dispense compatible with insurance and glucometer - One Touch Ultra   Krysten Molina MD   blood glucose monitoring (NO BRAND SPECIFIED) meter device kit Use to test blood sugar one times daily or as directed.  Dispense One Touch Ultra per insurance   Krysten Molina MD   blood glucose monitoring (NO BRAND SPECIFIED) test strip Use to test blood sugars one times daily or as directed.  Dispense compatible with insurance and glucometer - One Touch [employer]   Krysten Molina MD   hydrochlorothiazide (HYDRODIURIL) 25 MG tablet TAKE ONE TABLET BY MOUTH ONE TIME DAILY 10/1/2019  Alise Winslow MD   order for DME Equipment being ordered: Left wrist splint.   Tramaine Borja MD     REVIEW OF SYSTEMS:  A 10-point review of systems was completed.  All pertinent positives are noted in the HPI.  All other systems negative.      PHYSICAL EXAMINATION:   VITAL SIGNS:  Reviewed and as follows:  Temperature 97.5, blood pressure 113/62, heart rate 67, respiratory rate 13, O2 sats 98% on 2 liters via nasal cannula.     GENERAL:  The patient lying in bed, pleasant, awake, alert, cooperative, in no apparent distress, appears stated age, healthy-appearing and well groomed.   HEENT:  Pupils equal,  round and reactive to light.  Extraocular muscles intact.  Sclerae are clear.  Normocephalic, atraumatic.   NECK:  Supple.  Normal range of motion, no nuchal rigidity noted.   LUNGS:  No increased work of breathing.  Clear to auscultation bilaterally.  No crackles or wheezing noted.   CARDIOVASCULAR:  Normal apical pulse, regular rate and rhythm, normal S1 and S2.  No murmur, rub or gallop noted.   ABDOMEN:  Normal bowel sounds, soft, nondistended, nontender.  No masses palpated.  No guarding, rebound tenderness noted.     EXTREMITIES:  Dorsalis pedis pulses palpable bilaterally.  Surgical dressing noted on the right hip with scant sanguineous drainage noted.   NEUROLOGIC:  Awake, alert and oriented.  Cranial nerves II-XII are grossly intact.  Sensory is intact.  Speech fluent.   SKIN:  Warm and dry.  No lesions noted.  No erythema.  Surgical dressing noted on right hip with scant sanguineous drainage noted.   PSYCHIATRIC:  Mentation appears normal and affect normal.      LABORATORY DATA:  Reviewed in Epic.      ASSESSMENT AND PLAN:   Ms. 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  The patient had been admitted to Orthopedics for further evaluation and management.      Status post right total hip arthroplasty.    - Defer management of above including DVT prophylaxis, antibiotics, pain management and wound care.    - The patient on Eliquis prior to admission in setting of chronic atrial fibrillation; will defer resuming of Eliquis to primary orthopedic service.     Hypertension.    - Continue PTA amlodipine, carvedilol.    -  Holding PTA hydrochlorothiazide at this time in setting of patient requiring maintenance IV fluids; of note, the patient's hydrochlorothiazide had been on hold prior to admission in setting of hyponatremia, primary care provider has been monitoring.    - Holding PTA lisinopril pending repeat BMP in the a.m.     Chronic paroxysmal atrial fibrillation on longstanding anticoagulation.    -Continue PTA carvedilol.    - Holding PTA Eliquis in setting of recent surgery.  Will defer resuming to primary orthopedic service.  The patient has been placed on full dose aspirin postoperatively by orthopedic service.     Diabetes mellitus type 2 with peripheral neuropathy, not on longstanding insulin.    - PTA regimen includes metformin, will hold at this time and resume at discharge.    -- The patient placed on medium sliding scale insulin until discharge.     Chronic, stable diagnoses:  - Tricuspid regurgitation  - Venous insufficiency  - Gout  - Stress incontinence  - Essential tremor    Deep venous thrombosis prophylaxis:  Per orthopedic service has been placed on full dose ASA.      CODE STATUS:  FULL CODE.      DISPOSITION:  Per orthopedic service.      This patient was discussed with Dr. Bailey Whatley of the Hospitalist Service who agrees with current plans as outlined above.         ZULEMA WHATLEY MD       As dictated by MARCELA PARK NP            D: 10/09/2019   T: 10/09/2019   MT: DINA      Name:     ABIEL VILLALPANDO   MRN:      [id number]        Account:       [id number]   :      1934           Consult Date:  10/09/2019      Document: [id number]

## 2019-10-09 NOTE — PROGRESS NOTES
Admission medication history interview status for the 10/9/2019  admission is complete. See EPIC admission navigator for prior to admission medications     Medication history source reliability:Good    Medication history interview source(s):Patient    Medication history resources (including written lists, pill bottles, clinic record):Brought in a med list on day of procedure.    [facility]    Additional medication history information not noted on PTA med list :None    Time spent in this activity: 30 minutes    Prior to Admission medications    Medication Sig Last Dose Taking? Auth Provider   [place] 650 MG TABS Take 2 capsules by mouth 2 times daily For arthritis pain 10/6/2019 at hs Yes Reported, Patient   Alpha Lipoic Acid 200 MG CAPS Take 200 mg by mouth daily  10/7/2019 at am Yes Unknown, Entered By History   amLODIPine (NORVASC) 5 MG tablet Take 1 tablet (5 mg) by mouth daily 10/8/2019 at 2300 Yes Krysten Molina MD   apixaban ANTICOAGULANT (ELIQUIS) 5 MG tablet Take 1 tablet (5 mg) by mouth 2 times daily 10/6/2019 at hs Yes Krysten Molina MD   calcium carbonate (TUMS E-X 750) 750 MG CHEW Take 750 mg by mouth 2 times daily  10/6/2019 at hs Yes Reported, Patient   carvedilol (COREG) 25 MG tablet Take 1 tablet (25 mg) by mouth 2 times daily with meals 10/9/2019 at 0600 Yes Tracy Olivera APRN CNP   COMPOUNDED NON-CONTROLLED SUBSTANCE (CMPD RX) - PHARMACY TO MIX COMPOUNDED MEDICATION Estriol 1 mg/g  Apply small amount to finger and apply to inside vagina daily for 2 weeks then twice weekly  Route: vaginally Over 2 weeks ago at prn Yes Irma Painter MD   FLAXSEED (LINSEED) PO POWD Take 1 Tablespoonful by mouth every morning  10/7/2019 at am Yes Johana John MD   metFORMIN (GLUCOPHAGE) 500 MG tablet Take 1 tablet (500 mg) by mouth daily (with dinner) 10/8/2019 at 1500 Yes Krysten Molina MD   oxyCODONE (ROXICODONE) 5 MG tablet Take 0.5-1 tablets (2.5-5 mg) by mouth every 6  hours as needed for severe pain Never taken. at prn Yes Zion Blanco MD   predniSONE (DELTASONE) 5 MG tablet Take 5 mg by mouth daily as needed (for painful flare) May 2019 at prn Yes Unknown, Entered By History   senna-docusate (SENOKOT-S/PERICOLACE) 8.6-50 MG tablet Take 1 tablet by mouth 2 times daily as needed for constipation (Take while taking narcotic pain medications.) June 2019 at prn Yes Zion Blanco MD   triamcinolone (KENALOG) 0.5 % cream Apply topically 2 times daily to affected area as directed.  Patient taking differently: Apply topically 2 times daily as needed to affected area as directed. Over 1 month ago at prn Yes Jes Barboza MD   trospium (SANCTURA XR) 60 MG CP24 24 hr capsule Take 1 capsule (60 mg) by mouth every morning 10/8/2019 at am Yes Irma Painter MD   VITAMIN D 1000 UNIT OR TABS Take 1,000 Units by mouth At Bedtime  10/6/2019 at hs Yes Terry Mota MD   blood glucose (NO BRAND SPECIFIED) lancets standard Use to test blood sugar one times daily or as directed. Dispense compatible with insurance and glucometer - One Touch Ultra   Krysten Molina MD   blood glucose monitoring (NO BRAND SPECIFIED) meter device kit Use to test blood sugar one times daily or as directed.  Dispense One Touch Ultra per insurance   Krysten Molina MD   blood glucose monitoring (NO BRAND SPECIFIED) test strip Use to test blood sugars one times daily or as directed.  Dispense compatible with insurance and glucometer - One Touch Ultra   Krysten Molina MD   hydrochlorothiazide (HYDRODIURIL) 25 MG tablet TAKE ONE TABLET BY MOUTH ONE TIME DAILY 10/1/2019  Alise Winslow MD   lisinopril (PRINIVIL/ZESTRIL) 40 MG tablet Take 1 tablet (40 mg) by mouth daily  Patient taking differently: Take 40 mg by mouth every evening    Krysten Molina MD   order for DME Equipment being ordered: Left wrist splint.   Tramaine Borja MD

## 2019-10-09 NOTE — ANESTHESIA CARE TRANSFER NOTE
Patient: Aundrea Treviño    Procedure(s):  RIGHT TOTAL HIP ARTHROPLASTY    Diagnosis: DJD RIGHT HIP  Diagnosis Additional Information: No value filed.    Anesthesia Type:   No value filed.     Note:  Airway :Face Mask  Patient transferred to:PACU  Comments: Neuromuscular blockade reversed after TOF 4/4, spontaneous respirations, adequate tidal volumes, followed commands to voice, oropharynx suctioned with soft flexible catheter, extubated atraumatically, extubated with suction, airway patent after extubation. Dentition unchanged after extubation. Oxygen via facemask at 6 liters per minute to PACU. Oxygen tubing connected to wall O2 in PACU, SpO2, NiBP, and EKG monitors and alarms on and functioning, report on patient's clinical status given to PACU RN. Handoff Report: Identifed the Patient, Identified the Reponsible Provider, Reviewed the pertinent medical history, Discussed the surgical course, Reviewed Intra-OP anesthesia mangement and issues during anesthesia, Set expectations for post-procedure period and Allowed opportunity for questions and acknowledgement of understanding      Vitals: (Last set prior to Anesthesia Care Transfer)    CRNA VITALS  10/9/2019 1057 - 10/9/2019 1134      10/9/2019             Pulse:  75    SpO2:  100 %    Resp Rate (observed):  12    Resp Rate (set):  10                Electronically Signed By: TIMOTEO Degroot CRNA  October 9, 2019  11:34 AM

## 2019-10-09 NOTE — BRIEF OP NOTE
[facility]    Brief Operative Note    Pre-operative diagnosis: DJD RIGHT HIP  Post-operative diagnosis * No post-op diagnosis entered *  Procedure: Procedure(s):  RIGHT TOTAL HIP ARTHROPLASTY  Surgeon: Surgeon(s) and Role:     * Valentino Vizcarra MD - Primary     * Panda Arroyo - Assisting  Anesthesia: General   Estimated blood loss: Less than 100 ml  Drains: None  Specimens: * No specimens in log *  Findings:   None.  Complications: None.  Implants:    Implant Name Type Inv. Item Serial No.  Lot No. LRB No. Used   IMP SHELL SNR ACET R3 3H 50MM [id number] Total Joint Component/Insert IMP SHELL SNR ACET R3 3H 50MM [id number]  [place]  [id number] Right 1   IMP HOLE COVER SNN ACETAB CUP REFLEC INTERFIT [id number] Metallic Hardware/[place] IMP HOLE COVER SNN ACETAB CUP REFLEC INTERFIT [id number]  [place]  [id number] Right 1   IMP LINER S&amp;N ACET R3 XLPE [id number] 20DEG [id number] Total Joint Component/Insert IMP LINER S&amp;N ACET R3 XLPE [id number] 20DEG [id number]  [place]  [id number] Right 1   IMP STEM SNN SYNERGY STD POR PLUS SZ 13 155MM [id number] Total Joint Component/Insert IMP STEM SNN SYNERGY STD POR PLUS SZ 13 155MM [id number]  [place]  [id number] Right 1   OXINIUM 32MM OD -3 12/14 TAPER FEMORAL HEAD     [id number] Right 1

## 2019-10-10 ENCOUNTER — APPOINTMENT (OUTPATIENT)
Dept: PHYSICAL THERAPY | Facility: CLINIC | Age: 84
DRG: 470 | End: 2019-10-10
Attending: ORTHOPAEDIC SURGERY
Payer: COMMERCIAL

## 2019-10-10 LAB
ANION GAP SERPL CALCULATED.3IONS-SCNC: 3 MMOL/L (ref 3–14)
BUN SERPL-MCNC: 11 MG/DL (ref 7–30)
CALCIUM SERPL-MCNC: 8.5 MG/DL (ref 8.5–10.1)
CHLORIDE SERPL-SCNC: 103 MMOL/L (ref 94–109)
CO2 SERPL-SCNC: 28 MMOL/L (ref 20–32)
CREAT SERPL-MCNC: 0.55 MG/DL (ref 0.52–1.04)
GFR SERPL CREATININE-BSD FRML MDRD: 85 ML/MIN/{1.73_M2}
GLUCOSE BLDC GLUCOMTR-MCNC: 147 MG/DL (ref 70–99)
GLUCOSE BLDC GLUCOMTR-MCNC: 168 MG/DL (ref 70–99)
GLUCOSE BLDC GLUCOMTR-MCNC: 182 MG/DL (ref 70–99)
GLUCOSE BLDC GLUCOMTR-MCNC: 186 MG/DL (ref 70–99)
GLUCOSE SERPL-MCNC: 168 MG/DL (ref 70–99)
HGB BLD-MCNC: 11.5 G/DL (ref 11.7–15.7)
POTASSIUM SERPL-SCNC: 4.2 MMOL/L (ref 3.4–5.3)
SODIUM SERPL-SCNC: 134 MMOL/L (ref 133–144)

## 2019-10-10 PROCEDURE — 25000128 H RX IP 250 OP 636: Performed by: ORTHOPAEDIC SURGERY

## 2019-10-10 PROCEDURE — 97530 THERAPEUTIC ACTIVITIES: CPT | Mod: GP | Performed by: PHYSICAL THERAPY ASSISTANT

## 2019-10-10 PROCEDURE — 12000000 ZZH R&B MED SURG/OB

## 2019-10-10 PROCEDURE — 99207 ZZC CDG-MDM COMPONENT: MEETS MODERATE - UP CODED: CPT | Performed by: INTERNAL MEDICINE

## 2019-10-10 PROCEDURE — 25000132 ZZH RX MED GY IP 250 OP 250 PS 637: Performed by: ORTHOPAEDIC SURGERY

## 2019-10-10 PROCEDURE — 00000146 ZZHCL STATISTIC GLUCOSE BY METER IP

## 2019-10-10 PROCEDURE — 97110 THERAPEUTIC EXERCISES: CPT | Mod: GP | Performed by: PHYSICAL THERAPY ASSISTANT

## 2019-10-10 PROCEDURE — 80048 BASIC METABOLIC PNL TOTAL CA: CPT | Performed by: ORTHOPAEDIC SURGERY

## 2019-10-10 PROCEDURE — 97116 GAIT TRAINING THERAPY: CPT | Mod: GP | Performed by: PHYSICAL THERAPY ASSISTANT

## 2019-10-10 PROCEDURE — 85018 HEMOGLOBIN: CPT | Performed by: ORTHOPAEDIC SURGERY

## 2019-10-10 PROCEDURE — 36415 COLL VENOUS BLD VENIPUNCTURE: CPT | Performed by: ORTHOPAEDIC SURGERY

## 2019-10-10 PROCEDURE — 25000132 ZZH RX MED GY IP 250 OP 250 PS 637: Performed by: NURSE PRACTITIONER

## 2019-10-10 PROCEDURE — 99232 SBSQ HOSP IP/OBS MODERATE 35: CPT | Performed by: INTERNAL MEDICINE

## 2019-10-10 RX ADMIN — ACETAMINOPHEN 975 MG: 325 TABLET, FILM COATED ORAL at 15:58

## 2019-10-10 RX ADMIN — SENNOSIDES AND DOCUSATE SODIUM 2 TABLET: 8.6; 5 TABLET ORAL at 20:36

## 2019-10-10 RX ADMIN — GABAPENTIN 100 MG: 100 CAPSULE ORAL at 08:32

## 2019-10-10 RX ADMIN — GABAPENTIN 100 MG: 100 CAPSULE ORAL at 15:58

## 2019-10-10 RX ADMIN — GABAPENTIN 100 MG: 100 CAPSULE ORAL at 22:28

## 2019-10-10 RX ADMIN — ASPIRIN 325 MG: 325 TABLET, DELAYED RELEASE ORAL at 08:32

## 2019-10-10 RX ADMIN — ACETAMINOPHEN 975 MG: 325 TABLET, FILM COATED ORAL at 08:33

## 2019-10-10 RX ADMIN — INSULIN ASPART 1 UNITS: 100 INJECTION, SOLUTION INTRAVENOUS; SUBCUTANEOUS at 18:58

## 2019-10-10 RX ADMIN — INSULIN ASPART 1 UNITS: 100 INJECTION, SOLUTION INTRAVENOUS; SUBCUTANEOUS at 12:43

## 2019-10-10 RX ADMIN — INSULIN ASPART 1 UNITS: 100 INJECTION, SOLUTION INTRAVENOUS; SUBCUTANEOUS at 08:36

## 2019-10-10 RX ADMIN — CARVEDILOL 25 MG: 25 TABLET, FILM COATED ORAL at 08:32

## 2019-10-10 RX ADMIN — TOLTERODINE 4 MG: 4 CAPSULE, EXTENDED RELEASE ORAL at 08:32

## 2019-10-10 RX ADMIN — CEFAZOLIN 1 G: 1 INJECTION, POWDER, FOR SOLUTION INTRAMUSCULAR; INTRAVENOUS at 01:44

## 2019-10-10 RX ADMIN — CARVEDILOL 25 MG: 25 TABLET, FILM COATED ORAL at 18:58

## 2019-10-10 RX ADMIN — KETOROLAC TROMETHAMINE 15 MG: 15 INJECTION, SOLUTION INTRAMUSCULAR; INTRAVENOUS at 01:44

## 2019-10-10 RX ADMIN — AMLODIPINE BESYLATE 5 MG: 5 TABLET ORAL at 22:30

## 2019-10-10 RX ADMIN — KETOROLAC TROMETHAMINE 15 MG: 15 INJECTION, SOLUTION INTRAMUSCULAR; INTRAVENOUS at 08:37

## 2019-10-10 ASSESSMENT — ACTIVITIES OF DAILY LIVING (ADL)
ADLS_ACUITY_SCORE: 17

## 2019-10-10 NOTE — PLAN OF CARE
Patient plan:  Go to TCU on Saturday per pt and daughter, state the surgeon suggested it.  Current status: pt is below baseline with functional mobiltiy and ADL's. Will defer OT eval to next level of care to allow time to progress mobility with PT  Anticipated status at discharge: defer to PT eval

## 2019-10-10 NOTE — PLAN OF CARE
Patient plan: Go to TCU on Saturday per pt and daughter, state the surgeon suggested it.  Current status:  Pt performed supine to sit transfer with min assist and increased time.  Mild dizziness noted after transfer but resolved with rest.  Sit to/from stand transfer with min assist.  Gait training x 100 ft using wheeled walker and CGA.  Slow pace.  Limited due to fatigue.   Anticipated status at discharge: Min assist with transfers, SBA-CGA with gait

## 2019-10-10 NOTE — PLAN OF CARE
A&O x4, VSS on RA, CMS intact, Drng-small drainage, up with assist of 1, pt voided about 100cc and bladder scanned at 50cc post residual- denies any urge to go or pressure or discomfort, encouraged oral fluid, Pain controlled w/ tylenol and toradol, Diet m-carb- blood sugar monitored, IV-infusing, Continue to monitor.

## 2019-10-10 NOTE — PROGRESS NOTES
SW:  D: Following for discharge planning. Spoke with pt re: discharge plans pt confirms that her plan is to go to TCU and she would like referrals in order to [facility], and [facility]. LVM with Daughter Minerva [phone] also to confirm plans.    P: SW will continue to follow for discharge plans.    IDALMIS Crane

## 2019-10-10 NOTE — PLAN OF CARE
Patient is A&O, VSS on 2L NC, CMS intact, moderate carb diet, up with assist of 1, and dressing has some drainage on it. She denies pain, IV SL, and on schedule Toradol and Tylenol. Will continue to monitor

## 2019-10-10 NOTE — PROGRESS NOTES
Aundrea is doing well day 1 PO MIKAYLA. She was sitting up in a chair and feeling comfortable. Her pain is well managed. She was pleased to be able to stay through Saturday when she will be transfer to a TCU. All her questions were answered. Plan to see her again in the morning.

## 2019-10-10 NOTE — PROGRESS NOTES
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  The patient had been admitted to Orthopedics for further evaluation and management.      Status post right total hip arthroplasty  - Defer post-op cares to primary service      Hypertension  - Continue PTA amlodipine, carvedilol.    - Holding PTA hydrochlorothiazide.  Can likely restart tomorrow   - Holding PTA lisinopril and likely restart tomorrow      Chronic paroxysmal atrial fibrillation on longstanding anticoagulation.    -Continue PTA carvedilol.    - Holding PTA Eliquis in setting of recent surgery.  Will defer resuming to primary orthopedic service.  The patient has been placed on full dose aspirin postoperatively by orthopedic service.      Diabetes mellitus type 2 with peripheral neuropathy, not on longstanding insulin.    - Holding PTA Metformin   - Continue SSI        Diet: Moderate Consistent CHO Diet    DVT Prophylaxis: Defer to primary service  Joshi Catheter: not present  Code Status: Full Code      Disposition Plan   Expected discharge: Defer to primary service.  From medical stand point patient is stable for discharge   Entered: Russ Alejandre DO 10/10/2019, 2:07 PM       The patient's care was discussed with the Patient.    Russ Alejandre DO  Hospitalist Service  [facility]    ______________________________________________________________________    Interval History   Patient seen and examined.  Post op pain is  controlled.  No fevers or chills.  No chest pain or SOB.     Data reviewed today: I reviewed all medications, new labs and imaging results over the last 24 hours. I personally reviewed no images or EKG's today.    Physical Exam   Vital Signs: Temp: 97.5  F (36.4  C) Temp src: Oral BP: 126/72 Pulse: 64 Heart Rate: 74 Resp: 16 SpO2: 99 % O2 Device: Nasal cannula Oxygen Delivery: 2 LPM  Weight: 154 lbs 4.8 oz  General Appearance: Resting comfortably.  NAD   Respiratory: Clear to auscultation.  No respiratory distress  Cardiovascular: Sounds regular.  No obvious murmurs  GI: Bowel sounds noted.  Non-tender  Skin: No rashes  Other: No edema      Data   Recent Labs   Lab 10/10/19  0646 10/07/19  1422   HGB 11.5*  --     135   POTASSIUM 4.2 4.5   CHLORIDE 103 100   CO2 28 28   BUN 11 12   CR 0.55 0.67   ANIONGAP 3 7   KAIDEN 8.5 9.2   * 102*     No results found for this or any previous visit (from the past 24 hour(s)).

## 2019-10-10 NOTE — PLAN OF CARE
VSS on RA, baseline neuropathy otherwise CMS intact, scheduled Tylenol, appetite good, sliding scale insulin, up with assist 1/walker/GB, voiding in BR, aquacel saturated, replaced steri-strips, new aquacel applied, Panda WILEY updated,  plans to discharge to TCU 10/12/19.

## 2019-10-11 ENCOUNTER — APPOINTMENT (OUTPATIENT)
Dept: PHYSICAL THERAPY | Facility: CLINIC | Age: 84
DRG: 470 | End: 2019-10-11
Attending: ORTHOPAEDIC SURGERY
Payer: COMMERCIAL

## 2019-10-11 ENCOUNTER — PRE VISIT (OUTPATIENT)
Dept: UROLOGY | Facility: CLINIC | Age: 84
End: 2019-10-11

## 2019-10-11 LAB
GLUCOSE BLDC GLUCOMTR-MCNC: 129 MG/DL (ref 70–99)
GLUCOSE BLDC GLUCOMTR-MCNC: 146 MG/DL (ref 70–99)
GLUCOSE BLDC GLUCOMTR-MCNC: 157 MG/DL (ref 70–99)
GLUCOSE BLDC GLUCOMTR-MCNC: 160 MG/DL (ref 70–99)
HGB BLD-MCNC: 10.5 G/DL (ref 11.7–15.7)

## 2019-10-11 PROCEDURE — 99232 SBSQ HOSP IP/OBS MODERATE 35: CPT | Performed by: INTERNAL MEDICINE

## 2019-10-11 PROCEDURE — 25000132 ZZH RX MED GY IP 250 OP 250 PS 637: Performed by: NURSE PRACTITIONER

## 2019-10-11 PROCEDURE — 97110 THERAPEUTIC EXERCISES: CPT | Mod: GP | Performed by: PHYSICAL THERAPY ASSISTANT

## 2019-10-11 PROCEDURE — 85018 HEMOGLOBIN: CPT | Performed by: ORTHOPAEDIC SURGERY

## 2019-10-11 PROCEDURE — 25000132 ZZH RX MED GY IP 250 OP 250 PS 637: Performed by: INTERNAL MEDICINE

## 2019-10-11 PROCEDURE — 12000000 ZZH R&B MED SURG/OB

## 2019-10-11 PROCEDURE — 00000146 ZZHCL STATISTIC GLUCOSE BY METER IP

## 2019-10-11 PROCEDURE — 97116 GAIT TRAINING THERAPY: CPT | Mod: GP | Performed by: PHYSICAL THERAPY ASSISTANT

## 2019-10-11 PROCEDURE — 25000132 ZZH RX MED GY IP 250 OP 250 PS 637: Performed by: ORTHOPAEDIC SURGERY

## 2019-10-11 PROCEDURE — 99207 ZZC CDG-MDM COMPONENT: MEETS MODERATE - UP CODED: CPT | Performed by: INTERNAL MEDICINE

## 2019-10-11 PROCEDURE — 36415 COLL VENOUS BLD VENIPUNCTURE: CPT | Performed by: ORTHOPAEDIC SURGERY

## 2019-10-11 RX ORDER — HYDROCHLOROTHIAZIDE 25 MG/1
25 TABLET ORAL DAILY
Status: DISCONTINUED | OUTPATIENT
Start: 2019-10-11 | End: 2019-10-12 | Stop reason: HOSPADM

## 2019-10-11 RX ORDER — AMLODIPINE BESYLATE 2.5 MG/1
2.5 TABLET ORAL AT BEDTIME
Status: DISCONTINUED | OUTPATIENT
Start: 2019-10-11 | End: 2019-10-12 | Stop reason: HOSPADM

## 2019-10-11 RX ORDER — LISINOPRIL 40 MG/1
40 TABLET ORAL EVERY EVENING
Status: DISCONTINUED | OUTPATIENT
Start: 2019-10-11 | End: 2019-10-12 | Stop reason: HOSPADM

## 2019-10-11 RX ADMIN — ACETAMINOPHEN 975 MG: 325 TABLET, FILM COATED ORAL at 00:00

## 2019-10-11 RX ADMIN — SENNOSIDES AND DOCUSATE SODIUM 2 TABLET: 8.6; 5 TABLET ORAL at 21:16

## 2019-10-11 RX ADMIN — SENNOSIDES AND DOCUSATE SODIUM 2 TABLET: 8.6; 5 TABLET ORAL at 08:25

## 2019-10-11 RX ADMIN — GABAPENTIN 100 MG: 100 CAPSULE ORAL at 16:22

## 2019-10-11 RX ADMIN — INSULIN ASPART 1 UNITS: 100 INJECTION, SOLUTION INTRAVENOUS; SUBCUTANEOUS at 12:23

## 2019-10-11 RX ADMIN — CARVEDILOL 25 MG: 25 TABLET, FILM COATED ORAL at 08:25

## 2019-10-11 RX ADMIN — GABAPENTIN 100 MG: 100 CAPSULE ORAL at 08:26

## 2019-10-11 RX ADMIN — TOLTERODINE 4 MG: 4 CAPSULE, EXTENDED RELEASE ORAL at 08:25

## 2019-10-11 RX ADMIN — INSULIN ASPART 1 UNITS: 100 INJECTION, SOLUTION INTRAVENOUS; SUBCUTANEOUS at 08:27

## 2019-10-11 RX ADMIN — ASPIRIN 325 MG: 325 TABLET, DELAYED RELEASE ORAL at 08:26

## 2019-10-11 RX ADMIN — HYDROCHLOROTHIAZIDE 25 MG: 25 TABLET ORAL at 12:23

## 2019-10-11 RX ADMIN — GABAPENTIN 100 MG: 100 CAPSULE ORAL at 21:16

## 2019-10-11 RX ADMIN — CARVEDILOL 25 MG: 25 TABLET, FILM COATED ORAL at 18:12

## 2019-10-11 RX ADMIN — LISINOPRIL 40 MG: 40 TABLET ORAL at 21:16

## 2019-10-11 RX ADMIN — ACETAMINOPHEN 975 MG: 325 TABLET, FILM COATED ORAL at 08:25

## 2019-10-11 RX ADMIN — AMLODIPINE BESYLATE 2.5 MG: 2.5 TABLET ORAL at 21:16

## 2019-10-11 RX ADMIN — ACETAMINOPHEN 975 MG: 325 TABLET, FILM COATED ORAL at 16:22

## 2019-10-11 ASSESSMENT — ACTIVITIES OF DAILY LIVING (ADL)
ADLS_ACUITY_SCORE: 17

## 2019-10-11 NOTE — CONSULTS
SW consult for discharge planning. Referrals sent 10/10. Pt's daughter, Minerva would like pt to go to [facility]. Called [facility] and confirmed that pt can be accepted for admission. [facility] will plan for pt to admit Saturday. Per chart notes, Minerva is planning on bringing pt to [facility].     Citlali Dai MSW, [facility]    ADDENDUM @7509:     PAS-RR    D: Per DHS regulation, CHAD completed and submitted PAS-RR to [place] [employer] Direct Connect via the Senior LinkAge Line.  PAS-RR confirmation # is : [id number]    P: Further questions may be directed to Senior LinkAge Line at #[phone], option #4 for PAS-RR staff.

## 2019-10-11 NOTE — PLAN OF CARE
Patient plan: Go to TCU on Saturday per pt and daughter, state the surgeon suggested it.  Current status:  Pt performed bed mobility with min assist and sit to/from stand transfers with SBA.  Gait training x 230 ft using wheeled walker and CGA.  Slow pace.  Step length improving.  Good stability.  Pt performed 3 stairs x 1 trial using bilateral rails with CGA.   Anticipated status at discharge: Min assist with transfers, SBA-CGA with gait

## 2019-10-11 NOTE — PLAN OF CARE
Pt A/O. VSS on ra. Regular diet. Denies pain. Scheduled Tylenol given. Moist drainage on equal cell dressing. Will continue to monitor.

## 2019-10-11 NOTE — PROGRESS NOTES
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  The patient had been admitted to Orthopedics for further evaluation and management.      Status post right total hip arthroplasty  - Defer post-op cares to primary service      Hypertension  - Decreased Norvasc to 2.5 mg as the patient says she has been taking that at home  - PTA Coreg  - Restarted PTA Lisinopril and hydrochlorothiazide today       Chronic paroxysmal atrial fibrillation on longstanding anticoagulation.    - Continue PTA carvedilol.    - Holding PTA Eliquis in setting of recent surgery.  Will defer resuming to primary orthopedic service.  The patient has been placed on full dose aspirin postoperatively by orthopedic service.      Diabetes mellitus type 2 with peripheral neuropathy, not on longstanding insulin.    -  Holding PTA Metformin   -  Continue SSI        Diet: Moderate Consistent CHO Diet    DVT Prophylaxis: Defer to primary service  Joshi Catheter: not present  Code Status: Full Code      Disposition Plan   Expected discharge: Defer to primary service   Entered: Russ Alejandre DO 10/11/2019, 10:28 AM       The patient's care was discussed with the Patient.    Russ Alejandre DO  Hospitalist Service  [facility]    ______________________________________________________________________    Interval History   Patient seen and examined.  No acute events over night.  Post-op pain is well controlled.  No chest pain  or SOB.  No fevers or chills.  Tolerating diet.     Data reviewed today: I reviewed all medications, new labs and imaging results over the last 24 hours. I personally reviewed no images or EKG's today.    Physical Exam   Vital Signs: Temp: 97.7  F (36.5  C) Temp src: Oral BP: (!) 145/81 Pulse: 65 Heart Rate: 82 Resp: 16 SpO2: 98 % O2 Device: None (Room air)    Weight: 154 lbs 4.8 oz  General Appearance: Resting comfortably in chair.  NAD  Respiratory: Clear to auscultation.  No respiratory distress  Cardiovascular: RRR.  No obvious murmurs  GI: Bowel sounds present.  Non-tender  Skin: No rashes.  No cyanosis  Other: Trace lower extremity edema.  No calf tenderness      Data   Recent Labs   Lab 10/11/19  0701 10/10/19  0646 10/07/19  1422   HGB 10.5* 11.5*  --    NA  --  134 135   POTASSIUM  --  4.2 4.5   CHLORIDE  --  103 100   CO2  --  28 28   BUN  --  11 12   CR  --  0.55 0.67   ANIONGAP  --  3 7   KAIDEN  --  8.5 9.2   GLC  --  168* 102*     No results found for this or any previous visit (from the past 24 hour(s)).

## 2019-10-11 NOTE — PROGRESS NOTES
Aundrea is doing well PO day 2 right total hip. She has had some continued post operative bleeding noted in her Acquacel. It is not and abnormal amount of bleeding. She should have her dressing changed again today. Apply new steri-strips as needed. Her pain is well managed with oral pain medication. Plan to transfer to TCU tomorrow. Her daughter is planning to be her transportation. All questions were answered. She will see us in clinic for her PO visit.

## 2019-10-11 NOTE — TELEPHONE ENCOUNTER
Reason for Visit: post operative check up S/P mid urethral sling placement and cystoscopy    Diagnosis: DELFIN and urethral hypermobility    Orders/Procedures/Records: in system    Contact Patient: n/a    Rooming Requirements: UA dip / PVR / undress for exam      Jennifer No LPN  10/11/19  8:39 AM

## 2019-10-11 NOTE — PLAN OF CARE
Pt is A&O x4. Baseline numbness on BLE. Denies chest pain or SOB. Up w/ one assist to bathroom. Pain well managed w/ scheduled tylenol and ice. Declined oxycodone. Dressing changed, CDI. Discharging to TCU tomorrow.

## 2019-10-12 ENCOUNTER — APPOINTMENT (OUTPATIENT)
Dept: PHYSICAL THERAPY | Facility: CLINIC | Age: 84
DRG: 470 | End: 2019-10-12
Attending: ORTHOPAEDIC SURGERY
Payer: COMMERCIAL

## 2019-10-12 VITALS
HEART RATE: 62 BPM | DIASTOLIC BLOOD PRESSURE: 68 MMHG | WEIGHT: 154.3 LBS | OXYGEN SATURATION: 98 % | SYSTOLIC BLOOD PRESSURE: 136 MMHG | TEMPERATURE: 98.1 F | RESPIRATION RATE: 16 BRPM | BODY MASS INDEX: 26.34 KG/M2 | HEIGHT: 64 IN

## 2019-10-12 LAB
GLUCOSE BLDC GLUCOMTR-MCNC: 127 MG/DL (ref 70–99)
GLUCOSE BLDC GLUCOMTR-MCNC: 128 MG/DL (ref 70–99)
GLUCOSE BLDC GLUCOMTR-MCNC: 150 MG/DL (ref 70–99)

## 2019-10-12 PROCEDURE — 25000132 ZZH RX MED GY IP 250 OP 250 PS 637: Performed by: NURSE PRACTITIONER

## 2019-10-12 PROCEDURE — 97110 THERAPEUTIC EXERCISES: CPT | Mod: GP | Performed by: PHYSICAL THERAPY ASSISTANT

## 2019-10-12 PROCEDURE — 99207 ZZC CDG-MDM COMPONENT: MEETS MODERATE - UP CODED: CPT | Performed by: INTERNAL MEDICINE

## 2019-10-12 PROCEDURE — 00000146 ZZHCL STATISTIC GLUCOSE BY METER IP

## 2019-10-12 PROCEDURE — 97116 GAIT TRAINING THERAPY: CPT | Mod: GP | Performed by: PHYSICAL THERAPY ASSISTANT

## 2019-10-12 PROCEDURE — 25000132 ZZH RX MED GY IP 250 OP 250 PS 637: Performed by: INTERNAL MEDICINE

## 2019-10-12 PROCEDURE — 25000132 ZZH RX MED GY IP 250 OP 250 PS 637: Performed by: ORTHOPAEDIC SURGERY

## 2019-10-12 PROCEDURE — 99232 SBSQ HOSP IP/OBS MODERATE 35: CPT | Performed by: INTERNAL MEDICINE

## 2019-10-12 RX ORDER — AMLODIPINE BESYLATE 5 MG/1
2.5 TABLET ORAL DAILY
DISCHARGE
Start: 2019-10-12 | End: 2019-10-22

## 2019-10-12 RX ORDER — ACETAMINOPHEN 325 MG/1
650 TABLET ORAL EVERY 4 HOURS PRN
DISCHARGE
Start: 2019-10-12 | End: 2019-10-17

## 2019-10-12 RX ORDER — OXYCODONE HYDROCHLORIDE 5 MG/1
2.5-5 TABLET ORAL EVERY 6 HOURS PRN
Qty: 10 TABLET | Refills: 0 | Status: SHIPPED | OUTPATIENT
Start: 2019-10-12 | End: 2019-10-13

## 2019-10-12 RX ADMIN — GABAPENTIN 100 MG: 100 CAPSULE ORAL at 08:15

## 2019-10-12 RX ADMIN — CARVEDILOL 25 MG: 25 TABLET, FILM COATED ORAL at 08:14

## 2019-10-12 RX ADMIN — HYDROCHLOROTHIAZIDE 25 MG: 25 TABLET ORAL at 08:15

## 2019-10-12 RX ADMIN — ACETAMINOPHEN 975 MG: 325 TABLET, FILM COATED ORAL at 08:13

## 2019-10-12 RX ADMIN — INSULIN ASPART 1 UNITS: 100 INJECTION, SOLUTION INTRAVENOUS; SUBCUTANEOUS at 12:56

## 2019-10-12 RX ADMIN — ASPIRIN 325 MG: 325 TABLET, DELAYED RELEASE ORAL at 08:15

## 2019-10-12 RX ADMIN — ACETAMINOPHEN 975 MG: 325 TABLET, FILM COATED ORAL at 00:06

## 2019-10-12 RX ADMIN — TOLTERODINE 4 MG: 4 CAPSULE, EXTENDED RELEASE ORAL at 08:14

## 2019-10-12 ASSESSMENT — ACTIVITIES OF DAILY LIVING (ADL)
ADLS_ACUITY_SCORE: 17

## 2019-10-12 NOTE — PLAN OF CARE
Pt is A&O x4. Up w/ one assist to bathroom. Had BM x1. Small dried drainage on aquacel dressing, intact. Minimal pain well managed w/ tylenol and ice. Denies chest pain or SOB. Reviewed discharge paperwork w/ patient and daughter. No IV access. No further questions asked. Discharge pt w/ belonging and paperwork. Pt daughter transferring to [place].

## 2019-10-12 NOTE — PLAN OF CARE
Date/Time 10/12 nights    Trauma/Ortho/Medical (Choose one) ortho    Diagnosis Right MIKAYLA  POD#: 3  Mental Status:A+O  Activity/dangle assist 1/walker/GB  Diet:mod CHO  Pain:scheduled Tylenol  Joshi/Voiding:BR  Tele/Restraints/Iso:NA  02/LDA:RA/no IV access  D/C Date:10/12/19  Other Info:  Pt A&O vss on RA CMS intact +2 edema in R lower foot. UP SBA tolerated diet. Voiding uses W/GB pain managed with schedule tylenol plan possibly go yo TCU today nursing will continue to monitor.

## 2019-10-12 NOTE — DISCHARGE INSTRUCTIONS
TOTAL HIP REPLACEMENT TAKE HOME INSTRUCTIONS   Your surgeon will answer any questions about your progress. General guidelines for your care are listed below. Your surgeon may give additional instructions for your care at home. Please follow them carefully.    Activity Level  1. Physical activity may be resumed gradually according to your comfort level and your surgeon s instructions. Follow your exercise program as instructed by your therapist. Do exercises at least twice daily    Good Health Practices  1. Maintain an adequate fluid intake and eat a well balanced diet.  2. Be sure to include the basic food groups, such as dairy products, meat/fish, vegetables, and fruit. Each of these foods contributes to helping your wound heal and increasing your strength.  3. Surgery, decreased activity and pain medication all contribute to a decrease in bowel activity that can result in constipation. It is recommended that you increase your liquid intake, add fiber to your diet, increase activity, and decrease pain medication use. If you have any problems, notify your physician.  4. Notify your dentist of your total hip surgery and call your dentist one week before a dental appointment for antibiotics.    Things to Watch For  1. Check incision daily for increased redness, tenderness, swelling, or drainage along the incision line. If these occur, please notify your doctor. Also, call if you develop a fever above 101 .  2. Please notify your doctor if you experience any calf pain and/or if you have surgical pain not relieved by the pain medication prescribed by your doctor.      Please call  [phone] for follow up appointment with Dr. Vizcarra.  Call  [phone] for any concerns/ questions during off hours.

## 2019-10-12 NOTE — PROGRESS NOTES
SW      D: SW following for discharge needs. Received discharge orders for patient. Patient accepted at [facility]. Patient updated on this and in agreement. Patient's daughter to transport at 1400. SW faxed orders, script and updated [facility] on transport time.     P: No other SW needs at this time.

## 2019-10-12 NOTE — PLAN OF CARE
Patient plan: TCU today  Current status:  Pt performed bed mobility with min assist and sit to/from stand transfers with SBA.  Gait training x 350 ft using wheeled walker with SBA.  Good stability.  Anticipated status at discharge: Min assist with bed mobility, SBA with gait        Pt is discharging to TCU today.  PT goals partially met.

## 2019-10-12 NOTE — PROGRESS NOTES
[facility]    Internal Medicine Hospitalist Progress Note  10/12/2019  I evaluated patient on the above date.    Marcelo Christian Jr., MD  [phone] (p)  Text Page        Assessment & Plan New actions/orders today (10/12/2019) are underlined.    Aundrea Treviño is an 85-year-old female with past medical history significant for DM2, HTN, paroxysmal atrial fibrillation on chronic anticoagulation and moderate tricuspid regurgitation, who underwent elective right total hip arthroplasty on 10/9/2019. IM Hospitalists consulted for assistance with medical management.     Status post right total hip arthroplasty 10/9/2019.  - Post-op management per Ortho.    Anemia related to surgery.  Hgb normal in 9/2019. No overt clinical signs of major bleeding.   Recent Labs   Lab 10/11/19  0701 10/10/19  0646   HGB 10.5* 11.5*   - Monitor CBC.  - Consider prbc transfusion if hgb </= 7.0 or if significant bleeding with hemodynamic instability or if symptomatic.    Diabetes mellitus type 2 with peripheral neuropathy.  [PTA: metformin 500 mg daily with dinner.]  Hgb A1C 6.1 9/2019.  -  Resume PTA Metformin at discharge.  -  Continue SSI here.    Hypertension (benign essential).  [PTA: amlodipine 2.5 mg daily, carvedilol 25 mg BID, HCTZ 25 mg daily, lisinopril  40 mg daily.]  - Continue amlodipine, carvedilol, HCTZ, lisinopril.     Chronic paroxysmal atrial fibrillation on longstanding anticoagulation.    - Continue PTA carvedilol.    - On ASA here.  - Resume Eliquis at discharge per Ortho.       Diet: Moderate Consistent CHO Diet  Advance Diet as Tolerated    Prophylaxis: PCD's, ambulation. ASA per Ortho.  Joshi Catheter: not present  Code Status: Full Code      Disposition Plan   Expected discharge: Today, recommended to transitional care unit once per Ortho.  Entered: Marcelo Christian MD 10/12/2019, 12:11 PM         Interval History   Doing OK.  Hip pain better overall after surgery.    -Data reviewed today: I  "reviewed all new labs and imaging over the last 24 hours. I personally reviewed no images or EKG's today.    Physical Exam   Heart Rate: 70, Blood pressure 136/68, pulse 62, temperature 98.1  F (36.7  C), temperature source Oral, resp. rate 16, height 1.626 m (5' 4\"), weight 70 kg (154 lb 4.8 oz), SpO2 98 %.  Vitals:    10/09/19 0828   Weight: 70 kg (154 lb 4.8 oz)     Vital Signs with Ranges  Temp:  [97.5  F (36.4  C)-98.1  F (36.7  C)] 98.1  F (36.7  C)  Pulse:  [59-65] 62  Heart Rate:  [65-70] 70  Resp:  [16] 16  BP: (108-136)/(58-79) 136/68  SpO2:  [96 %-98 %] 98 %  Patient Vitals for the past 24 hrs:   BP Temp Temp src Pulse Heart Rate Resp SpO2   10/12/19 0900 -- -- -- -- -- 16 --   10/12/19 0810 136/68 98.1  F (36.7  C) Oral -- 70 16 98 %   10/11/19 2300 108/66 98  F (36.7  C) Oral 62 -- 16 96 %   10/11/19 2000 118/58 97.8  F (36.6  C) Oral 59 -- 16 96 %   10/11/19 1813 120/70 -- -- 65 -- -- --   10/11/19 1622 -- -- -- -- -- 16 --   10/11/19 1538 134/79 97.5  F (36.4  C) Oral -- 65 16 98 %   10/11/19 1246 -- -- -- -- -- 16 --     I/O's Last 24 hours  I/O last 3 completed shifts:  In: 1120 [P.O.:1120]  Out: -     Constitutional: Alert, oriented.  Respiratory: Diminished in bases. No crackles or wheezes.  Cardiovascular: Irregular, rate normal, no m/r/g.  GI:   Skin/Integumen:   Other:        Data   Recent Labs   Lab 10/11/19  0701 10/10/19  0646 10/07/19  1422   HGB 10.5* 11.5*  --    NA  --  134 135   POTASSIUM  --  4.2 4.5   CHLORIDE  --  103 100   CO2  --  28 28   BUN  --  11 12   CR  --  0.55 0.67   ANIONGAP  --  3 7   KAIDEN  --  8.5 9.2   GLC  --  168* 102*     Recent Labs   Lab Test 10/12/19  0817 10/12/19  0301 10/11/19  2249 10/11/19  1752 10/11/19  1107  10/10/19  0646  10/07/19  1422 09/26/19  1503 09/23/19  1459  05/20/19  1224   GLC  --   --   --   --   --   --  168*  --  102* 136* 105*  --  189*   * 128* 160* 129* 157*   < >  --    < >  --   --   --    < >  --     < > = values in this " interval not displayed.         No results found for this or any previous visit (from the past 24 hour(s)).    Medications   All medications were reviewed.    sodium chloride Stopped (10/10/19 0548)       amLODIPine  2.5 mg Oral At Bedtime     aspirin  325 mg Oral Daily     carvedilol  25 mg Oral BID w/meals     hydrochlorothiazide  25 mg Oral Daily     insulin aspart  1-7 Units Subcutaneous TID AC     insulin aspart  1-5 Units Subcutaneous At Bedtime     lisinopril  40 mg Oral QPM     senna-docusate  1 tablet Oral BID    Or     senna-docusate  2 tablet Oral BID     sodium chloride (PF)  3 mL Intracatheter Q8H     tolterodine ER  4 mg Oral QAM

## 2019-10-12 NOTE — PROVIDER NOTIFICATION
Paged surgeon regarding no order for aspirin for discharge meds. Awaiting for call back.      @1320, received call back from Dr. Vizcarra, no order for aspirin because pt is taking eliquis. Updated surgeon that pt is discharging to [facility] @ 1330.    [employer] Pregnancy And Lactation Text: This medication is Pregnancy Category X and is absolutely contraindicated during pregnancy. It is unknown if it is excreted in breast milk.

## 2019-10-13 ENCOUNTER — NURSING HOME VISIT (OUTPATIENT)
Dept: GERIATRICS | Facility: CLINIC | Age: 84
End: 2019-10-13
Payer: COMMERCIAL

## 2019-10-13 VITALS
SYSTOLIC BLOOD PRESSURE: 163 MMHG | BODY MASS INDEX: 27.29 KG/M2 | OXYGEN SATURATION: 98 % | DIASTOLIC BLOOD PRESSURE: 73 MMHG | WEIGHT: 159 LBS | RESPIRATION RATE: 16 BRPM | HEART RATE: 80 BPM | TEMPERATURE: 97.8 F

## 2019-10-13 DIAGNOSIS — M15.0 PRIMARY OSTEOARTHRITIS INVOLVING MULTIPLE JOINTS: Primary | ICD-10-CM

## 2019-10-13 DIAGNOSIS — N39.3 FEMALE STRESS INCONTINENCE: ICD-10-CM

## 2019-10-13 DIAGNOSIS — I27.20 PULMONARY HYPERTENSION, MILD (H): ICD-10-CM

## 2019-10-13 DIAGNOSIS — E11.42 TYPE 2 DIABETES MELLITUS WITH DIABETIC POLYNEUROPATHY, WITHOUT LONG-TERM CURRENT USE OF INSULIN (H): ICD-10-CM

## 2019-10-13 DIAGNOSIS — I07.1 MODERATE TRICUSPID REGURGITATION: ICD-10-CM

## 2019-10-13 DIAGNOSIS — Z96.641 STATUS POST TOTAL REPLACEMENT OF RIGHT HIP: ICD-10-CM

## 2019-10-13 DIAGNOSIS — I48.0 PAROXYSMAL ATRIAL FIBRILLATION (H): ICD-10-CM

## 2019-10-13 PROCEDURE — 99309 SBSQ NF CARE MODERATE MDM 30: CPT | Performed by: NURSE PRACTITIONER

## 2019-10-13 PROCEDURE — 99207 ZZC CDG-MDM COMPONENT: MEETS MODERATE - UP CODED: CPT | Performed by: NURSE PRACTITIONER

## 2019-10-13 NOTE — PROGRESS NOTES
[facility]  PRIMARY CARE PROVIDER AND CLINIC:  Krysten Molina MD, MD, [address] [place] [address] / [facility] [id number]  Chief Complaint   Patient presents with     Hospital F/U     [facility] Record Number:  [id number]  Place of Service where encounter took place:  [facility] [[id number]]    Aundrea Treviño  is a 85 year old  (8/29/1934), admitted to the above facility from  [facility]. Hospital stay 10/9/19 through 10/121/9..  Admitted to this facility for  rehab, medical management and nursing care.    HPI:    HPI information obtained from: facility chart records, facility staff, patient report, [facility] chart review and family/first contact daughter report.   Brief Summary of Hospital Course: Mrs. Treviño underwent a right total hip arthroplasty due to worsening pain and decreased mobility 2/2 Primary Osteoarthritis of the right hip.  Her hospital course was uneventful.  She had minimal blood loss and pain managed on Tylenol.  She is [employer] and would not have transfusion.   Updates on Status Since Skilled nursing Admission: She continues to have her pain managed with Tylenol and Ice.  She is requesting that the Oxycodone be discontinued since she won't need it.  She has some expected Constipation post operatively but is starting to have BM.  She has Type 2 DM oral medication and diet controlled.  She is anticoagulated with Apixaban for her Paroxysmol AFib, denies chest pain, flutter, light headedness, Nausea, or dizziness or chest pain.  She also has HTN which she is asymptomatic.    CODE STATUS/ADVANCE DIRECTIVES DISCUSSION:   CPR/Full code   Patient's living condition: lives alone  ALLERGIES: Amlodipine; Apple [chromium]; Kiwi; Nuts; Pear; and Tape [adhesive tape]  PAST MEDICAL HISTORY:  has a past medical history of Chronic anticoagulation (2/19/2017), Diabetes mellitus, type 2 (H), Diverticulitis of colon (without mention of  hemorrhage)(562.11), DM type 2, goal A1C below 8.0 (12/3/2014), Essential and other specified forms of tremor, Hyperlipidemia LDL goal <100 (5/9/2010), Hypertension goal BP (blood pressure) < 140/90, Intestinal disaccharidase deficiencies and disaccharide malabsorption, Moderate tricuspid regurgitation (2/19/2017), Osteoarthrosis, unspecified whether generalized or localized, unspecified site, Paroxysmal atrial fibrillation (H) (2/19/2017), Pulmonary hypertension, mild (H) (2/19/2017), Type 2 diabetes, HbA1c goal < 7% (H) (5/2/2010), and Urge incontinence. She also has no past medical history of Complication of anesthesia, Difficult intubation, Malignant hyperthermia, Motion sickness, Obese, PONV (postoperative nausea and vomiting), Sleep apnea, or Spinal headache.  PAST SURGICAL HISTORY:   has a past surgical history that includes surgical history of - ; surgical history of - ; Cystoscopy, Sling Transvaginal (N/A, 7/23/2019); and Arthroplasty hip (Right, 10/9/2019).  FAMILY HISTORY: family history includes Arthritis in her father; Cerebrovascular Disease in her father; Diabetes in her father; Hypertension in her father; No Known Problems in her mother; Other Cancer in her sister.  SOCIAL HISTORY:   reports that she quit smoking about 59 years ago. She smoked 0.00 packs per day. She has never used smokeless tobacco. She reports current alcohol use. She reports that she does not use drugs.    Post Discharge Medication Reconciliation Status: discharge medications reconciled, continue medications without change    Current Outpatient Medications   Medication Sig Dispense Refill     acetaminophen (TYLENOL) 325 MG tablet Take 2 tablets (650 mg) by mouth every 4 hours as needed for other (multimodal surgical pain management along with NSAIDS and opioid medication as indicated based on pain control and physical function.)       Alfalfa 650 MG TABS Take 2 capsules by mouth 2 times daily For arthritis pain       Alpha Lipoic  Acid 200 MG CAPS Take 200 mg by mouth daily        amLODIPine (NORVASC) 5 MG tablet Take 0.5 tablets (2.5 mg) by mouth daily       apixaban ANTICOAGULANT (ELIQUIS) 5 MG tablet Take 1 tablet (5 mg) by mouth 2 times daily 60 tablet 11     blood glucose (NO BRAND SPECIFIED) lancets standard Use to test blood sugar one times daily or as directed. Dispense compatible with insurance and glucometer - One Touch Ultra 100 each 3     blood glucose monitoring (NO BRAND SPECIFIED) meter device kit Use to test blood sugar one times daily or as directed.  Dispense One Touch Ultra per insurance 1 kit 0     blood glucose monitoring (NO BRAND SPECIFIED) test strip Use to test blood sugars one times daily or as directed.  Dispense compatible with insurance and glucometer - One Touch Ultra 100 strip 3     calcium carbonate (TUMS E-X 750) 750 MG CHEW Take 750 mg by mouth 2 times daily        carvedilol (COREG) 25 MG tablet Take 1 tablet (25 mg) by mouth 2 times daily with meals 180 tablet 3     COMPOUNDED NON-CONTROLLED SUBSTANCE (CMPD RX) - PHARMACY TO MIX COMPOUNDED MEDICATION Estriol 1 mg/g  Apply small amount to finger and apply to inside vagina daily for 2 weeks then twice weekly  Route: vaginally 30 g 11     Estriol 10 % CREA Insert 1 mg vaginally one time a day for Atrophic Vaginitis for 2 Weeks Apply small amount to finger and apply to inside vagina daily for two weeks then twice weekly. AND Insert 1 mg vaginally one time a day every Mon, Thu for Atrophic Vaginitis Apply small amount to finger and apply to inside vagina daily for two weeks then twice weekly.       FLAXSEED (LINSEED) PO POWD Take 1 Tablespoonful by mouth every morning        hydrochlorothiazide (HYDRODIURIL) 25 MG tablet TAKE ONE TABLET BY MOUTH ONE TIME DAILY 30 tablet 0     lisinopril (PRINIVIL/ZESTRIL) 40 MG tablet Take 1 tablet (40 mg) by mouth daily (Patient taking differently: Take 40 mg by mouth every evening ) 90 tablet 3     metFORMIN (GLUCOPHAGE) 500  MG tablet Take 1 tablet (500 mg) by mouth daily (with dinner) 90 tablet 1     order for DME Equipment being ordered: Left wrist splint. 1 each 0     predniSONE (DELTASONE) 5 MG tablet Take 5 mg by mouth daily as needed (for painful flare)       senna-docusate (SENOKOT-S/PERICOLACE) 8.6-50 MG tablet Take 1 tablet by mouth 2 times daily as needed for constipation (Take while taking narcotic pain medications.) 15 tablet 0     triamcinolone (KENALOG) 0.5 % cream Apply topically 2 times daily to affected area as directed. (Patient taking differently: Apply topically 2 times daily as needed to affected area as directed.) 15 g 3     trospium (SANCTURA XR) 60 MG CP24 24 hr capsule Take 1 capsule (60 mg) by mouth every morning 30 capsule 11     VITAMIN D 1000 UNIT OR TABS Take 1,000 Units by mouth At Bedtime  30 0       ROS:  10 point ROS of systems including Constitutional, Eyes, Respiratory, Cardiovascular, Gastroenterology, Genitourinary, Integumentary, Musculoskeletal, Psychiatric were all negative except for pertinent positives noted in my HPI.    Vitals:  BP (!) 163/73   Pulse 80   Temp 97.8  F (36.6  C)   Resp 16   Wt 72.1 kg (159 lb)   SpO2 98%   BMI 27.29 kg/m    Exam:  GENERAL APPEARANCE:  Alert, in no distress  ENT:  Mouth and posterior oropharynx normal, moist mucous membranes, normal hearing acuity  EYES:  EOM, conjunctivae, lids, pupils and irises normal  NECK:  No adenopathy,masses or thyromegaly  RESP:  respiratory effort and palpation of chest normal, lungs clear to auscultation , no respiratory distress  CV:  Palpation and auscultation of heart done , no edema, irregular rhythm rate normal  ABDOMEN:  normal bowel sounds, soft, nontender, no hepatosplenomegaly or other masses  M/S:   Gait and station abnormal using walker due to decreased right hip ROM  Digits and nails abnormal arthritic changes  SKIN:  Inspection of skin and subcutaneous tissue baseline, Palpation of skin and subcutaneous tissue  baseline, right hip incision dressing CDI  NEURO:   Examination of sensation by touch is l/e diminished  PSYCH:  oriented X 3, normal insight, judgement and memory, affect and mood normal    Lab/Diagnostic data:  Recent labs in [facility] reviewed by me today.     ASSESSMENT/PLAN:  (M15.0) Primary osteoarthritis involving multiple joints  (primary encounter diagnosis)  (Z96.641) Status post total replacement of right hip  Comment: Pain primarily incisional pain - arthritis pain gone  Plan: Mobilize  PT/OT  Walker at all times  Monitor Dressing for saturation and change accordingly  Monitor incision for s/sx infection  Labs ordered: CBC monitor HGB; BMP monitor electrolytes for post surgical shift and renal function    (I48.0) Paroxysmal atrial fibrillation (H)  Comment: chronic Anticoagulation  Plan: continue Apixaban  Monitor for bruising, spontaneous bleeding.    (I27.20) Pulmonary hypertension, mild (H)  Comment: chronic  Plan: continue Prednisone, monitor for changes in respiratory status    (I07.1) Moderate tricuspid regurgitation  Comment: noted  Plan: no changes in POC    (E11.42) Type 2 diabetes mellitus with diabetic polyneuropathy, without long-term current use of insulin (H)  Comment: controlled  Plan: continue Metformin and Glipizide    (N39.3) Female stress incontinence  Comment: continues to have leakage - had bladder sling surgery this past summer  Plan: continue Tropicum for now.  Will be seeing the Urology surgeon in 2 weeks.   Monitor for side effects - increased confusion, dry mouth, urinary retention.      Electronically signed by:  TIMOTEO Gould CNP

## 2019-10-13 NOTE — LETTER
10/13/2019        RE: Aundrea Treviño  [address]  [place] [address]        [facility] [place] [facility]  PRIMARY CARE PROVIDER AND CLINIC:  Krysten Molina MD, MD, [address] [place] [address] [place] [address]  Chief Complaint   Patient presents with     Hospital F/U     [facility] Record Number:  [id number]  [facility] of [facility] where encounter took place:  [facility] [[id number]]    Aundrea Treviño  is a 85 year old  (8/29/1934), admitted to the above facility from  [facility]. Hospital stay 10/9/19 through 10/121/9..  Admitted to this facility for  rehab, medical management and nursing care.    HPI:    HPI information obtained from: facility chart records, facility staff, patient report, [facility] chart review and family/first contact daughter report.   Brief Summary of Hospital Course: Mrs. Treviño underwent a right total hip arthroplasty due to worsening pain and decreased mobility 2/2 Primary Osteoarthritis of the right hip.  Her hospital course was uneventful.  She had minimal blood loss and pain managed on Tylenol.  She is Jehov[employer] Witness and would not have transfusion.   Updates on Status Since Skilled nursing Admission: She continues to have her pain managed with Tylenol and Ice.  She is requesting that the Oxycodone be discontinued since she won't need it.  She has some expected Constipation post operatively but is starting to have BM.  She has Type 2 DM oral medication and diet controlled.  She is anticoagulated with Apixaban for her Paroxysmol AFib, denies chest pain, flutter, light headedness, Nausea, or dizziness or chest pain.  She also has HTN which she is asymptomatic.    CODE STATUS/ADVANCE DIRECTIVES DISCUSSION:   CPR/Full code   Patient's living condition: lives alone  ALLERGIES: Amlodipine; Apple [chromium]; Kiwi; Nuts; Pear; and Tape [adhesive tape]  PAST MEDICAL HISTORY:  has a past medical history of Chronic anticoagulation  (2/19/2017), Diabetes mellitus, type 2 (H), Diverticulitis of colon (without mention of hemorrhage)(562.11), DM type 2, goal A1C below 8.0 (12/3/2014), Essential and other specified forms of tremor, Hyperlipidemia LDL goal <100 (5/9/2010), Hypertension goal BP (blood pressure) < 140/90, Intestinal disaccharidase deficiencies and disaccharide malabsorption, Moderate tricuspid regurgitation (2/19/2017), Osteoarthrosis, unspecified whether generalized or localized, unspecified site, Paroxysmal atrial fibrillation (H) (2/19/2017), Pulmonary hypertension, mild (H) (2/19/2017), Type 2 diabetes, HbA1c goal < 7% (H) (5/2/2010), and Urge incontinence. She also has no past medical history of Complication of anesthesia, Difficult intubation, Malignant hyperthermia, Motion sickness, Obese, PONV (postoperative nausea and vomiting), Sleep apnea, or Spinal headache.  PAST SURGICAL HISTORY:   has a past surgical history that includes surgical history of - ; surgical history of - ; Cystoscopy, Sling Transvaginal (N/A, 7/23/2019); and Arthroplasty hip (Right, 10/9/2019).  FAMILY HISTORY: family history includes Arthritis in her father; Cerebrovascular Disease in her father; Diabetes in her father; Hypertension in her father; No Known Problems in her mother; Other Cancer in her sister.  SOCIAL HISTORY:   reports that she quit smoking about 59 years ago. She smoked 0.00 packs per day. She has never used smokeless tobacco. She reports current alcohol use. She reports that she does not use drugs.    Post Discharge Medication Reconciliation Status: discharge medications reconciled, continue medications without change    Current Outpatient Medications   Medication Sig Dispense Refill     acetaminophen (TYLENOL) 325 MG tablet Take 2 tablets (650 mg) by mouth every 4 hours as needed for other (multimodal surgical pain management along with NSAIDS and opioid medication as indicated based on pain control and physical function.)       Alfalfa  650 MG TABS Take 2 capsules by mouth 2 times daily For arthritis pain       Alpha Lipoic Acid 200 MG CAPS Take 200 mg by mouth daily        amLODIPine (NORVASC) 5 MG tablet Take 0.5 tablets (2.5 mg) by mouth daily       apixaban ANTICOAGULANT (ELIQUIS) 5 MG tablet Take 1 tablet (5 mg) by mouth 2 times daily 60 tablet 11     blood glucose (NO BRAND SPECIFIED) lancets standard Use to test blood sugar one times daily or as directed. Dispense compatible with insurance and glucometer - One Touch Ultra 100 each 3     blood glucose monitoring (NO BRAND SPECIFIED) meter device kit Use to test blood sugar one times daily or as directed.  Dispense One Touch Ultra per insurance 1 kit 0     blood glucose monitoring (NO BRAND SPECIFIED) test strip Use to test blood sugars one times daily or as directed.  Dispense compatible with insurance and glucometer - One Touch Ultra 100 strip 3     calcium carbonate (TUMS E-X 750) 750 MG CHEW Take 750 mg by mouth 2 times daily        carvedilol (COREG) 25 MG tablet Take 1 tablet (25 mg) by mouth 2 times daily with meals 180 tablet 3     COMPOUNDED NON-CONTROLLED SUBSTANCE (CMPD RX) - PHARMACY TO MIX COMPOUNDED MEDICATION Estriol 1 mg/g  Apply small amount to finger and apply to inside vagina daily for 2 weeks then twice weekly  Route: vaginally 30 g 11     Estriol 10 % CREA Insert 1 mg vaginally one time a day for Atrophic Vaginitis for 2 Weeks Apply small amount to finger and apply to inside vagina daily for two weeks then twice weekly. AND Insert 1 mg vaginally one time a day every Mon, Thu for Atrophic Vaginitis Apply small amount to finger and apply to inside vagina daily for two weeks then twice weekly.       FLAXSEED (LINSEED) PO POWD Take 1 Tablespoonful by mouth every morning        hydrochlorothiazide (HYDRODIURIL) 25 MG tablet TAKE ONE TABLET BY MOUTH ONE TIME DAILY 30 tablet 0     lisinopril (PRINIVIL/ZESTRIL) 40 MG tablet Take 1 tablet (40 mg) by mouth daily (Patient taking  differently: Take 40 mg by mouth every evening ) 90 tablet 3     metFORMIN (GLUCOPHAGE) 500 MG tablet Take 1 tablet (500 mg) by mouth daily (with dinner) 90 tablet 1     order for DME Equipment being ordered: Left wrist splint. 1 each 0     predniSONE (DELTASONE) 5 MG tablet Take 5 mg by mouth daily as needed (for painful flare)       senna-docusate (SENOKOT-S/PERICOLACE) 8.6-50 MG tablet Take 1 tablet by mouth 2 times daily as needed for constipation (Take while taking narcotic pain medications.) 15 tablet 0     triamcinolone (KENALOG) 0.5 % cream Apply topically 2 times daily to affected area as directed. (Patient taking differently: Apply topically 2 times daily as needed to affected area as directed.) 15 g 3     trospium (SANCTURA XR) 60 MG CP24 24 hr capsule Take 1 capsule (60 mg) by mouth every morning 30 capsule 11     VITAMIN D 1000 UNIT OR TABS Take 1,000 Units by mouth At Bedtime  30 0       ROS:  10 point ROS of systems including Constitutional, Eyes, Respiratory, Cardiovascular, Gastroenterology, Genitourinary, Integumentary, Musculoskeletal, Psychiatric were all negative except for pertinent positives noted in my HPI.    Vitals:  BP (!) 163/73   Pulse 80   Temp 97.8  F (36.6  C)   Resp 16   Wt 72.1 kg (159 lb)   SpO2 98%   BMI 27.29 kg/m     Exam:  GENERAL APPEARANCE:  Alert, in no distress  ENT:  Mouth and posterior oropharynx normal, moist mucous membranes, normal hearing acuity  EYES:  EOM, conjunctivae, lids, pupils and irises normal  NECK:  No adenopathy,masses or thyromegaly  RESP:  respiratory effort and palpation of chest normal, lungs clear to auscultation , no respiratory distress  CV:  Palpation and auscultation of heart done , no edema, irregular rhythm rate normal  ABDOMEN:  normal bowel sounds, soft, nontender, no hepatosplenomegaly or other masses  M/S:   Gait and station abnormal using walker due to decreased right hip ROM  Digits and nails abnormal arthritic changes  SKIN:   Inspection of skin and subcutaneous tissue baseline, Palpation of skin and subcutaneous tissue baseline, right hip incision dressing CDI  NEURO:   Examination of sensation by touch is l/e diminished  PSYCH:  oriented X 3, normal insight, judgement and memory, affect and mood normal    Lab/Diagnostic data:  Recent labs in [facility] reviewed by me today.     ASSESSMENT/PLAN:  (M15.0) Primary osteoarthritis involving multiple joints  (primary encounter diagnosis)  (Z96.641) Status post total replacement of right hip  Comment: Pain primarily incisional pain - arthritis pain gone  Plan: Mobilize  PT/OT  Walker at all times  Monitor Dressing for saturation and change accordingly  Monitor incision for s/sx infection  Labs ordered: CBC monitor HGB; BMP monitor electrolytes for post surgical shift and renal function    (I48.0) Paroxysmal atrial fibrillation (H)  Comment: chronic Anticoagulation  Plan: continue Apixaban  Monitor for bruising, spontaneous bleeding.    (I27.20) Pulmonary hypertension, mild (H)  Comment: chronic  Plan: continue Prednisone, monitor for changes in respiratory status    (I07.1) Moderate tricuspid regurgitation  Comment: noted  Plan: no changes in POC    (E11.42) Type 2 diabetes mellitus with diabetic polyneuropathy, without long-term current use of insulin (H)  Comment: controlled  Plan: continue Metformin and Glipizide    (N39.3) Female stress incontinence  Comment: continues to have leakage - had bladder sling surgery this past summer  Plan: continue Tropicum for now.  Will be seeing the Urology surgeon in 2 weeks.   Monitor for side effects - increased confusion, dry mouth, urinary retention.      Electronically signed by:  TIMOTEO Gould CNP                         Sincerely,        TIMOTEO Gould CNP

## 2019-10-14 NOTE — PROGRESS NOTES
[place] [facility]  INITIAL VISIT NOTE  October 15, 2019    PRIMARY CARE PROVIDER AND CLINIC:  Krysetn Molina [address] [place] [address] / [facility] [address]    Chief Complaint   Patient presents with     Hospital F/U       HPI:    Aundrea Treviño is a 85 year old  (8/29/1934) female who was seen at [place] on [facility] on October 15, 2019 for an initial visit. Medical history is notable for atrial fibrillation, hypertension, essential tremor, stress incontinence, mild pulmonary hypertension, diabetes mellitus type 2, and osteoarthritis of the hip.  She was hospitalized at [facility] from October 9 through October 12, 2019 for elective right total hip arthroplasty (10/9/2019).  Surgery was performed under general anesthesia with EBL less than 100 ml.  Hemoglobin dropped to 10.5 on postop day 2.    Patient is admitted to this facility for medical management, nursing care, and rehab.     Patient was seen today in her room, laying in the bed.  She reports onset of nausea and diarrhea since last night but no abdominal pain or cramps.  She complains of poor appetite.  She denies fever, chills chest pain, palpitation, or dyspnea.  Surgical pain is controlled.  Blood pressure was uncontrolled earlier with systolic around 180.    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  Dyslipidemia  Persistent atrial fibrillation, on chronic anticoagulation  Mild pulmonary hypertension  Moderate tricuspid regurgitation  Gout  Venous insufficiency  Stress incontinence  Essential tremor  Diverticulitis of colon  Osteoarthritis    PAST SURGICAL HISTORY:   Past Surgical History:   Procedure Laterality Date     ARTHROPLASTY HIP Right 10/9/2019    Procedure: RIGHT TOTAL HIP ARTHROPLASTY;  Surgeon: Valentino Vizcarra MD;  Location: [facility] OR     CYSTOSCOPY, SLING TRANSVAGINAL N/A 7/23/2019    Procedure: Midurethral Sling and Cystoscopy (Support the  Urethra with Mesh and Look in the Bladder);  Surgeon: Irma Painter MD;  Location: [facility] OR     SURGICAL HISTORY OF -       hysterectomy, bladder suspension     SURGICAL HISTORY OF -       tonsillectomy       FAMILY HISTORY:   Family History   Problem Relation Age of Onset     No Known Problems Mother      Diabetes Father         type 2     Hypertension Father      Cerebrovascular Disease Father      Arthritis Father      Other Cancer Sister        SOCIAL HISTORY:  She lives in an apartment by herself.    Social History     Tobacco Use     Smoking status: Former Smoker     Packs/day: 0.00     Last attempt to quit: 1960     Years since quittin.5     Smokeless tobacco: Never Used   Substance Use Topics     Alcohol use: Yes     Comment: rarely, a glass of wine on a special occassion       MEDICATIONS:  Current Outpatient Medications   Medication Sig Dispense Refill     acetaminophen (TYLENOL) 325 MG tablet Take 2 tablets (650 mg) by mouth every 4 hours as needed for other (multimodal surgical pain management along with NSAIDS and opioid medication as indicated based on pain control and physical function.)       Alfalfa 650 MG TABS Take 2 capsules by mouth 2 times daily For arthritis pain       Alpha Lipoic Acid 200 MG CAPS Take 200 mg by mouth daily        amLODIPine (NORVASC) 5 MG tablet Take 0.5 tablets (2.5 mg) by mouth daily       apixaban ANTICOAGULANT (ELIQUIS) 5 MG tablet Take 1 tablet (5 mg) by mouth 2 times daily 60 tablet 11     blood glucose (NO BRAND SPECIFIED) lancets standard Use to test blood sugar one times daily or as directed. Dispense compatible with insurance and glucometer - One Touch Ultra 100 each 3     blood glucose monitoring (NO BRAND SPECIFIED) meter device kit Use to test blood sugar one times daily or as directed.  Dispense One Touch Ultra per insurance 1 kit 0     blood glucose monitoring (NO BRAND SPECIFIED) test strip Use to test blood sugars one times daily or as directed.   Dispense compatible with insurance and glucometer - One Touch Ultra 100 strip 3     calcium carbonate (TUMS E-X 750) 750 MG CHEW Take 750 mg by mouth 2 times daily        carvedilol (COREG) 25 MG tablet Take 1 tablet (25 mg) by mouth 2 times daily with meals 180 tablet 3     COMPOUNDED NON-CONTROLLED SUBSTANCE (CMPD RX) - PHARMACY TO MIX COMPOUNDED MEDICATION Estriol 1 mg/g  Apply small amount to finger and apply to inside vagina daily for 2 weeks then twice weekly  Route: vaginally 30 g 11     Estriol 10 % CREA Insert 1 mg vaginally one time a day for Atrophic Vaginitis for 2 Weeks Apply small amount to finger and apply to inside vagina daily for two weeks then twice weekly. AND Insert 1 mg vaginally one time a day every Mon, Thu for Atrophic Vaginitis Apply small amount to finger and apply to inside vagina daily for two weeks then twice weekly.       FLAXSEED (LINSEED) PO POWD Take 1 Tablespoonful by mouth every morning        hydrochlorothiazide (HYDRODIURIL) 25 MG tablet TAKE ONE TABLET BY MOUTH ONE TIME DAILY 30 tablet 0     lisinopril (PRINIVIL/ZESTRIL) 40 MG tablet Take 1 tablet (40 mg) by mouth daily (Patient taking differently: Take 40 mg by mouth every evening ) 90 tablet 3     metFORMIN (GLUCOPHAGE) 500 MG tablet Take 1 tablet (500 mg) by mouth daily (with dinner) 90 tablet 1     order for DME Equipment being ordered: Left wrist splint. 1 each 0     predniSONE (DELTASONE) 5 MG tablet Take 5 mg by mouth daily as needed (for painful flare)       senna-docusate (SENOKOT-S/PERICOLACE) 8.6-50 MG tablet Take 1 tablet by mouth 2 times daily as needed for constipation (Take while taking narcotic pain medications.) 15 tablet 0     triamcinolone (KENALOG) 0.5 % cream Apply topically 2 times daily to affected area as directed. (Patient taking differently: Apply topically 2 times daily as needed to affected area as directed.) 15 g 3     trospium (SANCTURA XR) 60 MG CP24 24 hr capsule Take 1 capsule (60 mg) by mouth  every morning 30 capsule 11     VITAMIN D 1000 UNIT OR TABS Take 1,000 Units by mouth At Bedtime  30 0       ALLERGIES:  Allergies   Allergen Reactions     Amlodipine      Swollen ankles and rash but tolerates 2.5 mg without any symptoms     Apple [Chromium]      Kiwi      Nuts      Pear      Tape [Adhesive Tape]        ROS:  10 point ROS neg other than the symptoms noted above in the HPI.    PHYSICAL EXAM:  Vitals: Blood pressure 161/76, heart rate 99, respiratory rate 24, temperature 97.4  F, action saturation 93%  Gen: Weak appearing  HEENT: Normocephalic; oropharynx clear, mild conjunctival pallor  Card: Normal S1, S2, irregularly irregular rhythm  Resp: Lungs clear to auscultation bilaterally  GI: Abdomen soft, not-tender, non-distended, +BS  Ext: Trace B/L LE edema  Neuro: CX II-XII grossly intact; ROM in all four extremities grossly in tact  Psych: Alert and oriented x3; normal affect  Skin: No acute rash    LABORATORY/IMAGING DATA:  Lab Results   Component Value Date    WBC 6.9 05/20/2019     Lab Results   Component Value Date    RBC 4.71 05/20/2019     Lab Results   Component Value Date    HGB 10.5 10/11/2019     Lab Results   Component Value Date    HCT 39.9 05/20/2019     Lab Results   Component Value Date    MCV 85 05/20/2019     Lab Results   Component Value Date    MCH 28.2 05/20/2019     Lab Results   Component Value Date    MCHC 33.3 05/20/2019     Lab Results   Component Value Date    RDW 13.8 05/20/2019     Lab Results   Component Value Date     05/20/2019     Last Comprehensive Metabolic Panel:  Sodium   Date Value Ref Range Status   10/10/2019 134 133 - 144 mmol/L Final     Potassium   Date Value Ref Range Status   10/10/2019 4.2 3.4 - 5.3 mmol/L Final     Chloride   Date Value Ref Range Status   10/10/2019 103 94 - 109 mmol/L Final     Carbon Dioxide   Date Value Ref Range Status   10/10/2019 28 20 - 32 mmol/L Final     Anion Gap   Date Value Ref Range Status   10/10/2019 3 3 - 14 mmol/L  Final     Glucose   Date Value Ref Range Status   10/10/2019 168 (H) 70 - 99 mg/dL Final     Urea Nitrogen   Date Value Ref Range Status   10/10/2019 11 7 - 30 mg/dL Final     Creatinine   Date Value Ref Range Status   10/10/2019 0.55 0.52 - 1.04 mg/dL Final     GFR Estimate   Date Value Ref Range Status   10/10/2019 85 >60 mL/min/[1.73_m2] Final     Comment:     Non  GFR Calc  Starting 12/18/2018, serum creatinine based estimated GFR (eGFR) will be   calculated using the Chronic Kidney Disease Epidemiology Collaboration   (CKD-EPI) equation.       Calcium   Date Value Ref Range Status   10/10/2019 8.5 8.5 - 10.1 mg/dL Final       ASSESSMENT/PLAN:  Right hip degenerative joint disease, status post right total hip arthroplasty on October 9, 2019.  Patient is hemodynamically stable.  Plan:  Weightbearing as tolerated  Pain management with PRN acetaminophen   PT/OT evaluation therapy  Bowel regimen for prevention of constipation  For DVT prophylaxis, patient is already on apixaban for atrial fibrillation  Follow-up with Ortho clinic as scheduled    Acute blood loss anemia.  Due to above surgery.  Hemoglobin at time of discharge was 10.5.  Plan:  CBC today, Oct 15    Acute diarrhea.  This started last night, associated with nausea and poor appetite.  Plan:  Continue supportive care  Monitor for fever or abdominal pain, or worsening of diarrhea  Low threshold to check for enteric bacteria/virus as well as C diff if diarrhea does not resolve within 24 hours    Hypertension, benign essential.  Blood pressure is uncontrolled.  Plan:  Continue prior to admission hydrochlorothiazide 25 mg p.o. daily and carvedilol 25 mg p.o. twice daily  Increase prior to admission amlodipine from 2.5 to 5 mg p.o. daily  PRN labetalol available for systolic blood pressure more than 180 diastolic blood pressure more than 105  Continue to monitor blood pressure    Diabetes mellitus type 2 with peripheral neuropathy.  Last  hemoglobin A1c was 6.1% on September 23, 2019.  Plan:  Continue metformin 500 mg p.o. daily   Continue to monitor blood glucose    Atrial fibrillation, persistent.   Rate is controlled.  Plan:  Continue chronic anticoagulation therapy with apixaban 5 mg p.o. twice daily  Continue prior to admission carvedilol 25 mg p.o. twice daily    Gout.  Inactive.  Plan:  PRN prednisone for acute flare    Overactive bladder.  Plan:  Continue prior to admission trospium 60 mg p.o. every morning    Physical deconditioning.  Plan:  Continue PT/OT evaluation and therapy      Electronically signed by:  Charley Miranda MD

## 2019-10-15 ENCOUNTER — NURSING HOME VISIT (OUTPATIENT)
Dept: GERIATRICS | Facility: CLINIC | Age: 84
End: 2019-10-15
Payer: COMMERCIAL

## 2019-10-15 ENCOUNTER — HOSPITAL LABORATORY (OUTPATIENT)
Dept: OTHER | Facility: CLINIC | Age: 84
End: 2019-10-15

## 2019-10-15 DIAGNOSIS — R53.81 PHYSICAL DECONDITIONING: ICD-10-CM

## 2019-10-15 DIAGNOSIS — R19.7 ACUTE DIARRHEA: ICD-10-CM

## 2019-10-15 DIAGNOSIS — N32.81 OVERACTIVE BLADDER: ICD-10-CM

## 2019-10-15 DIAGNOSIS — I48.19 PERSISTENT ATRIAL FIBRILLATION (H): ICD-10-CM

## 2019-10-15 DIAGNOSIS — E11.42 TYPE 2 DIABETES MELLITUS WITH DIABETIC POLYNEUROPATHY, WITHOUT LONG-TERM CURRENT USE OF INSULIN (H): ICD-10-CM

## 2019-10-15 DIAGNOSIS — D62 ANEMIA DUE TO BLOOD LOSS, ACUTE: ICD-10-CM

## 2019-10-15 DIAGNOSIS — Z96.641 STATUS POST TOTAL REPLACEMENT OF RIGHT HIP: ICD-10-CM

## 2019-10-15 DIAGNOSIS — M16.11 PRIMARY OSTEOARTHRITIS OF RIGHT HIP: Primary | ICD-10-CM

## 2019-10-15 DIAGNOSIS — M10.9 GOUT, UNSPECIFIED CAUSE, UNSPECIFIED CHRONICITY, UNSPECIFIED SITE: ICD-10-CM

## 2019-10-15 DIAGNOSIS — I10 UNCONTROLLED HYPERTENSION: ICD-10-CM

## 2019-10-15 LAB
ANION GAP SERPL CALCULATED.3IONS-SCNC: 8 MMOL/L (ref 3–14)
BUN SERPL-MCNC: 17 MG/DL (ref 7–30)
CALCIUM SERPL-MCNC: 8.9 MG/DL (ref 8.5–10.1)
CHLORIDE SERPL-SCNC: 100 MMOL/L (ref 94–109)
CO2 SERPL-SCNC: 27 MMOL/L (ref 20–32)
CREAT SERPL-MCNC: 0.48 MG/DL (ref 0.52–1.04)
ERYTHROCYTE [DISTWIDTH] IN BLOOD BY AUTOMATED COUNT: 13.7 % (ref 10–15)
GFR SERPL CREATININE-BSD FRML MDRD: 87 ML/MIN/{1.73_M2}
GLUCOSE SERPL-MCNC: 212 MG/DL (ref 70–99)
HCT VFR BLD AUTO: 36.4 % (ref 35–47)
HGB BLD-MCNC: 12.5 G/DL (ref 11.7–15.7)
MCH RBC QN AUTO: 28.5 PG (ref 26.5–33)
MCHC RBC AUTO-ENTMCNC: 34.3 G/DL (ref 31.5–36.5)
MCV RBC AUTO: 83 FL (ref 78–100)
PLATELET # BLD AUTO: 206 10E9/L (ref 150–450)
POTASSIUM SERPL-SCNC: 3 MMOL/L (ref 3.4–5.3)
RBC # BLD AUTO: 4.38 10E12/L (ref 3.8–5.2)
SODIUM SERPL-SCNC: 135 MMOL/L (ref 133–144)
WBC # BLD AUTO: 10.9 10E9/L (ref 4–11)

## 2019-10-15 PROCEDURE — 99207 ZZC CDG-MDM COMPONENT: MEETS LOW - DOWN CODED: CPT | Performed by: INTERNAL MEDICINE

## 2019-10-15 PROCEDURE — 99305 1ST NF CARE MODERATE MDM 35: CPT | Performed by: INTERNAL MEDICINE

## 2019-10-15 NOTE — LETTER
10/15/2019        RE: Aundrea Treviño  [address]  [place] [address]        [facility]  INITIAL VISIT NOTE  October 15, 2019    PRIMARY CARE PROVIDER AND CLINIC:  Krysten Molina [address] [place] [address] / [facility] [id number]    Chief Complaint   Patient presents with     Hospital F/U       HPI:    Aundrea Treviño is a 85 year old  (8/29/1934) female who was seen at [facility] on October 15, 2019 for an initial visit. Medical history is notable for atrial fibrillation, hypertension, essential tremor, stress incontinence, mild pulmonary hypertension, diabetes mellitus type 2, and osteoarthritis of the hip.  She was hospitalized at [facility] from October 9 through October 12, 2019 for elective right total hip arthroplasty (10/9/2019).  Surgery was performed under general anesthesia with EBL less than 100 ml.  Hemoglobin dropped to 10.5 on postop day 2.    Patient is admitted to this facility for medical management, nursing care, and rehab.     Patient was seen today in her room, laying in the bed.  She reports onset of nausea and diarrhea since last night but no abdominal pain or cramps.  She complains of poor appetite.  She denies fever, chills chest pain, palpitation, or dyspnea.  Surgical pain is controlled.  Blood pressure was uncontrolled earlier with systolic around 180.    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  Dyslipidemia  Persistent atrial fibrillation, on chronic anticoagulation  Mild pulmonary hypertension  Moderate tricuspid regurgitation  Gout  Venous insufficiency  Stress incontinence  Essential tremor  Diverticulitis of colon  Osteoarthritis    PAST SURGICAL HISTORY:   Past Surgical History:   Procedure Laterality Date     ARTHROPLASTY HIP Right 10/9/2019    Procedure: RIGHT TOTAL HIP ARTHROPLASTY;  Surgeon: Valentino Vizcarra MD;  Location:  OR      CYSTOSCOPY, SLING TRANSVAGINAL N/A 2019    Procedure: Midurethral Sling and Cystoscopy (Support the Urethra with Mesh and Look in the Bladder);  Surgeon: Irma Painter MD;  Location: [facility] OR     SURGICAL HISTORY OF -       hysterectomy, bladder suspension     SURGICAL HISTORY OF -       tonsillectomy       FAMILY HISTORY:   Family History   Problem Relation Age of Onset     No Known Problems Mother      Diabetes Father         type 2     Hypertension Father      Cerebrovascular Disease Father      Arthritis Father      Other Cancer Sister        SOCIAL HISTORY:  She lives in an apartment by herself.    Social History     Tobacco Use     Smoking status: Former Smoker     Packs/day: 0.00     Last attempt to quit: 1960     Years since quittin.5     Smokeless tobacco: Never Used   Substance Use Topics     Alcohol use: Yes     Comment: rarely, a glass of wine on a special occassion       MEDICATIONS:  Current Outpatient Medications   Medication Sig Dispense Refill     acetaminophen (TYLENOL) 325 MG tablet Take 2 tablets (650 mg) by mouth every 4 hours as needed for other (multimodal surgical pain management along with NSAIDS and opioid medication as indicated based on pain control and physical function.)       Alfalfa 650 MG TABS Take 2 capsules by mouth 2 times daily For arthritis pain       Alpha Lipoic Acid 200 MG CAPS Take 200 mg by mouth daily        amLODIPine (NORVASC) 5 MG tablet Take 0.5 tablets (2.5 mg) by mouth daily       apixaban ANTICOAGULANT (ELIQUIS) 5 MG tablet Take 1 tablet (5 mg) by mouth 2 times daily 60 tablet 11     blood glucose (NO BRAND SPECIFIED) lancets standard Use to test blood sugar one times daily or as directed. Dispense compatible with insurance and glucometer - One Touch Ultra 100 each 3     blood glucose monitoring (NO BRAND SPECIFIED) meter device kit Use to test blood sugar one times daily or as directed.  Dispense One Touch Ultra per insurance 1 kit 0     blood  glucose monitoring (NO BRAND SPECIFIED) test strip Use to test blood sugars one times daily or as directed.  Dispense compatible with insurance and glucometer - One Touch Ultra 100 strip 3     calcium carbonate (TUMS E-X 750) 750 MG CHEW Take 750 mg by mouth 2 times daily        carvedilol (COREG) 25 MG tablet Take 1 tablet (25 mg) by mouth 2 times daily with meals 180 tablet 3     COMPOUNDED NON-CONTROLLED SUBSTANCE (CMPD RX) - PHARMACY TO MIX COMPOUNDED MEDICATION Estriol 1 mg/g  Apply small amount to finger and apply to inside vagina daily for 2 weeks then twice weekly  Route: vaginally 30 g 11     Estriol 10 % CREA Insert 1 mg vaginally one time a day for Atrophic Vaginitis for 2 Weeks Apply small amount to finger and apply to inside vagina daily for two weeks then twice weekly. AND Insert 1 mg vaginally one time a day every Mon, Thu for Atrophic Vaginitis Apply small amount to finger and apply to inside vagina daily for two weeks then twice weekly.       FLAXSEED (LINSEED) PO POWD Take 1 Tablespoonful by mouth every morning        hydrochlorothiazide (HYDRODIURIL) 25 MG tablet TAKE ONE TABLET BY MOUTH ONE TIME DAILY 30 tablet 0     lisinopril (PRINIVIL/ZESTRIL) 40 MG tablet Take 1 tablet (40 mg) by mouth daily (Patient taking differently: Take 40 mg by mouth every evening ) 90 tablet 3     metFORMIN (GLUCOPHAGE) 500 MG tablet Take 1 tablet (500 mg) by mouth daily (with dinner) 90 tablet 1     order for DME Equipment being ordered: Left wrist splint. 1 each 0     predniSONE (DELTASONE) 5 MG tablet Take 5 mg by mouth daily as needed (for painful flare)       senna-docusate (SENOKOT-S/PERICOLACE) 8.6-50 MG tablet Take 1 tablet by mouth 2 times daily as needed for constipation (Take while taking narcotic pain medications.) 15 tablet 0     triamcinolone (KENALOG) 0.5 % cream Apply topically 2 times daily to affected area as directed. (Patient taking differently: Apply topically 2 times daily as needed to affected  area as directed.) 15 g 3     trospium (SANCTURA XR) 60 MG CP24 24 hr capsule Take 1 capsule (60 mg) by mouth every morning 30 capsule 11     VITAMIN D 1000 UNIT OR TABS Take 1,000 Units by mouth At Bedtime  30 0       ALLERGIES:  Allergies   Allergen Reactions     Amlodipine      Swollen ankles and rash but tolerates 2.5 mg without any symptoms     Apple [Chromium]      Kiwi      Nuts      Pear      Tape [Adhesive Tape]        ROS:  10 point ROS neg other than the symptoms noted above in the HPI.    PHYSICAL EXAM:  Vitals: Blood pressure 161/76, heart rate 99, respiratory rate 24, temperature 97.4  F, action saturation 93%  Gen: Weak appearing  HEENT: Normocephalic; oropharynx clear, mild conjunctival pallor  Card: Normal S1, S2, irregularly irregular rhythm  Resp: Lungs clear to auscultation bilaterally  GI: Abdomen soft, not-tender, non-distended, +BS  Ext: Trace B/L LE edema  Neuro: CX II-XII grossly intact; ROM in all four extremities grossly in tact  Psych: Alert and oriented x3; normal affect  Skin: No acute rash    LABORATORY/IMAGING DATA:  Lab Results   Component Value Date    WBC 6.9 05/20/2019     Lab Results   Component Value Date    RBC 4.71 05/20/2019     Lab Results   Component Value Date    HGB 10.5 10/11/2019     Lab Results   Component Value Date    HCT 39.9 05/20/2019     Lab Results   Component Value Date    MCV 85 05/20/2019     Lab Results   Component Value Date    MCH 28.2 05/20/2019     Lab Results   Component Value Date    MCHC 33.3 05/20/2019     Lab Results   Component Value Date    RDW 13.8 05/20/2019     Lab Results   Component Value Date     05/20/2019     Last Comprehensive Metabolic Panel:  Sodium   Date Value Ref Range Status   10/10/2019 134 133 - 144 mmol/L Final     Potassium   Date Value Ref Range Status   10/10/2019 4.2 3.4 - 5.3 mmol/L Final     Chloride   Date Value Ref Range Status   10/10/2019 103 94 - 109 mmol/L Final     Carbon Dioxide   Date Value Ref Range Status    10/10/2019 28 20 - 32 mmol/L Final     Anion Gap   Date Value Ref Range Status   10/10/2019 3 3 - 14 mmol/L Final     Glucose   Date Value Ref Range Status   10/10/2019 168 (H) 70 - 99 mg/dL Final     Urea Nitrogen   Date Value Ref Range Status   10/10/2019 11 7 - 30 mg/dL Final     Creatinine   Date Value Ref Range Status   10/10/2019 0.55 0.52 - 1.04 mg/dL Final     GFR Estimate   Date Value Ref Range Status   10/10/2019 85 >60 mL/min/[1.73_m2] Final     Comment:     Non  GFR Calc  Starting 12/18/2018, serum creatinine based estimated GFR (eGFR) will be   calculated using the Chronic Kidney Disease Epidemiology Collaboration   (CKD-EPI) equation.       Calcium   Date Value Ref Range Status   10/10/2019 8.5 8.5 - 10.1 mg/dL Final       ASSESSMENT/PLAN:  Right hip degenerative joint disease, status post right total hip arthroplasty on October 9, 2019.  Patient is hemodynamically stable.  Plan:  Weightbearing as tolerated  Pain management with PRN acetaminophen   PT/OT evaluation therapy  Bowel regimen for prevention of constipation  For DVT prophylaxis, patient is already on apixaban for atrial fibrillation  Follow-up with Ortho clinic as scheduled    Acute blood loss anemia.  Due to above surgery.  Hemoglobin at time of discharge was 10.5.  Plan:  CBC today, Oct 15    Acute diarrhea.  This started last night, associated with nausea and poor appetite.  Plan:  Continue supportive care  Monitor for fever or abdominal pain, or worsening of diarrhea  Low threshold to check for enteric bacteria/virus as well as C diff if diarrhea does not resolve within 24 hours    Hypertension, benign essential.  Blood pressure is uncontrolled.  Plan:  Continue prior to admission hydrochlorothiazide 25 mg p.o. daily and carvedilol 25 mg p.o. twice daily  Increase prior to admission amlodipine from 2.5 to 5 mg p.o. 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  Last hemoglobin A1c was 6.1% on September 23, 2019.  Plan:  Continue metformin 500 mg p.o. daily   Continue to monitor blood glucose    Atrial fibrillation, persistent.   Rate is controlled.  Plan:  Continue chronic anticoagulation therapy with apixaban 5 mg p.o. twice daily  Continue prior to admission carvedilol 25 mg p.o. twice daily    Gout.  Inactive.  Plan:  PRN prednisone for acute flare    Overactive bladder.  Plan:  Continue prior to admission trospium 60 mg p.o. every morning    Physical deconditioning.  Plan:  Continue PT/OT evaluation and therapy      Electronically signed by:  Charley Miranda MD                      Sincerely,        Charley Miranda MD

## 2019-10-16 ENCOUNTER — TELEPHONE (OUTPATIENT)
Dept: GERIATRICS | Facility: CLINIC | Age: 84
End: 2019-10-16

## 2019-10-16 NOTE — TELEPHONE ENCOUNTER
Nursing calling today to report patient c/o right knee pain that is also warm, tender, red.   History of recent right total hip arthroplasty  Is on apixaban 5 mg BID  Does have PRN oxycodone for analgesia although patient trying not to take it.  Patient did not attend therapy yesterday d/tnausea.   Today rating pain 4/10.     Has PRN Prednisone order for gout flare - 5 mg daily.  10/15/19 CBC drawn: WBC 10.5., Hgb 12.5, Plt 206 (no diff)    Orders:  - RLE venous U/S to r/o DVT  - RLE xray 2 view to r/o OA in right knee  - CBC with Diff on Friday  - if patient wants to take PRN Prednisone - dosing not very therapeutic for gout flare but if she prefers to take it, OK to take it.    - analgesia optimal  - Ice PRN   - will await results from imagining and can determine POC thereafter.    Kristal Lan, TIMOTEO CNP

## 2019-10-17 ENCOUNTER — HOSPITAL LABORATORY (OUTPATIENT)
Dept: OTHER | Facility: CLINIC | Age: 84
End: 2019-10-17

## 2019-10-17 ENCOUNTER — NURSING HOME VISIT (OUTPATIENT)
Dept: GERIATRICS | Facility: CLINIC | Age: 84
End: 2019-10-17
Payer: COMMERCIAL

## 2019-10-17 ENCOUNTER — TRANSFERRED RECORDS (OUTPATIENT)
Dept: HEALTH INFORMATION MANAGEMENT | Facility: CLINIC | Age: 84
End: 2019-10-17

## 2019-10-17 VITALS
TEMPERATURE: 97.8 F | RESPIRATION RATE: 16 BRPM | BODY MASS INDEX: 27.14 KG/M2 | SYSTOLIC BLOOD PRESSURE: 125 MMHG | WEIGHT: 159 LBS | OXYGEN SATURATION: 96 % | DIASTOLIC BLOOD PRESSURE: 61 MMHG | HEART RATE: 76 BPM | HEIGHT: 64 IN

## 2019-10-17 DIAGNOSIS — N39.3 FEMALE STRESS INCONTINENCE: ICD-10-CM

## 2019-10-17 DIAGNOSIS — I07.1 MODERATE TRICUSPID REGURGITATION: ICD-10-CM

## 2019-10-17 DIAGNOSIS — I27.20 PULMONARY HYPERTENSION, MILD (H): ICD-10-CM

## 2019-10-17 DIAGNOSIS — Z96.641 STATUS POST TOTAL REPLACEMENT OF RIGHT HIP: ICD-10-CM

## 2019-10-17 DIAGNOSIS — R19.5 LOOSE STOOLS: ICD-10-CM

## 2019-10-17 DIAGNOSIS — D62 ANEMIA DUE TO BLOOD LOSS, ACUTE: ICD-10-CM

## 2019-10-17 DIAGNOSIS — M25.461 EFFUSION OF RIGHT KNEE: ICD-10-CM

## 2019-10-17 DIAGNOSIS — I10 UNCONTROLLED HYPERTENSION: ICD-10-CM

## 2019-10-17 DIAGNOSIS — I48.0 PAROXYSMAL ATRIAL FIBRILLATION (H): ICD-10-CM

## 2019-10-17 DIAGNOSIS — R53.81 DEBILITY: ICD-10-CM

## 2019-10-17 DIAGNOSIS — R11.2 INTRACTABLE VOMITING WITH NAUSEA, UNSPECIFIED VOMITING TYPE: ICD-10-CM

## 2019-10-17 DIAGNOSIS — N95.2 ATROPHIC VAGINITIS: ICD-10-CM

## 2019-10-17 DIAGNOSIS — M15.0 PRIMARY OSTEOARTHRITIS INVOLVING MULTIPLE JOINTS: ICD-10-CM

## 2019-10-17 DIAGNOSIS — G89.18 ACUTE POST-OPERATIVE PAIN: ICD-10-CM

## 2019-10-17 DIAGNOSIS — E11.42 TYPE 2 DIABETES MELLITUS WITH DIABETIC POLYNEUROPATHY, WITHOUT LONG-TERM CURRENT USE OF INSULIN (H): ICD-10-CM

## 2019-10-17 DIAGNOSIS — M25.561 ACUTE PAIN OF RIGHT KNEE: Primary | ICD-10-CM

## 2019-10-17 LAB
ANION GAP SERPL CALCULATED.3IONS-SCNC: 5 MMOL/L (ref 3–14)
BUN SERPL-MCNC: 14 MG/DL (ref 7–30)
CALCIUM SERPL-MCNC: 8.8 MG/DL (ref 8.5–10.1)
CHLORIDE SERPL-SCNC: 96 MMOL/L (ref 94–109)
CO2 SERPL-SCNC: 31 MMOL/L (ref 20–32)
CREAT SERPL-MCNC: 0.63 MG/DL (ref 0.52–1.04)
GFR SERPL CREATININE-BSD FRML MDRD: 82 ML/MIN/{1.73_M2}
GLUCOSE SERPL-MCNC: 123 MG/DL (ref 70–99)
POTASSIUM SERPL-SCNC: 3.8 MMOL/L (ref 3.4–5.3)
SODIUM SERPL-SCNC: 132 MMOL/L (ref 133–144)

## 2019-10-17 PROCEDURE — 99309 SBSQ NF CARE MODERATE MDM 30: CPT | Performed by: NURSE PRACTITIONER

## 2019-10-17 RX ORDER — ONDANSETRON 4 MG/1
TABLET, FILM COATED ORAL EVERY 6 HOURS PRN
COMMUNITY
End: 2024-06-13

## 2019-10-17 RX ORDER — LABETALOL 100 MG/1
100 TABLET, FILM COATED ORAL EVERY 6 HOURS PRN
COMMUNITY
End: 2019-10-22

## 2019-10-17 RX ORDER — ACETAMINOPHEN 500 MG
1000 TABLET ORAL 3 TIMES DAILY
Start: 2019-10-17

## 2019-10-17 ASSESSMENT — MIFFLIN-ST. JEOR: SCORE: 1151.22

## 2019-10-17 NOTE — PROGRESS NOTES
[facility]  [facility] Record Number:  [id number]  Place of Service where encounter took place:  [facility] [[id number]]  Chief Complaint   Patient presents with     RECHECK       HPI:    Aundrea Treviño  is a 85 year old (8/29/1934), who is being seen today for an episodic care visit.  HPI information obtained from: facility chart records, facility staff, patient report and [facility] chart review.    Met with Mrs. Treviño and her daughter today for follow up.  Mrs. Treviño is s/p R MIKAYLA but now is reporting more pain in her Right knee than in her hip.  Reports it started a few days ago, worse today, worse with activity.  She reports pain in her hip as minimal.  She reports N/V/D from a few days ago as almost gone.  Has no other complaints.     Past Medical and Surgical History reviewed in Epic today.    MEDICATIONS:  Current Outpatient Medications   Medication Sig Dispense Refill     acetaminophen (TYLENOL) 500 MG tablet Take 2 tablets (1,000 mg) by mouth 3 times daily       Alfalfa 650 MG TABS Take 2 capsules by mouth 2 times daily For arthritis pain       Alpha Lipoic Acid 200 MG CAPS Take 200 mg by mouth daily        amLODIPine (NORVASC) 5 MG tablet Take 0.5 tablets (2.5 mg) by mouth daily (Patient taking differently: Take 5 mg by mouth daily )       apixaban ANTICOAGULANT (ELIQUIS) 5 MG tablet Take 1 tablet (5 mg) by mouth 2 times daily 60 tablet 11     blood glucose (NO BRAND SPECIFIED) lancets standard Use to test blood sugar one times daily or as directed. Dispense compatible with insurance and glucometer - One Touch Ultra 100 each 3     blood glucose monitoring (NO BRAND SPECIFIED) meter device kit Use to test blood sugar one times daily or as directed.  Dispense One Touch Ultra per insurance 1 kit 0     blood glucose monitoring (NO BRAND SPECIFIED) test strip Use to test blood sugars one times daily or as directed.  Dispense compatible with insurance and  glucometer - One Touch Ultra 100 strip 3     calcium carbonate (TUMS E-X 750) 750 MG CHEW Take 750 mg by mouth 2 times daily        carvedilol (COREG) 25 MG tablet Take 1 tablet (25 mg) by mouth 2 times daily with meals 180 tablet 3     Estriol 10 % CREA 1 Application See Admin Instructions Insert 1 mg vaginally one time a day for Atrophic Vaginitis for 2 Weeks Apply small amount to finger and apply to inside vagina daily for two weeks then twice weekly. AND Insert 1 mg vaginally one time a day every Mon, Thu for Atrophic Vaginitis Apply small amount to finger and apply to inside vagina daily for two weeks then twice weekly.  -Pt reports using PRN now.       FLAXSEED (LINSEED) PO POWD Take 1 Tablespoonful by mouth every morning        hydrochlorothiazide (HYDRODIURIL) 25 MG tablet TAKE ONE TABLET BY MOUTH ONE TIME DAILY 30 tablet 0     labetalol (NORMODYNE) 100 MG tablet Take 100 mg by mouth every 6 hours as needed       lisinopril (PRINIVIL/ZESTRIL) 40 MG tablet Take 1 tablet (40 mg) by mouth daily (Patient taking differently: Take 40 mg by mouth every evening ) 90 tablet 3     metFORMIN (GLUCOPHAGE) 500 MG tablet Take 1 tablet (500 mg) by mouth daily (with dinner) 90 tablet 1     ondansetron (ZOFRAN) 4 MG tablet Take by mouth every 6 hours as needed for nausea       order for DME Equipment being ordered: Left wrist splint. 1 each 0     oxyCODONE HCl (OXAYDO) 5 MG TABA Take 2.5-5 mg by mouth every 6 hours as needed Give 2.5 mg by mouth every 6 hours as needed for Pain Take 2.5mg-for pain 1-5/10.  Give 5 mg by mouth every 6 hours as needed for Severe Pain Take 5mg for pain 6-10/10       predniSONE (DELTASONE) 5 MG tablet Take 5 mg by mouth daily as needed (for painful flare)       senna-docusate (SENOKOT-S/PERICOLACE) 8.6-50 MG tablet Take 1 tablet by mouth 2 times daily as needed for constipation (Take while taking narcotic pain medications.) 15 tablet 0     triamcinolone (KENALOG) 0.5 % cream Apply topically 2  "times daily to affected area as directed. (Patient taking differently: Apply topically 2 times daily as needed to affected area as directed.) 15 g 3     trospium (SANCTURA XR) 60 MG CP24 24 hr capsule Take 1 capsule (60 mg) by mouth every morning 30 capsule 11     VITAMIN D 1000 UNIT OR TABS Take 1,000 Units by mouth At Bedtime  30 0     REVIEW OF SYSTEMS:  7 point ROS done including, light headedness/dizziness, fever/chills, pain, Resp, CV, GI, and  and is negative other than noted in HPI.      Objective:  /61   Pulse 76   Temp 97.8  F (36.6  C)   Resp 16   Ht 1.626 m (5' 4\")   Wt 72.1 kg (159 lb)   SpO2 96%   BMI 27.29 kg/m       Exam:  GENERAL APPEARANCE:  Elderly thin female sitting up in WC, NAD, non-toxic.  ENT:  Mouth and oropharynx normal, moist mucous membranes.   RESP:  Lungs CTA.  Regular relaxed breathing effort.  No cough.   CV: S1/S2 no murmur or rubs.  Irregular-irregular rhythm and rate.    ABDOMEN:   Flat, soft, non-tender with active bowel sounds.  No guarding, rigidity, or rebound tenderness.  EXTREMITIES:  1+ LLE and 2+ RLE edema, 1-2+ edema about Right hip, no calf tenderness.   JOINTS: Right knee is tender to palpation, swollen, no excessive warmth or redness.   PSYCH: Alert and orientated, pleasant and cooperative.   WOUND: Right hip is covered with occlusive surgical dressing, old drainage present, no s/s of infection.     Labs:   I have personally reviewed labs, which are in facility or EMR chart.   I have personally reviewed images, which are in facility or EMR chart.     ASSESSMENT/PLAN:  Acute pain of right knee  Effusion of right knee  Primary osteoarthritis involving multiple joints  Is s/p R MIKAYLA due to OA in her Right hip.  Now last few days she has developed swelling and pain in her Right knee.  US ruled out DVT.  Xray notes moderate suprapatellar effusion, which we appreciate on exam as well; also notes Kellgren and Robe grade 3 OA.  Thus suspicion for effusion and " pain is over compensation due to R MIKAYLA.    -Changed Acetaminophen to TID scheduled.   -Ok for ace wrap, elevation, ice.   --Did print out Xray to take to Ortho on 10/22 f/u.   --Will have in house Ortho PA review next week if not improved.     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  Incision covered with occlusive surgical dressing, but no s/s of infection.    -Acetaminophen as noted above.   -Continues PRN Oxycodone but she is avoiding use.   -Ortho follow-up as noted above.     Type 2 diabetes mellitus with diabetic polyneuropathy, without long-term current use of insulin (H)  A1c was 6.1 in Sept.  QID glucose checks show adequate daily control.   -Lowered glucose checks to BID.   -Continue q daily Metformin.     Uncontrolled hypertension  Paroxysmal atrial fibrillation (H)  Pulmonary hypertension, mild (H)  Moderate tricuspid regurgitation  There was some confusion of her Norvasc dose and initially at TCU, SBP's were 120-180 but erratic.  Norvasc increased and now SBP's ranging 110-150's.  HR 's.  Asymptomatic.   -Continue's Norvasc at 5 mg's.   -Continues Lisinopril, Coreg, hydrochlorothiazide.   -Continues PRN Labetolol.   -Continues BID Apixiban.     Anemia due to blood loss, acute  Hgb 12.5 when checked 10/15, no s/s of overt bleeding at this time.   -No changes, continue to clinically monitor.     Female stress incontinence  -Continues PTA Trospium for now.   -Has Urology f/u 10/23.     Intractable vomiting with nausea, unspecified vomiting type  Loose stools  Had ongoing N/V and loose stools early in the week, for several days.  She reports last episodes yesterday, reports none today.  Etiology unclear, but with brisk on set and resolution, can not rule out gastroenteroritis.   -No changes, continue to clinically monitor.     Debility  -Continues with PT/OT.     [facility] vaginitis  [facility] noted Estriol cream as  scheduled, she reports she only uses PRN, thus we changed to PRN, she does not want it now.     Orders written by provider at facility  -As noted above.     Electronically signed by:  TIMOTEO Johnson CNP

## 2019-10-17 NOTE — LETTER
10/17/2019        RE: Aundrea Treviño  [address]  [place] [address]        [facility]  [facility] Record Number:  [id number]  [facility] where encounter took place[facility] [[id number]]  Chief Complaint   Patient presents with     RECHECK       HPI:    Aundrea Treviño  is a 85 year old (8/29/1934), who is being seen today for an episodic care visit.  HPI information obtained from: facility chart records, facility staff, patient report and [facility] chart review.    Met with Mrs. Treviño and her daughter today for follow up.  Mrs. Treviño is s/p R MIKAYLA but now is reporting more pain in her Right knee than in her hip.  Reports it started a few days ago, worse today, worse with activity.  She reports pain in her hip as minimal.  She reports N/V/D from a few days ago as almost gone.  Has no other complaints.     Past Medical and Surgical History reviewed in Epic today.    MEDICATIONS:  Current Outpatient Medications   Medication Sig Dispense Refill     acetaminophen (TYLENOL) 500 MG tablet Take 2 tablets (1,000 mg) by mouth 3 times daily       Alfalfa 650 MG TABS Take 2 capsules by mouth 2 times daily For arthritis pain       Alpha Lipoic Acid 200 MG CAPS Take 200 mg by mouth daily        amLODIPine (NORVASC) 5 MG tablet Take 0.5 tablets (2.5 mg) by mouth daily (Patient taking differently: Take 5 mg by mouth daily )       apixaban ANTICOAGULANT (ELIQUIS) 5 MG tablet Take 1 tablet (5 mg) by mouth 2 times daily 60 tablet 11     blood glucose (NO BRAND SPECIFIED) lancets standard Use to test blood sugar one times daily or as directed. Dispense compatible with insurance and glucometer - One Touch Ultra 100 each 3     blood glucose monitoring (NO BRAND SPECIFIED) meter device kit Use to test blood sugar one times daily or as directed.  Dispense One Touch Ultra per insurance 1 kit 0     blood glucose monitoring (NO BRAND SPECIFIED) test strip  Use to test blood sugars one times daily or as directed.  Dispense compatible with insurance and glucometer - One Touch Ultra 100 strip 3     calcium carbonate (TUMS E-X 750) 750 MG CHEW Take 750 mg by mouth 2 times daily        carvedilol (COREG) 25 MG tablet Take 1 tablet (25 mg) by mouth 2 times daily with meals 180 tablet 3     Estriol 10 % CREA 1 Application See Admin Instructions Insert 1 mg vaginally one time a day for Atrophic Vaginitis for 2 Weeks Apply small amount to finger and apply to inside vagina daily for two weeks then twice weekly. AND Insert 1 mg vaginally one time a day every Mon, Thu for Atrophic Vaginitis Apply small amount to finger and apply to inside vagina daily for two weeks then twice weekly.  -Pt reports using PRN now.       FLAXSEED (LINSEED) PO POWD Take 1 Tablespoonful by mouth every morning        hydrochlorothiazide (HYDRODIURIL) 25 MG tablet TAKE ONE TABLET BY MOUTH ONE TIME DAILY 30 tablet 0     labetalol (NORMODYNE) 100 MG tablet Take 100 mg by mouth every 6 hours as needed       lisinopril (PRINIVIL/ZESTRIL) 40 MG tablet Take 1 tablet (40 mg) by mouth daily (Patient taking differently: Take 40 mg by mouth every evening ) 90 tablet 3     metFORMIN (GLUCOPHAGE) 500 MG tablet Take 1 tablet (500 mg) by mouth daily (with dinner) 90 tablet 1     ondansetron (ZOFRAN) 4 MG tablet Take by mouth every 6 hours as needed for nausea       order for DME Equipment being ordered: Left wrist splint. 1 each 0     oxyCODONE HCl (OXAYDO) 5 MG TABA Take 2.5-5 mg by mouth every 6 hours as needed Give 2.5 mg by mouth every 6 hours as needed for Pain Take 2.5mg-for pain 1-5/10.  Give 5 mg by mouth every 6 hours as needed for Severe Pain Take 5mg for pain 6-10/10       predniSONE (DELTASONE) 5 MG tablet Take 5 mg by mouth daily as needed (for painful flare)       senna-docusate (SENOKOT-S/PERICOLACE) 8.6-50 MG tablet Take 1 tablet by mouth 2 times daily as needed for constipation (Take while taking  "narcotic pain medications.) 15 tablet 0     triamcinolone (KENALOG) 0.5 % cream Apply topically 2 times daily to affected area as directed. (Patient taking differently: Apply topically 2 times daily as needed to affected area as directed.) 15 g 3     trospium (SANCTURA XR) 60 MG CP24 24 hr capsule Take 1 capsule (60 mg) by mouth every morning 30 capsule 11     VITAMIN D 1000 UNIT OR TABS Take 1,000 Units by mouth At Bedtime  30 0     REVIEW OF SYSTEMS:  7 point ROS done including, light headedness/dizziness, fever/chills, pain, Resp, CV, GI, and  and is negative other than noted in HPI.      Objective:  /61   Pulse 76   Temp 97.8  F (36.6  C)   Resp 16   Ht 1.626 m (5' 4\")   Wt 72.1 kg (159 lb)   SpO2 96%   BMI 27.29 kg/m        Exam:  GENERAL APPEARANCE:  Elderly thin female sitting up in WC, NAD, non-toxic.  ENT:  Mouth and oropharynx normal, moist mucous membranes.   RESP:  Lungs CTA.  Regular relaxed breathing effort.  No cough.   CV: S1/S2 no murmur or rubs.  Irregular-irregular rhythm and rate.    ABDOMEN:   Flat, soft, non-tender with active bowel sounds.  No guarding, rigidity, or rebound tenderness.  EXTREMITIES:  1+ LLE and 2+ RLE edema, 1-2+ edema about Right hip, no calf tenderness.   JOINTS: Right knee is tender to palpation, swollen, no excessive warmth or redness.   PSYCH: Alert and orientated, pleasant and cooperative.   WOUND: Right hip is covered with occlusive surgical dressing, old drainage present, no s/s of infection.     Labs:   I have personally reviewed labs, which are in facility or EMR chart.   I have personally reviewed images, which are in facility or EMR chart.     ASSESSMENT/PLAN:  Acute pain of right knee  Effusion of right knee  Primary osteoarthritis involving multiple joints  Is s/p R MIKAYLA due to OA in her Right hip.  Now last few days she has developed swelling and pain in her Right knee.  US ruled out DVT.  Xray notes moderate suprapatellar effusion, which we " appreciate on exam as well; also notes Kellgren and Robe grade 3 OA.  Thus suspicion for effusion and pain is over compensation due to R MIKAYLA.    -Changed Acetaminophen to TID scheduled.   -Ok for ace wrap, elevation, ice.   --Did print out Xray to take to Ortho on 10/22 f/u.   --Will have in house Ortho PA review next week if not improved.     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  Incision covered with occlusive surgical dressing, but no s/s of infection.    -Acetaminophen as noted above.   -Continues PRN Oxycodone but she is avoiding use.   -Ortho follow-up as noted above.     Type 2 diabetes mellitus with diabetic polyneuropathy, without long-term current use of insulin (H)  A1c was 6.1 in Sept.  QID glucose checks show adequate daily control.   -Lowered glucose checks to BID.   -Continue q daily Metformin.     Uncontrolled hypertension  Paroxysmal atrial fibrillation (H)  Pulmonary hypertension, mild (H)  Moderate tricuspid regurgitation  There was some confusion of her Norvasc dose and initially at TCU, SBP's were 120-180 but erratic.  Norvasc increased and now SBP's ranging 110-150's.  HR 's.  Asymptomatic.   -Continue's Norvasc at 5 mg's.   -Continues Lisinopril, Coreg, hydrochlorothiazide.   -Continues PRN Labetolol.   -Continues BID Apixiban.     Anemia due to blood loss, acute  Hgb 12.5 when checked 10/15, no s/s of overt bleeding at this time.   -No changes, continue to clinically monitor.     Female stress incontinence  -Continues PTA Trospium for now.   -Has Urology f/u 10/23.     Intractable vomiting with nausea, unspecified vomiting type  Loose stools  Had ongoing N/V and loose stools early in the week, for several days.  She reports last episodes yesterday, reports none today.  Etiology unclear, but with brisk on set and resolution, can not rule out gastroenteroritis.   -No changes, continue to  clinically monitor.     Debility  -Continues with PT/OT.     [facility] Estriol cream as scheduled, she reports she only uses PRN, thus we changed to PRN, she does not want it now.     Orders written by provider at facility  -As noted above.     Electronically signed by:  TIMOTEO Johnson CNP           Sincerely,        TIMOTEO Johnson CNP

## 2019-10-18 ENCOUNTER — HOSPITAL LABORATORY (OUTPATIENT)
Dept: OTHER | Facility: CLINIC | Age: 84
End: 2019-10-18

## 2019-10-18 LAB
BASOPHILS # BLD AUTO: 0 10E9/L (ref 0–0.2)
BASOPHILS NFR BLD AUTO: 0.2 %
DIFFERENTIAL METHOD BLD: ABNORMAL
EOSINOPHIL # BLD AUTO: 0 10E9/L (ref 0–0.7)
EOSINOPHIL NFR BLD AUTO: 0.6 %
ERYTHROCYTE [DISTWIDTH] IN BLOOD BY AUTOMATED COUNT: 13.7 % (ref 10–15)
HCT VFR BLD AUTO: 31 % (ref 35–47)
HGB BLD-MCNC: 10.3 G/DL (ref 11.7–15.7)
IMM GRANULOCYTES # BLD: 0 10E9/L (ref 0–0.4)
IMM GRANULOCYTES NFR BLD: 0.3 %
LYMPHOCYTES # BLD AUTO: 0.8 10E9/L (ref 0.8–5.3)
LYMPHOCYTES NFR BLD AUTO: 13.1 %
MCH RBC QN AUTO: 27.9 PG (ref 26.5–33)
MCHC RBC AUTO-ENTMCNC: 33.2 G/DL (ref 31.5–36.5)
MCV RBC AUTO: 84 FL (ref 78–100)
MONOCYTES # BLD AUTO: 0.6 10E9/L (ref 0–1.3)
MONOCYTES NFR BLD AUTO: 9.2 %
NEUTROPHILS # BLD AUTO: 4.9 10E9/L (ref 1.6–8.3)
NEUTROPHILS NFR BLD AUTO: 76.6 %
NRBC # BLD AUTO: 0 10*3/UL
NRBC BLD AUTO-RTO: 0 /100
PLATELET # BLD AUTO: 159 10E9/L (ref 150–450)
RBC # BLD AUTO: 3.69 10E12/L (ref 3.8–5.2)
WBC # BLD AUTO: 6.4 10E9/L (ref 4–11)

## 2019-10-21 ENCOUNTER — HOSPITAL LABORATORY (OUTPATIENT)
Dept: OTHER | Facility: CLINIC | Age: 84
End: 2019-10-21

## 2019-10-21 LAB
ANION GAP SERPL CALCULATED.3IONS-SCNC: 7 MMOL/L (ref 3–14)
BUN SERPL-MCNC: 11 MG/DL (ref 7–30)
CALCIUM SERPL-MCNC: 8.7 MG/DL (ref 8.5–10.1)
CHLORIDE SERPL-SCNC: 100 MMOL/L (ref 94–109)
CO2 SERPL-SCNC: 28 MMOL/L (ref 20–32)
CREAT SERPL-MCNC: 0.62 MG/DL (ref 0.52–1.04)
GFR SERPL CREATININE-BSD FRML MDRD: 82 ML/MIN/{1.73_M2}
GLUCOSE SERPL-MCNC: 95 MG/DL (ref 70–99)
POTASSIUM SERPL-SCNC: 3.6 MMOL/L (ref 3.4–5.3)
SODIUM SERPL-SCNC: 135 MMOL/L (ref 133–144)

## 2019-10-22 ENCOUNTER — DISCHARGE SUMMARY NURSING HOME (OUTPATIENT)
Dept: GERIATRICS | Facility: CLINIC | Age: 84
End: 2019-10-22
Payer: COMMERCIAL

## 2019-10-22 ENCOUNTER — TRANSFERRED RECORDS (OUTPATIENT)
Dept: HEALTH INFORMATION MANAGEMENT | Facility: CLINIC | Age: 84
End: 2019-10-22

## 2019-10-22 VITALS
OXYGEN SATURATION: 98 % | TEMPERATURE: 96.2 F | RESPIRATION RATE: 16 BRPM | DIASTOLIC BLOOD PRESSURE: 72 MMHG | WEIGHT: 153.2 LBS | BODY MASS INDEX: 26.15 KG/M2 | HEART RATE: 72 BPM | SYSTOLIC BLOOD PRESSURE: 124 MMHG | HEIGHT: 64 IN

## 2019-10-22 DIAGNOSIS — I10 HYPERTENSION GOAL BP (BLOOD PRESSURE) < 140/90: ICD-10-CM

## 2019-10-22 DIAGNOSIS — I07.1 MODERATE TRICUSPID REGURGITATION: ICD-10-CM

## 2019-10-22 DIAGNOSIS — R11.0 NAUSEA: ICD-10-CM

## 2019-10-22 DIAGNOSIS — D62 ANEMIA DUE TO BLOOD LOSS, ACUTE: ICD-10-CM

## 2019-10-22 DIAGNOSIS — N39.3 FEMALE STRESS INCONTINENCE: ICD-10-CM

## 2019-10-22 DIAGNOSIS — E11.42 TYPE 2 DIABETES MELLITUS WITH DIABETIC POLYNEUROPATHY, WITHOUT LONG-TERM CURRENT USE OF INSULIN (H): ICD-10-CM

## 2019-10-22 DIAGNOSIS — R53.81 DEBILITY: ICD-10-CM

## 2019-10-22 DIAGNOSIS — I27.20 PULMONARY HYPERTENSION, MILD (H): ICD-10-CM

## 2019-10-22 DIAGNOSIS — I48.0 PAROXYSMAL ATRIAL FIBRILLATION (H): ICD-10-CM

## 2019-10-22 DIAGNOSIS — R19.5 LOOSE STOOLS: ICD-10-CM

## 2019-10-22 DIAGNOSIS — Z96.641 STATUS POST TOTAL REPLACEMENT OF RIGHT HIP: ICD-10-CM

## 2019-10-22 DIAGNOSIS — G89.18 ACUTE POST-OPERATIVE PAIN: ICD-10-CM

## 2019-10-22 DIAGNOSIS — I10 ESSENTIAL HYPERTENSION: ICD-10-CM

## 2019-10-22 DIAGNOSIS — M25.561 ACUTE PAIN OF RIGHT KNEE: ICD-10-CM

## 2019-10-22 DIAGNOSIS — M25.461 EFFUSION OF RIGHT KNEE: ICD-10-CM

## 2019-10-22 DIAGNOSIS — M15.0 PRIMARY OSTEOARTHRITIS INVOLVING MULTIPLE JOINTS: Primary | ICD-10-CM

## 2019-10-22 PROCEDURE — 99316 NF DSCHRG MGMT 30 MIN+: CPT | Performed by: NURSE PRACTITIONER

## 2019-10-22 RX ORDER — AMLODIPINE BESYLATE 5 MG/1
2.5 TABLET ORAL DAILY
Start: 2019-10-22 | End: 2020-02-17

## 2019-10-22 ASSESSMENT — MIFFLIN-ST. JEOR: SCORE: 1124.91

## 2019-10-22 NOTE — PROGRESS NOTES
[facility] DISCHARGE SUMMARY  PATIENT'S NAME: Aundrea Treviño  YOB: 1934  MEDICAL RECORD NUMBER:  [id number]  [facility] of [facility] where encounter took place[facility] [[id number]]    PRIMARY CARE PROVIDER AND CLINIC RESPONSIBLE AFTER TRANSFER:   Krysten Molina MD, MD, [address] / [place] [address]      Transferring providers: TIMOTEO Johnson CNP, Charley Miranda MD  Recent Hospitalization/ED:  [facility] stay 10/9/19 to 10/12/19.  Date of SNF Admission: October / 12 / 2019  Date of SNF (anticipated) Discharge: October / 23 / 2019  Discharged to: previous independent home  Cognitive Scores: SLUMS: 29/30  Physical Function: Balance scores not done, 240 ft with 4ww.   DME: [facility]    CODE STATUS/ADVANCE DIRECTIVES DISCUSSION:  Full Code   ALLERGIES: Amlodipine; Apple [chromium]; Kiwi; Nuts; Pear; and Tape [adhesive tape]    DISCHARGE DIAGNOSIS/NURSING FACILITY COURSE:   Mrs. Treviño is a 84 y/o with known multiple joint OA whom underwent an elective MIKAYLA on 9 Oct.  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  She was noted to have lower SBP's thus they held her hydrochlorothiazide and later increased her Norvasc, but then later resumed hydrochlorothiazide thus antihypertensive control was unclear.  She later then transitioned over to the TCU/rehab for ongoing cares and PT/OT.     Initially at the TCU Mrs. Treviño was hypertensive in the 160-180 range, but asymptomatic.  Later SBP's came down to the 100-140 range.  She later developed some N/V and loose stools but then later some constipation issues and intermittent nausea.  Biggest issue is after working with PT/OT she started developing acute Right knee pain.  US ruled out DVT and Xray noted significant DJD and moderate effusion. We continued conservative measures along with ace wrap and ice.  Over  "time swelling in knee remained but pain improved/resolved.  Due to improvement she will transition back home with home care.     In meeting Mrs. Treviño today for discharge, she reports she still gets mild light headedness/dizziness with position changes, but resolves briskly.  She still endorses intermittent nausea but no emesis, reports no BM now for a few days but no abdominal pain.  Reports Right knee pain mostly gone, minimal Right hip pain.  She has no other complaints.  Reports being ready for home.     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   Right hip has a occlusive surgical dressing in place, old drainage, no overt s/s of infection.   -Continues on PTA Apixiban.   -TID Acetaminophen for pain.   --Has Ortho f/u late 10/22.     Anemia due to blood loss, acute  Hgb down to 10.3 when last checked 10/18, no overt s/s of bleeding.   -Further monitoring left to PCP discretion.     Effusion of right knee  Acute pain of right knee  Biggest issue has been distal Right knee pain since starting PT/OT.  US did rule out DVT.  Xray noted advanced DJD and a moderate pleural effusion.  We continued conservative measures with ace wrap, ice and Acetaminophen.  Pain has resolved but she reports it's still \"Stiff\" and it remains swollen.   -Ortho f/u as noted above.   -Continue PRN Acewrap and Ice.   -Continue Acetaminophen as noted above.   --We did send a copy of Xray to Ortho appt.     Paroxysmal atrial fibrillation (H)  Essential hypertension  Pulmonary hypertension, mild (H)  Moderate tricuspid regurgitation  As noted, initially her hydrochlorothiazide was stopped then Amlodipine increased then hydrochlorothiazide resumed.  When she first arrived to TCU, SBP's 160-180's, unclear how much pain or JOHN was contributing.  Over time SBP's " improved and lately 100-140's, HR's WNL's.  She does endorse mild light headedness/dizziness with position changes, but resolves briskly.  But we did lower her Amlodipine back to PTA 2.5 mg's daily.   Weight is 153 lbs on 10/19 and down from admission.  Some Right hip and knee swelling, no other s/s of CHF.   -Norvasc back down to 2.5 mg's q daily.   -Continues hydrochlorothiazide, Coreg, Lisinopril.   -Continues BID Apixiban.   --Further adjustments left to PCP discretion.     Type 2 diabetes mellitus with diabetic polyneuropathy, without long-term current use of insulin (H)  A1c 6.1 in Sept.  Initially we did QID glucoses which ranged 102-207 with good control.   -Ok for daily checks at home.   -Continues on daily Metformin.     Female stress incontinence  No issues.   -Continues PTA Trospium.   --Has Urology f/u 10/23.     Nausea  Loose stools  First N/V with loose stools.  We backed off bowel meds, then mild constipation with intermittent nausea, emesis resolved.  Etiology unclear.   -Continues PRN Ondansetron.   -Continues PRN Senna.     Debility  -Will DC with  home care.   -See Epic order for F2F.     Past Medical History:  has a past medical history of Chronic anticoagulation (2/19/2017), Diabetes mellitus, type 2 (H), Diverticulitis of colon (without mention of hemorrhage)(562.11), DM type 2, goal A1C below 8.0 (12/3/2014), Essential and other specified forms of tremor, Hyperlipidemia LDL goal <100 (5/9/2010), Hypertension goal BP (blood pressure) < 140/90, Intestinal disaccharidase deficiencies and disaccharide malabsorption, Moderate tricuspid regurgitation (2/19/2017), Osteoarthrosis, unspecified whether generalized or localized, unspecified site, Paroxysmal atrial fibrillation (H) (2/19/2017), Pulmonary hypertension, mild (H) (2/19/2017), Type 2 diabetes, HbA1c goal < 7% (H) (5/2/2010), and Urge incontinence. She also has no past medical history of Complication of anesthesia, Difficult intubation,  Malignant hyperthermia, Motion sickness, Obese, PONV (postoperative nausea and vomiting), Sleep apnea, or Spinal headache.    Discharge Medications:  Current Outpatient Medications   Medication Sig Dispense Refill     acetaminophen (TYLENOL) 500 MG tablet Take 2 tablets (1,000 mg) by mouth 3 times daily       Alfalfa 650 MG TABS Take 2 capsules by mouth 2 times daily For arthritis pain       Alpha Lipoic Acid 200 MG CAPS Take 200 mg by mouth daily        amLODIPine (NORVASC) 5 MG tablet Take 0.5 tablets (2.5 mg) by mouth daily       apixaban ANTICOAGULANT (ELIQUIS) 5 MG tablet Take 1 tablet (5 mg) by mouth 2 times daily 60 tablet 11     blood glucose (NO BRAND SPECIFIED) lancets standard Use to test blood sugar one times daily or as directed. Dispense compatible with insurance and glucometer - One Touch Ultra 100 each 3     blood glucose monitoring (NO BRAND SPECIFIED) meter device kit Use to test blood sugar one times daily or as directed.  Dispense One Touch Ultra per insurance 1 kit 0     blood glucose monitoring (NO BRAND SPECIFIED) test strip Use to test blood sugars one times daily or as directed.  Dispense compatible with insurance and glucometer - One Touch Ultra 100 strip 3     calcium carbonate (TUMS E-X 750) 750 MG CHEW Take 750 mg by mouth 2 times daily        carvedilol (COREG) 25 MG tablet Take 1 tablet (25 mg) by mouth 2 times daily with meals 180 tablet 3     Estriol 10 % CREA 1 Application See Admin Instructions Insert 1 mg vaginally one time a day for Atrophic Vaginitis for 2 Weeks Apply small amount to finger and apply to inside vagina daily for two weeks then twice weekly. AND Insert 1 mg vaginally one time a day every Mon, Thu for Atrophic Vaginitis Apply small amount to finger and apply to inside vagina daily for two weeks then twice weekly.  -Pt reports using PRN now.       hydrochlorothiazide (HYDRODIURIL) 25 MG tablet TAKE ONE TABLET BY MOUTH ONE TIME DAILY 30 tablet 0     lisinopril  "(PRINIVIL/ZESTRIL) 40 MG tablet Take 1 tablet (40 mg) by mouth daily (Patient taking differently: Take 40 mg by mouth every evening ) 90 tablet 3     metFORMIN (GLUCOPHAGE) 500 MG tablet Take 1 tablet (500 mg) by mouth daily (with dinner) 90 tablet 1     ondansetron (ZOFRAN) 4 MG tablet Take by mouth every 6 hours as needed for nausea       order for DME Equipment being ordered: Left wrist splint. 1 each 0     predniSONE (DELTASONE) 5 MG tablet Take 5 mg by mouth daily as needed (for painful flare)       senna-docusate (SENOKOT-S/PERICOLACE) 8.6-50 MG tablet Take 1 tablet by mouth 2 times daily as needed for constipation (Take while taking narcotic pain medications.) 15 tablet 0     trospium (SANCTURA XR) 60 MG CP24 24 hr capsule Take 1 capsule (60 mg) by mouth every morning 30 capsule 11     VITAMIN D 1000 UNIT OR TABS Take 1,000 Units by mouth At Bedtime  30 0     FLAXSEED (LINSEED) PO POWD Take 1 Tablespoonful by mouth every morning        triamcinolone (KENALOG) 0.5 % cream Apply topically 2 times daily to affected area as directed. (Patient not taking: Reported on 10/22/2019) 15 g 3     Medication Changes/Rationale:   -As noted above.     Controlled medications sent with patient:   not applicable/none     ROS:   7 point ROS done including, light headedness/dizziness, fever/chills, pain, Resp, CV, GI, and  and is negative other than noted in HPI.      Physical Exam:   Vitals: /72   Pulse 72   Temp 96.2  F (35.7  C)   Resp 16   Ht 1.626 m (5' 4\")   Wt 69.5 kg (153 lb 3.2 oz)   SpO2 98%   BMI 26.30 kg/m    BMI= Body mass index is 26.3 kg/m .     GENERAL APPEARANCE:  Elderly thin female sitting up in bed, NAD, non-toxic.  ENT:  Mouth and oropharynx normal, moist mucous membranes.   RESP:  Lungs CTA.  Regular relaxed breathing effort.  No cough.   CV: S1/S2 no murmur or rubs.  Irregular-irregular rhythm and rate.    ABDOMEN:   Flat, soft, non-tender with active bowel sounds.  No guarding, rigidity, or " rebound tenderness.  EXTREMITIES:  No LLE edema and 1-2+ edema about Right hip and knee, no calf tenderness.   JOINTS: Right knee is tender to palpation, swollen, no excessive warmth or redness.   PSYCH: Alert and orientated, pleasant and cooperative.   WOUND: Right hip is covered with occlusive surgical dressing, old drainage present, no s/s of infection.     SNF labs: Labs done in SNF are in [facility]. Please refer to them using EPIC/Care Everywhere.    DISCHARGE PLAN:    Follow up labs: No labs orders/due    Medical Follow Up:      Follow up with primary care provider in 1-2 weeks   Follow up with Ortho late 10/22.    Follow up with Urology 10/23.       MTM referral needed and placed by this provider: No      Discharge Services: Home Care:  Occupational Therapy, Physical Therapy, Registered Nurse, Home Health Aide and From:  [facility]    Discharge Instructions Verbalized to Patient at Discharge:     Instructed patient to arrange/attend above appointments.     TOTAL DISCHARGE TIME:   Greater than 30 minutes  Electronically signed by:  TIMOTEO Johnson [facility]     Home care Face to Face documentation done in [facility] attached to Home care orders for [facility].

## 2019-10-22 NOTE — LETTER
10/22/2019        RE: Aundrea Treviño  [address]  [place] [address]        [facility] DISCHARGE SUMMARY  PATIENT'S NAME: Aundrea Treviño  YOB: 1934  MEDICAL RECORD NUMBER:  [id number]  Place of Service where encounter took place:  [facility] [[id number]]    PRIMARY CARE PROVIDER AND CLINIC RESPONSIBLE AFTER TRANSFER:   Krysten Molina MD, [place] [address] [facility] [address]      Transferring providers: TIMOTEO Johnson CNP, Charley Miranda MD  Recent Hospitalization/ED:  [facility] stay 10/9/19 to 10/12/19.  Date of SNF Admission: October / 12 / 2019  Date of SNF (anticipated) Discharge: October / 23 / 2019  Discharged to: previous independent home  Cognitive Scores: SLUMS: 29/30  Physical Function: Balance scores not done, 240 ft with 4ww.   DME: [facility]    CODE STATUS/ADVANCE DIRECTIVES DISCUSSION:  Full Code   ALLERGIES: Amlodipine; Apple [chromium]; Kiwi; Nuts; Pear; and Tape [adhesive tape]    DISCHARGE DIAGNOSIS/NURSING FACILITY COURSE:   Mrs. Treviño is a 86 y/o with known multiple joint OA whom underwent an elective MIKAYLA on 9 Oct.  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  She was noted to have lower SBP's thus they held her hydrochlorothiazide and later increased her Norvasc, but then later resumed hydrochlorothiazide thus antihypertensive control was unclear.  She later then transitioned over to the TCU/rehab for ongoing cares and PT/OT.     Initially at the TCU Mrs. Treviño was hypertensive in the 160-180 range, but asymptomatic.  Later SBP's came down to the 100-140 range.  She later developed some N/V and loose stools but then later some constipation issues and intermittent nausea.  Biggest issue is after working with PT/OT she started developing acute Right knee pain.  US ruled out DVT and Xray noted significant  "DJD and moderate effusion. We continued conservative measures along with ace wrap and ice.  Over time swelling in knee remained but pain improved/resolved.  Due to improvement she will transition back home with home care.     In meeting Mrs. Treviño today for discharge, she reports she still gets mild light headedness/dizziness with position changes, but resolves briskly.  She still endorses intermittent nausea but no emesis, reports no BM now for a few days but no abdominal pain.  Reports Right knee pain mostly gone, minimal Right hip pain.  She has no other complaints.  Reports being ready for home.     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   Right hip has a occlusive surgical dressing in place, old drainage, no overt s/s of infection.   -Continues on PTA Apixiban.   -TID Acetaminophen for pain.   --Has Ortho f/u late 10/22.     Anemia due to blood loss, acute  Hgb down to 10.3 when last checked 10/18, no overt s/s of bleeding.   -Further monitoring left to PCP discretion.     Effusion of right knee  Acute pain of right knee  Biggest issue has been distal Right knee pain since starting PT/OT.  US did rule out DVT.  Xray noted advanced DJD and a moderate pleural effusion.  We continued conservative measures with ace wrap, ice and Acetaminophen.  Pain has resolved but she reports it's still \"Stiff\" and it remains swollen.   -Ortho f/u as noted above.   -Continue PRN Acewrap and Ice.   -Continue Acetaminophen as noted above.   --We did send a copy of Xray to Ortho appt.     Paroxysmal atrial fibrillation (H)  Essential hypertension  Pulmonary hypertension, mild (H)  Moderate tricuspid regurgitation  As noted, initially her hydrochlorothiazide was stopped then Amlodipine increased then hydrochlorothiazide resumed.  When she first " arrived to TCU, SBP's 160-180's, unclear how much pain or JOHN was contributing.  Over time SBP's improved and lately 100-140's, HR's WNL's.  She does endorse mild light headedness/dizziness with position changes, but resolves briskly.  But we did lower her Amlodipine back to PTA 2.5 mg's daily.   Weight is 153 lbs on 10/19 and down from admission.  Some Right hip and knee swelling, no other s/s of CHF.   -Norvasc back down to 2.5 mg's q daily.   -Continues hydrochlorothiazide, Coreg, Lisinopril.   -Continues BID Apixiban.   --Further adjustments left to PCP discretion.     Type 2 diabetes mellitus with diabetic polyneuropathy, without long-term current use of insulin (H)  A1c 6.1 in Sept.  Initially we did QID glucoses which ranged 102-207 with good control.   -Ok for daily checks at home.   -Continues on daily Metformin.     Female stress incontinence  No issues.   -Continues PTA Trospium.   --Has Urology f/u 10/23.     Nausea  Loose stools  First N/V with loose stools.  We backed off bowel meds, then mild constipation with intermittent nausea, emesis resolved.  Etiology unclear.   -Continues PRN Ondansetron.   -Continues PRN Senna.     Debility  -Will DC with  home care.   -See Epic order for F2F.     Past Medical History:  has a past medical history of Chronic anticoagulation (2/19/2017), Diabetes mellitus, type 2 (H), Diverticulitis of colon (without mention of hemorrhage)(562.11), DM type 2, goal A1C below 8.0 (12/3/2014), Essential and other specified forms of tremor, Hyperlipidemia LDL goal <100 (5/9/2010), Hypertension goal BP (blood pressure) < 140/90, Intestinal disaccharidase deficiencies and disaccharide malabsorption, Moderate tricuspid regurgitation (2/19/2017), Osteoarthrosis, unspecified whether generalized or localized, unspecified site, Paroxysmal atrial fibrillation (H) (2/19/2017), Pulmonary hypertension, mild (H) (2/19/2017), Type 2 diabetes, HbA1c goal < 7% (H) (5/2/2010), and Urge  incontinence. She also has no past medical history of Complication of anesthesia, Difficult intubation, Malignant hyperthermia, Motion sickness, Obese, PONV (postoperative nausea and vomiting), Sleep apnea, or Spinal headache.    Discharge Medications:  Current Outpatient Medications   Medication Sig Dispense Refill     acetaminophen (TYLENOL) 500 MG tablet Take 2 tablets (1,000 mg) by mouth 3 times daily       Alfalfa 650 MG TABS Take 2 capsules by mouth 2 times daily For arthritis pain       Alpha Lipoic Acid 200 MG CAPS Take 200 mg by mouth daily        amLODIPine (NORVASC) 5 MG tablet Take 0.5 tablets (2.5 mg) by mouth daily       apixaban ANTICOAGULANT (ELIQUIS) 5 MG tablet Take 1 tablet (5 mg) by mouth 2 times daily 60 tablet 11     blood glucose (NO BRAND SPECIFIED) lancets standard Use to test blood sugar one times daily or as directed. Dispense compatible with insurance and glucometer - One Touch Ultra 100 each 3     blood glucose monitoring (NO BRAND SPECIFIED) meter device kit Use to test blood sugar one times daily or as directed.  Dispense One Touch Ultra per insurance 1 kit 0     blood glucose monitoring (NO BRAND SPECIFIED) test strip Use to test blood sugars one times daily or as directed.  Dispense compatible with insurance and glucometer - One Touch Ultra 100 strip 3     calcium carbonate (TUMS E-X 750) 750 MG CHEW Take 750 mg by mouth 2 times daily        carvedilol (COREG) 25 MG tablet Take 1 tablet (25 mg) by mouth 2 times daily with meals 180 tablet 3     Estriol 10 % CREA 1 Application See Admin Instructions Insert 1 mg vaginally one time a day for Atrophic Vaginitis for 2 Weeks Apply small amount to finger and apply to inside vagina daily for two weeks then twice weekly. AND Insert 1 mg vaginally one time a day every Mon, Thu for Atrophic Vaginitis Apply small amount to finger and apply to inside vagina daily for two weeks then twice weekly.  -Pt reports using PRN now.        "hydrochlorothiazide (HYDRODIURIL) 25 MG tablet TAKE ONE TABLET BY MOUTH ONE TIME DAILY 30 tablet 0     lisinopril (PRINIVIL/ZESTRIL) 40 MG tablet Take 1 tablet (40 mg) by mouth daily (Patient taking differently: Take 40 mg by mouth every evening ) 90 tablet 3     metFORMIN (GLUCOPHAGE) 500 MG tablet Take 1 tablet (500 mg) by mouth daily (with dinner) 90 tablet 1     ondansetron (ZOFRAN) 4 MG tablet Take by mouth every 6 hours as needed for nausea       order for DME Equipment being ordered: Left wrist splint. 1 each 0     predniSONE (DELTASONE) 5 MG tablet Take 5 mg by mouth daily as needed (for painful flare)       senna-docusate (SENOKOT-S/PERICOLACE) 8.6-50 MG tablet Take 1 tablet by mouth 2 times daily as needed for constipation (Take while taking narcotic pain medications.) 15 tablet 0     trospium (SANCTURA XR) 60 MG CP24 24 hr capsule Take 1 capsule (60 mg) by mouth every morning 30 capsule 11     VITAMIN D 1000 UNIT OR TABS Take 1,000 Units by mouth At Bedtime  30 0     FLAXSEED (LINSEED) PO POWD Take 1 Tablespoonful by mouth every morning        triamcinolone (KENALOG) 0.5 % cream Apply topically 2 times daily to affected area as directed. (Patient not taking: Reported on 10/22/2019) 15 g 3     Medication Changes/Rationale:   -As noted above.     Controlled medications sent with patient:   not applicable/none     ROS:   7 point ROS done including, light headedness/dizziness, fever/chills, pain, Resp, CV, GI, and  and is negative other than noted in HPI.      Physical Exam:   Vitals: /72   Pulse 72   Temp 96.2  F (35.7  C)   Resp 16   Ht 1.626 m (5' 4\")   Wt 69.5 kg (153 lb 3.2 oz)   SpO2 98%   BMI 26.30 kg/m     BMI= Body mass index is 26.3 kg/m .     GENERAL APPEARANCE:  Elderly thin female sitting up in bed, NAD, non-toxic.  ENT:  Mouth and oropharynx normal, moist mucous membranes.   RESP:  Lungs CTA.  Regular relaxed breathing effort.  No cough.   CV: S1/S2 no murmur or rubs.  " Irregular-irregular rhythm and rate.    ABDOMEN:   Flat, soft, non-tender with active bowel sounds.  No guarding, rigidity, or rebound tenderness.  EXTREMITIES:   No LLE edema and 1-2+ edema about Right hip and knee, no calf tenderness.   JOINTS: Right knee is tender to palpation, swollen, no excessive warmth or redness.   PSYCH: Alert and orientated, pleasant and cooperative.   WOUND: Right hip is covered with occlusive surgical dressing, old drainage present, no s/s of infection.     SNF labs: Labs done in SNF are in [facility]. Please refer to them using EPIC/Care Everywhere.    DISCHARGE PLAN:    Follow up labs: No labs orders/due    Medical Follow Up:      Follow up with primary care provider in 1-2 weeks   Follow up with Ortho late 10/22.    Follow up with Urology 10/23.       MTM referral needed and placed by this provider: No      Discharge Services: Home Care:  Occupational Therapy, Physical Therapy, Registered Nurse, Home Health Aide and From:  [facility]    Discharge Instructions Verbalized to Patient at Discharge:     Instructed patient to arrange/attend above appointments.     TOTAL DISCHARGE TIME:   Greater than 30 minutes  Electronically signed by:  TIMOTEO Johnson CNP     Home care Face to Face documentation done in [facility] attached to Home care orders for [facility].               Sincerely,        TIMOTEO Johnson CNP

## 2019-10-23 ENCOUNTER — OFFICE VISIT (OUTPATIENT)
Dept: UROLOGY | Facility: CLINIC | Age: 84
End: 2019-10-23
Payer: COMMERCIAL

## 2019-10-23 VITALS
HEART RATE: 60 BPM | BODY MASS INDEX: 26.12 KG/M2 | WEIGHT: 153 LBS | SYSTOLIC BLOOD PRESSURE: 125 MMHG | HEIGHT: 64 IN | DIASTOLIC BLOOD PRESSURE: 62 MMHG

## 2019-10-23 DIAGNOSIS — N39.3 STRESS INCONTINENCE: Primary | ICD-10-CM

## 2019-10-23 DIAGNOSIS — N95.2 ATROPHIC VAGINITIS: ICD-10-CM

## 2019-10-23 DIAGNOSIS — N32.81 OVERACTIVE BLADDER: ICD-10-CM

## 2019-10-23 DIAGNOSIS — N39.41 URGE INCONTINENCE OF URINE: ICD-10-CM

## 2019-10-23 ASSESSMENT — MIFFLIN-ST. JEOR: SCORE: 1124

## 2019-10-23 ASSESSMENT — PAIN SCALES - GENERAL: PAINLEVEL: NO PAIN (0)

## 2019-10-23 NOTE — LETTER
10/23/2019       RE: Aundrea Treviño  [address]  [facility] [address]     Dear Colleague,    Thank you for referring your patient, Aundrea Treviño, to the [facility] UROLOGY AND INST FOR PROSTATE AND UROLOGIC CANCERS at [facility]. Please see a copy of my visit note below.    Reason for Visit:  post op for A midurethral sling on 7/26/19.    Clinical Data: Ms. Aundrea Treviño  is a 84 year old year old   female with a history of the above.   She returns today for her post operative visit.  She states that she is doing well.  No stress incontinence, but continues to have some urge incontinence although slightly improved. At last visit we started trospium, however she does not feel that has helped.     On exam:  Residual mild urethral mobility, no leakage with cough  Vaginal incision with suture  No evidence of mesh extrusion or erosion    RU: 21 cc on bladder scan by nursing.    A/P s/p midurethral sling doing well with some continued urge incontinence and overactive bladder.  She has tried oxybutynin in the past which did not help and now has tried trospium without much improvement. Discussed adding Myrbetriq but decided to hold off given history of hypertension.  -continue sanctura 60 mg   -start timed voiding  -use estrogen cream more regularly.  -f/u in 3 months.    Scribe Disclosure:   I, Cleopatra Christensen, am serving as a scribe; to document services personally performed by Irma Painter MD- -based on data collection and the provider's statements to me.     Provider Disclosure:  I agree with above History, Review of Systems, Physical exam and Plan.  I have reviewed the content of the documentation and have edited it as needed. I have personally performed the services documented here and the documentation accurately represents those services and the decisions I have made.      Electronically signed by:  Irma Painter MD    Thank you for allowing me to  participate in the care of  Ms.Aundreacarol Treviño   and I will keep you updated on her progress.    Irma Painter MD

## 2019-10-23 NOTE — DISCHARGE SUMMARY
Orthopedic Discharge Summary    Aundrea Treviño MRN# [id number]   YOB: 1934 Age: 85 year old     Date of Admission:  10/9/2019  Date of Discharge:  10/12/2019  1:40 PM  Admitting Physician:  Valentino Vizcarra MD  Discharge Physician:  No att. providers found  Discharging Service:  Orthopedics     Home clinic: [facility]  Primary Provider: Krysten Molina          Admission Diagnoses:   DJD RIGHT HIP  S/P total hip arthroplasty          Discharge Diagnosis:   Patient Active Problem List   Diagnosis     Disorder of bone and cartilage     Female stress incontinence     Dystonia     Essential tremor     Refusal of blood transfusions as patient is [employer]     Hypertension goal BP (blood pressure) < 140/90     CARDIOVASCULAR SCREENING; LDL GOAL LESS THAN 130     Gout     Venous (peripheral) insufficiency     Chronic anticoagulation     Paroxysmal atrial fibrillation (H)     Pulmonary hypertension, mild (H)     Moderate tricuspid regurgitation     ACP (advance care planning)     Patient is [employer]     Type 2 diabetes mellitus with diabetic polyneuropathy, without long-term current use of insulin (H)     Primary osteoarthritis of right shoulder     Primary osteoarthritis of first carpometacarpal joint of left hand     Elevated C-reactive protein (CRP)     S/P total hip arthroplasty     Primary osteoarthritis involving multiple joints     Acute pain of right knee     Effusion of right knee     Status post total replacement of right hip, 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              Discharge Medications:     No current facility-administered medications for this encounter.      Current Outpatient Medications   Medication Sig     Alfalfa 650 MG TABS Take 2 capsules by mouth 2 times daily For arthritis pain     Alpha Lipoic Acid 200 MG CAPS Take 200 mg by mouth daily      apixaban ANTICOAGULANT (ELIQUIS) 5 MG tablet Take 1 tablet (5 mg) by mouth 2 times daily     calcium carbonate (TUMS E-X 750) 750 MG CHEW Take 750 mg by mouth 2 times daily      carvedilol (COREG) 25 MG tablet Take 1 tablet (25 mg) by mouth 2 times daily with meals     FLAXSEED (LINSEED) PO POWD Take 1 Tablespoonful by mouth every morning      lisinopril (PRINIVIL/ZESTRIL) 40 MG tablet Take 1 tablet (40 mg) by mouth daily (Patient taking differently: Take 40 mg by mouth every evening )     metFORMIN (GLUCOPHAGE) 500 MG tablet Take 1 tablet (500 mg) by mouth daily (with dinner)     predniSONE (DELTASONE) 5 MG tablet Take 5 mg by mouth daily as needed (for painful flare)     senna-docusate (SENOKOT-S/PERICOLACE) 8.6-50 MG tablet Take 1 tablet by mouth 2 times daily as needed for constipation (Take while taking narcotic pain medications.)     triamcinolone (KENALOG) 0.5 % cream Apply topically 2 times daily to affected area as directed. (Patient not taking: Reported on 10/22/2019)     trospium (SANCTURA XR) 60 MG CP24 24 hr capsule Take 1 capsule (60 mg) by mouth every morning     VITAMIN D 1000 UNIT OR TABS Take 1,000 Units by mouth At Bedtime      acetaminophen (TYLENOL) 500 MG tablet Take 2 tablets (1,000 mg) by mouth 3 times daily     amLODIPine (NORVASC) 5 MG tablet Take 0.5 tablets (2.5 mg) by mouth daily     blood glucose (NO BRAND SPECIFIED) lancets standard Use to test blood sugar one times daily or as directed. Dispense compatible with insurance and glucometer - One Touch Ultra     blood glucose monitoring (NO BRAND SPECIFIED) meter device kit Use to test blood sugar one times daily or as directed.  Dispense One Touch  Ultra per insurance     blood glucose monitoring (NO BRAND SPECIFIED) test strip Use to test blood sugars one times daily or as directed.  Dispense compatible with insurance and glucometer - One Touch Ultra     Estriol 10 % CREA 1 Application See Admin Instructions Insert 1 mg vaginally one time a day for Atrophic Vaginitis for 2 Weeks Apply small amount to finger and apply to inside vagina daily for two weeks then twice weekly. AND Insert 1 mg vaginally one time a day every Mon, Thu for Atrophic Vaginitis Apply small amount to finger and apply to inside vagina daily for two weeks then twice weekly.  -Pt reports using PRN now.     hydrochlorothiazide (HYDRODIURIL) 25 MG tablet TAKE ONE TABLET BY MOUTH ONE TIME DAILY     ondansetron (ZOFRAN) 4 MG tablet Take by mouth every 6 hours as needed for nausea     order for DME Equipment being ordered: Left wrist splint.               Brief History of Illness:   Aundrea Treviño is a 85 year old female who was admitted for hip osteoarthritis.          Hospital Course:     S/P total hip arthroplasty    * No resolved hospital problems. *              Discharge Instructions and Follow-Up:   Discharge diet: Regular   Discharge activity: Activity as tolerated   Discharge follow-up: Follow up with me in [facility] 10-14days   Outpatient therapy: None    Home Care agency: None    Supplies and equipment: None   Lines and drains: None    Wound care: None   Other instructions: None

## 2019-10-23 NOTE — PROGRESS NOTES
Reason for Visit:  post op for A midurethral sling on 7/26/19.    Clinical Data: Ms. Aundrea Treviño  is a 84 year old year old   female with a history of the above.   She returns today for her post operative visit.  She states that she is doing well.  No stress incontinence, but continues to have some urge incontinence although slightly improved. At last visit we started trospium, however she does not feel that has helped.     On exam:  Residual mild urethral mobility, no leakage with cough  Vaginal incision with suture  No evidence of mesh extrusion or erosion    RU: 21 cc on bladder scan by nursing.    A/P s/p midurethral sling doing well with some continued urge incontinence and overactive bladder.  She has tried oxybutynin in the past which did not help and now has tried trospium without much improvement. Discussed adding Myrbetriq but decided to hold off given history of hypertension.  -continue sanctura 60 mg   -start timed voiding  -use estrogen cream more regularly.  -f/u in 3 months.    Scribe Disclosure:   I, Cleopatra Christensen, am serving as a scribe; to document services personally performed by Irma Painter MD- -based on data collection and the provider's statements to me.     Provider Disclosure:  I agree with above History, Review of Systems, Physical exam and Plan.  I have reviewed the content of the documentation and have edited it as needed. I have personally performed the services documented here and the documentation accurately represents those services and the decisions I have made.      Electronically signed by:  Irma Painter MD    Thank you for allowing me to participate in the care of  Ms.Ramona RADHA Treviño   and I will keep you updated on her progress.    Irma Painter MD

## 2019-10-23 NOTE — PATIENT INSTRUCTIONS
Please follow up in 3 months     It was a pleasure meeting with you today.  Thank you for allowing me and my team the privilege of caring for you today.  YOU are the reason we are here, and I truly hope we provided you with the excellent service you deserve.  Please let us know if there is anything else we can do for you so that we can be sure you are leaving completely satisfied with your care experience.

## 2019-10-23 NOTE — NURSING NOTE
Chief Complaint   Patient presents with     RECHECK      post operative check up S/P mid urethral sling placement and cystoscopy       Krysten Mcgregor MA

## 2019-10-24 ENCOUNTER — DOCUMENTATION ONLY (OUTPATIENT)
Dept: FAMILY MEDICINE | Facility: CLINIC | Age: 84
End: 2019-10-24

## 2019-10-24 ENCOUNTER — MEDICAL CORRESPONDENCE (OUTPATIENT)
Dept: HEALTH INFORMATION MANAGEMENT | Facility: CLINIC | Age: 84
End: 2019-10-24

## 2019-10-24 NOTE — PROGRESS NOTES
[facility] now requests orders and shares plan of care/discharge summaries for some patients through [facility].  Please REPLY TO THIS MESSAGE OR ROUTE BACK TO THE AUTHOR in order to give authorization for orders when needed.  This is considered a verbal order, you will still receive a faxed copy of orders for signature.  Thank you for your assistance in improving collaboration for our patients.    ORDER  SN 1 Wk 1, 2 wk 1, 1 wk 1, 2 as needed for disease process education/assessment, home safety, medication education/assessment, wound assessment/education.  HHA for bathing and grooming assistance 1 Wk 3 starting week of 10/27  PT to evaluate and treat for endurance, strenthening, home safety, standing balance and gait training, fall risk assessment.  OT to evaluate and treat HEP, home safety, bathing safety, ADLs, energy conservation techniques.      MD SUMMARY/PLAN OF CARE    FACE TO FACE ENCOUNTER  The patient had a face to face encounter with an allowed provider type on 10/22/19 and the encounter was related to the primary reason for home health care.    Patient Identified Goal// able to walk normally without a walker. Back to where she was before hip situation.    SUMMARY TO MD    SITUATION   Pt is an 85 year old female living alone in her apartment. She lives on the first level. On 10/9-10/12 she had surgery and was in the hospital recovering from her right total hip arthroplasty due to right hip osteoarthritis. She had anemia due to blood loss but because she is a [employer] she did not have a blood transfusion. She was found to have lower systolic BP, hydrochlorthiazide was held, later increasing norvasc, but then later resumed hydrochlorthiazed as well. From 10/12-10/23, pt continued to recover in rehab at [facility]. She was hypertensive and she also developed nausea with loose stools of unknown etiology. Later she developed intermittent nausea and constipation. After working with therapy,  she had right knee pain and swelling and an x-ray was done, finding DJD advanced and moderate pleural effusion. Pain improved. Pt denies pain or issues with right knee currently. Right foot swelling 1+ nonpitting.  VS: 120/52, 63 HR, 97.5F, 93% RA  WOUND DESCRIPTION AND MEASUREMENTS: right hip incisional wound approximated and measuring 16cm in length. Ecchymosis around the site. No erythema, inflammation, or drainage noted. Pt has an area of bruising lower back, but she is unsure how she got it.  PHYSICAL PSYCHOSOCIAL IMPAIRMENT OR LIMITATIONS: Pt currently uses a 2WW to ambulate. She has some weakness and gets SOB with exertion, although she denies. Prior to her surgery, she was using a cane to ambulate.  NUTRITION CONCERNS: Pt has intermittent nausea every day but is not taking any medications for it.  In the hospital and TCU she was taking zofran, but does not currently have a prescription. Pt eats small meals, no issues or concerns with swallowing.  HOME ENVIRONMENT AFFECTING CARE: pt home is uncluttered but pt has carpeting that can make it difficult for her to ambulate with her 2WW. She also has rugs in the home but main walkway is free from rugs or clutter.   CAREGIVER SUPPORT: her daughter Minerva lives in [place] and is helpful when patient needs assistance. Patient also has neighbors and friends nearby that can assist as needed. They bring her meals every day.    BACKGROUND peripheral venous insufficiency, chronic anticoagulation, pulmonary hypertension, paraoxysmal afib, DM2, osteoarthritis right shoulder/first carpometacarpal join of left hand, elevated c-reactive protein, acute pain right knee, effusion right knee, anemia due to blood loss, intractable vomiting with nausea, loose stool, debility, essential hypertension.    RECOMMENDATION: SN 1 Wk 1, 2 wk 1, 1 wk 1, 2 as needed for disease process education/assessment, home safety, medication education/assessment, wound assessment/education.  HHA for  bathing and grooming assistance 1 Wk 3 starting week of 10/27  PT to evaluate and treat for endurance, strenthening, home safety, standing balance and gait training, fall risk assessment.  OT to evaluate and treat HEP, home safety, bathing safety, ADLs, energy conservation techniques.    Amanda Mcgrath RN, BSN, PHN  [facility]  [phone]  [email or web address]

## 2019-10-25 DIAGNOSIS — I10 HYPERTENSION GOAL BP (BLOOD PRESSURE) < 140/90: ICD-10-CM

## 2019-10-25 RX ORDER — HYDROCHLOROTHIAZIDE 25 MG/1
TABLET ORAL
Qty: 30 TABLET | Refills: 0 | Status: SHIPPED | OUTPATIENT
Start: 2019-10-25 | End: 2019-11-20

## 2019-10-25 NOTE — PROGRESS NOTES
Patient was inpatient 10/9-10/12 for status post total hip.    Verbal ok given for HC orders below.    ORDER  SN 1 Wk 1, 2 wk 1, 1 wk 1, 2 as needed for disease process education/assessment, home safety, medication education/assessment, wound assessment/education.  HHA for bathing and grooming assistance 1 Wk 3 starting week of 10/27  PT to evaluate and treat for endurance, strenthening, home safety, standing balance and gait training, fall risk assessment.  OT to evaluate and treat HEP, home safety, bathing safety, ADLs, energy conservation techniques.    F2F completed on 10/22.    Thanks! Vivian Mcgee RN

## 2019-10-25 NOTE — TELEPHONE ENCOUNTER
"Requested Prescriptions   Pending Prescriptions Disp Refills     hydrochlorothiazide (HYDRODIURIL) 25 MG tablet [Pharmacy Med Name: hydroCHLOROthiazide Oral Tablet 25 MG] 30 tablet 0     Sig: TAKE ONE TABLET BY MOUTH ONE TIME DAILY       Diuretics (Including Combos) Protocol Passed - 10/25/2019  9:50 AM        Passed - Blood pressure under 140/90 in past 12 months     BP Readings from Last 3 Encounters:   10/23/19 125/62   10/22/19 124/72   10/17/19 125/61                 Passed - Recent (12 mo) or future (30 days) visit within the authorizing provider's specialty     Patient has had an office visit with the authorizing provider or a provider within the authorizing providers department within the previous 12 mos or has a future within next 30 days. See \"Patient Info\" tab in inbasket, or \"Choose Columns\" in Meds & Orders section of the refill encounter.              Passed - Medication is active on med list        Passed - Patient is age 18 or older        Passed - No active pregancy on record        Passed - Normal serum creatinine on file in past 12 months     Recent Labs   Lab Test 10/21/19  0750   CR 0.62              Passed - Normal serum potassium on file in past 12 months     Recent Labs   Lab Test 10/21/19  0750   POTASSIUM 3.6                    Passed - Normal serum sodium on file in past 12 months     Recent Labs   Lab Test 10/21/19  0750                 Passed - No positive pregnancy test in past 12 months        "

## 2019-10-28 ENCOUNTER — TELEPHONE (OUTPATIENT)
Dept: FAMILY MEDICINE | Facility: CLINIC | Age: 84
End: 2019-10-28

## 2019-10-28 NOTE — TELEPHONE ENCOUNTER
[facility] with  [facility] called requesting PT order for 2x wk for 3 wks to work on exercise & gait     Please advise

## 2019-10-28 NOTE — TELEPHONE ENCOUNTER
Returned call to yolette Cuellar detailed message on confidential voicemail for orders as requested below. Advised return call to clinic with questions or concerns.    Thank You!  Cyn Castillo, RN  Triage Nurse

## 2019-10-29 DIAGNOSIS — Z76.89 HEALTH CARE HOME: Primary | ICD-10-CM

## 2019-10-29 NOTE — PROGRESS NOTES
SUBJECTIVE:   Aundrea Treviño is a 85 year old female who presents to clinic today for the following health issues:      Hospital Follow-up Visit:    Hospital/Nursing Home/IP Rehab Facility: [facility]  Date of Admission: 10/9/2019  Date of Discharge: 10/12/2019  Reason(s) for Admission: DJD RIGHT HIP, S/P total hip arthroplasty     Transferring providers: TIMOTEO Johnson CNP, Charley Miranda MD  Recent Hospitalization/ED:  [facility] stay 10/9/19 to 10/12/19.  Date of SNF Admission: October / 12 / 2019  Date of SNF (anticipated) Discharge: October / 23 / 2019  Discharged to: previous independent home  Cognitive Scores: SLUMS: 29/30  Physical Function: Balance scores not done, 240 ft with 4ww.   DME: Walker       Problems taking medications regularly:  None       Medication changes since discharge: None       Problems adhering to non-medication therapy:  None     Summary of hospitalization:  [facility] discharge summary reviewed  Diagnostic Tests/Treatments reviewed.  Follow up needed: cbc  Other Healthcare Providers Involved in Patient s Care:         Homecare, Surgical follow-up appointment - ortho and Physical Therapy  Update since discharge: improved.      Post Discharge Medication Reconciliation: discharge medications reconciled, continue medications without change.  Plan of care communicated with patient     Coding guidelines for this visit:  Type of Medical   Decision Making Face-to-Face Visit       within 7 Days of discharge Face-to-Face Visit        within 14 days of discharge   Moderate Complexity [id number]   High Complexity [id number]          She has been doing very well.  Has had very little hip pain.  Did have some knee arthritis pain.  Little bit of swelling as well.  That has improved, but has not resolved.  She did not mention it to her orthopedist.follow-up, but plans to do so at her January visit.  The pain is not severe.  It is not  "constant.  Home PT is going well.  She continues to do her exercises.  She is using her walker as recommended.  Trying out her cane.    Continues to have some urge incontinence.  Continues on trospium.  Does see some benefit with that.  She is not  currently interested in follow-up for her urge incontinence.  Dr. Painter had also discussed the possibility of Myrbetriq.Discussed timed urination, which she does sometimes. She is not interested in further intervention at this time.      From rehab discharge summary:   \"   DISCHARGE DIAGNOSIS/NURSING FACILITY COURSE:   Mrs. Treviño is a 86 y/o with known multiple joint OA whom underwent an elective MIKAYLA on 9 Oct.  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  She was noted to have lower SBP's thus they held her hydrochlorothiazide and later increased her Norvasc, but then later resumed hydrochlorothiazide thus antihypertensive control was unclear.  She later then transitioned over to the TCU/rehab for ongoing cares and PT/OT.      Initially at the TCU Mrs. Treviño was hypertensive in the 160-180 range, but asymptomatic.  Later SBP's came down to the 100-140 range.  She later developed some N/V and loose stools but then later some constipation issues and intermittent nausea.  Biggest issue is after working with PT/OT she started developing acute Right knee pain.  US ruled out DVT and Xray noted significant DJD and moderate effusion. We continued conservative measures along with ace wrap and ice.  Over time swelling in knee remained but pain improved/resolved.  Due to improvement she will transition back home with home care.      In meeting Mrs. Treviño today for discharge, she reports she still gets mild light headedness/dizziness with position changes, but resolves briskly.  She still endorses intermittent nausea but no emesis, reports no BM now for a few days but no abdominal pain.  Reports Right knee " "pain mostly gone, minimal Right hip pain.  She has no other complaints.  Reports being ready for home.      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   Right hip has a occlusive surgical dressing in place, old drainage, no overt s/s of infection.   -Continues on PTA Apixiban.   -TID Acetaminophen for pain.   --Has Ortho f/u late 10/22.      Anemia due to blood loss, acute  Hgb down to 10.3 when last checked 10/18, no overt s/s of bleeding.   -Further monitoring left to PCP discretion.      Effusion of right knee  Acute pain of right knee  Biggest issue has been distal Right knee pain since starting PT/OT.  US did rule out DVT.  Xray noted advanced DJD and a moderate pleural effusion.  We continued conservative measures with ace wrap, ice and Acetaminophen.  Pain has resolved but she reports it's still \"Stiff\" and it remains swollen.   -Ortho f/u as noted above.   -Continue PRN Acewrap and Ice.   -Continue Acetaminophen as noted above.   --We did send a copy of Xray to Ortho appt.      Paroxysmal atrial fibrillation (H)  Essential hypertension  Pulmonary hypertension, mild (H)  Moderate tricuspid regurgitation  As noted, initially her hydrochlorothiazide was stopped then Amlodipine increased then hydrochlorothiazide resumed.  When she first arrived to TCU, SBP's 160-180's, unclear how much pain or JOHN was contributing.  Over time SBP's improved and lately 100-140's, HR's WNL's.  She does endorse mild light headedness/dizziness with position changes, but resolves briskly.  But we did lower her Amlodipine back to PTA 2.5 mg's daily.   Weight is 153 lbs on 10/19 and down from admission.  Some Right hip and knee swelling, no other s/s of CHF.   -Norvasc back down to 2.5 mg's q daily.   -Continues hydrochlorothiazide, Coreg, Lisinopril. " "  -Continues BID Apixiban.   --Further adjustments left to PCP discretion.      Type 2 diabetes mellitus with diabetic polyneuropathy, without long-term current use of insulin (H)  A1c 6.1 in Sept.  Initially we did QID glucoses which ranged 102-207 with good control.   -Ok for daily checks at home.   -Continues on daily Metformin.      Female stress incontinence  No issues.   -Continues PTA Trospium.   --Has Urology f/u 10/23.      Nausea  Loose stools  First N/V with loose stools.  We backed off bowel meds, then mild constipation with intermittent nausea, emesis resolved.  Etiology unclear.   -Continues PRN Ondansetron.   -Continues PRN Senna.      Debility  -Will DC with  home care.   -See Epic order for F2F. \"    Problem list and histories reviewed & adjusted, as indicated.  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     Hypertension goal BP (blood pressure) < 140/90     CARDIOVASCULAR SCREENING; LDL GOAL LESS THAN 130     Gout     Venous (peripheral) insufficiency     Chronic anticoagulation     Paroxysmal atrial fibrillation (H)     Pulmonary hypertension, mild (H)     Moderate tricuspid regurgitation     ACP (advance care planning)     Patient is Scientologist     Type 2 diabetes mellitus with diabetic polyneuropathy, without long-term current use of insulin (H)     Primary osteoarthritis of right shoulder     Primary osteoarthritis of first carpometacarpal joint of left hand     Elevated C-reactive protein (CRP)     S/P total hip arthroplasty     Primary osteoarthritis involving multiple joints     Acute pain of right knee     Effusion of right knee     Status post total replacement of right hip, 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     Acute post-operative pain     Uncontrolled hypertension     Anemia due to blood loss, acute     " Intractable vomiting with nausea, unspecified vomiting type     Loose stools     Debility     Essential hypertension     Nausea     Past Surgical History:   Procedure Laterality Date     ARTHROPLASTY HIP Right 10/9/2019    Procedure: RIGHT TOTAL HIP ARTHROPLASTY;  Surgeon: Valentino Vizcarra MD;  Location: [facility] OR     CYSTOSCOPY, SLING TRANSVAGINAL N/A 2019    Procedure: Midurethral Sling and Cystoscopy (Support the Urethra with Mesh and Look in the Bladder);  Surgeon: Irma Painter MD;  Location: [facility] OR     SURGICAL HISTORY OF -       hysterectomy, bladder suspension     SURGICAL HISTORY OF -       tonsillectomy       Social History     Tobacco Use     Smoking status: Former Smoker     Packs/day: 0.00     Last attempt to quit: 1960     Years since quittin.6     Smokeless tobacco: Never Used   Substance Use Topics     Alcohol use: Yes     Comment: rarely, a glass of wine on a special occassion     Family History   Problem Relation Age of Onset     No Known Problems Mother      Diabetes Father         type 2     Hypertension Father      Cerebrovascular Disease Father      Arthritis Father      Other Cancer Sister          Current Outpatient Medications   Medication Sig Dispense Refill     acetaminophen (TYLENOL) 500 MG tablet Take 2 tablets (1,000 mg) by mouth 3 times daily       Alfalfa 650 MG TABS Take 2 capsules by mouth 2 times daily For arthritis pain       Alpha Lipoic Acid 200 MG CAPS Take 200 mg by mouth daily        amLODIPine (NORVASC) 5 MG tablet Take 0.5 tablets (2.5 mg) by mouth daily       apixaban ANTICOAGULANT (ELIQUIS) 5 MG tablet Take 1 tablet (5 mg) by mouth 2 times daily 60 tablet 11     blood glucose (NO BRAND SPECIFIED) lancets standard Use to test blood sugar one times daily or as directed. Dispense compatible with insurance and glucometer - One Touch Ultra 100 each 3     blood glucose monitoring (NO BRAND SPECIFIED) meter device kit Use to test blood sugar one times daily or  as directed.  Dispense One Touch Ultra per insurance 1 kit 0     blood glucose monitoring (NO BRAND SPECIFIED) test strip Use to test blood sugars one times daily or as directed.  Dispense compatible with insurance and glucometer - One Touch Ultra 100 strip 3     calcium carbonate (TUMS E-X 750) 750 MG CHEW Take 750 mg by mouth 2 times daily        carvedilol (COREG) 25 MG tablet Take 1 tablet (25 mg) by mouth 2 times daily with meals 180 tablet 3     Estriol 10 % CREA 1 Application See Admin Instructions Insert 1 mg vaginally one time a day for Atrophic Vaginitis for 2 Weeks Apply small amount to finger and apply to inside vagina daily for two weeks then twice weekly. AND Insert 1 mg vaginally one time a day every Mon, Thu for Atrophic Vaginitis Apply small amount to finger and apply to inside vagina daily for two weeks then twice weekly.  -Pt reports using PRN now.       FLAXSEED (LINSEED) PO POWD Take 1 Tablespoonful by mouth every morning        hydrochlorothiazide (HYDRODIURIL) 25 MG tablet Take 1 tablet (25 mg) by mouth daily 90 tablet 3     lisinopril (PRINIVIL/ZESTRIL) 40 MG tablet Take 1 tablet (40 mg) by mouth daily (Patient taking differently: Take 40 mg by mouth every evening ) 90 tablet 3     metFORMIN (GLUCOPHAGE) 500 MG tablet Take 1 tablet (500 mg) by mouth daily (with dinner) 90 tablet 1     ondansetron (ZOFRAN) 4 MG tablet Take by mouth every 6 hours as needed for nausea       order for DME Equipment being ordered: Left wrist splint. 1 each 0     predniSONE (DELTASONE) 5 MG tablet Take 5 mg by mouth daily as needed (for painful flare)       senna-docusate (SENOKOT-S/PERICOLACE) 8.6-50 MG tablet Take 1 tablet by mouth 2 times daily as needed for constipation (Take while taking narcotic pain medications.) 15 tablet 0     triamcinolone (KENALOG) 0.5 % cream Apply topically 2 times daily to affected area as directed. 15 g 3     trospium (SANCTURA XR) 60 MG CP24 24 hr capsule Take 1 capsule (60 mg) by  "mouth every morning 30 capsule 11     VITAMIN D 1000 UNIT OR TABS Take 1,000 Units by mouth At Bedtime  30 0     Allergies   Allergen Reactions     Amlodipine      Swollen ankles and rash but tolerates 2.5 mg without any symptoms     Apple [Chromium]      Kiwi      Nuts      Pear      Tape [Adhesive Tape]      Recent Labs   Lab Test 10/21/19  0750 10/17/19  0800  09/23/19  1459 05/20/19  1224 02/18/19  0944 10/29/18  1508  05/07/18  1511  09/07/17  0954  09/12/16  1152   A1C  --   --   --  6.1* 6.9* 6.7*  --    < > 7.3*   < > 6.9*   < > 7.1*   LDL  --   --   --   --   --   --  91  --   --   --  88  --  96   HDL  --   --   --   --   --   --  54  --   --   --  53  --  46*   TRIG  --   --   --   --   --   --  103  --   --   --  70  --  91   ALT  --   --   --   --  30  --  19  --  22  --  21   < > 20   CR 0.62 0.63   < > 0.68 0.75  --  0.79  --  0.73  --  0.75   < > 0.72   GFRESTIMATED 82 82   < > 80 72  --  69  --  76  --  73   < > 77   GFRESTBLACK >90 >90   < > >90 84  --  84  --  >90  --  89   < > >90   GFR Calc     POTASSIUM 3.6 3.8   < > 3.5 3.7  --  3.7  --  3.9  --  3.6   < > 3.6   TSH  --   --   --   --  1.26  --  1.53  --   --   --   --   --  1.56    < > = values in this interval not displayed.      BP Readings from Last 3 Encounters:   11/20/19 120/68   10/23/19 125/62   10/22/19 124/72    Wt Readings from Last 3 Encounters:   11/20/19 69.5 kg (153 lb 4 oz)   10/23/19 69.4 kg (153 lb)   10/22/19 69.5 kg (153 lb 3.2 oz)              Reviewed and updated as needed this visit by clinical staff  Tobacco  Allergies  Meds       Reviewed and updated as needed this visit by Provider         ROS:  As above     OBJECTIVE:     /68 (BP Location: Left arm, Patient Position: Sitting, Cuff Size: Adult Regular)   Pulse 68   Temp 97.8  F (36.6  C) (Oral)   Ht 1.626 m (5' 4\")   Wt 69.5 kg (153 lb 4 oz)   SpO2 100%   BMI 26.31 kg/m    Body mass index is 26.31 kg/m .  GENERAL: healthy, alert and no " "distress, walks with walker.    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   Right hip has a occlusive surgical dressing in place, old drainage, no overt s/s of infection.   -Continues on PTA Apixiban.   -TID Acetaminophen for pain.   --Has Ortho f/u late 10/22.      Anemia due to blood loss, acute  Recheck CBC today    Hgb down to 10.3 when last checked 10/18, no overt s/s of bleeding.   -Further monitoring left to PCP discretion.      Effusion of right knee  Acute pain of right knee  Occurs intermittently, but not significantly bothersome at this time. Encouraged her to contact ortho if becoming more bothersome    Biggest issue has been distal Right knee pain since starting PT/OT.  US did rule out DVT.  Xray noted advanced DJD and a moderate pleural effusion.  We continued conservative measures with ace wrap, ice and Acetaminophen.  Pain has resolved but she reports it's still \"Stiff\" and it remains swollen.   -Ortho f/u as noted above.   -Continue PRN Acewrap and Ice.   -Continue Acetaminophen as noted above.   --We did send a copy of Xray to Ortho appt.      Paroxysmal atrial fibrillation (H)  Essential hypertension  Pulmonary hypertension, mild (H)  Moderate tricuspid regurgitation  Continue amlodipine 2.5mg daily, " hydrochlorothiazide 25mg daily, carvedilol 25mg twice daily, lisinopril 40mg daily    As noted, initially her hydrochlorothiazide was stopped then Amlodipine increased then hydrochlorothiazide resumed.  When she first arrived to TCU, SBP's 160-180's, unclear how much pain or JOHN was contributing.  Over time SBP's improved and lately 100-140's, HR's WNL's.  She does endorse mild light headedness/dizziness with position changes, but resolves briskly.  But we did lower her Amlodipine back to PTA 2.5 mg's daily.   Weight is 153 lbs on 10/19 and down from admission.  Some Right hip and knee swelling, no other s/s of CHF.   -Norvasc back down to 2.5 mg's q daily.   -Continues hydrochlorothiazide, Coreg, Lisinopril.   -Continues BID Apixiban.   --Further adjustments left to PCP discretion.      Type 2 diabetes mellitus with diabetic polyneuropathy, without long-term current use of insulin (H)  Controlled. Continues metformin 500mg daily    A1c 6.1 in Sept.  Initially we did QID glucoses which ranged 102-207 with good control.   -Ok for daily checks at home.   -Continues on daily Metformin.      Female stress incontinence (actually has more urge incontinence)  Responding some to trospium.  Continue trospium  -Continues PTA Trospium.   --Has Urology f/u 10/23.      Nausea  Loose stools  Resolved   First N/V with loose stools.  We backed off bowel meds, then mild constipation with intermittent nausea, emesis resolved.  Etiology unclear.   -Continues PRN Ondansetron.   -Continues PRN Senna.      Debility  -Will DC with  home care.   -See Epic order for F2F.     My clinical findings of activity intolerance, post hospital weakness and decreased strength/endurnace support the fact that the patient is homebound and in need of requested services.   I certify that the patient is homebound because of the taxing effort require to leave home. The patient only tolerates infrequent outings of short duration and requires assistance to  leave home for safety.      Patient Instructions   Return in 3 months.       Krysten Molina M.D.        [facility]

## 2019-10-30 ENCOUNTER — PATIENT OUTREACH (OUTPATIENT)
Dept: CARE COORDINATION | Facility: CLINIC | Age: 84
End: 2019-10-30

## 2019-10-30 NOTE — PROGRESS NOTES
Clinic Care Coordination Contact  Care Coordination Communication         Clinical Data: Patient was hospitalized.  Recent Hospitalization/ED:  [facility] stay 10/9/19 to 10/12/19.  Date of SNF Admission: October / 12 / 2019  Date of SNF (anticipated) Discharge: October / 23 / 2019    DISCHARGE DIAGNOSIS/NURSING FACILITY COURSE:   Mrs. Treviño is a 84 y/o with known multiple joint OA whom underwent an elective MIKAYLA on 9 Oct.  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  She was noted to have lower SBP's thus they held her hydrochlorothiazide and later increased her Norvasc, but then later resumed hydrochlorothiazide thus antihypertensive control was unclear.  She later then transitioned over to the TCU/rehab for ongoing cares and PT/OT.      Patient was discharged from TCU with [facility]     Home Care Contact:              Home Care Agency: [facility]               Contact name () and phone number: Amanda Mcgrath RN                Patient Contact: Clinic Care Coordinator RN left voicemail for patient.                 Plan:  No further Clinic Care Coordinator RN outreach

## 2019-11-19 ENCOUNTER — TRANSFERRED RECORDS (OUTPATIENT)
Dept: HEALTH INFORMATION MANAGEMENT | Facility: CLINIC | Age: 84
End: 2019-11-19

## 2019-11-20 ENCOUNTER — OFFICE VISIT (OUTPATIENT)
Dept: FAMILY MEDICINE | Facility: CLINIC | Age: 84
End: 2019-11-20
Payer: COMMERCIAL

## 2019-11-20 VITALS
HEIGHT: 64 IN | BODY MASS INDEX: 26.16 KG/M2 | SYSTOLIC BLOOD PRESSURE: 120 MMHG | WEIGHT: 153.25 LBS | OXYGEN SATURATION: 100 % | DIASTOLIC BLOOD PRESSURE: 68 MMHG | HEART RATE: 68 BPM | TEMPERATURE: 97.8 F

## 2019-11-20 DIAGNOSIS — D62 ANEMIA DUE TO BLOOD LOSS, ACUTE: Primary | ICD-10-CM

## 2019-11-20 DIAGNOSIS — I07.1 MODERATE TRICUSPID REGURGITATION: ICD-10-CM

## 2019-11-20 DIAGNOSIS — I48.0 PAROXYSMAL ATRIAL FIBRILLATION (H): ICD-10-CM

## 2019-11-20 DIAGNOSIS — Z96.641 STATUS POST TOTAL REPLACEMENT OF RIGHT HIP: ICD-10-CM

## 2019-11-20 DIAGNOSIS — M16.11 OSTEOARTHRITIS OF RIGHT HIP, UNSPECIFIED OSTEOARTHRITIS TYPE: ICD-10-CM

## 2019-11-20 DIAGNOSIS — E11.42 TYPE 2 DIABETES MELLITUS WITH DIABETIC POLYNEUROPATHY, WITHOUT LONG-TERM CURRENT USE OF INSULIN (H): ICD-10-CM

## 2019-11-20 DIAGNOSIS — R53.81 DEBILITY: ICD-10-CM

## 2019-11-20 DIAGNOSIS — I10 HYPERTENSION GOAL BP (BLOOD PRESSURE) < 140/90: ICD-10-CM

## 2019-11-20 DIAGNOSIS — N39.41 URGE INCONTINENCE OF URINE: ICD-10-CM

## 2019-11-20 DIAGNOSIS — M25.561 ACUTE PAIN OF RIGHT KNEE: ICD-10-CM

## 2019-11-20 DIAGNOSIS — I27.20 PULMONARY HYPERTENSION, MILD (H): ICD-10-CM

## 2019-11-20 LAB
ERYTHROCYTE [DISTWIDTH] IN BLOOD BY AUTOMATED COUNT: 14.3 % (ref 10–15)
HCT VFR BLD AUTO: 36.1 % (ref 35–47)
HGB BLD-MCNC: 11.8 G/DL (ref 11.7–15.7)
MCH RBC QN AUTO: 28.2 PG (ref 26.5–33)
MCHC RBC AUTO-ENTMCNC: 32.7 G/DL (ref 31.5–36.5)
MCV RBC AUTO: 86 FL (ref 78–100)
PLATELET # BLD AUTO: 152 10E9/L (ref 150–450)
RBC # BLD AUTO: 4.18 10E12/L (ref 3.8–5.2)
WBC # BLD AUTO: 5.5 10E9/L (ref 4–11)

## 2019-11-20 PROCEDURE — 36415 COLL VENOUS BLD VENIPUNCTURE: CPT | Performed by: FAMILY MEDICINE

## 2019-11-20 PROCEDURE — 85027 COMPLETE CBC AUTOMATED: CPT | Performed by: FAMILY MEDICINE

## 2019-11-20 PROCEDURE — 99214 OFFICE O/P EST MOD 30 MIN: CPT | Performed by: FAMILY MEDICINE

## 2019-11-20 RX ORDER — HYDROCHLOROTHIAZIDE 25 MG/1
25 TABLET ORAL DAILY
Qty: 90 TABLET | Refills: 3 | Status: SHIPPED | OUTPATIENT
Start: 2019-11-20 | End: 2020-09-10

## 2019-11-20 ASSESSMENT — MIFFLIN-ST. JEOR: SCORE: 1125.14

## 2019-12-10 DIAGNOSIS — Z53.9 DIAGNOSIS NOT YET DEFINED: Primary | ICD-10-CM

## 2019-12-10 PROCEDURE — G0180 MD CERTIFICATION HHA PATIENT: HCPCS | Performed by: FAMILY MEDICINE

## 2019-12-16 DIAGNOSIS — Z96.641 STATUS POST TOTAL REPLACEMENT OF RIGHT HIP: ICD-10-CM

## 2019-12-16 DIAGNOSIS — E11.42 TYPE 2 DIABETES MELLITUS WITH DIABETIC POLYNEUROPATHY, WITHOUT LONG-TERM CURRENT USE OF INSULIN (H): Primary | ICD-10-CM

## 2019-12-16 DIAGNOSIS — I10 ESSENTIAL HYPERTENSION: ICD-10-CM

## 2019-12-17 ENCOUNTER — PATIENT OUTREACH (OUTPATIENT)
Dept: CARE COORDINATION | Facility: CLINIC | Age: 84
End: 2019-12-17

## 2019-12-17 NOTE — PROGRESS NOTES
The clinic Community Health Worker spoke with the patient today due to Home Care Discharge to discuss possible Clinic Care Coordination enrollment.  The service was described to the patient and immediate needs were discussed.  The patient declined enrollment at this time. Patient states she is healthy and feels she is doing fine on her own.  The PCP is encouraged to refer in the future if the patient's needs change.

## 2020-01-07 ENCOUNTER — TRANSFERRED RECORDS (OUTPATIENT)
Dept: HEALTH INFORMATION MANAGEMENT | Facility: CLINIC | Age: 85
End: 2020-01-07

## 2020-01-15 DIAGNOSIS — R06.02 SOB (SHORTNESS OF BREATH): ICD-10-CM

## 2020-01-15 DIAGNOSIS — I10 ESSENTIAL HYPERTENSION WITH GOAL BLOOD PRESSURE LESS THAN 140/90: ICD-10-CM

## 2020-01-15 NOTE — TELEPHONE ENCOUNTER
"Requested Prescriptions   Pending Prescriptions Disp Refills     carvedilol (COREG) 25 MG tablet [Pharmacy Med Name: Carvedilol Oral Tablet 25 MG] 180 tablet 1     Sig: TAKE 1 TABLET (25 MG) BY MOUTH 2 TIMES DAILY WITH MEALS  Last Written Prescription Date:  1/14/2019  Last Fill Quantity: 180 tablet,  # refills: 3   Last Office Visit: 11/20/2019   Future Office Visit:            Beta-Blockers Protocol Passed - 1/15/2020  1:56 PM        Passed - Blood pressure under 140/90 in past 12 months     BP Readings from Last 3 Encounters:   11/20/19 120/68   10/23/19 125/62   10/22/19 124/72           Passed - Patient is age 6 or older        Passed - Recent (12 mo) or future (30 days) visit within the authorizing provider's specialty     Patient has had an office visit with the authorizing provider or a provider within the authorizing providers department within the previous 12 mos or has a future within next 30 days. See \"Patient Info\" tab in inbasket, or \"Choose Columns\" in Meds & Orders section of the refill encounter.              Passed - Medication is active on med list          "

## 2020-01-16 RX ORDER — CARVEDILOL 25 MG/1
TABLET ORAL
Qty: 180 TABLET | Refills: 3 | Status: SHIPPED | OUTPATIENT
Start: 2020-01-16 | End: 2021-01-14

## 2020-01-16 NOTE — TELEPHONE ENCOUNTER
Signed Prescriptions:                        Disp   Refills    carvedilol (COREG) 25 MG tablet            180 ta*3        Sig: TAKE 1 TABLET (25 MG) BY MOUTH 2 TIMES DAILY WITH           MEALS  Authorizing Provider: NITISH CA  Ordering User: MATTHEW VICTORIA

## 2020-01-21 ENCOUNTER — TELEPHONE (OUTPATIENT)
Dept: FAMILY MEDICINE | Facility: CLINIC | Age: 85
End: 2020-01-21

## 2020-01-21 NOTE — TELEPHONE ENCOUNTER
Aundrea Treviño 's froms have been fax to [facility] .    Fax with [facility] on  11/20/2019.    Emily Banks MA

## 2020-02-17 DIAGNOSIS — I48.0 PAROXYSMAL ATRIAL FIBRILLATION (H): ICD-10-CM

## 2020-02-17 DIAGNOSIS — I10 HYPERTENSION GOAL BP (BLOOD PRESSURE) < 140/90: ICD-10-CM

## 2020-02-17 RX ORDER — LISINOPRIL 40 MG/1
40 TABLET ORAL DAILY
Qty: 90 TABLET | Refills: 3 | Status: CANCELLED | OUTPATIENT
Start: 2020-02-17

## 2020-02-17 NOTE — TELEPHONE ENCOUNTER
"Requested Prescriptions   Pending Prescriptions Disp Refills     amLODIPine (NORVASC) 5 MG tablet       Sig: Take 0.5 tablets (2.5 mg) by mouth daily  Last Written Prescription Date:  10/22/2019  Last Fill Quantity: ,  # refills:    Last Office Visit: 11/20/2019   Future Office Visit:            Calcium Channel Blockers Protocol  Passed - 2/17/2020  3:20 PM        Passed - Blood pressure under 140/90 in past 12 months     BP Readings from Last 3 Encounters:   11/20/19 120/68   10/23/19 125/62   10/22/19 124/72           Passed - Recent (12 mo) or future (30 days) visit within the authorizing provider's specialty     Patient has had an office visit with the authorizing provider or a provider within the authorizing providers department within the previous 12 mos or has a future within next 30 days. See \"Patient Info\" tab in inbasket, or \"Choose Columns\" in Meds & Orders section of the refill encounter.            Passed - Medication is active on med list        Passed - Patient is age 18 or older        Passed - No active pregnancy on record        Passed - Normal serum creatinine on file in past 12 months     Recent Labs   Lab Test 10/21/19  0750   CR 0.62           Passed - No positive pregnancy test in past 12 months     _________________________________________________________________       lisinopril (ZESTRIL) 40 MG tablet 90 tablet 3     Sig: Take 1 tablet (40 mg) by mouth daily  Last Written Prescription Date:  2/18/2019  Last Fill Quantity: 90 tabelt,  # refills: 3   Last Office Visit: 11/20/2019   Future Office Visit:            ACE Inhibitors (Including Combos) Protocol Passed - 2/17/2020  3:20 PM        Passed - Blood pressure under 140/90 in past 12 months     BP Readings from Last 3 Encounters:   11/20/19 120/68   10/23/19 125/62   10/22/19 124/72           Passed - Recent (12 mo) or future (30 days) visit within the authorizing provider's specialty     Patient has had an office visit with the authorizing " "provider or a provider within the authorizing providers department within the previous 12 mos or has a future within next 30 days. See \"Patient Info\" tab in inbasket, or \"Choose Columns\" in Meds & Orders section of the refill encounter.            Passed - Medication is active on med list        Passed - Patient is age 18 or older        Passed - No active pregnancy on record        Passed - Normal serum creatinine on file in past 12 months     Recent Labs   Lab Test 10/21/19  0750   CR 0.62           Passed - Normal serum potassium on file in past 12 months     Recent Labs   Lab Test 10/21/19  0750   POTASSIUM 3.6           Passed - No positive pregnancy test within past 12 months     _________________________________________________________________       apixaban ANTICOAGULANT (ELIQUIS) 5 MG tablet 60 tablet 11     Sig: Take 1 tablet (5 mg) by mouth 2 times daily  Last Written Prescription Date:  2/18/2019  Last Fill Quantity: 60 tablet,  # refills: 11   Last Office Visit: 11/20/2019   Future Office Visit:            Direct Oral Anticoagulant Agents Failed - 2/17/2020  3:20 PM        Failed - Patient is 18-79 years of age        Passed - Normal Platelets on file in past 12 months     Recent Labs   Lab Test 11/20/19  1221              Passed - Medication is active on med list        Passed - Serum creatinine less than or equal to 1.4 on file in past 12 mos     Recent Labs   Lab Test 10/21/19  0750   CR 0.62           Passed - Weight is greater than 60 kg for the past year     Wt Readings from Last 3 Encounters:   11/20/19 69.5 kg (153 lb 4 oz)   10/23/19 69.4 kg (153 lb)   10/22/19 69.5 kg (153 lb 3.2 oz)           Passed - No active pregnancy on record        Passed - No positive pregnancy test within past 12 months        Passed - Recent (6 mo) or future (30 days) visit within the authorizing provider's specialty          "

## 2020-02-18 DIAGNOSIS — I10 HYPERTENSION GOAL BP (BLOOD PRESSURE) < 140/90: ICD-10-CM

## 2020-02-18 DIAGNOSIS — I48.0 PAROXYSMAL ATRIAL FIBRILLATION (H): ICD-10-CM

## 2020-02-19 RX ORDER — LISINOPRIL 40 MG/1
40 TABLET ORAL DAILY
Qty: 90 TABLET | Refills: 2 | Status: SHIPPED | OUTPATIENT
Start: 2020-02-19 | End: 2020-09-10

## 2020-02-19 NOTE — TELEPHONE ENCOUNTER
Requested Prescriptions   Pending Prescriptions Disp Refills     ELIQUIS ANTICOAGULANT 5 MG PO tablet [Pharmacy Med Name: Eliquis Oral Tablet 5 MG] 60 tablet 10     Sig: Take 1 tablet (5 mg) by mouth 2 times daily  Last Written Prescription Date:  2/18/2019  Last Fill Quantity: 60 tablet,  # refills: 11   Last Office Visit: 11/20/2019   Future Office Visit:            Direct Oral Anticoagulant Agents Failed - 2/18/2020  6:57 PM        Failed - Patient is 18-79 years of age        Passed - Normal Platelets on file in past 12 months     Recent Labs   Lab Test 11/20/19  1221              Passed - Medication is active on med list        Passed - Serum creatinine less than or equal to 1.4 on file in past 12 mos     Recent Labs   Lab Test 10/21/19  0750   CR 0.62           Passed - Weight is greater than 60 kg for the past year     Wt Readings from Last 3 Encounters:   11/20/19 69.5 kg (153 lb 4 oz)   10/23/19 69.4 kg (153 lb)   10/22/19 69.5 kg (153 lb 3.2 oz)           Passed - No active pregnancy on record        Passed - No positive pregnancy test within past 12 months        Passed - Recent (6 mo) or future (30 days) visit within the authorizing provider's specialty     _________________________________________________________________       LISINOPRIL 40 MG PO tablet [Pharmacy Med Name: Lisinopril Oral Tablet 40 MG] 90 tablet 2     Sig: Take 1 tablet (40 mg) by mouth daily  Last Written Prescription Date:  2/18/2019  Last Fill Quantity: 90 tablet,  # refills: 3   Last Office Visit: 11/20/2019   Future Office Visit:            ACE Inhibitors (Including Combos) Protocol Passed - 2/18/2020  6:57 PM        Passed - Blood pressure under 140/90 in past 12 months     BP Readings from Last 3 Encounters:   11/20/19 120/68   10/23/19 125/62   10/22/19 124/72           Passed - Recent (12 mo) or future (30 days) visit within the authorizing provider's specialty     Patient has had an office visit with the authorizing  "provider or a provider within the authorizing providers department within the previous 12 mos or has a future within next 30 days. See \"Patient Info\" tab in inbasket, or \"Choose Columns\" in Meds & Orders section of the refill encounter.            Passed - Medication is active on med list        Passed - Patient is age 18 or older        Passed - No active pregnancy on record        Passed - Normal serum creatinine on file in past 12 months     Recent Labs   Lab Test 10/21/19  0750   CR 0.62           Passed - Normal serum potassium on file in past 12 months     Recent Labs   Lab Test 10/21/19  0750   POTASSIUM 3.6           Passed - No positive pregnancy test within past 12 months          "

## 2020-02-20 RX ORDER — AMLODIPINE BESYLATE 5 MG/1
2.5 TABLET ORAL DAILY
Qty: 90 TABLET | Refills: 3 | Status: SHIPPED | OUTPATIENT
Start: 2020-02-20 | End: 2021-05-04

## 2020-02-20 RX ORDER — APIXABAN 5 MG/1
TABLET, FILM COATED ORAL
Qty: 60 TABLET | Refills: 10 | Status: SHIPPED | OUTPATIENT
Start: 2020-02-20 | End: 2021-01-26

## 2020-02-20 NOTE — TELEPHONE ENCOUNTER
amLODIPine (NORVASC) 5 MG tablet (Discontinued)    Last Written Prescription Date:  10/3/19  Last Fill Quantity: 30,   # refills: 0  Last Office Visit: 11/20/19  Future Office visit:       Routing refill request to provider for review/approval because:  Drug not active on patient's medication list - transitional care discharge advises directions of 2.5 mg daily      This Order Has Been Discontinued     Order Status Reason By On   Discontinued Reorder Marcelo Christian MD 10/12/19 1217     Placed back on patient medication list by    Ordered Status Priority Ordering User Department   10/22/19 (none) (none) Lucian Lozano, TIMOTEO CNP [facility]     Office visit 10/22 - discharge transitional care    Paroxysmal atrial fibrillation (H)  Essential hypertension  Pulmonary hypertension, mild (H)  Moderate tricuspid regurgitation  As noted, initially her hydrochlorothiazide was stopped then Amlodipine increased then hydrochlorothiazide resumed.  When she first arrived to TCU, SBP's 160-180's, unclear how much pain or JOHN was contributing.  Over time SBP's improved and lately 100-140's, HR's WNL's.  She does endorse mild light headedness/dizziness with position changes, but resolves briskly.  But we did lower her Amlodipine back to PTA 2.5 mg's daily.   Weight is 153 lbs on 10/19 and down from admission.  Some Right hip and knee swelling, no other s/s of CHF.   -Norvasc back down to 2.5 mg's q daily.   -Continues hydrochlorothiazide, Coreg, Lisinopril.   -Continues BID Apixiban.   --Further adjustments left to PCP discretion.

## 2020-05-17 DIAGNOSIS — E11.42 TYPE 2 DIABETES MELLITUS WITH DIABETIC POLYNEUROPATHY, WITHOUT LONG-TERM CURRENT USE OF INSULIN (H): ICD-10-CM

## 2020-05-28 NOTE — TELEPHONE ENCOUNTER
message sent to pharmacy - METFORMIN REFILL REQUEST WILL BE ADDRESSED AT VISIT 6/1/2020..  Jose PÉREZ

## 2020-05-28 NOTE — TELEPHONE ENCOUNTER
Virtual visit scheduled with PCP on 6/1. Patient has 18 tablets left & does not need a short term refill.    Agatha JACOBS     [facility]

## 2020-06-01 ENCOUNTER — VIRTUAL VISIT (OUTPATIENT)
Dept: FAMILY MEDICINE | Facility: CLINIC | Age: 85
End: 2020-06-01
Payer: COMMERCIAL

## 2020-06-01 DIAGNOSIS — M79.89 LEG SWELLING: ICD-10-CM

## 2020-06-01 DIAGNOSIS — Z79.01 CHRONIC ANTICOAGULATION: ICD-10-CM

## 2020-06-01 DIAGNOSIS — I87.2 VENOUS (PERIPHERAL) INSUFFICIENCY: ICD-10-CM

## 2020-06-01 DIAGNOSIS — N39.3 FEMALE STRESS INCONTINENCE: ICD-10-CM

## 2020-06-01 DIAGNOSIS — E11.42 TYPE 2 DIABETES MELLITUS WITH DIABETIC POLYNEUROPATHY, WITHOUT LONG-TERM CURRENT USE OF INSULIN (H): Primary | ICD-10-CM

## 2020-06-01 DIAGNOSIS — E11.9 TYPE 2 DIABETES MELLITUS WITHOUT COMPLICATION, WITHOUT LONG-TERM CURRENT USE OF INSULIN (H): ICD-10-CM

## 2020-06-01 DIAGNOSIS — I48.0 PAROXYSMAL ATRIAL FIBRILLATION (H): ICD-10-CM

## 2020-06-01 DIAGNOSIS — I10 HYPERTENSION GOAL BP (BLOOD PRESSURE) < 140/90: ICD-10-CM

## 2020-06-01 PROCEDURE — 99214 OFFICE O/P EST MOD 30 MIN: CPT | Mod: 95 | Performed by: FAMILY MEDICINE

## 2020-06-01 NOTE — PROGRESS NOTES
"Aundrea Treviño is a 85 year old female who is being evaluated via a billable telephone visit.      The patient has been notified of following:     \"This telephone visit will be conducted via a call between you and your physician/provider. We have found that certain health care needs can be provided without the need for a physical exam.  This service lets us provide the care you need with a short phone conversation.  If a prescription is necessary we can send it directly to your pharmacy.  If lab work is needed we can place an order for that and you can then stop by our lab to have the test done at a later time.    Telephone visits are billed at different rates depending on your insurance coverage. During this emergency period, for some insurers they may be billed the same as an in-person visit.  Please reach out to your insurance provider with any questions.    If during the course of the call the physician/provider feels a telephone visit is not appropriate, you will not be charged for this service.\"    Patient has given verbal consent for Telephone visit?  Yes    What phone number would you like to be contacted at? [phone] (L)    How would you like to obtain your AVS? Mail a copy    Subjective     Aundrea Treviño is a 85 year old female who presents via phone visit today for the following health issues:    HPI11:26 AM    Diabetes Follow-up      How often are you checking your blood sugar? Not at all    What concerns do you have today about your diabetes? None     Do you have any of these symptoms? (Select all that apply)  neuropathy     Have you had a diabetic eye exam in the last 12 months? No she does have an appt in a couple of weeks     Hyperlipidemia Follow-Up      Are you regularly taking any medication or supplement to lower your cholesterol?   No    Are you having muscle aches or other side effects that you think could be caused by your cholesterol lowering medication?  No    Hypertension " Follow-up      Do you check your blood pressure regularly outside of the clinic? Yes     Are you following a low salt diet? Yes to the best of her ability she has been eating a lot of processed meals     Are your blood pressures ever more than 140 on the top number (systolic) OR more   than 90 on the bottom number (diastolic), for example 140/90? No    BP Readings from Last 2 Encounters:   11/20/19 120/68   10/23/19 125/62     Hemoglobin A1C (%)   Date Value   09/23/2019 6.1 (H)   05/20/2019 6.9 (H)     LDL Cholesterol Calculated (mg/dL)   Date Value   10/29/2018 91   09/07/2017 88         How many servings of fruits and vegetables do you eat daily?  2-3    On average, how many sweetened beverages do you drink each day (Examples: soda, juice, sweet tea, etc.  Do NOT count diet or artificially sweetened beverages)?   1    How many days per week do you exercise enough to make your heart beat faster? She is having trouble walking because of the neuropathy but she tries to walk around the hallway     How many minutes a day do you exercise enough to make your heart beat faster? Depends on there day     How many days per week do you miss taking your medication? 0         Patient Active Problem List   Diagnosis     Disorder of bone and cartilage     Female stress incontinence     Dystonia     Essential tremor     Refusal of blood transfusions as patient is Religious     Hypertension goal BP (blood pressure) < 140/90     CARDIOVASCULAR SCREENING; LDL GOAL LESS THAN 130     Gout     Venous (peripheral) insufficiency     Chronic anticoagulation     Paroxysmal atrial fibrillation (H)     Pulmonary hypertension, mild (H)     Moderate tricuspid regurgitation     ACP (advance care planning)     Patient is Religious     Type 2 diabetes mellitus with diabetic polyneuropathy, without long-term current use of insulin (H)     Primary osteoarthritis of right shoulder     Primary osteoarthritis of first carpometacarpal  joint of left hand     Elevated C-reactive protein (CRP)     S/P total hip arthroplasty     Primary osteoarthritis involving multiple joints     Acute pain of right knee     Effusion of right knee     Status post total replacement of right hip, 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     Acute post-operative pain     Uncontrolled hypertension     Anemia due to blood loss, acute     Intractable vomiting with nausea, unspecified vomiting type     Loose stools     Debility     Essential hypertension     Nausea     Past Surgical History:   Procedure Laterality Date     ARTHROPLASTY HIP Right 10/9/2019    Procedure: RIGHT TOTAL HIP ARTHROPLASTY;  Surgeon: Valentino Vizcarra MD;  Location: [facility] OR     CYSTOSCOPY, SLING TRANSVAGINAL N/A 2019    Procedure: Midurethral Sling and Cystoscopy (Support the Urethra with Mesh and Look in the Bladder);  Surgeon: Irma Painter MD;  Location: [facility] OR     SURGICAL HISTORY OF -       hysterectomy, bladder suspension     SURGICAL HISTORY OF -       tonsillectomy       Social History     Tobacco Use     Smoking status: Former Smoker     Packs/day: 0.00     Last attempt to quit: 1960     Years since quittin.1     Smokeless tobacco: Never Used   Substance Use Topics     Alcohol use: Yes     Comment: rarely, a glass of wine on a special occassion     Family History   Problem Relation Age of Onset     No Known Problems Mother      Diabetes Father         type 2     Hypertension Father      Cerebrovascular Disease Father      Arthritis Father      Other Cancer Sister          Current Outpatient Medications   Medication Sig Dispense Refill     acetaminophen (TYLENOL) 500 MG tablet Take 2 tablets (1,000 mg) by mouth 3 times daily       Alfalfa 650 MG TABS Take 2 capsules by mouth 2 times daily For arthritis pain       Alpha Lipoic Acid 200 MG CAPS Take 200 mg by mouth daily        amLODIPine 5 MG PO tablet Take 0.5 tablets (2.5 mg) by mouth daily 90 tablet 3     blood glucose (NO BRAND SPECIFIED) lancets  standard Use to test blood sugar one times daily or as directed. Dispense compatible with insurance and glucometer - One Touch Ultra 100 each 3     blood glucose monitoring (NO BRAND SPECIFIED) meter device kit Use to test blood sugar one times daily or as directed.  Dispense One Touch Ultra per insurance 1 kit 0     blood glucose monitoring (NO BRAND SPECIFIED) test strip Use to test blood sugars one times daily or as directed.  Dispense compatible with insurance and glucometer - One Touch Ultra 100 strip 3     calcium carbonate (TUMS E-X 750) 750 MG CHEW Take 750 mg by mouth 2 times daily        carvedilol (COREG) 25 MG tablet TAKE 1 TABLET (25 MG) BY MOUTH 2 TIMES DAILY WITH MEALS 180 tablet 3     ELIQUIS ANTICOAGULANT 5 MG PO tablet Take 1 tablet (5 mg) by mouth 2 times daily 60 tablet 10     Estriol 10 % CREA 1 Application See Admin Instructions Insert 1 mg vaginally one time a day for Atrophic Vaginitis for 2 Weeks Apply small amount to finger and apply to inside vagina daily for two weeks then twice weekly. AND Insert 1 mg vaginally one time a day every Mon, Thu for Atrophic Vaginitis Apply small amount to finger and apply to inside vagina daily for two weeks then twice weekly.  -Pt reports using PRN now.       FLAXSEED (LINSEED) PO POWD Take 1 Tablespoonful by mouth every morning        hydrochlorothiazide (HYDRODIURIL) 25 MG tablet Take 1 tablet (25 mg) by mouth daily 90 tablet 3     LISINOPRIL 40 MG PO tablet Take 1 tablet (40 mg) by mouth daily 90 tablet 2     metFORMIN (GLUCOPHAGE) 500 MG tablet Take 1 tablet (500 mg) by mouth daily (with dinner) 90 tablet 1     ondansetron (ZOFRAN) 4 MG tablet Take by mouth every 6 hours as needed for nausea       order for DME Equipment being ordered: Left wrist splint. 1 each 0     predniSONE (DELTASONE) 5 MG tablet Take 5 mg by mouth daily as needed (for painful flare)       senna-docusate (SENOKOT-S/PERICOLACE) 8.6-50 MG tablet Take 1 tablet by mouth 2 times daily  as needed for constipation (Take while taking narcotic pain medications.) 15 tablet 0     triamcinolone (KENALOG) 0.5 % cream Apply topically 2 times daily to affected area as directed. 15 g 3     trospium (SANCTURA XR) 60 MG CP24 24 hr capsule Take 1 capsule (60 mg) by mouth every morning 30 capsule 11     VITAMIN D 1000 UNIT OR TABS Take 1,000 Units by mouth At Bedtime  30 0     Allergies   Allergen Reactions     Amlodipine      Swollen ankles and rash but tolerates 2.5 mg without any symptoms     Apple [Chromium]      Kiwi      Nuts      Pear      Tape [Adhesive Tape]      Recent Labs   Lab Test 10/21/19  0750 10/17/19  0800  09/23/19  1459 05/20/19  1224 02/18/19  0944 10/29/18  1508  05/07/18  1511  09/07/17  0954  09/12/16  1152   A1C  --   --   --  6.1* 6.9* 6.7*  --    < > 7.3*   < > 6.9*   < > 7.1*   LDL  --   --   --   --   --   --  91  --   --   --  88  --  96   HDL  --   --   --   --   --   --  54  --   --   --  53  --  46*   TRIG  --   --   --   --   --   --  103  --   --   --  70  --  91   ALT  --   --   --   --  30  --  19  --  22  --  21   < > 20   CR 0.62 0.63   < > 0.68 0.75  --  0.79  --  0.73  --  0.75   < > 0.72   GFRESTIMATED 82 82   < > 80 72  --  69  --  76  --  73   < > 77   GFRESTBLACK >90 >90   < > >90 84  --  84  --  >90  --  89   < > >90   GFR Calc     POTASSIUM 3.6 3.8   < > 3.5 3.7  --  3.7  --  3.9  --  3.6   < > 3.6   TSH  --   --   --   --  1.26  --  1.53  --   --   --   --   --  1.56    < > = values in this interval not displayed.      BP Readings from Last 3 Encounters:   11/20/19 120/68   10/23/19 125/62   10/22/19 124/72    Wt Readings from Last 3 Encounters:   11/20/19 69.5 kg (153 lb 4 oz)   10/23/19 69.4 kg (153 lb)   10/22/19 69.5 kg (153 lb 3.2 oz)                    Reviewed and updated as needed this visit by Provider         Review of Systems   Constitutional, HEENT, cardiovascular, pulmonary, gi and gu systems are negative, except as otherwise noted.        Objective   Reported vitals:  There were no vitals taken for this visit.   healthy, alert and no distress  PSYCH: Alert and oriented times 3; coherent speech, normal   rate and volume, able to articulate logical thoughts, able   to abstract reason, no tangential thoughts, no hallucinations   or delusions  Her affect is normal  RESP: No cough, no audible wheezing, able to talk in full sentences  Remainder of exam unable to be completed due to telephone visits    Diagnostic Test Results:  Labs reviewed in Epic        Assessment/Plan:     1. Type 2 diabetes mellitus with diabetic polyneuropathy, without long-term current use of insulin (H)   Controlled. Due for recheck A1C, cmp, urine albumin. continue metformin 500mg daily. Tolerating it well. Peripheral neuropathy bilateral feet unchanged.      2.Hypertension goal BP (blood pressure) < 140/90  Historically well controlled. Will check bp at time of lab visit. Will schedule lab.  No changes today.  Continues amlodipine 2.5 mg daily, carvedilol 25 mg twice daily, hydrochlorothiazide 25 mg daily, lisinopril 40 mg daily.        3 Venous (peripheral) insufficiency.   4. Swelling of right foot greater than left  Stable with compression stockings.     5. Chronic anticoagulation  6. Paroxysmal A. Fib    Continues on Eliquis 5 mg twice daily.  Followed by cardiology     7.  Female stress incontinence  Continues trospium    She will schedule labs and video or telephone wellness visit in august.      11:33 AM     Phone call duration:  7 minutes    Krysten Molina MD

## 2020-07-08 ENCOUNTER — TELEPHONE (OUTPATIENT)
Dept: UROLOGY | Facility: CLINIC | Age: 85
End: 2020-07-08

## 2020-07-08 DIAGNOSIS — N39.41 URGE INCONTINENCE OF URINE: ICD-10-CM

## 2020-07-08 DIAGNOSIS — N39.3 FEMALE STRESS INCONTINENCE: ICD-10-CM

## 2020-07-08 DIAGNOSIS — N32.81 OVERACTIVE BLADDER: ICD-10-CM

## 2020-07-08 DIAGNOSIS — N39.492 POSTURAL URINARY INCONTINENCE: Primary | ICD-10-CM

## 2020-07-08 RX ORDER — SOLIFENACIN SUCCINATE 10 MG/1
10 TABLET, FILM COATED ORAL DAILY
Qty: 30 TABLET | Refills: 0 | Status: SHIPPED | OUTPATIENT
Start: 2020-07-08 | End: 2020-10-09

## 2020-07-08 NOTE — TELEPHONE ENCOUNTER
Irma Painter MD Babcock, Rachel, LPN 44 minutes ago (3:17 PM)       Depending on what she's used before we could use vesicare 10 mg or toviaz 8 mg.      Vesicare 10 mg prescribed based on Dr. Painter's note.  Prema Guerrero RN

## 2020-07-08 NOTE — TELEPHONE ENCOUNTER
[facility] Call Center    Phone Message    May a detailed message be left on voicemail: yes     Reason for Call: Medication Question or concern regarding medication   Prescription Clarification  Name of Medication: trospium (SANCTURA XR) 60 MG CP24 24 hr capsule [[id number]] (Order [id number]    Prescribing Provider: Inocencio   Pharmacy:   [id number] - [place], [place] - [address]     What on the order needs clarification? Pharmacy states that they cannot get this medication at this time and need an alternative. Patient is completely out of medication above.     Please advise  Action Taken: Message routed to:  Adult Clinics: Urology p [phone]

## 2020-07-08 NOTE — TELEPHONE ENCOUNTER
Per Dr. Painter, Depending on what patient has used before, can try Vesicare 10mg or Toviaz 8 mg. Per chart review, patient has tried neither.    Brooke Torrez LPN

## 2020-08-03 ENCOUNTER — TRANSFERRED RECORDS (OUTPATIENT)
Dept: HEALTH INFORMATION MANAGEMENT | Facility: CLINIC | Age: 85
End: 2020-08-03

## 2020-08-03 LAB — RETINOPATHY: NEGATIVE

## 2020-08-20 DIAGNOSIS — E11.42 TYPE 2 DIABETES MELLITUS WITH DIABETIC POLYNEUROPATHY, WITHOUT LONG-TERM CURRENT USE OF INSULIN (H): ICD-10-CM

## 2020-08-20 DIAGNOSIS — I10 HYPERTENSION GOAL BP (BLOOD PRESSURE) < 140/90: ICD-10-CM

## 2020-08-20 LAB
ALBUMIN SERPL-MCNC: 3.9 G/DL (ref 3.4–5)
ALP SERPL-CCNC: 87 U/L (ref 40–150)
ALT SERPL W P-5'-P-CCNC: 13 U/L (ref 0–50)
ANION GAP SERPL CALCULATED.3IONS-SCNC: 7 MMOL/L (ref 3–14)
AST SERPL W P-5'-P-CCNC: 13 U/L (ref 0–45)
BILIRUB SERPL-MCNC: 1 MG/DL (ref 0.2–1.3)
BUN SERPL-MCNC: 17 MG/DL (ref 7–30)
CALCIUM SERPL-MCNC: 9.2 MG/DL (ref 8.5–10.1)
CHLORIDE SERPL-SCNC: 104 MMOL/L (ref 94–109)
CHOLEST SERPL-MCNC: 145 MG/DL
CO2 SERPL-SCNC: 27 MMOL/L (ref 20–32)
CREAT SERPL-MCNC: 0.86 MG/DL (ref 0.52–1.04)
GFR SERPL CREATININE-BSD FRML MDRD: 61 ML/MIN/{1.73_M2}
GLUCOSE SERPL-MCNC: 148 MG/DL (ref 70–99)
HBA1C MFR BLD: 5.9 % (ref 0–5.6)
HDLC SERPL-MCNC: 42 MG/DL
LDLC SERPL CALC-MCNC: 89 MG/DL
NONHDLC SERPL-MCNC: 103 MG/DL
POTASSIUM SERPL-SCNC: 3.7 MMOL/L (ref 3.4–5.3)
PROT SERPL-MCNC: 6.7 G/DL (ref 6.8–8.8)
SODIUM SERPL-SCNC: 138 MMOL/L (ref 133–144)
TRIGL SERPL-MCNC: 72 MG/DL

## 2020-08-20 PROCEDURE — 83036 HEMOGLOBIN GLYCOSYLATED A1C: CPT | Performed by: INTERNAL MEDICINE

## 2020-08-20 PROCEDURE — 80061 LIPID PANEL: CPT | Performed by: INTERNAL MEDICINE

## 2020-08-20 PROCEDURE — 82043 UR ALBUMIN QUANTITATIVE: CPT | Performed by: INTERNAL MEDICINE

## 2020-08-20 PROCEDURE — 80053 COMPREHEN METABOLIC PANEL: CPT | Performed by: INTERNAL MEDICINE

## 2020-08-20 PROCEDURE — 36415 COLL VENOUS BLD VENIPUNCTURE: CPT | Performed by: INTERNAL MEDICINE

## 2020-08-21 LAB
CREAT UR-MCNC: 133 MG/DL
MICROALBUMIN UR-MCNC: 8 MG/L
MICROALBUMIN/CREAT UR: 5.9 MG/G CR (ref 0–25)

## 2020-09-10 ENCOUNTER — VIRTUAL VISIT (OUTPATIENT)
Dept: FAMILY MEDICINE | Facility: CLINIC | Age: 85
End: 2020-09-10
Payer: COMMERCIAL

## 2020-09-10 DIAGNOSIS — I10 HYPERTENSION GOAL BP (BLOOD PRESSURE) < 140/90: ICD-10-CM

## 2020-09-10 DIAGNOSIS — E11.42 TYPE 2 DIABETES MELLITUS WITH DIABETIC POLYNEUROPATHY, WITHOUT LONG-TERM CURRENT USE OF INSULIN (H): ICD-10-CM

## 2020-09-10 DIAGNOSIS — G62.9 PERIPHERAL POLYNEUROPATHY: Primary | ICD-10-CM

## 2020-09-10 PROCEDURE — 99442 ZZC PHYSICIAN TELEPHONE EVALUATION 11-20 MIN: CPT | Mod: 95 | Performed by: FAMILY MEDICINE

## 2020-09-10 RX ORDER — HYDROCHLOROTHIAZIDE 25 MG/1
25 TABLET ORAL DAILY
Qty: 90 TABLET | Refills: 3 | Status: SHIPPED | OUTPATIENT
Start: 2020-09-10 | End: 2021-07-07

## 2020-09-10 RX ORDER — LISINOPRIL 40 MG/1
40 TABLET ORAL DAILY
Qty: 90 TABLET | Refills: 2 | Status: SHIPPED | OUTPATIENT
Start: 2020-09-10 | End: 2021-07-07

## 2020-09-10 NOTE — PROGRESS NOTES
"Aundrea Treviño is a 86 year old female who is being evaluated via a billable telephone visit.      The patient has been notified of following:     \"This telephone visit will be conducted via a call between you and your physician/provider. We have found that certain health care needs can be provided without the need for a physical exam.  This service lets us provide the care you need with a short phone conversation.  If a prescription is necessary we can send it directly to your pharmacy.  If lab work is needed we can place an order for that and you can then stop by our lab to have the test done at a later time.    Telephone visits are billed at different rates depending on your insurance coverage. During this emergency period, for some insurers they may be billed the same as an in-person visit.  Please reach out to your insurance provider with any questions.    If during the course of the call the physician/provider feels a telephone visit is not appropriate, you will not be charged for this service.\"    Patient has given verbal consent for Telephone visit?  Yes    What phone number would you like to be contacted at? [phone] (M)    How would you like to obtain your AVS? Mail a copy    Subjective     Aundrea Treviño is a 86 year old female who presents via phone visit today for the following health issues:    HPI       Diabetes Follow-up      How often are you checking your blood sugar? Not regularly this morning it was 160 but it is always high in the morning     What concerns do you have today about your diabetes? None     Do you have any of these symptoms? (Select all that apply)  Numbness in feet worsening  No pain in her extremities. Swelling remains the same. Sitting all day long. Wears compression stockings and puts her feet up. Walks once a day to [place] mail [place]. Just feels numb, lower legs and feet. Strange feeling, not painful.     BP Readings from Last 2 Encounters:   11/20/19 120/68 "   10/23/19 125/62     Hemoglobin A1C (%)   Date Value   08/20/2020 5.9 (H)   09/23/2019 6.1 (H)     LDL Cholesterol Calculated (mg/dL)   Date Value   08/20/2020 89   10/29/2018 91               Hypertension Follow-up      Do you check your blood pressure regularly outside of the clinic? Yes     Are you following a low salt diet? Yes    Are your blood pressures ever more than 140 on the top number (systolic) OR more   than 90 on the bottom number (diastolic), for example 140/90? No this morning was 140/73       How many servings of fruits and vegetables do you eat daily?  2-3    On average, how many sweetened beverages do you drink each day (Examples: soda, juice, sweet tea, etc.  Do NOT count diet or artificially sweetened beverages)?   1- orange juice with half sweetness     How many days per week do you exercise enough to make your heart beat faster? Has not been walking a lot due to her feet and legs     How many minutes a day do you exercise enough to make your heart beat faster? Depends on the day     How many days per week do you miss taking your medication? 0         Review of Systems   Constitutional, HEENT, cardiovascular, pulmonary, gi and gu systems are negative, except as otherwise noted.       Objective          Vitals:  No vitals were obtained today due to virtual visit.    healthy, alert and no distress  PSYCH: Alert and oriented times 3; coherent speech, normal   rate and volume, able to articulate logical thoughts, able   to abstract reason, no tangential thoughts, no hallucinations   or delusions  Her affect is normal  RESP: No cough, no audible wheezing, able to talk in full sentences  Remainder of exam unable to be completed due to telephone visits    Orders Only on 08/20/2020   Component Date Value Ref Range Status     Creatinine Urine 08/20/2020 133  mg/dL Final     Albumin Urine mg/L 08/20/2020 8  mg/L Final     Albumin Urine mg/g Cr 08/20/2020 5.90  0 - 25 mg/g Cr Final     Cholesterol  08/20/2020 145  <200 mg/dL Final     Triglycerides 08/20/2020 72  <150 mg/dL Final     HDL Cholesterol 08/20/2020 42* >49 mg/dL Final     LDL Cholesterol Calculated 08/20/2020 89  <100 mg/dL Final    Desirable:       <100 mg/dl     Non HDL Cholesterol 08/20/2020 103  <130 mg/dL Final     Hemoglobin A1C 08/20/2020 5.9* 0 - 5.6 % Final    Comment: Normal <5.7% Prediabetes 5.7-6.4%  Diabetes 6.5% or higher - adopted from ADA   consensus guidelines.       Sodium 08/20/2020 138  133 - 144 mmol/L Final     Potassium 08/20/2020 3.7  3.4 - 5.3 mmol/L Final     Chloride 08/20/2020 104  94 - 109 mmol/L Final     Carbon Dioxide 08/20/2020 27  20 - 32 mmol/L Final     Anion Gap 08/20/2020 7  3 - 14 mmol/L Final     Glucose 08/20/2020 148* 70 - 99 mg/dL Final     Urea Nitrogen 08/20/2020 17  7 - 30 mg/dL Final     Creatinine 08/20/2020 0.86  0.52 - 1.04 mg/dL Final     GFR Estimate 08/20/2020 61  >60 mL/min/[1.73_m2] Final    Comment: Non  GFR Calc  Starting 12/18/2018, serum creatinine based estimated GFR (eGFR) will be   calculated using the Chronic Kidney Disease Epidemiology Collaboration   (CKD-EPI) equation.       GFR Estimate If Black 08/20/2020 71  >60 mL/min/[1.73_m2] Final    Comment:  GFR Calc  Starting 12/18/2018, serum creatinine based estimated GFR (eGFR) will be   calculated using the Chronic Kidney Disease Epidemiology Collaboration   (CKD-EPI) equation.       Calcium 08/20/2020 9.2  8.5 - 10.1 mg/dL Final     Bilirubin Total 08/20/2020 1.0  0.2 - 1.3 mg/dL Final     Albumin 08/20/2020 3.9  3.4 - 5.0 g/dL Final     Protein Total 08/20/2020 6.7* 6.8 - 8.8 g/dL Final     Alkaline Phosphatase 08/20/2020 87  40 - 150 U/L Final     ALT 08/20/2020 13  0 - 50 U/L Final     AST 08/20/2020 13  0 - 45 U/L Final           Assessment/Plan:    Assessment & Plan         1. Type 2 diabetes mellitus with diabetic polyneuropathy, without long-term current use of insulin (H)   Controlled.   continue  "metformin 500mg daily. Tolerating it well. Peripheral neuropathy bilateral feet to lower legs worsening over time. BG is very well controlled.See below. At risk of b12 deficiency on metformin.      2.Hypertension goal BP (blood pressure) < 140/90  Controlled.  No changes today.  Continues amlodipine 2.5 mg daily, carvedilol 25 mg twice daily, hydrochlorothiazide 25 mg daily, lisinopril 40 mg daily.        3 Venous (peripheral) insufficiency.   4. Swelling of right foot greater than left  Stable with compression stockings.     5. Chronic anticoagulation  6. Paroxysmal A. Fib     Continues on Eliquis 5 mg twice daily.  Followed by cardiology     7.  Female stress incontinence  Continues trospium    8. Peripheral neuropathy  Numbness, no pain. Worsening over time. Will start with lab visit for cbc, b12, folate, tsh. Consider further eval with neurology. She does not want medication to treat the neuropathy symptoms.      BMI:   Estimated body mass index is 26.31 kg/m  as calculated from the following:    Height as of 11/20/19: 1.626 m (5' 4\").    Weight as of 11/20/19: 69.5 kg (153 lb 4 oz).     I recommended scheduling a shingles vaccine and influenza vaccine      Patient Instructions   1) schedule a lab visit to check some tests for your neuropathy  2) Get your flu shot  3) Check on insurance coverage of Shingrix, the shingles vaccine  4) See me in 6 months and earlier as needed.       Return in about 6 months (around 3/10/2021) for Lab Work.    Krysten Molina MD, MD  [facility]    Phone call duration:  15 minutes              "

## 2020-09-10 NOTE — PATIENT INSTRUCTIONS
1) schedule a lab visit to check some tests for your neuropathy  2) Get your flu shot  3) Check on insurance coverage of Shingrix, the shingles vaccine  4) See me in 6 months and earlier as needed.

## 2020-09-17 DIAGNOSIS — G62.9 PERIPHERAL POLYNEUROPATHY: ICD-10-CM

## 2020-09-17 DIAGNOSIS — E11.42 TYPE 2 DIABETES MELLITUS WITH DIABETIC POLYNEUROPATHY, WITHOUT LONG-TERM CURRENT USE OF INSULIN (H): ICD-10-CM

## 2020-09-17 LAB
ERYTHROCYTE [DISTWIDTH] IN BLOOD BY AUTOMATED COUNT: 13.9 % (ref 10–15)
FOLATE SERPL-MCNC: 9 NG/ML
HCT VFR BLD AUTO: 41 % (ref 35–47)
HGB BLD-MCNC: 14.4 G/DL (ref 11.7–15.7)
MCH RBC QN AUTO: 30.1 PG (ref 26.5–33)
MCHC RBC AUTO-ENTMCNC: 35.1 G/DL (ref 31.5–36.5)
MCV RBC AUTO: 86 FL (ref 78–100)
PLATELET # BLD AUTO: 142 10E9/L (ref 150–450)
RBC # BLD AUTO: 4.79 10E12/L (ref 3.8–5.2)
VIT B12 SERPL-MCNC: 296 PG/ML (ref 193–986)
WBC # BLD AUTO: 5.7 10E9/L (ref 4–11)

## 2020-09-17 PROCEDURE — 82607 VITAMIN B-12: CPT | Performed by: FAMILY MEDICINE

## 2020-09-17 PROCEDURE — 36415 COLL VENOUS BLD VENIPUNCTURE: CPT | Performed by: FAMILY MEDICINE

## 2020-09-17 PROCEDURE — 84443 ASSAY THYROID STIM HORMONE: CPT | Performed by: FAMILY MEDICINE

## 2020-09-17 PROCEDURE — 82746 ASSAY OF FOLIC ACID SERUM: CPT | Performed by: FAMILY MEDICINE

## 2020-09-17 PROCEDURE — 85027 COMPLETE CBC AUTOMATED: CPT | Performed by: FAMILY MEDICINE

## 2020-09-18 LAB — TSH SERPL DL<=0.005 MIU/L-ACNC: 2.98 MU/L (ref 0.4–4)

## 2020-10-06 DIAGNOSIS — N39.41 URGE INCONTINENCE OF URINE: ICD-10-CM

## 2020-10-09 NOTE — TELEPHONE ENCOUNTER
Solifenacin Succinate Oral Tablet 10 MG  Last Written Prescription Date:  7/8/2020  Last Fill Quantity: 30,   # refills: 0  Last Office Visit : 10/23/2019  Future Office visit:  None  Routing refill request to provider for review/approval because:  Only a 30 day supply sent to pharm last order.   Is Pt still taking this medication for Pt care?    Refer to clinic for review      Krysten Villa RN  Central Triage Red Flags/Med Refills

## 2020-10-12 RX ORDER — SOLIFENACIN SUCCINATE 10 MG/1
10 TABLET, FILM COATED ORAL DAILY
Qty: 30 TABLET | Refills: 0 | Status: SHIPPED | OUTPATIENT
Start: 2020-10-12 | End: 2020-11-12

## 2020-10-12 NOTE — TELEPHONE ENCOUNTER
Chart reviewed. Refill approved for 30 day supply per refill protocol. Prescription sent to [facility] in [place] with request for patient to contact the clinic to schedule a follow up visit.    Meghan Sorensen RN, BSN

## 2020-10-13 NOTE — MR AVS SNAPSHOT
After Visit Summary   7/26/2018    Aundrea Treviño    MRN: [id number]           Patient Information     Date Of Birth          8/29/1934        Visit Information        Provider Department      7/26/2018 11:20 AM Krysten Molina MD [facility]        Today's Diagnoses     Type 2 diabetes mellitus with diabetic polyneuropathy, without long-term current use of insulin (H)    -  1    Hypertension goal BP (blood pressure) < 140/90        Paroxysmal atrial fibrillation (H)        Chronic anticoagulation          Care Instructions    Your A1C improved to 6.9.     Return for recheck in November and we can discuss medication if needed at that time.           Follow-ups after your visit        Who to contact     If you have questions or need follow up information about today's clinic visit or your schedule please contact [facility] directly at [phone].  Normal or non-critical lab and imaging results will be communicated to you by MyChart, letter or phone within 4 business days after the clinic has received the results. If you do not hear from us within 7 days, please contact the clinic through MyChart or phone. If you have a critical or abnormal lab result, we will notify you by phone as soon as possible.  Submit refill requests through [employer] or call your pharmacy and they will forward the refill request to us. Please allow 3 business days for your refill to be completed.          Additional Information About Your Visit        Care EveryWhere ID     This is your Care EveryWhere ID. This could be used by other organizations to access your [place] medical records  [id number]        Your Vitals Were     Pulse Temperature Pulse Oximetry BMI (Body Mass Index)          61 97.8  F (36.6  C) (Oral) 96% 27.34 kg/m2         Blood Pressure from Last 3 Encounters:   07/26/18 123/63   06/20/18 152/80   05/07/18 150/70    Weight from Last 3 Encounters:   07/26/18 159 lb 4 oz (72.2 kg)  Examination as per physician assistant note.  Await ability to operate.  Transfer if needed.     06/20/18 160 lb (72.6 kg)   05/07/18 159 lb (72.1 kg)              We Performed the Following     Hemoglobin A1c        Primary Care Provider Office Phone # Fax #    Krysten Molina [phone] [address]  [facility] [address]        Equal Access to Services     KEVIN MARTINEZ : Hadii aad ku hadasho Soomaali, waaxda luqadaha, qaybta kaalmada adeegyada, waxay idiin hayaan adeeg khamadeosh la'aan . So [facility] [phone].    ATENCIÓN: Si habla español, tiene a husain disposición servicios gratuitos de asistencia lingüística. Llame al [phone].    We comply with applicable federal civil rights laws and [place] laws. We do not discriminate on the basis of race, color, national origin, age, disability, sex, sexual orientation, or gender identity.            Thank you!     Thank you for choosing [facility]  for your care. Our goal is always to provide you with excellent care. Hearing back from our patients is one way we can continue to improve our services. Please take a few minutes to complete the written survey that you may receive in the mail after your visit with us. Thank you!             Your Updated Medication List - Protect others around you: Learn how to safely use, store and throw away your medicines at www.disposemymeds.org.          This list is accurate as of 7/26/18 12:39 PM.  Always use your most recent med list.                   Brand Name Dispense Instructions for use Diagnosis    Alfalfa 650 MG Tabs      Take 2 capsules by mouth 2 times daily For arthritis pain        amLODIPine 2.5 MG tablet    NORVASC    90 tablet    Take 1 tablet (2.5 mg) by mouth daily    Hypertension goal BP (blood pressure) < 140/90       apixaban ANTICOAGULANT 5 MG tablet    ELIQUIS    60 tablet    Take 1 tablet (5 mg) by mouth 2 times daily    Paroxysmal atrial fibrillation (H)       blood glucose lancets standard    no brand specified    100 each    Use to test blood sugar one times  daily or as directed. Dispense compatible with insurance and glucometer - One Touch Ultra    Type 2 diabetes mellitus without complication, without long-term current use of insulin (H)       blood glucose monitoring meter device kit    no brand specified    1 kit    Use to test blood sugar one times daily or as directed.  Dispense One Touch Ultra per insurance    Type 2 diabetes mellitus without complication, without long-term current use of insulin (H)       blood glucose monitoring test strip    no brand specified    100 strip    Use to test blood sugars one times daily or as directed.  Dispense compatible with insurance and glucometer - One Touch Ultra    Type 2 diabetes mellitus without complication, without long-term current use of insulin (H)       carvedilol 25 MG tablet    COREG    180 tablet    Take 1 tablet (25 mg) by mouth 2 times daily (with meals)    SOB (shortness of breath), Essential hypertension with goal blood pressure less than 140/90       cholecalciferol 1000 UNIT tablet    vitamin D3    30    1 TABLET DAILY        Flaxseed (Linseed) Powd      1.5  tablespoon daily    Type II or unspecified type diabetes mellitus without mention of complication, not stated as uncontrolled       fluticasone 50 MCG/ACT spray    FLONASE    3 Package    Spray 1-2 sprays into both nostrils daily    Cough, Allergic rhinitis       hydrochlorothiazide 25 MG tablet    HYDRODIURIL    90 tablet    TAKE ONE TABLET BY MOUTH ONE TIME DAILY    Hypertension goal BP (blood pressure) < 140/90       lisinopril 40 MG tablet    PRINIVIL/ZESTRIL    90 tablet    Take 1 tablet (40 mg) by mouth daily    Hypertension goal BP (blood pressure) < 140/90       order for DME     1 each    Equipment being ordered: Left wrist splint.    Carpal tunnel syndrome of left wrist       oxybutynin 5 MG tablet    DITROPAN    90 tablet    TAKE 1/2 TABLET BY MOUTH TWICE DAILY    Female stress incontinence       triamcinolone 0.5 % cream    KENALOG    15 g     Apply topically 2 times daily to affected area as directed.    Itching       TUMS E-X 750 750 MG Chew   Generic drug:  calcium carbonate      Take 1,500 mg by mouth daily

## 2020-11-09 DIAGNOSIS — N39.41 URGE INCONTINENCE OF URINE: ICD-10-CM

## 2020-11-12 NOTE — TELEPHONE ENCOUNTER
Solifenacin Succinate Oral Tablet 10 MG      Last Written Prescription Date:  10-12-12  Last Fill Quantity: 30,   # refills: 0  Last Office Visit : 10-23-20 ( RTC 3 M)  Future Office visit:  none    Routing refill request to provider for review/approval because:  Past due appt, Previous christos RF given    Scheduling has been notified to contact the pt for appointment.

## 2020-11-16 RX ORDER — SOLIFENACIN SUCCINATE 10 MG/1
10 TABLET, FILM COATED ORAL DAILY
Qty: 30 TABLET | Refills: 0 | Status: SHIPPED | OUTPATIENT
Start: 2020-11-16 | End: 2020-12-16

## 2020-12-08 ENCOUNTER — PRE VISIT (OUTPATIENT)
Dept: UROLOGY | Facility: CLINIC | Age: 85
End: 2020-12-08

## 2020-12-08 NOTE — TELEPHONE ENCOUNTER
Reason for Visit: annual follow up    Diagnosis:  DELFIN, OAB    Orders/Procedures/Records: in system    Contact Patient: n/a    Rooming Requirements: tele      Jennifer No LPN  12/08/20  9:59 AM

## 2020-12-16 ENCOUNTER — VIRTUAL VISIT (OUTPATIENT)
Dept: UROLOGY | Facility: CLINIC | Age: 85
End: 2020-12-16
Payer: COMMERCIAL

## 2020-12-16 DIAGNOSIS — N95.2 ATROPHIC VAGINITIS: ICD-10-CM

## 2020-12-16 DIAGNOSIS — I27.20 PULMONARY HYPERTENSION, MILD (H): ICD-10-CM

## 2020-12-16 DIAGNOSIS — N39.41 URGENCY INCONTINENCE: Primary | ICD-10-CM

## 2020-12-16 DIAGNOSIS — R39.15 URINARY URGENCY: ICD-10-CM

## 2020-12-16 PROCEDURE — 99212 OFFICE O/P EST SF 10 MIN: CPT | Mod: 95 | Performed by: UROLOGY

## 2020-12-16 RX ORDER — MIRABEGRON 25 MG/1
25 TABLET, FILM COATED, EXTENDED RELEASE ORAL DAILY
Qty: 30 TABLET | Refills: 11 | Status: SHIPPED | OUTPATIENT
Start: 2020-12-16 | End: 2021-07-07

## 2020-12-16 NOTE — PATIENT INSTRUCTIONS
-discontinue sanctura 60 mg   -start timed voiding  -start myrbetriq 25 mg  -use estrogen cream more regularly.  -f/u in 3 months, if continues then consider repeat UDS

## 2020-12-16 NOTE — PROGRESS NOTES
"Aundrea Treviño is a 86 year old female who is being evaluated via a billable telephone visit.      The patient has been notified of following:     \"This telephone visit will be conducted via a call between you and your physician/provider. We have found that certain health care needs can be provided without the need for a physical exam.  This service lets us provide the care you need with a short phone conversation.  If a prescription is necessary we can send it directly to your pharmacy.  If lab work is needed we can place an order for that and you can then stop by our lab to have the test done at a later time.    Telephone visits are billed at different rates depending on your insurance coverage. During this emergency period, for some insurers they may be billed the same as an in-person visit.  Please reach out to your insurance provider with any questions.    If during the course of the call the physician/provider feels a telephone visit is not appropriate, you will not be charged for this service.\"    Patient has given verbal consent for Telephone visit?  Yes    What phone number would you like to be contacted at? [phone]    How would you like to obtain your AVS? Mail a copy     "

## 2020-12-16 NOTE — PROGRESS NOTES
Reason for Visit:  F/u on urinary symptoms. post op for A midurethral sling on 7/26/19.    Clinical Data: Ms. Aundrea Treviño  is an 86 year old year old   female with history of mixed incontinence.  She underwent a midurethral sling on 7/26/19 and has been doing well with that.     She is also on Trospium for her urgency and urge incontinence however she doesn't feel like that is helping her.  It caused her to have some urinary retention so she stopped taking it.    UDS 5/8/19:  -Borderline small bladder capacity (310 mL) with normal filling sensations.  -Normal bladder compliance with questionable very mild stress-induced DO without incontinence.   -Multiple episodes of stress incontinence at the following points throughout filling. High Pabd pressures are suggestive for urethral hypermobility.    Pabd 177 cm H2O at a volume of 217 mL.    Pabd 127 cm H2O at a volume of 260 mL.  -Fair detrusor contraction during voiding to max Pdet 10 cm H2O which she supplements with abdominal straining.  -Good flow rate (Qmax 18 mL/s) with a bell-shape flow curve (with few intermittent peaks) and complete bladder emptying (final PVR 0 mL).  -Mildly increased EMG activity during voiding that correlates with abdominal straining.  -No evidence for bladder outlet obstruction.  -Fluoroscopy reveals a mildly trabeculated bladder wall without diverticuli or VUR. Bladder neck is primarily closed during filling but appears open during episodes of stress incontinence and voiding.    Previous RU: 21 cc on bladder scan by nursing.    A/P:  85 y/o with mixed incontinence helped by the sling but continues to have urgency and urge incontinence.  We discussed stopping the sanctura and starting myrbetriq, she will monitory bp.   -discontinue sanctura 60 mg   -start timed voiding  -start myrbetriq 25 mg  -use estrogen cream more regularly.  -f/u in 3 months, if continues then consider repeat UDS    Thank you for allowing me to participate in the  care of  Ms.Aundrea RADHA Treviño   and I will keep you updated on her progress.    Irma Painter MD   14 min spent with patient and her daughter.

## 2020-12-21 ENCOUNTER — TELEPHONE (OUTPATIENT)
Dept: UROLOGY | Facility: CLINIC | Age: 85
End: 2020-12-21

## 2021-03-19 ENCOUNTER — IMMUNIZATION (OUTPATIENT)
Dept: NURSING | Facility: CLINIC | Age: 86
End: 2021-03-19
Payer: COMMERCIAL

## 2021-03-19 PROCEDURE — 91300 PR COVID VAC PFIZER DIL RECON 30 MCG/0.3 ML IM: CPT

## 2021-03-19 PROCEDURE — 0001A PR COVID VAC PFIZER DIL RECON 30 MCG/0.3 ML IM: CPT

## 2021-04-09 ENCOUNTER — IMMUNIZATION (OUTPATIENT)
Dept: NURSING | Facility: CLINIC | Age: 86
End: 2021-04-09
Attending: INTERNAL MEDICINE
Payer: COMMERCIAL

## 2021-04-09 PROCEDURE — 0002A PR COVID VAC PFIZER DIL RECON 30 MCG/0.3 ML IM: CPT

## 2021-04-09 PROCEDURE — 91300 PR COVID VAC PFIZER DIL RECON 30 MCG/0.3 ML IM: CPT

## 2021-04-12 DIAGNOSIS — I10 ESSENTIAL HYPERTENSION WITH GOAL BLOOD PRESSURE LESS THAN 140/90: ICD-10-CM

## 2021-04-12 DIAGNOSIS — R06.02 SOB (SHORTNESS OF BREATH): ICD-10-CM

## 2021-04-14 RX ORDER — CARVEDILOL 25 MG/1
TABLET ORAL
Qty: 180 TABLET | Refills: 0 | Status: SHIPPED | OUTPATIENT
Start: 2021-04-14 | End: 2021-07-07

## 2021-04-14 NOTE — TELEPHONE ENCOUNTER
BP Readings from Last 6 Encounters:   11/20/19 120/68   10/23/19 125/62   10/22/19 124/72   10/17/19 125/61   10/13/19 (!) 163/73   10/12/19 136/68       Last visit Virtual 9/10/20

## 2021-04-26 DIAGNOSIS — I48.0 PAROXYSMAL ATRIAL FIBRILLATION (H): ICD-10-CM

## 2021-04-26 DIAGNOSIS — L29.9 ITCHING: ICD-10-CM

## 2021-04-26 RX ORDER — APIXABAN 5 MG/1
TABLET, FILM COATED ORAL
Qty: 180 TABLET | Refills: 0 | Status: SHIPPED | OUTPATIENT
Start: 2021-04-26 | End: 2021-07-07

## 2021-04-28 RX ORDER — TRIAMCINOLONE ACETONIDE 5 MG/G
CREAM TOPICAL 2 TIMES DAILY
Qty: 15 G | Refills: 3 | Status: SHIPPED | OUTPATIENT
Start: 2021-04-28 | End: 2023-02-13

## 2021-04-28 NOTE — TELEPHONE ENCOUNTER
Routing refill request to provider for review/approval because:  Last filled 2/20/2017  15 grams x 3 refills  Do you want to see her to refill this?     Last visit: 9/10/2020    Thank you

## 2021-05-03 DIAGNOSIS — E11.42 TYPE 2 DIABETES MELLITUS WITH DIABETIC POLYNEUROPATHY, WITHOUT LONG-TERM CURRENT USE OF INSULIN (H): ICD-10-CM

## 2021-05-03 DIAGNOSIS — I10 HYPERTENSION GOAL BP (BLOOD PRESSURE) < 140/90: ICD-10-CM

## 2021-05-04 RX ORDER — AMLODIPINE BESYLATE 5 MG/1
TABLET ORAL
Qty: 90 TABLET | Refills: 0 | Status: SHIPPED | OUTPATIENT
Start: 2021-05-04 | End: 2021-07-07

## 2021-05-04 NOTE — TELEPHONE ENCOUNTER
Team Coordinators-Please contact patient to schedule diabetes and BP follow up visit with Dr. Molina.    Prescriptions approved per [facility] Refill Protocol.    Thank you!  SHAWN Ng, RN  [facility]      Warnings Override History for amLODIPine 5 MG PO tablet [[id number]]    Overridden by Krysten Molina MD on Feb 20, 2020 1:01 PM   Allergy/Contraindication   1. AMLODIPINE [Level: Ingredient Match]

## 2021-07-07 ENCOUNTER — OFFICE VISIT (OUTPATIENT)
Dept: FAMILY MEDICINE | Facility: CLINIC | Age: 86
End: 2021-07-07
Payer: COMMERCIAL

## 2021-07-07 VITALS
RESPIRATION RATE: 16 BRPM | OXYGEN SATURATION: 97 % | DIASTOLIC BLOOD PRESSURE: 76 MMHG | HEIGHT: 64 IN | TEMPERATURE: 97.2 F | HEART RATE: 80 BPM | BODY MASS INDEX: 27.52 KG/M2 | WEIGHT: 161.2 LBS | SYSTOLIC BLOOD PRESSURE: 128 MMHG

## 2021-07-07 DIAGNOSIS — I10 HYPERTENSION GOAL BP (BLOOD PRESSURE) < 140/90: ICD-10-CM

## 2021-07-07 DIAGNOSIS — I10 ESSENTIAL HYPERTENSION WITH GOAL BLOOD PRESSURE LESS THAN 140/90: ICD-10-CM

## 2021-07-07 DIAGNOSIS — E11.42 TYPE 2 DIABETES MELLITUS WITH DIABETIC POLYNEUROPATHY, WITHOUT LONG-TERM CURRENT USE OF INSULIN (H): Primary | ICD-10-CM

## 2021-07-07 DIAGNOSIS — G62.9 PERIPHERAL POLYNEUROPATHY: ICD-10-CM

## 2021-07-07 DIAGNOSIS — H91.90 HEARING LOSS, UNSPECIFIED HEARING LOSS TYPE, UNSPECIFIED LATERALITY: ICD-10-CM

## 2021-07-07 DIAGNOSIS — I48.0 PAROXYSMAL ATRIAL FIBRILLATION (H): ICD-10-CM

## 2021-07-07 DIAGNOSIS — R06.02 SOB (SHORTNESS OF BREATH): ICD-10-CM

## 2021-07-07 LAB
ALBUMIN SERPL-MCNC: 4 G/DL (ref 3.4–5)
ALP SERPL-CCNC: 73 U/L (ref 40–150)
ALT SERPL W P-5'-P-CCNC: 16 U/L (ref 0–50)
ANION GAP SERPL CALCULATED.3IONS-SCNC: 9 MMOL/L (ref 3–14)
AST SERPL W P-5'-P-CCNC: 12 U/L (ref 0–45)
BILIRUB SERPL-MCNC: 1.2 MG/DL (ref 0.2–1.3)
BUN SERPL-MCNC: 14 MG/DL (ref 7–30)
CALCIUM SERPL-MCNC: 9.3 MG/DL (ref 8.5–10.1)
CHLORIDE SERPL-SCNC: 98 MMOL/L (ref 94–109)
CO2 SERPL-SCNC: 28 MMOL/L (ref 20–32)
CREAT SERPL-MCNC: 0.85 MG/DL (ref 0.52–1.04)
GFR SERPL CREATININE-BSD FRML MDRD: 61 ML/MIN/{1.73_M2}
GLUCOSE SERPL-MCNC: 145 MG/DL (ref 70–99)
HBA1C MFR BLD: 6.6 % (ref 0–5.6)
POTASSIUM SERPL-SCNC: 4 MMOL/L (ref 3.4–5.3)
PROT SERPL-MCNC: 6.6 G/DL (ref 6.8–8.8)
SODIUM SERPL-SCNC: 135 MMOL/L (ref 133–144)

## 2021-07-07 PROCEDURE — 99207 PR FOOT EXAM NO CHARGE: CPT | Performed by: FAMILY MEDICINE

## 2021-07-07 PROCEDURE — 36415 COLL VENOUS BLD VENIPUNCTURE: CPT | Performed by: FAMILY MEDICINE

## 2021-07-07 PROCEDURE — 83036 HEMOGLOBIN GLYCOSYLATED A1C: CPT | Performed by: FAMILY MEDICINE

## 2021-07-07 PROCEDURE — 99214 OFFICE O/P EST MOD 30 MIN: CPT | Performed by: FAMILY MEDICINE

## 2021-07-07 PROCEDURE — 80053 COMPREHEN METABOLIC PANEL: CPT | Performed by: FAMILY MEDICINE

## 2021-07-07 RX ORDER — LISINOPRIL 40 MG/1
40 TABLET ORAL DAILY
Qty: 90 TABLET | Refills: 2 | Status: SHIPPED | OUTPATIENT
Start: 2021-07-07 | End: 2022-04-13

## 2021-07-07 RX ORDER — AMLODIPINE BESYLATE 5 MG/1
TABLET ORAL
Qty: 90 TABLET | Refills: 3 | Status: SHIPPED | OUTPATIENT
Start: 2021-07-07 | End: 2022-05-11

## 2021-07-07 RX ORDER — CARVEDILOL 25 MG/1
TABLET ORAL
Qty: 180 TABLET | Refills: 3 | Status: SHIPPED | OUTPATIENT
Start: 2021-07-07 | End: 2022-05-11

## 2021-07-07 RX ORDER — HYDROCHLOROTHIAZIDE 25 MG/1
25 TABLET ORAL DAILY
Qty: 90 TABLET | Refills: 3 | Status: SHIPPED | OUTPATIENT
Start: 2021-07-07 | End: 2022-05-11

## 2021-07-07 ASSESSMENT — MIFFLIN-ST. JEOR: SCORE: 1156.2

## 2021-07-07 NOTE — PATIENT INSTRUCTIONS
Start taking a vitamin B12 supplement 1000mcg daily. This might help with your nerve symptoms.   You can check out [facility] on [address].

## 2021-07-07 NOTE — PROGRESS NOTES
Assessment & Plan     Type 2 diabetes mellitus with diabetic polyneuropathy, without long-term current use of insulin (H)     - Hemoglobin A1c  - Comprehensive metabolic panel (BMP + Alb, Alk Phos, ALT, AST, Total. Bili, TP)  - FOOT EXAM  - metFORMIN (GLUCOPHAGE) 500 MG tablet  Dispense: 90 tablet; Refill: 3    Hypertension goal BP (blood pressure) < 140/90     - amLODIPine (NORVASC) 5 MG tablet  Dispense: 90 tablet; Refill: 3  - hydrochlorothiazide (HYDRODIURIL) 25 MG tablet  Dispense: 90 tablet; Refill: 3  - lisinopril (ZESTRIL) 40 MG tablet  Dispense: 90 tablet; Refill: 2    Hearing loss, unspecified hearing loss type, unspecified laterality     - AUDIOLOGY ADULT REFERRAL    Essential hypertension with goal blood pressure less than 140/90     - carvedilol (COREG) 25 MG tablet  Dispense: 180 tablet; Refill: 3    Paroxysmal atrial fibrillation (H)     - apixaban ANTICOAGULANT (ELIQUIS ANTICOAGULANT) 5 MG tablet  Dispense: 180 tablet; Refill: 3    Peripheral polyneuropathy     - [facility] REFERRAL     Type 2 diabetes mellitus with diabetic polyneuropathy, without long-term current use of insulin (H)   Controlled.   continue metformin 500mg daily. Tolerating it well. Peripheral neuropathy bilateral feet to lower legs worsening over time. BG is very well controlled.See below. At risk of b12 deficiency on metformin. B12 level was on the low side of the normal range. Recommended starting vitamin B12 supplement.       Hypertension goal BP (blood pressure) < 140/90  Controlled.  No changes today.  Continues amlodipine 2.5 mg daily, carvedilol 25 mg twice daily, hydrochlorothiazide 25 mg daily, lisinopril 40 mg daily.         Venous (peripheral) insufficiency.    Swelling of right foot greater than left  Stable with compression stockings.      Chronic anticoagulation   Paroxysmal A. Fib     Continues on Eliquis 5 mg twice daily.  Followed by cardiology      Female stress incontinence  mirbetriq helped, but was  too expensive, so she stopped it      Peripheral neuropathy -- denies pain, but the numbness and sense of tightness is uncomfortable.   Start B12 supplement 1000mcg daily    Numbness, no pain. Worsening over time. Will start with lab visit for cbc, b12, folate, tsh. Consider further eval with neurology. She does not want medication to treat the neuropathy symptoms.              Patient Instructions   Start taking a vitamin B12 supplement 1000mcg daily. This might help with your nerve symptoms.   You can check out [facility] medical on [address].       Return in about 6 months (around 1/7/2022) for Routine preventive.    Krysten Molina MD, MD  [facility]    Fabiano Guillaume is a 86 year old who presents for the following health issues      HPI     Diabetes Follow-up      How often are you checking your blood sugar? Not at all    What concerns do you have today about your diabetes? None     Do you have any of these symptoms? (Select all that apply)  No numbness or tingling in feet.  No redness, sores or blisters on feet.  No complaints of excessive thirst.  No reports of blurry vision.  No significant changes to weight.    Have you had a diabetic eye exam in the last 12 months? Yes- Date of last eye exam: 08/03/2020,  Location: [facility]        BP Readings from Last 2 Encounters:   07/07/21 128/76   11/20/19 120/68     Hemoglobin A1C (%)   Date Value   07/07/2021 6.6 (H)   08/20/2020 5.9 (H)     LDL Cholesterol Calculated (mg/dL)   Date Value   08/20/2020 89   10/29/2018 91               Hypertension Follow-up      Do you check your blood pressure regularly outside of the clinic? No     Are you following a low salt diet? No    Are your blood pressures ever more than 140 on the top number (systolic) OR more   than 90 on the bottom number (diastolic), for example 140/90? Not checking          Review of Systems          Objective    /76 (BP Location: Left arm, Patient  "Position: Sitting, Cuff Size: Adult Regular)   Pulse 80   Temp 97.2  F (36.2  C) (Temporal)   Resp 16   Ht 1.626 m (5' 4\")   Wt 73.1 kg (161 lb 3.2 oz)   SpO2 97%   BMI 27.67 kg/m    Body mass index is 27.67 kg/m .  Physical Exam   GENERAL: healthy, alert and no distress  Diabetic foot exam: no sores, skin intact, decreased sensation with monofilament BLE up to thighs    Orders Only on 09/17/2020   Component Date Value Ref Range Status     WBC 09/17/2020 5.7  4.0 - 11.0 10e9/L Final     RBC Count 09/17/2020 4.79  3.8 - 5.2 10e12/L Final     Hemoglobin 09/17/2020 14.4  11.7 - 15.7 g/dL Final     Hematocrit 09/17/2020 41.0  35.0 - 47.0 % Final     MCV 09/17/2020 86  78 - 100 fl Final     MCH 09/17/2020 30.1  26.5 - 33.0 pg Final     MCHC 09/17/2020 35.1  31.5 - 36.5 g/dL Final     RDW 09/17/2020 13.9  10.0 - 15.0 % Final     Platelet Count 09/17/2020 142* 150 - 450 10e9/L Final     TSH 09/17/2020 2.98  0.40 - 4.00 mU/L Final     Folate 09/17/2020 9.0  >5.4 ng/mL Final     Vitamin B12 09/17/2020 296  193 - 986 pg/mL Final              "

## 2022-04-12 DIAGNOSIS — I10 HYPERTENSION GOAL BP (BLOOD PRESSURE) < 140/90: ICD-10-CM

## 2022-04-13 RX ORDER — LISINOPRIL 40 MG/1
40 TABLET ORAL DAILY
Qty: 90 TABLET | Refills: 0 | Status: SHIPPED | OUTPATIENT
Start: 2022-04-13 | End: 2022-05-11

## 2022-04-13 NOTE — TELEPHONE ENCOUNTER
Prescription approved per [facility] Refill Protocol.    Please reach out to pt to schedule annual visit, last was 7/7/21.    BENITEZ Contreras RN  [facility]

## 2022-04-14 NOTE — TELEPHONE ENCOUNTER
Spoke with patient has to check with her daughter as to when she will be able to take her first and then she will call back to schedule

## 2022-05-10 NOTE — LETTER
September 11, 2017      Aundrea Treviño  [address]   [place] [address]        Dear ,    We are writing to inform you of your test results.    Your lab results are stable. Please let us know if you have any questions.     Resulted Orders   Comprehensive metabolic panel   Result Value Ref Range    Sodium 136 133 - 144 mmol/L    Potassium 3.6 3.4 - 5.3 mmol/L    Chloride 99 94 - 109 mmol/L    Carbon Dioxide 28 20 - 32 mmol/L    Anion Gap 9 3 - 14 mmol/L    Glucose 163 (H) 70 - 99 mg/dL    Urea Nitrogen 14 7 - 30 mg/dL    Creatinine 0.75 0.52 - 1.04 mg/dL    GFR Estimate 73 >60 mL/min/1.7m2      Comment:      Non  GFR Calc    GFR Estimate If Black 89 >60 mL/min/1.7m2      Comment:       GFR Calc    Calcium 9.3 8.5 - 10.1 mg/dL    Bilirubin Total 1.2 0.2 - 1.3 mg/dL    Albumin 3.9 3.4 - 5.0 g/dL    Protein Total 7.0 6.8 - 8.8 g/dL    Alkaline Phosphatase 95 40 - 150 U/L    ALT 21 0 - 50 U/L    AST 15 0 - 45 U/L   Hemoglobin A1c   Result Value Ref Range    Hemoglobin A1C 6.9 (H) 4.3 - 6.0 %   Lipid panel reflex to direct LDL   Result Value Ref Range    Cholesterol 155 <200 mg/dL    Triglycerides 70 <150 mg/dL    HDL Cholesterol 53 >49 mg/dL    LDL Cholesterol Calculated 88 <100 mg/dL      Comment:      Desirable:       <100 mg/dl    Non HDL Cholesterol 102 <130 mg/dL       If you have any questions or concerns, please call the clinic at the number listed above.       Sincerely,        Krysten Molina MD, MD/nr                 1

## 2022-05-11 ENCOUNTER — OFFICE VISIT (OUTPATIENT)
Dept: FAMILY MEDICINE | Facility: CLINIC | Age: 87
End: 2022-05-11
Payer: COMMERCIAL

## 2022-05-11 VITALS
OXYGEN SATURATION: 98 % | WEIGHT: 160 LBS | HEART RATE: 63 BPM | BODY MASS INDEX: 27.31 KG/M2 | DIASTOLIC BLOOD PRESSURE: 82 MMHG | HEIGHT: 64 IN | SYSTOLIC BLOOD PRESSURE: 148 MMHG | TEMPERATURE: 97.6 F | RESPIRATION RATE: 16 BRPM

## 2022-05-11 DIAGNOSIS — I10 ESSENTIAL HYPERTENSION WITH GOAL BLOOD PRESSURE LESS THAN 140/90: ICD-10-CM

## 2022-05-11 DIAGNOSIS — H91.93 HEARING DIFFICULTY OF BOTH EARS: ICD-10-CM

## 2022-05-11 DIAGNOSIS — I10 HYPERTENSION GOAL BP (BLOOD PRESSURE) < 140/90: ICD-10-CM

## 2022-05-11 DIAGNOSIS — Z23 HIGH PRIORITY FOR 2019-NCOV VACCINE: ICD-10-CM

## 2022-05-11 DIAGNOSIS — Z00.00 ENCOUNTER FOR MEDICARE ANNUAL WELLNESS EXAM: Primary | ICD-10-CM

## 2022-05-11 DIAGNOSIS — R06.02 SOB (SHORTNESS OF BREATH): ICD-10-CM

## 2022-05-11 DIAGNOSIS — Z13.220 SCREENING FOR HYPERLIPIDEMIA: ICD-10-CM

## 2022-05-11 DIAGNOSIS — E11.42 TYPE 2 DIABETES MELLITUS WITH DIABETIC POLYNEUROPATHY, WITHOUT LONG-TERM CURRENT USE OF INSULIN (H): ICD-10-CM

## 2022-05-11 LAB
CREAT UR-MCNC: 76 MG/DL
HBA1C MFR BLD: 6.8 % (ref 0–5.6)
MICROALBUMIN UR-MCNC: 6 MG/L
MICROALBUMIN/CREAT UR: 7.89 MG/G CR (ref 0–25)

## 2022-05-11 PROCEDURE — 82043 UR ALBUMIN QUANTITATIVE: CPT | Performed by: FAMILY MEDICINE

## 2022-05-11 PROCEDURE — 90471 IMMUNIZATION ADMIN: CPT | Performed by: FAMILY MEDICINE

## 2022-05-11 PROCEDURE — 80053 COMPREHEN METABOLIC PANEL: CPT | Performed by: FAMILY MEDICINE

## 2022-05-11 PROCEDURE — G0438 PPPS, INITIAL VISIT: HCPCS | Performed by: FAMILY MEDICINE

## 2022-05-11 PROCEDURE — 90715 TDAP VACCINE 7 YRS/> IM: CPT | Performed by: FAMILY MEDICINE

## 2022-05-11 PROCEDURE — 91305 COVID-19,PF,PFIZER (12+ YRS): CPT | Performed by: FAMILY MEDICINE

## 2022-05-11 PROCEDURE — 99214 OFFICE O/P EST MOD 30 MIN: CPT | Mod: 25 | Performed by: FAMILY MEDICINE

## 2022-05-11 PROCEDURE — 83036 HEMOGLOBIN GLYCOSYLATED A1C: CPT | Performed by: FAMILY MEDICINE

## 2022-05-11 PROCEDURE — 36415 COLL VENOUS BLD VENIPUNCTURE: CPT | Performed by: FAMILY MEDICINE

## 2022-05-11 PROCEDURE — 80061 LIPID PANEL: CPT | Performed by: FAMILY MEDICINE

## 2022-05-11 PROCEDURE — 0054A COVID-19,PF,PFIZER (12+ YRS): CPT | Performed by: FAMILY MEDICINE

## 2022-05-11 RX ORDER — LISINOPRIL 40 MG/1
40 TABLET ORAL DAILY
Qty: 90 TABLET | Refills: 0 | Status: SHIPPED | OUTPATIENT
Start: 2022-05-11 | End: 2022-07-18

## 2022-05-11 RX ORDER — CARVEDILOL 25 MG/1
TABLET ORAL
Qty: 180 TABLET | Refills: 3 | Status: SHIPPED | OUTPATIENT
Start: 2022-05-11 | End: 2023-06-26

## 2022-05-11 RX ORDER — AMLODIPINE BESYLATE 5 MG/1
TABLET ORAL
Qty: 90 TABLET | Refills: 3 | Status: SHIPPED | OUTPATIENT
Start: 2022-05-11 | End: 2023-06-26

## 2022-05-11 RX ORDER — HYDROCHLOROTHIAZIDE 25 MG/1
25 TABLET ORAL DAILY
Qty: 90 TABLET | Refills: 3 | Status: SHIPPED | OUTPATIENT
Start: 2022-05-11 | End: 2023-06-26

## 2022-05-11 ASSESSMENT — ENCOUNTER SYMPTOMS
HEMATURIA: 0
DYSURIA: 0
ABDOMINAL PAIN: 0
MYALGIAS: 1
BREAST MASS: 0
NERVOUS/ANXIOUS: 0
FREQUENCY: 1
HEARTBURN: 0
DIZZINESS: 0
ARTHRALGIAS: 1
DIARRHEA: 0
HEMATOCHEZIA: 0
NAUSEA: 0
FEVER: 0
JOINT SWELLING: 1
PARESTHESIAS: 0
WEAKNESS: 1
COUGH: 1
SHORTNESS OF BREATH: 1
EYE PAIN: 0
HEADACHES: 0
SORE THROAT: 0
PALPITATIONS: 0
CHILLS: 0
CONSTIPATION: 0

## 2022-05-11 ASSESSMENT — ACTIVITIES OF DAILY LIVING (ADL)
CURRENT_FUNCTION: HOUSEWORK REQUIRES ASSISTANCE
CURRENT_FUNCTION: LAUNDRY REQUIRES ASSISTANCE
CURRENT_FUNCTION: SHOPPING REQUIRES ASSISTANCE
CURRENT_FUNCTION: TRANSPORTATION REQUIRES ASSISTANCE
CURRENT_FUNCTION: BATHING REQUIRES ASSISTANCE

## 2022-05-11 NOTE — PATIENT INSTRUCTIONS
I recommend getting the new shingles vaccine, Shingrix.  You can call your insurance company to find out if this vaccine is covered.  You may also ask our pharmacy to check if your insurance covers Shingrix if given at the pharmacy.    Schedule a visit for hearing exam and hearing aid assessment.       Patient Education   Personalized Prevention Plan  You are due for the preventive services outlined below.  Your care team is available to assist you in scheduling these services.  If you have already completed any of these items, please share that information with your care team to update in your medical record.  Health Maintenance Due   Topic Date Due    ANNUAL REVIEW OF HM ORDERS  Never done    Zoster (Shingles) Vaccine (1 of 2) Never done    Annual Wellness Visit  12/23/2009    FALL RISK ASSESSMENT  05/15/2020    Eye Exam  08/03/2021    Cholesterol Lab  08/20/2021    Kidney Microalbumin Urine Test  08/20/2021    Diptheria Tetanus Pertussis (DTAP/TDAP/TD) Vaccine (4 - Td or Tdap) 09/14/2021    A1C Lab  01/07/2022    Comprehensive Metabolic Panel  01/07/2022    COVID-19 Vaccine (4 - Booster for Pfizer series) 04/21/2022

## 2022-05-11 NOTE — LETTER
May 17, 2022      Aundrea GARCIA Kamila  [address]   [facility] [address]        Dear ,    We are writing to inform you of your test results.    Your lab results are stable. Please let us know if you have any questions.        Resulted Orders   Lipid panel reflex to direct LDL Fasting   Result Value Ref Range    Cholesterol 169 <200 mg/dL    Triglycerides 66 <150 mg/dL    Direct Measure HDL 54 >=50 mg/dL    LDL Cholesterol Calculated 102 (H) <=100 mg/dL    Non HDL Cholesterol 115 <130 mg/dL    Patient Fasting > 8hrs? No     Narrative    Cholesterol  Desirable:  <200 mg/dL    Triglycerides  Normal:  Less than 150 mg/dL  Borderline High:  150-199 mg/dL  High:  200-499 mg/dL  Very High:  Greater than or equal to 500 mg/dL    Direct Measure HDL  Female:  Greater than or equal to 50 mg/dL   Male:  Greater than or equal to 40 mg/dL    LDL Cholesterol  Desirable:  <100mg/dL  Above Desirable:  100-129 mg/dL   Borderline High:  130-159 mg/dL   High:  160-189 mg/dL   Very High:  >= 190 mg/dL    Non HDL Cholesterol  Desirable:  130 mg/dL  Above Desirable:  130-159 mg/dL  Borderline High:  160-189 mg/dL  High:  190-219 mg/dL  Very High:  Greater than or equal to 220 mg/dL   Albumin Random Urine Quantitative with Creat Ratio   Result Value Ref Range    Creatinine Urine mg/dL 76 mg/dL    Albumin Urine mg/L 6 mg/L    Albumin Urine mg/g Cr 7.89 0.00 - 25.00 mg/g Cr   HEMOGLOBIN A1C   Result Value Ref Range    Hemoglobin A1C 6.8 (H) 0.0 - 5.6 %      Comment:      Normal <5.7%   Prediabetes 5.7-6.4%    Diabetes 6.5% or higher     Note: Adopted from ADA consensus guidelines.   COMPREHENSIVE METABOLIC PANEL   Result Value Ref Range    Sodium 135 133 - 144 mmol/L    Potassium 3.8 3.4 - 5.3 mmol/L    Chloride 101 94 - 109 mmol/L    Carbon Dioxide (CO2) 29 20 - 32 mmol/L    Anion Gap 5 3 - 14 mmol/L    Urea Nitrogen 16 7 - 30 mg/dL    Creatinine 0.72 0.52 - 1.04 mg/dL    Calcium 9.2 8.5 - 10.1 mg/dL    Glucose 139 (H)  70 - 99 mg/dL    Alkaline Phosphatase 83 40 - 150 U/L    AST 7 0 - 45 U/L    ALT 15 0 - 50 U/L    Protein Total 6.9 6.8 - 8.8 g/dL    Albumin 4.1 3.4 - 5.0 g/dL    Bilirubin Total 1.0 0.2 - 1.3 mg/dL    GFR Estimate 80 >60 mL/min/1.73m2      Comment:      Effective December 21, 2021 eGFRcr in adults is calculated using the 2021 CKD-EPI creatinine equation which includes age and gender (Sravani et al., NEJM, DOI: 10.1056/WFNGvp0590260)       If you have any questions or concerns, please call the clinic at the number listed above.       Sincerely,      Krysten Molina MD

## 2022-05-11 NOTE — PROGRESS NOTES
"SUBJECTIVE:   Audnrea Treviño is a 87 year old female who presents for Preventive Visit.      Are you in the first 12 months of your Medicare coverage?  No    Healthy Habits:     In general, how would you rate your overall health?  Fair    Frequency of exercise:  None    Do you usually eat at least 4 servings of fruit and vegetables a day, include whole grains    & fiber and avoid regularly eating high fat or \"junk\" foods?  Yes    Taking medications regularly:  Yes    Medication side effects:  None    Ability to successfully perform activities of daily living:  Transportation requires assistance, shopping requires assistance, housework requires assistance, bathing requires assistance and laundry requires assistance    Home Safety:  No safety concerns identified    Hearing Impairment:  Difficulty following a conversation in a noisy restaurant or crowded room, need to ask people to speak up or repeat themselves and difficulty understanding soft or whispered speech    In the past 6 months, have you been bothered by leaking of urine? Yes    In general, how would you rate your overall mental or emotional health?  Good      PHQ-2 Total Score: 0    Additional concerns today:  No    Do you feel safe in your environment? Yes    Have you ever done Advance Care Planning? (For example, a Health Directive, POLST, or a discussion with a medical provider or your loved ones about your wishes): Yes, advance care planning is on file.       Fall risk  Fallen 2 or more times in the past year?: (P) No  Any fall with injury in the past year?: (P) No    Cognitive Screening   1) Repeat 3 items (Leader, Season, Table)    2) Clock draw: NORMAL  3) 3 item recall:   Recalls 2 objects   Results: NORMAL clock, 1-2 items recalled: COGNITIVE IMPAIRMENT LESS LIKELY    Mini-CogTM Copyright VARGAS Wick. Licensed by the author for use in [facility]; reprinted with permission (darleen@.[facility]). All rights reserved.           Reviewed and " updated as needed this visit by clinical staff   Tobacco  Allergies  Meds   Med Hx  Surg Hx  Fam Hx  Soc Hx          Reviewed and updated as needed this visit by Provider                   Social History     Tobacco Use     Smoking status: Former Smoker     Packs/day: 0.00     Quit date: 1960     Years since quittin.0     Smokeless tobacco: Never Used   Substance Use Topics     Alcohol use: Yes     Comment: rarely, a glass of wine on a special occassion     If you drink alcohol do you typically have >3 drinks per day or >7 drinks per week? No    Alcohol Use 2022   Prescreen: >3 drinks/day or >7 drinks/week? Not Applicable   Prescreen: >3 drinks/day or >7 drinks/week? -   No flowsheet data found.             Current providers sharing in care for this patient include:   Patient Care Team:  Krysten Molina MD as PCP - General (Family Practice)  Irma Painter MD as MD (Urology)  Leah Acosta, RN as Specialty Care Coordinator (Urology)  Krysten Molina MD as Referring Physician (Family Practice)  Irma Painter MD as Assigned Surgical Provider  Krysten Molina MD as Assigned PCP    The following health maintenance items are reviewed in Epic and correct as of today:  Health Maintenance Due   Topic Date Due     ANNUAL REVIEW OF HM ORDERS  Never done     ZOSTER IMMUNIZATION (1 of 2) Never done     EYE EXAM  2021     LIPID  2021     MICROALBUMIN  2021     DTAP/TDAP/TD IMMUNIZATION (4 - Td or Tdap) 2021     A1C  2022     CMP  2022     COVID-19 Vaccine (4 - Booster for Pfizer series) 2022             Pertinent mammograms are reviewed under the imaging tab.    Review of Systems   Constitutional: Negative for chills and fever.   HENT: Positive for hearing loss. Negative for congestion, ear pain and sore throat.    Eyes: Negative for pain and visual disturbance.   Respiratory: Positive for cough and shortness of breath.    Cardiovascular: Positive for  "peripheral edema. Negative for chest pain and palpitations.   Gastrointestinal: Negative for abdominal pain, constipation, diarrhea, heartburn, hematochezia and nausea.   Breasts:  Negative for tenderness, breast mass and discharge.   Genitourinary: Positive for frequency and urgency. Negative for dysuria, genital sores, hematuria, pelvic pain, vaginal bleeding and vaginal discharge.   Musculoskeletal: Positive for arthralgias, joint swelling and myalgias.   Skin: Negative for rash.   Neurological: Positive for weakness. Negative for dizziness, headaches and paresthesias.   Psychiatric/Behavioral: Negative for mood changes. The patient is not nervous/anxious.       all above are stable per pt.       OBJECTIVE:   BP (!) 148/82 (BP Location: Left arm, Patient Position: Sitting, Cuff Size: Adult Regular)   Pulse 63   Temp 97.6  F (36.4  C) (Temporal)   Resp 16   Ht 1.626 m (5' 4\")   Wt 72.6 kg (160 lb)   SpO2 98%   BMI 27.46 kg/m   Estimated body mass index is 27.46 kg/m  as calculated from the following:    Height as of this encounter: 1.626 m (5' 4\").    Weight as of this encounter: 72.6 kg (160 lb).  Physical Exam  GENERAL APPEARANCE: healthy, alert and no distress  EYES: Eyes grossly normal to inspection, PERRL and conjunctivae and sclerae normal  HENT: ear canals and TM's normal, nose and mouth without ulcers or lesions, oropharynx clear and oral mucous membranes moist  NECK: no adenopathy, no asymmetry, masses, or scars and thyroid normal to palpation  RESP: lungs clear to auscultation - no rales, rhonchi or wheezes  CV: irregular, normal S1 S2, no S3 or S4, no murmur   ABDOMEN: soft, nontender, no hepatosplenomegaly, no masses and bowel sounds normal  MS: no musculoskeletal defects are noted and gait is age appropriate without ataxia  SKIN: no suspicious lesions or rashes  NEURO: Normal strength and tone, sensory exam grossly normal, mentation intact and speech normal  PSYCH: mentation appears normal and " affect normal/bright    Diagnostic Test Results:  Labs reviewed in Epic    ASSESSMENT / PLAN:       ICD-10-CM    1. Encounter for Medicare annual wellness exam  Z00.00    2. Screening for hyperlipidemia  Z13.220 Lipid panel reflex to direct LDL Fasting     Lipid panel reflex to direct LDL Fasting   3. Hypertension goal BP (blood pressure) < 140/90  I10 Albumin Random Urine Quantitative with Creat Ratio     COMPREHENSIVE METABOLIC PANEL     amLODIPine (NORVASC) 5 MG tablet     hydrochlorothiazide (HYDRODIURIL) 25 MG tablet     lisinopril (ZESTRIL) 40 MG tablet     Albumin Random Urine Quantitative with Creat Ratio     COMPREHENSIVE METABOLIC PANEL   4. SOB (shortness of breath)  R06.02 carvedilol (COREG) 25 MG tablet   5. Essential hypertension with goal blood pressure less than 140/90  I10 Albumin Random Urine Quantitative with Creat Ratio     carvedilol (COREG) 25 MG tablet     Albumin Random Urine Quantitative with Creat Ratio   6. Type 2 diabetes mellitus with diabetic polyneuropathy, without long-term current use of insulin (H)  E11.42 HEMOGLOBIN A1C     metFORMIN (GLUCOPHAGE) 500 MG tablet     HEMOGLOBIN A1C   7. Hearing difficulty of both ears  H91.93 Adult Audiology Referral   8. High priority for 2019-nCoV vaccine  Z23 COVID-19,PF,PFIZER (12+ Yrs GRAY LABEL)     Wellness visit     covid booster recommended today   shingrix recommended          Type 2 diabetes mellitus with diabetic polyneuropathy, without long-term current use of insulin (H)   Controlled.   continue metformin 500mg daily. Tolerating it well. Peripheral neuropathy bilateral feet to lower legs worsening over time. BG is very well controlled.See below. At risk of b12 deficiency on metformin. B12 level was on the low side of the normal range. Recommended continuing vitamin B12 supplement.       Hypertension goal BP (blood pressure) < 140/90  Controlled.  No changes today.  Continues amlodipine 2.5 mg daily, carvedilol 25 mg twice daily,  "hydrochlorothiazide 25 mg daily, lisinopril 40 mg daily.         Venous (peripheral) insufficiency.    Swelling of right foot greater than left  Stable with compression stockings.      Chronic anticoagulation   Paroxysmal A. Fib     Continues on Eliquis 5 mg twice daily.  Followed by cardiology      Female stress incontinence  mirbetriq helped, but was too expensive, so she stopped it      Peripheral neuropathy -- denies pain, but the numbness and sense of tightness is uncomfortable.     B12 supplement 1000mcg daily     Numbness, no pain. Worsening over time. Will start with lab visit for cbc, b12, folate, tsh. Consider further eval with neurology. She does not want medication to treat the neuropathy symptoms.            She will let me know if she would like PT to help with generalized weakness/deconditioning.        COUNSELING:  Reviewed preventive health counseling, as reflected in patient instructions    Estimated body mass index is 27.46 kg/m  as calculated from the following:    Height as of this encounter: 1.626 m (5' 4\").    Weight as of this encounter: 72.6 kg (160 lb).        She reports that she quit smoking about 62 years ago. She smoked 0.00 packs per day. She has never used smokeless tobacco.      Appropriate preventive services were discussed with this patient, including applicable screening as appropriate for cardiovascular disease, diabetes, osteopenia/osteoporosis, and glaucoma.  As appropriate for age/gender, discussed screening for colorectal cancer, prostate cancer, breast cancer, and cervical cancer. Checklist reviewing preventive services available has been given to the patient.    Reviewed patients plan of care and provided an AVS. The Intermediate Care Plan ( asthma action plan, low back pain action plan, and migraine action plan) for Aundrea meets the Care Plan requirement. This Care Plan has been established and reviewed with the Patient.    Counseling Resources:  ATP IV Guidelines  Pooled " Cohorts Equation Calculator  Breast Cancer Risk Calculator  Breast Cancer: Medication to Reduce Risk  FRAX Risk Assessment  ICSI Preventive Guidelines  Dietary Guidelines for Americans, 2010  [facility]'s MyPlate  ASA Prophylaxis  Lung CA Screening    Krysten Molina MD, MD  [facility]    Identified Health Risks:

## 2022-05-12 LAB
ALBUMIN SERPL-MCNC: 4.1 G/DL (ref 3.4–5)
ALP SERPL-CCNC: 83 U/L (ref 40–150)
ALT SERPL W P-5'-P-CCNC: 15 U/L (ref 0–50)
ANION GAP SERPL CALCULATED.3IONS-SCNC: 5 MMOL/L (ref 3–14)
AST SERPL W P-5'-P-CCNC: 7 U/L (ref 0–45)
BILIRUB SERPL-MCNC: 1 MG/DL (ref 0.2–1.3)
BUN SERPL-MCNC: 16 MG/DL (ref 7–30)
CALCIUM SERPL-MCNC: 9.2 MG/DL (ref 8.5–10.1)
CHLORIDE BLD-SCNC: 101 MMOL/L (ref 94–109)
CHOLEST SERPL-MCNC: 169 MG/DL
CO2 SERPL-SCNC: 29 MMOL/L (ref 20–32)
CREAT SERPL-MCNC: 0.72 MG/DL (ref 0.52–1.04)
FASTING STATUS PATIENT QL REPORTED: NO
GFR SERPL CREATININE-BSD FRML MDRD: 80 ML/MIN/1.73M2
GLUCOSE BLD-MCNC: 139 MG/DL (ref 70–99)
HDLC SERPL-MCNC: 54 MG/DL
LDLC SERPL CALC-MCNC: 102 MG/DL
NONHDLC SERPL-MCNC: 115 MG/DL
POTASSIUM BLD-SCNC: 3.8 MMOL/L (ref 3.4–5.3)
PROT SERPL-MCNC: 6.9 G/DL (ref 6.8–8.8)
SODIUM SERPL-SCNC: 135 MMOL/L (ref 133–144)
TRIGL SERPL-MCNC: 66 MG/DL

## 2022-06-06 NOTE — PATIENT INSTRUCTIONS
"A List of Routine Foot/Nail Care Services      1.  [facility]  Travel to locations throughout the [facility]  [address] [place] [address]  Phone:  [phone]    2.  [facility]  Basic foot care in your home.  Phone: [phone]    3.  [facility]  Routine foot care for older and diabetic feet by a licensed nurse in your home.   [facility] is available at the cost of $40.00 per visit  Phone: [phone]    4. [facility]  [email or web address]  Routine foot care for older and diabetic feet by a licensed nurse at your home or at [facility] ([address] [place] [address]).   Phone: [phone]      DIABETES AND YOUR FEET    What effect does diabetes have on the feet? Diabetes can result in several problems in the feet including ulcers (open sores) and amputations.  Two of the most important reasons why people develop foot problems when they have diabetes is :  1.  Neuropathy (loss of feeling) and 2.  Vascular disease (loss or decrease of blood flow).    What is neuropathy?  Neuropathy is a term used to describe a loss of nerve function.  Patients with diabetes are at risk of developing neuropathy if their sugars continue to run high and are above the normal value.  One theory for neuropathy is that the \"extra\" sugar in the body enters the nerves and is broken down.  These by-products build up in the nerve causing it to swell and impairing nerve function.  Often times, this can be prevented by controlling your sugars, dieting and exercise.    How do I know if I have neuropathy?  When a person develops neuropathy, they usually begin to feel numbness or tingling in their feet and sometime in their legs.  Other symptoms may include painful burning or hot feet, tingling or feeling like insects or ants are crawling on your feet or legs.  If the diabetes is sever and the sugars run high for long periods of time, " We could also do IV infusion for this.  See if we could do this at the infusion center at [place].  He wasn't encephalopathic at our visit which is good.      neuropathy can also occur in the hands.    What is vascular disease?  Vascular disease is a term used to describe a loss or decrease in circulation (blood flow).  There is a problem in getting blood and oxygen to areas that need it.  Similar to neuropathy, sugars can build up in the walls of the arteries (blood vessels) and cause them to become swollen, thickened and hardened.  This decreases the amount of blood that can go to an area that needs it.  Though this is common in the legs of diabetic patients, it can also affect other arteries (blood vessels) in the body such as in the heart and eyes.    How do I know if I have vascular disease?  In the legs, vascular disease usually results in cramping.  Patients who develop leg cramps after walking the same distance every time (i.e. One block, half a mile, ect.) need to let their doctors know so that their circulation may be checked.  Cramps causing severe pain in the feet and/or legs while sleeping and the cramps go away when you stand or hang your legs off the side of the bed, may also be a sign of poor blood circulation.  Occasional cramping in cold weather or on rare occasions with activity may not be due to poor circulation, but you should inform your doctor.    How can these problems be prevented?  The key to prevention is good blood sugar control.  Poor blood sugar control is a big reason many of these problems start.  Physical activity (exercise) is a very good way to help decrease your blood sugars.  Exercise can lower your blood sugar, blood pressure, and cholesterol.  It also reduces your risk for heart disease and stroke, relieves stress, and strengthens your heart, muscles and bones.  In addition, regular activity helps insulin work better, improves your blood circulation, and keeps your joints flexible.  If you're trying to lose weight, a combination of exercise and wise food choices can help you reach your target weight and maintain it.      Pain  Management Strategies:  1.Blood Sugar Control - Most important  2. Medications such as:  Amytriptylline, duloxetine, gabapentin, lyrica, tramadol  3. Nutritional therapy:  Vitamin B6 (100mg daily), Vitamin B12 (75mcg daily), Vitamin D 2000 IU daily), Alpha-Lipoic Acid (600-1800mg daily), Acetyl-L-Carnitine (500-1000mg TID, L-methyl folate (1500mcg daily)    ** Metformin can block Vitamin B6 and B12 so it is important to supplement**    Diabetic Foot Care Recommendations For Avoiding Serious Foot Issues    DO'S   1.Wash your feet with lukewarm water and a mild soap and then dry them thoroughly, especially between the toes.     2. Examine your feet daily looking for cuts, corns, blisters, cracks, ect..., especially after wearing new shoes.  Make sure to look between your toes.  If you cannot see the bottom of your feet, set a mirror on the floor and hold your foot over it, or ask a spouse, friend or family member to examine your feet for you.  Contact your doctor immediately if new problems are noted or if sores are not healing.     3.  Immediately apply moisturizer to the tops and bottoms of your feet, avoiding areas between the toes.  Hand lotion (Intesive Care, Cherrie, Eucerin, Neutrogena, Curel, ect...) is sufficient unless your doctor prescribes a medicated lotion.  Apply sunscreen to your feet when going swimming outside.     4.Use clean comfortable shoes, wear white socks (if you have any bleeding or drainage, you will see it on white socks).  Socks should not have thick seams or cut off the circulation around the leg.  Break in new shoes slowly and rotate with older shoes until broken in.  Check the inside of your shoes with your hand to look for areas of irritation or objects that may have fallen into your shoes.       5. Keep slippers by the side of your bed for use during the night.     6. Shoes should be fitted by a professional and should not cause areas of irritation.  Check your feet regularly when  wearing a new pair of shoes and replace them as needed.     7.  Talk to your doctor about proper exercise.  Exercise and stretching stimulate blood flow to your feet and maintain proper glucose levels.     8.  Monitor your blood glucose level as instructed by your doctor.  Notify your doctor immediately if your blood sugar is abnormally high or low.     9.  Cut your nails straight across, but then gently round any sharp edges with a cardboard nail file.  If you have neuropathy, peripheral vascular disease or cannot see that well to trim your own toenails, see a medical professional for care.    DONT'S   1.  Do not soak your feet if you have an open sore.  Use only lukewarm water and always check the temperature with your hand as hot water can easily burn your feet.       2.  Never use a hot water bottle or heating pad on your feet.  Also do not apply cold compresses to your feet.  With decreased sensation, you could burn or freeze your feet.       3.  Do not apply any of these to your feet:    -  Over the counter medicine for corns or warts    -  Harsh chemicals like boric acid    -  Do not self-treat corns, cuts, blisters or infections.  Always consult your doctor.       4.  Do not wear sandals, slippers or walk barefoot, especially on hot sand or concrete or other harsh surfaces.     5.  If you smoke, stop!!!

## 2022-07-11 DIAGNOSIS — I48.0 PAROXYSMAL ATRIAL FIBRILLATION (H): ICD-10-CM

## 2022-07-12 RX ORDER — APIXABAN 5 MG/1
TABLET, FILM COATED ORAL
Qty: 180 TABLET | Refills: 0 | Status: SHIPPED | OUTPATIENT
Start: 2022-07-12 | End: 2022-10-10

## 2022-07-12 NOTE — TELEPHONE ENCOUNTER
Routing refill request to provider for review/approval because:  --Abnormal and out of date platelets.  --Does not meet age criteria for RN to authorize.      --Last visit:  5/11/2022 [facility].    --Future Visit: none.      Platelet Count   Date Value Ref Range Status   09/17/2020 142 (L) 150 - 450 10e9/L Final        Disp Refills Start End ALPA   apixaban ANTICOAGULANT (ELIQUIS ANTICOAGULANT) 5 MG tablet 180 tablet 3 7/7/2021  No   Sig - Route: Take 1 tablet (5 mg) by mouth 2 times daily

## 2022-08-22 ENCOUNTER — TRANSFERRED RECORDS (OUTPATIENT)
Dept: HEALTH INFORMATION MANAGEMENT | Facility: CLINIC | Age: 87
End: 2022-08-22

## 2022-08-22 LAB — RETINOPATHY: NEGATIVE

## 2022-10-04 PROBLEM — R11.2 INTRACTABLE VOMITING WITH NAUSEA: Status: ACTIVE | Noted: 2019-10-17

## 2022-10-04 PROBLEM — Z78.9 OTHER SPECIFIED HEALTH STATUS: Status: ACTIVE | Noted: 2017-10-06

## 2022-10-08 DIAGNOSIS — I48.0 PAROXYSMAL ATRIAL FIBRILLATION (H): ICD-10-CM

## 2022-10-08 DIAGNOSIS — I10 HYPERTENSION GOAL BP (BLOOD PRESSURE) < 140/90: ICD-10-CM

## 2022-10-10 RX ORDER — LISINOPRIL 40 MG/1
TABLET ORAL
Qty: 90 TABLET | Refills: 0 | OUTPATIENT
Start: 2022-10-10

## 2022-10-10 RX ORDER — APIXABAN 5 MG/1
TABLET, FILM COATED ORAL
Qty: 180 TABLET | Refills: 2 | Status: SHIPPED | OUTPATIENT
Start: 2022-10-10 | End: 2023-07-21

## 2022-10-10 NOTE — TELEPHONE ENCOUNTER
Message sent to pharmacy - Refusal reason: Should already have refills on file (ORDER SENT 7/18/22 FOR 1 YR SUPP TO REQUESTING CUB 1923.).  Jose PÉREZ         Disp Refills Start End ALPA   lisinopril (ZESTRIL) 40 MG tablet 90 tablet 3 7/18/2022  No   Sig - Route: Take 1 tablet (40 mg) by mouth daily

## 2022-10-10 NOTE — TELEPHONE ENCOUNTER
Routing refill request to provider for review/approval because:  --Labs out of range:  Plts.  --Does not meet age criteria for RN to authorize.       Written Prescription Date:     Disp Refills Start End ALPA   ELIQUIS ANTICOAGULANT 5 MG tablet 180 tablet 0 7/12/2022  No   Sig: Take 1 tablet (5 mg) by mouth 2 times daily         --Last visit:  5/11/2022 [facility].    --Future Visit: none.      Platelet Count   Date Value Ref Range Status   09/17/2020 142 (L) 150 - 450 10e9/L Final

## 2022-11-25 DIAGNOSIS — I10 HYPERTENSION GOAL BP (BLOOD PRESSURE) < 140/90: ICD-10-CM

## 2022-11-29 RX ORDER — LISINOPRIL 40 MG/1
TABLET ORAL
Qty: 90 TABLET | Refills: 0 | OUTPATIENT
Start: 2022-11-29

## 2022-11-29 NOTE — TELEPHONE ENCOUNTER
Refused - should have refills already from July - full year sent to same pharmacy  Padmini Gurrola RN  [facility]

## 2022-12-01 DIAGNOSIS — I10 HYPERTENSION GOAL BP (BLOOD PRESSURE) < 140/90: ICD-10-CM

## 2022-12-01 RX ORDER — LISINOPRIL 40 MG/1
40 TABLET ORAL DAILY
Qty: 90 TABLET | Refills: 3 | Status: CANCELLED | OUTPATIENT
Start: 2022-12-01

## 2022-12-05 RX ORDER — LISINOPRIL 40 MG/1
TABLET ORAL
Qty: 90 TABLET | Refills: 0 | Status: SHIPPED | OUTPATIENT
Start: 2022-12-05 | End: 2023-02-28

## 2022-12-05 NOTE — TELEPHONE ENCOUNTER
Order sent today 12/5/22 by  to requesting pharm.    Roxane Abreu RN    [facility]

## 2022-12-27 ENCOUNTER — TELEPHONE (OUTPATIENT)
Dept: FAMILY MEDICINE | Facility: CLINIC | Age: 87
End: 2022-12-27

## 2022-12-27 NOTE — TELEPHONE ENCOUNTER
RN received call from Tracy at [facility].    Patient was seen today for nail care.    Due to medicare quidelines, patient needs to be sen by PCP every 6 months, have diagnosis of diabetes tdor it to be covered.    RN advised last time patient was seen and had lab work was 5/11/2022.      Tracy advised she had had patient sign a AVN and discussed with patient and daughter of aboveand that patient needed to be seen by /28/2023 for visits to be covered.    Tracy appreciative of informations.    Brett Doherty, RN, BSN, PHN  [facility]

## 2023-02-07 DIAGNOSIS — L29.9 ITCHING: ICD-10-CM

## 2023-02-10 NOTE — TELEPHONE ENCOUNTER
Dr. Molina-Please review, sign if agree and may close encounter.    Routing refill request to provider for review/approval because:      Last Written Prescription Date:  4/28/21  Last Fill Quantity: 15 g,  # refills: 3   Last office visit: 5/11/2022 with prescribing provider:  Dr. Molina   Future Office Visit:        Thank you!  MIGUEL NgN, RN-[facility]

## 2023-02-13 RX ORDER — TRIAMCINOLONE ACETONIDE 5 MG/G
CREAM TOPICAL 2 TIMES DAILY
Qty: 15 G | Refills: 0 | Status: SHIPPED | OUTPATIENT
Start: 2023-02-13

## 2023-02-26 DIAGNOSIS — I10 HYPERTENSION GOAL BP (BLOOD PRESSURE) < 140/90: ICD-10-CM

## 2023-02-28 RX ORDER — LISINOPRIL 40 MG/1
TABLET ORAL
Qty: 90 TABLET | Refills: 1 | Status: SHIPPED | OUTPATIENT
Start: 2023-02-28 | End: 2023-06-25

## 2023-02-28 NOTE — TELEPHONE ENCOUNTER
Dr. Molina/ Providers-Please review, sign if agree and may close encounter.    Routing refill request to provider for review/approval because:  BP elevated  BP Readings from Last 3 Encounters:   05/11/22 (!) 148/82   07/07/21 128/76   11/20/19 120/68     Last Written Prescription Date:  12/5/22  Last Fill Quantity: 90,  # refills: 0    Last office visit: 5/11/2022 with prescribing provider:  Dr. Molina   Future Office Visit:          Thank you!  MIGUEL NgN, RN-[facility]

## 2023-03-28 ENCOUNTER — TELEPHONE (OUTPATIENT)
Dept: FAMILY MEDICINE | Facility: CLINIC | Age: 88
End: 2023-03-28
Payer: COMMERCIAL

## 2023-03-28 NOTE — TELEPHONE ENCOUNTER
General Call    Contacts       Type Contact Phone/Fax    03/28/2023 12:18 PM CDT Phone (Incoming) MINERVA GALDAMEZ (Emergency Contact) [phone]        Reason for Call:  Patient's daughter Minerva is calling requesting to put in a order for A1c. Patient has an appointment this Friday with [facility] and they always ask if her A1c has been checked on a regular basis? [place] states in order for medicare to cover the toenail care this needs to be done. Daughter is hoping we can put in a order so they can have it checked at the [place] location before her appointment.    Okay to leave a detailed message?: Yes at Other phone number:  [phone] Minerva Harding   [facility]  Central Scheduler

## 2023-03-28 NOTE — TELEPHONE ENCOUNTER
A1C and CMP are on her health maintenance as due, she can have these done without orders needing to be in     TCs- pls call patient and schedule her for lab appt or let her know she can schedule    SHAWN Bone RN  [facility]

## 2023-03-28 NOTE — TELEPHONE ENCOUNTER
Call and spoke w/ pt, pt stated that her daughter is the one making the scheduling. Advice pt that she can let her daughter know that A1C and CMP can be schedule without an order need. Pt is aware and will let her daughter know and call to schedule.

## 2023-04-20 ENCOUNTER — PATIENT OUTREACH (OUTPATIENT)
Dept: CARE COORDINATION | Facility: CLINIC | Age: 88
End: 2023-04-20
Payer: COMMERCIAL

## 2023-05-04 ENCOUNTER — PATIENT OUTREACH (OUTPATIENT)
Dept: CARE COORDINATION | Facility: CLINIC | Age: 88
End: 2023-05-04
Payer: COMMERCIAL

## 2023-06-26 ENCOUNTER — OFFICE VISIT (OUTPATIENT)
Dept: FAMILY MEDICINE | Facility: CLINIC | Age: 88
End: 2023-06-26
Payer: COMMERCIAL

## 2023-06-26 VITALS
HEIGHT: 64 IN | HEART RATE: 82 BPM | OXYGEN SATURATION: 97 % | WEIGHT: 156 LBS | DIASTOLIC BLOOD PRESSURE: 82 MMHG | BODY MASS INDEX: 26.63 KG/M2 | TEMPERATURE: 98 F | RESPIRATION RATE: 16 BRPM | SYSTOLIC BLOOD PRESSURE: 132 MMHG

## 2023-06-26 DIAGNOSIS — I10 HYPERTENSION GOAL BP (BLOOD PRESSURE) < 140/90: ICD-10-CM

## 2023-06-26 DIAGNOSIS — I48.0 PAROXYSMAL ATRIAL FIBRILLATION (H): ICD-10-CM

## 2023-06-26 DIAGNOSIS — N39.3 FEMALE STRESS INCONTINENCE: ICD-10-CM

## 2023-06-26 DIAGNOSIS — Z23 NEED FOR SHINGLES VACCINE: ICD-10-CM

## 2023-06-26 DIAGNOSIS — R06.02 SOB (SHORTNESS OF BREATH): ICD-10-CM

## 2023-06-26 DIAGNOSIS — E11.42 TYPE 2 DIABETES MELLITUS WITH DIABETIC POLYNEUROPATHY, WITHOUT LONG-TERM CURRENT USE OF INSULIN (H): ICD-10-CM

## 2023-06-26 DIAGNOSIS — I10 ESSENTIAL HYPERTENSION: ICD-10-CM

## 2023-06-26 DIAGNOSIS — Z79.01 CHRONIC ANTICOAGULATION: ICD-10-CM

## 2023-06-26 DIAGNOSIS — M25.511 RIGHT SHOULDER PAIN, UNSPECIFIED CHRONICITY: ICD-10-CM

## 2023-06-26 DIAGNOSIS — Z00.00 ENCOUNTER FOR MEDICARE ANNUAL WELLNESS EXAM: Primary | ICD-10-CM

## 2023-06-26 DIAGNOSIS — I10 ESSENTIAL HYPERTENSION WITH GOAL BLOOD PRESSURE LESS THAN 140/90: ICD-10-CM

## 2023-06-26 DIAGNOSIS — Z13.220 SCREENING FOR HYPERLIPIDEMIA: ICD-10-CM

## 2023-06-26 PROBLEM — I27.20 PULMONARY HYPERTENSION, MILD (H): Status: RESOLVED | Noted: 2017-02-19 | Resolved: 2023-06-26

## 2023-06-26 LAB
ALBUMIN SERPL BCG-MCNC: 4.8 G/DL (ref 3.5–5.2)
ALP SERPL-CCNC: 80 U/L (ref 35–104)
ALT SERPL W P-5'-P-CCNC: 8 U/L (ref 0–50)
ANION GAP SERPL CALCULATED.3IONS-SCNC: 14 MMOL/L (ref 7–15)
AST SERPL W P-5'-P-CCNC: 14 U/L (ref 0–45)
BILIRUB SERPL-MCNC: 0.9 MG/DL
BUN SERPL-MCNC: 15.1 MG/DL (ref 8–23)
CALCIUM SERPL-MCNC: 10.2 MG/DL (ref 8.8–10.2)
CHLORIDE SERPL-SCNC: 95 MMOL/L (ref 98–107)
CHOLEST SERPL-MCNC: 173 MG/DL
CREAT SERPL-MCNC: 0.77 MG/DL (ref 0.51–0.95)
CREAT UR-MCNC: 39.6 MG/DL
DEPRECATED HCO3 PLAS-SCNC: 26 MMOL/L (ref 22–29)
ERYTHROCYTE [DISTWIDTH] IN BLOOD BY AUTOMATED COUNT: 13.4 % (ref 10–15)
GFR SERPL CREATININE-BSD FRML MDRD: 74 ML/MIN/1.73M2
GLUCOSE SERPL-MCNC: 111 MG/DL (ref 70–99)
HBA1C MFR BLD: 6.8 % (ref 0–5.6)
HCT VFR BLD AUTO: 41.2 % (ref 35–47)
HDLC SERPL-MCNC: 55 MG/DL
HGB BLD-MCNC: 13.8 G/DL (ref 11.7–15.7)
LDLC SERPL CALC-MCNC: 98 MG/DL
MCH RBC QN AUTO: 28 PG (ref 26.5–33)
MCHC RBC AUTO-ENTMCNC: 33.5 G/DL (ref 31.5–36.5)
MCV RBC AUTO: 84 FL (ref 78–100)
MICROALBUMIN UR-MCNC: <12 MG/L
MICROALBUMIN/CREAT UR: NORMAL MG/G{CREAT}
NONHDLC SERPL-MCNC: 118 MG/DL
PLATELET # BLD AUTO: 152 10E3/UL (ref 150–450)
POTASSIUM SERPL-SCNC: 4 MMOL/L (ref 3.4–5.3)
PROT SERPL-MCNC: 7.2 G/DL (ref 6.4–8.3)
RBC # BLD AUTO: 4.93 10E6/UL (ref 3.8–5.2)
SODIUM SERPL-SCNC: 135 MMOL/L (ref 136–145)
TRIGL SERPL-MCNC: 101 MG/DL
WBC # BLD AUTO: 5.8 10E3/UL (ref 4–11)

## 2023-06-26 PROCEDURE — 82043 UR ALBUMIN QUANTITATIVE: CPT | Performed by: FAMILY MEDICINE

## 2023-06-26 PROCEDURE — 99207 PR FOOT EXAM NO CHARGE: CPT | Mod: 25 | Performed by: FAMILY MEDICINE

## 2023-06-26 PROCEDURE — 82570 ASSAY OF URINE CREATININE: CPT | Performed by: FAMILY MEDICINE

## 2023-06-26 PROCEDURE — 80061 LIPID PANEL: CPT | Performed by: FAMILY MEDICINE

## 2023-06-26 PROCEDURE — 83036 HEMOGLOBIN GLYCOSYLATED A1C: CPT | Performed by: FAMILY MEDICINE

## 2023-06-26 PROCEDURE — 99214 OFFICE O/P EST MOD 30 MIN: CPT | Mod: 25 | Performed by: FAMILY MEDICINE

## 2023-06-26 PROCEDURE — 80053 COMPREHEN METABOLIC PANEL: CPT | Performed by: FAMILY MEDICINE

## 2023-06-26 PROCEDURE — 91312 COVID-19 BIVALENT 12+ (PFIZER): CPT | Performed by: FAMILY MEDICINE

## 2023-06-26 PROCEDURE — 0124A COVID-19 BIVALENT 12+ (PFIZER): CPT | Performed by: FAMILY MEDICINE

## 2023-06-26 PROCEDURE — 36415 COLL VENOUS BLD VENIPUNCTURE: CPT | Performed by: FAMILY MEDICINE

## 2023-06-26 PROCEDURE — 85027 COMPLETE CBC AUTOMATED: CPT | Performed by: FAMILY MEDICINE

## 2023-06-26 PROCEDURE — G0439 PPPS, SUBSEQ VISIT: HCPCS | Performed by: FAMILY MEDICINE

## 2023-06-26 RX ORDER — AMLODIPINE BESYLATE 5 MG/1
TABLET ORAL
Qty: 90 TABLET | Refills: 3 | Status: SHIPPED | OUTPATIENT
Start: 2023-06-26 | End: 2024-06-13

## 2023-06-26 RX ORDER — CARVEDILOL 25 MG/1
TABLET ORAL
Qty: 180 TABLET | Refills: 3 | Status: SHIPPED | OUTPATIENT
Start: 2023-06-26 | End: 2024-06-28

## 2023-06-26 RX ORDER — LISINOPRIL 40 MG/1
40 TABLET ORAL DAILY
Qty: 90 TABLET | Refills: 3 | Status: SHIPPED | OUTPATIENT
Start: 2023-06-26 | End: 2024-08-14

## 2023-06-26 RX ORDER — HYDROCHLOROTHIAZIDE 25 MG/1
25 TABLET ORAL DAILY
Qty: 90 TABLET | Refills: 3 | Status: SHIPPED | OUTPATIENT
Start: 2023-06-26 | End: 2024-05-22 | Stop reason: ALTCHOICE

## 2023-06-26 ASSESSMENT — ACTIVITIES OF DAILY LIVING (ADL)
CURRENT_FUNCTION: BATHING REQUIRES ASSISTANCE
CURRENT_FUNCTION: HOUSEWORK REQUIRES ASSISTANCE
CURRENT_FUNCTION: LAUNDRY REQUIRES ASSISTANCE
CURRENT_FUNCTION: TRANSPORTATION REQUIRES ASSISTANCE
CURRENT_FUNCTION: SHOPPING REQUIRES ASSISTANCE

## 2023-06-26 ASSESSMENT — PAIN SCALES - GENERAL: PAINLEVEL: SEVERE PAIN (6)

## 2023-06-26 NOTE — PROGRESS NOTES
"SUBJECTIVE:   Aundrea is a 88 year old who presents for Preventive Visit.      6/26/2023     2:00 PM   Additional Questions   Roomed by Susan   Accompanied by Minerva/ Daughter         6/26/2023     2:00 PM   Patient Reported Additional Medications   Patient reports taking the following new medications None     Are you in the first 12 months of your Medicare coverage?  No    Healthy Habits:     In general, how would you rate your overall health?  Very good    Frequency of exercise:  4-5 days/week    Duration of exercise:  Less than 15 minutes    Do you usually eat at least 4 servings of fruit and vegetables a day, include whole grains    & fiber and avoid regularly eating high fat or \"junk\" foods?  Yes    Taking medications regularly:  Yes    Barriers to taking medications:  None    Medication side effects:  None    Ability to successfully perform activities of daily living:  Laundry requires assistance, housework requires assistance, bathing requires assistance, shopping requires assistance and transportation requires assistance    Home Safety:  No safety concerns identified    Hearing Impairment:  Difficulty following a conversation in a noisy restaurant or crowded room, feel that people are mumbling or not speaking clearly, need to ask people to speak up or repeat themselves and difficulty understanding soft or whispered speech    In the past 6 months, have you been bothered by leaking of urine? Yes    In general, how would you rate your overall mental or emotional health?  Excellent      PHQ-2 Total Score: 0    Additional concerns today:  No    Here with her daughter Minerva today.   Right shoulder pain. It came on quickly. Bothered in the shoulder into the right side of her neck. Hurts to lift her shoulder. Not very long. Maybe 6 months. Getting worse over time. At first thought she might have slept on it wrong. No fall or injury. Has been using a cream on it. mykel    Has neuropathy. Lyman on television about " importance of blood flow for nerves.     Have you ever done Advance Care Planning? (For example, a Health Directive, POLST, or a discussion with a medical provider or your loved ones about your wishes): No, advance care planning information given to patient to review.  Patient plans to discuss their wishes with loved ones or provider.         Fall risk  Fallen 2 or more times in the past year?: No  Any fall with injury in the past year?: No    Cognitive Screening   1) Repeat 3 items (Leader, Season, Table)    2) Clock draw: NORMAL  3) 3 item recall:   Recalls 2 objects   Results: NORMAL clock, 1-2 items recalled: COGNITIVE IMPAIRMENT LESS LIKELY    Mini-CogTM Copyright S Shamika. Licensed by the author for use in [facility]; reprinted with permission (darleen@[facility]). All rights reserved.      Do you have sleep apnea, excessive snoring or daytime drowsiness?: no    Reviewed and updated as needed this visit by clinical staff   Tobacco  Allergies  Meds              Reviewed and updated as needed this visit by Provider                 Social History     Tobacco Use     Smoking status: Former     Packs/day: 0.00     Types: Cigarettes     Quit date: 1960     Years since quittin.2     Smokeless tobacco: Never   Substance Use Topics     Alcohol use: Yes     Comment: rarely, a glass of wine on a special occassion              2022    11:26 AM   Alcohol Use   Prescreen: >3 drinks/day or >7 drinks/week? Not Applicable     Do you have a current opioid prescription? No  Do you use any other controlled substances or medications that are not prescribed by a provider? None              Current providers sharing in care for this patient include:   Patient Care Team:  Krysten Molina MD as PCP - General (Family Practice)  Irma Painter MD as MD (Urology)  Leah Acosta RN as Specialty Care Coordinator (Urology)  Krysten Molina MD as Referring Physician (Family Practice)  Krysten Molina MD as  "Assigned PCP    The following health maintenance items are reviewed in Epic and correct as of today:  Health Maintenance   Topic Date Due     ZOSTER IMMUNIZATION (1 of 2) Never done     CMP  11/11/2022     BMP  05/11/2023     LIPID  05/11/2023     MICROALBUMIN  05/11/2023     EYE EXAM  08/22/2023     A1C  12/26/2023     MEDICARE ANNUAL WELLNESS VISIT  06/26/2024     DIABETIC FOOT EXAM  06/26/2024     ANNUAL REVIEW OF HM ORDERS  06/26/2024     FALL RISK ASSESSMENT  06/26/2024     ADVANCE CARE PLANNING  06/26/2028     DTAP/TDAP/TD IMMUNIZATION (7 - Td or Tdap) 05/11/2032     PHQ-2 (once per calendar year)  Completed     INFLUENZA VACCINE  Completed     Pneumococcal Vaccine: 65+ Years  Completed     COVID-19 Vaccine  Completed     IPV IMMUNIZATION  Aged Out     MENINGITIS IMMUNIZATION  Aged Out             Pertinent mammograms are reviewed under the imaging tab.    Review of Systems  Constitutional, HEENT, cardiovascular, pulmonary, GI, , musculoskeletal, neuro, skin, endocrine and psych systems are negative, except as otherwise noted.    OBJECTIVE:   /82 (BP Location: Right arm, Patient Position: Sitting, Cuff Size: Adult Regular)   Pulse 82   Temp 98  F (36.7  C) (Temporal)   Resp 16   Ht 1.63 m (5' 4.17\")   Wt 70.8 kg (156 lb)   SpO2 97%   BMI 26.63 kg/m   Estimated body mass index is 26.63 kg/m  as calculated from the following:    Height as of this encounter: 1.63 m (5' 4.17\").    Weight as of this encounter: 70.8 kg (156 lb).  Physical Exam  GENERAL APPEARANCE: healthy, alert and no distress  EYES: Eyes grossly normal to inspection, PERRL and conjunctivae and sclerae normal  HENT: ear canals and TM's normal, nose and mouth without ulcers or lesions, oropharynx clear and oral mucous membranes moist  NECK: no adenopathy, no asymmetry, masses, or scars and thyroid normal to palpation  RESP: lungs clear to auscultation - no rales, rhonchi or wheezes  CV: irregularly irregular.  normal S1 S2, peripheral " edema-right greater than left  ABDOMEN: soft, nontender, no hepatosplenomegaly, no masses and bowel sounds normal  MS: no musculoskeletal defects are noted and gait is age appropriate without ataxia  SKIN: no suspicious lesions or rashes  NEURO: Normal strength and tone, sensory exam grossly normal, mentation intact and speech normal  PSYCH: mentation appears normal and affect normal/bright    Diagnostic Test Results:  Labs reviewed in Epic    ASSESSMENT / PLAN:       ICD-10-CM    1. Encounter for Medicare annual wellness exam  Z00.00       2. Hypertension goal BP (blood pressure) < 140/90  I10 amLODIPine (NORVASC) 5 MG tablet     hydrochlorothiazide (HYDRODIURIL) 25 MG tablet     lisinopril (ZESTRIL) 40 MG tablet     Albumin Random Urine Quantitative with Creat Ratio     Albumin Random Urine Quantitative with Creat Ratio      3. SOB (shortness of breath)  R06.02 carvedilol (COREG) 25 MG tablet      4. Essential hypertension with goal blood pressure less than 140/90  I10 carvedilol (COREG) 25 MG tablet      5. Type 2 diabetes mellitus with diabetic polyneuropathy, without long-term current use of insulin (H)  E11.42 metFORMIN (GLUCOPHAGE) 500 MG tablet     HEMOGLOBIN A1C     CBC with platelets     FOOT EXAM     HEMOGLOBIN A1C     CBC with platelets      6. Paroxysmal atrial fibrillation (H)  I48.0       7. Chronic anticoagulation  Z79.01       8. Essential hypertension  I10 COMPREHENSIVE METABOLIC PANEL     COMPREHENSIVE METABOLIC PANEL      9. Female stress incontinence  N39.3       10. Need for shingles vaccine  Z23       11. Screening for hyperlipidemia  Z13.220 Lipid panel reflex to direct LDL Non-fasting     Lipid panel reflex to direct LDL Non-fasting      12. Right shoulder pain, unspecified chronicity  M25.511 Orthopedic  Referral        Wellness visit     covid booster recommended today   shingrix recommended     Type 2 diabetes mellitus with diabetic polyneuropathy, without long-term current use  "of insulin (H)   Controlled. A1C today. continue metformin 500mg daily. Tolerating it well. Peripheral neuropathy bilateral feet to lower legs worsening over time. BG is very well controlled.  At risk of b12 deficiency on metformin. B12 level was on the low side of the normal range. Recommended continuing vitamin B12 supplement.       Hypertension goal BP (blood pressure) < 140/90  Controlled.  No changes today.  Continues amlodipine 2.5 mg daily, carvedilol 25 mg twice daily, hydrochlorothiazide 25 mg daily, lisinopril 40 mg daily.   CMP, urine albumin today      Venous (peripheral) insufficiency.    Swelling of right foot greater than left  Stable with compression stockings.      Chronic anticoagulation    Paroxysmal A. Fib     Continues on Eliquis 5 mg twice daily.  rate controlled on carvedilol.   CBC, CMP today       Female stress incontinence  mirbetriq helped, but was too expensive, so she stopped it  Never tried estradiol vaginal cream. Discussed again today. She will think about trying it.       Peripheral neuropathy -- denies pain, but the numbness and sense of tightness is uncomfortable.     B12 supplement 1000mcg daily     Numbness, no pain. Worsening over time. Will start with lab visit for cbc, b12, folate, tsh. Consider further eval with neurology. She does not want medication to treat the neuropathy symptoms.               Does not drive anymore and daughter drives her and needs renewal of parking sticker. Expires in january    COUNSELING:  Reviewed preventive health counseling, as reflected in patient instructions      BMI:   Estimated body mass index is 26.63 kg/m  as calculated from the following:    Height as of this encounter: 1.63 m (5' 4.17\").    Weight as of this encounter: 70.8 kg (156 lb).         She reports that she quit smoking about 63 years ago. Her smoking use included cigarettes. She has never used smokeless tobacco.      Appropriate preventive services were discussed with this " patient, including applicable screening as appropriate for cardiovascular disease, diabetes, osteopenia/osteoporosis, and glaucoma.  As appropriate for age/gender, discussed screening for colorectal cancer, prostate cancer, breast cancer, and cervical cancer. Checklist reviewing preventive services available has been given to the patient.    Reviewed patients plan of care and provided an AVS. The Intermediate Care Plan ( asthma action plan, low back pain action plan, and migraine action plan) for Aundrea meets the Care Plan requirement. This Care Plan has been established and reviewed with the Patient.       Krysten Molina MD, MD  [facility]    Identified Health Risks:    I have reviewed Opioid Use Disorder and Substance Use Disorder risk factors and made any needed referrals.

## 2023-06-26 NOTE — PATIENT INSTRUCTIONS
For your shoulder: you can continue to use the lidocaine cream for pain. You may also try voltaren gel (another kind of pain cream). Schedule with ortho.   Try to get out and walk in your [place] every day.   I recommend getting the Shingrix vaccine at your pharmacy

## 2023-06-26 NOTE — LETTER
June 27, 2023      Aundrea Treviño  [address]   [facility] [id number]        Dear ,    We are writing to inform you of your test results.    Your lab results are stable. Please let us know if you have any questions.      Resulted Orders   COMPREHENSIVE METABOLIC PANEL   Result Value Ref Range    Sodium 135 (L) 136 - 145 mmol/L    Potassium 4.0 3.4 - 5.3 mmol/L    Chloride 95 (L) 98 - 107 mmol/L    Carbon Dioxide (CO2) 26 22 - 29 mmol/L    Anion Gap 14 7 - 15 mmol/L    Urea Nitrogen 15.1 8.0 - 23.0 mg/dL    Creatinine 0.77 0.51 - 0.95 mg/dL    Calcium 10.2 8.8 - 10.2 mg/dL    Glucose 111 (H) 70 - 99 mg/dL    Alkaline Phosphatase 80 35 - 104 U/L    AST 14 0 - 45 U/L      Comment:      Reference intervals for this test were updated on 6/12/2023 to more accurately reflect our healthy population. There may be differences in the flagging of prior results with similar values performed with this method. Interpretation of those prior results can be made in the context of the updated reference intervals.    ALT 8 0 - 50 U/L      Comment:      Reference intervals for this test were updated on 6/12/2023 to more accurately reflect our healthy population. There may be differences in the flagging of prior results with similar values performed with this method. Interpretation of those prior results can be made in the context of the updated reference intervals.      Protein Total 7.2 6.4 - 8.3 g/dL    Albumin 4.8 3.5 - 5.2 g/dL    Bilirubin Total 0.9 <=1.2 mg/dL    GFR Estimate 74 >60 mL/min/1.73m2   HEMOGLOBIN A1C   Result Value Ref Range    Hemoglobin A1C 6.8 (H) 0.0 - 5.6 %      Comment:      Normal <5.7%   Prediabetes 5.7-6.4%    Diabetes 6.5% or higher     Note: Adopted from ADA consensus guidelines.   Lipid panel reflex to direct LDL Non-fasting   Result Value Ref Range    Cholesterol 173 <200 mg/dL    Triglycerides 101 <150 mg/dL    Direct Measure HDL 55 >=50 mg/dL    LDL Cholesterol Calculated 98  <=100 mg/dL    Non HDL Cholesterol 118 <130 mg/dL    Narrative    Cholesterol  Desirable:  <200 mg/dL    Triglycerides  Normal:  Less than 150 mg/dL  Borderline High:  150-199 mg/dL  High:  200-499 mg/dL  Very High:  Greater than or equal to 500 mg/dL    Direct Measure HDL  Female:  Greater than or equal to 50 mg/dL   Male:  Greater than or equal to 40 mg/dL    LDL Cholesterol  Desirable:  <100mg/dL  Above Desirable:  100-129 mg/dL   Borderline High:  130-159 mg/dL   High:  160-189 mg/dL   Very High:  >= 190 mg/dL    Non HDL Cholesterol  Desirable:  130 mg/dL  Above Desirable:  130-159 mg/dL  Borderline High:  160-189 mg/dL  High:  190-219 mg/dL  Very High:  Greater than or equal to 220 mg/dL   Albumin Random Urine Quantitative with Creat Ratio   Result Value Ref Range    Creatinine Urine mg/dL 39.6 mg/dL      Comment:      The reference ranges have not been established in urine creatinine. The results should be integrated into the clinical context for interpretation.    Albumin Urine mg/L <12.0 mg/L      Comment:      The reference ranges have not been established in urine albumin. The results should be integrated into the clinical context for interpretation.    Albumin Urine mg/g Cr        Comment:      Unable to calculate, urine albumin and/or urine creatinine is outside detectable limits.  Microalbuminuria is defined as an albumin:creatinine ratio of 17 to 299 for males and 25 to 299 for females. A ratio of albumin:creatinine of 300 or higher is indicative of overt proteinuria.  Due to biologic variability, positive results should be confirmed by a second, first-morning random or 24-hour timed urine specimen. If there is discrepancy, a third specimen is recommended. When 2 out of 3 results are in the microalbuminuria range, this is evidence for incipient nephropathy and warrants increased efforts at glucose control, blood pressure control, and institution of therapy with an angiotensin-converting-enzyme (ACE)  inhibitor (if the patient can tolerate it).     CBC with platelets   Result Value Ref Range    WBC Count 5.8 4.0 - 11.0 10e3/uL    RBC Count 4.93 3.80 - 5.20 10e6/uL    Hemoglobin 13.8 11.7 - 15.7 g/dL    Hematocrit 41.2 35.0 - 47.0 %    MCV 84 78 - 100 fL    MCH 28.0 26.5 - 33.0 pg    MCHC 33.5 31.5 - 36.5 g/dL    RDW 13.4 10.0 - 15.0 %    Platelet Count 152 150 - 450 10e3/uL       If you have any questions or concerns, please call the clinic at the number listed above.       Sincerely,      MALLORY/ Krysten Molina MD

## 2023-07-27 ENCOUNTER — ANCILLARY PROCEDURE (OUTPATIENT)
Dept: GENERAL RADIOLOGY | Facility: CLINIC | Age: 88
End: 2023-07-27
Attending: FAMILY MEDICINE
Payer: COMMERCIAL

## 2023-07-27 ENCOUNTER — OFFICE VISIT (OUTPATIENT)
Dept: ORTHOPEDICS | Facility: CLINIC | Age: 88
End: 2023-07-27
Payer: COMMERCIAL

## 2023-07-27 VITALS
DIASTOLIC BLOOD PRESSURE: 79 MMHG | HEIGHT: 64 IN | SYSTOLIC BLOOD PRESSURE: 154 MMHG | BODY MASS INDEX: 26.29 KG/M2 | WEIGHT: 154 LBS

## 2023-07-27 DIAGNOSIS — M19.011 OSTEOARTHRITIS OF RIGHT GLENOHUMERAL JOINT: Primary | ICD-10-CM

## 2023-07-27 DIAGNOSIS — M25.511 RIGHT SHOULDER PAIN, UNSPECIFIED CHRONICITY: ICD-10-CM

## 2023-07-27 PROCEDURE — 73030 X-RAY EXAM OF SHOULDER: CPT | Mod: TC | Performed by: RADIOLOGY

## 2023-07-27 PROCEDURE — 20610 DRAIN/INJ JOINT/BURSA W/O US: CPT | Mod: RT | Performed by: FAMILY MEDICINE

## 2023-07-27 PROCEDURE — 99204 OFFICE O/P NEW MOD 45 MIN: CPT | Mod: 25 | Performed by: FAMILY MEDICINE

## 2023-07-27 RX ADMIN — LIDOCAINE HYDROCHLORIDE 4 ML: 10 INJECTION, SOLUTION INFILTRATION; PERINEURAL at 17:33

## 2023-07-27 RX ADMIN — TRIAMCINOLONE ACETONIDE 40 MG: 40 INJECTION, SUSPENSION INTRA-ARTICULAR; INTRAMUSCULAR at 17:33

## 2023-07-27 NOTE — PROGRESS NOTES
CHIEF COMPLAINT:  Pain of the Right Shoulder     HISTORY OF PRESENT ILLNESS  Ms. Treviño is a pleasant 88 year old year old R handed female who presents to clinic today with R shoulder pain.  Aundrea explains that about 3 months ago, she noticed the pain started and has since gotten worse, without any injury or trauma. Pt has a hx of seeing sport med for R shoulder OA back in 2018 but this seems like a different issue. This the pain goes up into her neck but she denies any numbness and tingling in neck or shoulder.     Onset: gradual  Location: right shoulder  Quality:  aching and sharp  Duration: 3.5 months   Severity: 9/10 at worst  Timing:intermittent episodes with use. She can sit still and it doesn't bother her.   Modifying factors:  resting and non-use makes it better, movement and use makes it worse  Associated signs & symptoms: pain and numbness in hands/fingers from neuropathy.   Previous similar pain: Yes, saw Dr Lorenzana in [id number] for R shoulder OA  Treatments to date: Salonpas, acetaminophen with mild relief    Additional history: as documented    Review of Systems:  Have you recently had a a fever, chills, weight loss? No  Do you have any vision problems? No  Do you have any chest pain or edema? No  Do you have any shortness of breath or wheezing?  No  Do you have stomach problems? No  Do you have any numbness or focal weakness? No  Do you have diabetes? T2DM well controlled  Do you have problems with bleeding or clotting? Yes on eliquis  Do you have an rashes or other skin lesions? No      MEDICAL HISTORY  Patient Active Problem List   Diagnosis    Disorder of bone and cartilage    Female stress incontinence    Dystonia    Essential tremor    Refusal of blood transfusions as patient is [employer]    Hypertension goal BP (blood pressure) < 140/90    CARDIOVASCULAR SCREENING; LDL GOAL LESS THAN 130    Gout    Venous (peripheral) insufficiency    Chronic anticoagulation    Paroxysmal atrial  fibrillation (H)    Moderate tricuspid regurgitation    ACP (advance care planning)    Patient is Yazidi    Type 2 diabetes mellitus with diabetic polyneuropathy, without long-term current use of insulin (H)    Primary osteoarthritis of right shoulder    Primary osteoarthritis of first carpometacarpal joint of left hand    Elevated C-reactive protein (CRP)    S/P total hip arthroplasty    Primary osteoarthritis involving multiple joints    Acute pain of right knee    Effusion of right knee    Status post total replacement of right hip, 9 Oct    Acute post-operative pain    Uncontrolled hypertension    Anemia due to blood loss, acute    Intractable vomiting with nausea, unspecified vomiting type    Loose stools    Debility    Essential hypertension    Nausea       Current Outpatient Medications   Medication Sig Dispense Refill    acetaminophen (TYLENOL) 500 MG tablet Take 2 tablets (1,000 mg) by mouth 3 times daily      Alfalfa 650 MG TABS Take 2 capsules by mouth 2 times daily For arthritis pain      Alpha Lipoic Acid 200 MG CAPS Take 200 mg by mouth daily       amLODIPine (NORVASC) 5 MG tablet TAKE 1/2 TABLET (2.5MG) BY MOUTH ONCE DAILY 90 tablet 3    blood glucose (NO BRAND SPECIFIED) lancets standard Use to test blood sugar one times daily or as directed. Dispense compatible with insurance and glucometer - One Touch Ultra (Patient not taking: Reported on 6/26/2023) 100 each 3    blood glucose (NO BRAND SPECIFIED) test strip Use to test blood sugars one times daily or as directed.  Dispense compatible with insurance and glucometer - One Touch Ultra (Patient not taking: Reported on 6/26/2023) 100 strip 3    blood glucose monitoring (NO BRAND SPECIFIED) meter device kit Use to test blood sugar one times daily or as directed.  Dispense One Touch Ultra per insurance (Patient not taking: Reported on 6/26/2023) 1 kit 0    calcium carbonate 750 MG CHEW Take 750 mg by mouth 2 times daily       carvedilol  (COREG) 25 MG tablet TAKE 1 TABLET (25 MG) BY MOUTH 2 TIMES DAILY WITH MEALS 180 tablet 3    ELIQUIS ANTICOAGULANT 5 MG tablet TAKE 1 TABLET BY MOUTH TWICE DAILY. 60 tablet 0    Estriol 10 % CREA 1 Application See Admin Instructions Insert 1 mg vaginally one time a day for Atrophic Vaginitis for 2 Weeks Apply small amount to finger and apply to inside vagina daily for two weeks then twice weekly. AND Insert 1 mg vaginally one time a day every Mon, Thu for Atrophic Vaginitis Apply small amount to finger and apply to inside vagina daily for two weeks then twice weekly.  -Pt reports using PRN now. (Patient not taking: Reported on 6/26/2023)      FLAXSEED (LINSEED) PO POWD Take 1 Tablespoonful by mouth every morning       hydrochlorothiazide (HYDRODIURIL) 25 MG tablet Take 1 tablet (25 mg) by mouth daily 90 tablet 3    lisinopril (ZESTRIL) 40 MG tablet Take 1 tablet (40 mg) by mouth daily 90 tablet 3    metFORMIN (GLUCOPHAGE) 500 MG tablet Take 1 tablet (500 mg) by mouth daily (with dinner) 90 tablet 3    ondansetron (ZOFRAN) 4 MG tablet Take by mouth every 6 hours as needed for nausea (Patient not taking: Reported on 6/26/2023)      order for DME Equipment being ordered: Left wrist splint. 1 each 0    predniSONE (DELTASONE) 5 MG tablet Take 5 mg by mouth daily as needed (for painful flare) (Patient not taking: Reported on 6/26/2023)      senna-docusate (SENOKOT-S/PERICOLACE) 8.6-50 MG tablet Take 1 tablet by mouth 2 times daily as needed for constipation (Take while taking narcotic pain medications.) (Patient not taking: Reported on 6/26/2023) 15 tablet 0    triamcinolone (ARISTOCORT HP) 0.5 % external cream Apply topically 2 times daily to affected area as directed. 15 g 0    VITAMIN D 1000 UNIT OR TABS Take 1,000 Units by mouth At Bedtime  30 0       Allergies   Allergen Reactions    Amlodipine      Swollen ankles and rash but tolerates 2.5 mg without any symptoms    Apple Juice     Apple [Chromium]     Food     Kiwi      Nuts     Pear     Tape [Adhesive Tape]        Family History   Problem Relation Age of Onset    No Known Problems Mother     Diabetes Father         type 2    Hypertension Father     Cerebrovascular Disease Father     Arthritis Father     Other Cancer Sister        Additional medical/Social/Surgical histories reviewed in [facility] and updated as appropriate.       PHYSICAL EXAM  There were no vitals taken for this visit.    General  - normal appearance, in no obvious distress  Musculoskeletal - Right shoulder  - inspection: normal bone and joint alignment, no obvious deformity, no scapular winging, no AC step-off,   - palpation: Tenderness anterior joint line, normal clavicle, non-tender AC  - ROM:  Shoulder hiking with abduction, painful and limited forward elevation, abduction and internal rotation. External rotation limited to 30 degrees  - strength: 5/5  strength, 4/5 shoulder strength in abduction due to pain.  5/5 ER and IR  - special tests:  (-) Speed's  (+) Neer  (+) Hawkin's  (+) Charlie's  (-) [place]'s  (-) apprehension  (-) subscap lift-off  Neuro  - no sensory or motor deficit, grossly normal coordination, normal muscle tone  Skin  - no ecchymosis, erythema, warmth, or induration, no obvious rash  Psych  - interactive, appropriate, normal mood and affect       IMAGING : XR Shoulder right 4 views. Final results and radiologist's interpretation, available in the [facility] health record. Images were reviewed with the patient/family members in the office today. My personal interpretation of the performed imaging is severe osteoarthritic changes of right shoulder.  Hydroxyapatite deposit of cuff insertion.    Labs A1c 6.6 on 6/26/23     ASSESSMENT & PLAN  Ms. Treviño is a 88 year old year old female history of Type 2 DM, Paroxysmal Afib on anticoagulation who presents to clinic today with acute on chronic right shoulder pain.  Suspected related to severe degenerative changes of shoulder with exacerbation of OA.      Diagnosis:   End stage osteoarthritis of right shoulder  Well controlled T2DM    Gentle range of motion discussed to start even pendulum exercises.  Corticosteroid injection reviewed and we opted to pursue subacromial injection today. If persisting we can consider more invasive intraarticular injection under US guidance.  OTC tylenol. Monitor glucose changes but A1c within very acceptable range.    Follow up in 6 weeks.     Right shoulder Injection - subacromial space   The patient was informed of the risks and the benefits of the procedure and a written consent was signed.  The patient s right shoulder was prepped with chlorhexidine in sterile fashion.   40 mg of triamcinolone acetate suspension was drawn up into a 5 mL syringe with 4 mL of 1% lidocaine.  Injection was performed with sub-sterile technique.  A 1.5-inch 22-gauge needle was used to enter the subacromial space at the posterior aspect of shoulder.  There were no complications. The patient tolerated the procedure well. There was negligible bleeding.   The patient was instructed to ice the shoulder upon leaving clinic and refrain from overuse over the next 3 days.   The patient was instructed to call or go to the emergency room with any unusual pain, swelling, redness, or if otherwise concerned.    Lucian Ambrosio DO [facility]  Primary Care Sports Medicine  [facility]      It was a pleasure seeing Aundrea today.    Lucian Ambrosio DO, [facility]  Primary Care Sports Medicine

## 2023-07-27 NOTE — LETTER
7/27/2023         RE: Aundrea Treviño  [address]  [facility] [address]        Dear Colleague,    Thank you for referring your patient, Aundrea Treviño, to the [facility]IC[facility]INIC [place]. Please see a copy of my visit note below.    CHIEF COMPLAINT:  Pain of the Right Shoulder     HISTORY OF PRESENT ILLNESS  Ms. Treviño is a pleasant 88 year old year old R handed female who presents to clinic today with R shoulder pain.  Aundrea explains that about 3 months ago, she noticed the pain started and has since gotten worse, without any injury or trauma. Pt has a hx of seeing sport med for R shoulder OA back in 2018 but this seems like a different issue. This the pain goes up into her neck but she denies any numbness and tingling in neck or shoulder.     Onset: gradual  Location: right shoulder  Quality:  aching and sharp  Duration: 3.5 months   Severity: 9/10 at worst  Timing:intermittent episodes with use. She can sit still and it doesn't bother her.   Modifying factors:  resting and non-use makes it better, movement and use makes it worse  Associated signs & symptoms: pain and numbness in hands/fingers from neuropathy.   Previous similar pain: Yes, saw Dr Lorenzana in [phone] for R shoulder OA  Treatments to date: Salonpas, acetaminophen with mild relief    Additional history: as documented    Review of Systems:  Have you recently had a a fever, chills, weight loss? No  Do you have any vision problems? No  Do you have any chest pain or edema? No  Do you have any shortness of breath or wheezing?  No  Do you have stomach problems? No  Do you have any numbness or focal weakness? No  Do you have diabetes? T2DM well controlled  Do you have problems with bleeding or clotting? Yes on eliquis  Do you have an rashes or other skin lesions? No      MEDICAL HISTORY  Patient Active Problem List   Diagnosis     Disorder of bone and cartilage     Female stress incontinence     Dystonia      Essential tremor     Refusal of blood transfusions as patient is Buddhist     Hypertension goal BP (blood pressure) < 140/90     CARDIOVASCULAR SCREENING; LDL GOAL LESS THAN 130     Gout     Venous (peripheral) insufficiency     Chronic anticoagulation     Paroxysmal atrial fibrillation (H)     Moderate tricuspid regurgitation     ACP (advance care planning)     Patient is Buddhist     Type 2 diabetes mellitus with diabetic polyneuropathy, without long-term current use of insulin (H)     Primary osteoarthritis of right shoulder     Primary osteoarthritis of first carpometacarpal joint of left hand     Elevated C-reactive protein (CRP)     S/P total hip arthroplasty     Primary osteoarthritis involving multiple joints     Acute pain of right knee     Effusion of right knee     Status post total replacement of right hip, 9 Oct     Acute post-operative pain     Uncontrolled hypertension     Anemia due to blood loss, acute     Intractable vomiting with nausea, unspecified vomiting type     Loose stools     Debility     Essential hypertension     Nausea       Current Outpatient Medications   Medication Sig Dispense Refill     acetaminophen (TYLENOL) 500 MG tablet Take 2 tablets (1,000 mg) by mouth 3 times daily       Alfalfa 650 MG TABS Take 2 capsules by mouth 2 times daily For arthritis pain       Alpha Lipoic Acid 200 MG CAPS Take 200 mg by mouth daily        amLODIPine (NORVASC) 5 MG tablet TAKE 1/2 TABLET (2.5MG) BY MOUTH ONCE DAILY 90 tablet 3     blood glucose (NO BRAND SPECIFIED) lancets standard Use to test blood sugar one times daily or as directed. Dispense compatible with insurance and glucometer - One Touch Ultra (Patient not taking: Reported on 6/26/2023) 100 each 3     blood glucose (NO BRAND SPECIFIED) test strip Use to test blood sugars one times daily or as directed.  Dispense compatible with insurance and glucometer - One Touch Ultra (Patient not taking: Reported on 6/26/2023) 100  strip 3     blood glucose monitoring (NO BRAND SPECIFIED) meter device kit Use to test blood sugar one times daily or as directed.  Dispense One Touch Ultra per insurance (Patient not taking: Reported on 6/26/2023) 1 kit 0     calcium carbonate 750 MG CHEW Take 750 mg by mouth 2 times daily        carvedilol (COREG) 25 MG tablet TAKE 1 TABLET (25 MG) BY MOUTH 2 TIMES DAILY WITH MEALS 180 tablet 3     ELIQUIS ANTICOAGULANT 5 MG tablet TAKE 1 TABLET BY MOUTH TWICE DAILY. 60 tablet 0     Estriol 10 % CREA 1 Application See Admin Instructions Insert 1 mg vaginally one time a day for Atrophic Vaginitis for 2 Weeks Apply small amount to finger and apply to inside vagina daily for two weeks then twice weekly. AND Insert 1 mg vaginally one time a day every Mon, Thu for Atrophic Vaginitis Apply small amount to finger and apply to inside vagina daily for two weeks then twice weekly.  -Pt reports using PRN now. (Patient not taking: Reported on 6/26/2023)       FLAXSEED (LINSEED) PO POWD Take 1 Tablespoonful by mouth every morning        hydrochlorothiazide (HYDRODIURIL) 25 MG tablet Take 1 tablet (25 mg) by mouth daily 90 tablet 3     lisinopril (ZESTRIL) 40 MG tablet Take 1 tablet (40 mg) by mouth daily 90 tablet 3     metFORMIN (GLUCOPHAGE) 500 MG tablet Take 1 tablet (500 mg) by mouth daily (with dinner) 90 tablet 3     ondansetron (ZOFRAN) 4 MG tablet Take by mouth every 6 hours as needed for nausea (Patient not taking: Reported on 6/26/2023)       order for DME Equipment being ordered: Left wrist splint. 1 each 0     predniSONE (DELTASONE) 5 MG tablet Take 5 mg by mouth daily as needed (for painful flare) (Patient not taking: Reported on 6/26/2023)       senna-docusate (SENOKOT-S/PERICOLACE) 8.6-50 MG tablet Take 1 tablet by mouth 2 times daily as needed for constipation (Take while taking narcotic pain medications.) (Patient not taking: Reported on 6/26/2023) 15 tablet 0     triamcinolone (ARISTOCORT HP) 0.5 % external  cream Apply topically 2 times daily to affected area as directed. 15 g 0     VITAMIN D 1000 UNIT OR TABS Take 1,000 Units by mouth At Bedtime  30 0       Allergies   Allergen Reactions     Amlodipine      Swollen ankles and rash but tolerates 2.5 mg without any symptoms     Apple Juice      Apple [Chromium]      Food      Kiwi      Nuts      Pear      Tape [Adhesive Tape]        Family History   Problem Relation Age of Onset     No Known Problems Mother      Diabetes Father         type 2     Hypertension Father      Cerebrovascular Disease Father      Arthritis Father      Other Cancer Sister        Additional medical/Social/Surgical histories reviewed in [facility] and updated as appropriate.       PHYSICAL EXAM  There were no vitals taken for this visit.    General  - normal appearance, in no obvious distress  Musculoskeletal - Right shoulder  - inspection: normal bone and joint alignment, no obvious deformity, no scapular winging, no AC step-off,   - palpation: Tenderness anterior joint line, normal clavicle, non-tender AC  - ROM:  Shoulder hiking with abduction, painful and limited forward elevation, abduction and internal rotation. External rotation limited to 30 degrees  - strength: 5/5  strength, 4/5 shoulder strength in abduction due to pain.  5/5 ER and IR  - special tests:  (-) Speed's  (+) Neer  (+) Hawkin's  (+) Charlie's  (-) Coleman's  (-) apprehension  (-) subscap lift-off  Neuro  - no sensory or motor deficit, grossly normal coordination, normal muscle tone  Skin  - no ecchymosis, erythema, warmth, or induration, no obvious rash  Psych  - interactive, appropriate, normal mood and affect       IMAGING : XR Shoulder right 4 views. Final results and radiologist's interpretation, available in the [facility] health record. Images were reviewed with the patient/family members in the office today. My personal interpretation of the performed imaging is severe osteoarthritic changes of right shoulder.  Hydroxyapatite  deposit of cuff insertion.    Labs A1c 6.6 on 6/26/23     ASSESSMENT & PLAN  Ms. Treviño is a 88 year old year old female history of Type 2 DM, Paroxysmal Afib on anticoagulation who presents to clinic today with acute on chronic right shoulder pain.  Suspected related to severe degenerative changes of shoulder with exacerbation of OA.     Diagnosis:   End stage osteoarthritis of right shoulder  Well controlled T2DM    Gentle range of motion discussed to start even pendulum exercises.  Corticosteroid injection reviewed and we opted to pursue subacromial injection today. If persisting we can consider more invasive intraarticular injection under US guidance.  OTC tylenol. Monitor glucose changes but A1c within very acceptable range.    Follow up in 6 weeks.     Right shoulder Injection - subacromial space   The patient was informed of the risks and the benefits of the procedure and a written consent was signed.  The patient s right shoulder was prepped with chlorhexidine in sterile fashion.   40 mg of triamcinolone acetate suspension was drawn up into a 5 mL syringe with 4 mL of 1% lidocaine.  Injection was performed with sub-sterile technique.  A 1.5-inch 22-gauge needle was used to enter the subacromial space at the posterior aspect of shoulder.  There were no complications. The patient tolerated the procedure well. There was negligible bleeding.   The patient was instructed to ice the shoulder upon leaving clinic and refrain from overuse over the next 3 days.   The patient was instructed to call or go to the emergency room with any unusual pain, swelling, redness, or if otherwise concerned.    DO [facility]  Primary Care Sports Medicine  [facility]      It was a pleasure seeing Aundrea today.    Lucian Ambrosio DO, CAQSM  Primary Care Sports Medicine      Large Joint Injection/Arthocentesis: R subacromial bursa    Date/Time: 7/27/2023 5:33 PM    Performed by: Lucian Ambrosio    Authorized by: Lucian Ambrosio DO    Indications:  Pain  Needle Size:  22 G  Guidance: landmark guided    Location:  Shoulder      Site:  R subacromial bursa  Medications:  40 mg triamcinolone 40 MG/ML; 4 mL lidocaine 1 %  Outcome:  Tolerated well, no immediate complications  Procedure discussed: discussed risks, benefits, and alternatives    Consent Given by:  Patient  Timeout: timeout called immediately prior to procedure    Prep: patient was prepped and draped in usual sterile fashion          Again, thank you for allowing me to participate in the care of your patient.        Sincerely,        Lucian Ambrosio DO

## 2023-07-27 NOTE — PATIENT INSTRUCTIONS
Thank you for choosing [facility]    [facility] LOCATIONS  [place] [address] [address] [place], [address] [place] [address]   [phone]       APPOINTMENTS: [phone]    CARE QUESTIONS: [phone],    BILLING QUESTIONS: [phone]    FAX NUMBER: [phone]        Follow up: as needed      1. Right shoulder pain, unspecified chronicity          Steroid Injection Information:    A corticosteroid injection was administered at your visit today.  The area of injection may be sore, slightly swollen for 1-2 days afterward.  Immediately after injection, you may have an area of numbness, which is caused by the local anesthetic, lidocaine (similar to novacaine) in the shot.  This medicine will wear off in about 4 hours.  In addition to the lidocaine, a steroid medication was injected, called triamcinolone acetate.  This medication is intended to provide long-acting antiinflammatory / pain relief.  It may take 2-5 days for this medication to provide noticeable relief.      After an injection, Dr. Ambrosio recommends:    Protecting the area wearing a bandage or gauze pad for at least 24 hours.    Using ice; ice bag or frozen vegetables over the injection site every one to two hours when able (for 15 minutes at a time).      Avoid any strenuous activity even if the knee is already feeling better immediately afterward. Light stretching is encouraged but refrain from home exercise program and increasing activity level for about 48 hours.    Avoid soaking in a hot tub, bath, or pool for 48 hours after injection. Showering is fine!    Watch for signs of infection, including increasing pain, redness and swelling that last more than 48 hours after injection.

## 2023-07-27 NOTE — PROGRESS NOTES
Large Joint Injection/Arthocentesis: R subacromial bursa    Date/Time: 7/27/2023 5:33 PM    Performed by: Lucian Ambrosio DO  Authorized by: Lucian Ambrosio DO    Indications:  Pain  Needle Size:  22 G  Guidance: landmark guided    Location:  Shoulder      Site:  R subacromial bursa  Medications:  40 mg triamcinolone 40 MG/ML; 4 mL lidocaine 1 %  Outcome:  Tolerated well, no immediate complications  Procedure discussed: discussed risks, benefits, and alternatives    Consent Given by:  Patient  Timeout: timeout called immediately prior to procedure    Prep: patient was prepped and draped in usual sterile fashion

## 2023-07-31 RX ORDER — LIDOCAINE HYDROCHLORIDE 10 MG/ML
4 INJECTION, SOLUTION INFILTRATION; PERINEURAL
Status: SHIPPED | OUTPATIENT
Start: 2023-07-27

## 2023-07-31 RX ORDER — TRIAMCINOLONE ACETONIDE 40 MG/ML
40 INJECTION, SUSPENSION INTRA-ARTICULAR; INTRAMUSCULAR
Status: SHIPPED | OUTPATIENT
Start: 2023-07-27

## 2023-08-14 ENCOUNTER — TELEPHONE (OUTPATIENT)
Dept: ORTHOPEDICS | Facility: CLINIC | Age: 88
End: 2023-08-14
Payer: COMMERCIAL

## 2023-08-14 NOTE — TELEPHONE ENCOUNTER
I called the patient and assisted in scheduling a right GH USGI with Dr. Ambrosio on 9/7/23 at 1:20PM at the [place] location. All questions were answered at this time. Lynn Wise ATC on 8/14/2023 at 2:15 PM

## 2023-08-14 NOTE — TELEPHONE ENCOUNTER
[facility] Call Center    Phone Message    May a detailed message be left on voicemail: Yes    Reason for Call: Daughter Minerva calling and wanted to see if Dr. Ambrosio can order a US guided injection. Pt would like to have that done since the first injection did not work.    Action Taken: Message routed to: [facility] [employer]    Travel Screening: Not Applicable

## 2023-08-15 NOTE — LETTER
"12/16/2020       RE: Aundrea Treviño  [address]  [facility] [address]     Dear Colleague,    Thank you for referring your patient, Aundrea Treviño, to the [facility] UROLOGY CLINIC [place] at [facility]. Please see a copy of my visit note below.    Aundrea Treviño is a 86 year old female who is being evaluated via a billable telephone visit.      The patient has been notified of following:     \"This telephone visit will be conducted via a call between you and your physician/provider. We have found that certain health care needs can be provided without the need for a physical exam.  This service lets us provide the care you need with a short phone conversation.  If a prescription is necessary we can send it directly to your pharmacy.  If lab work is needed we can place an order for that and you can then stop by our lab to have the test done at a later time.    Telephone visits are billed at different rates depending on your insurance coverage. During this emergency period, for some insurers they may be billed the same as an in-person visit.  Please reach out to your insurance provider with any questions.    If during the course of the call the physician/provider feels a telephone visit is not appropriate, you will not be charged for this service.\"    Patient has given verbal consent for Telephone visit?  Yes    What phone number would you like to be contacted at? [phone]    How would you like to obtain your AVS? Mail a copy       Reason for Visit:  F/u on urinary symptoms. post op for A midurethral sling on 7/26/19.    Clinical Data: Ms. Aundrea Treviño  is an 86 year old year old   female with history of mixed incontinence.  She underwent a midurethral sling on 7/26/19 and has been doing well with that.     She is also on Trospium for her urgency and urge incontinence however she doesn't feel like that is helping her.  It caused her to have " some urinary retention so she stopped taking it.    UDS 5/8/19:  -Borderline small bladder capacity (310 mL) with normal filling sensations.  -Normal bladder compliance with questionable very mild stress-induced DO without incontinence.   -Multiple episodes of stress incontinence at the following points throughout filling. High Pabd pressures are suggestive for urethral hypermobility.    Pabd 177 cm H2O at a volume of 217 mL.    Pabd 127 cm H2O at a volume of 260 mL.  -Fair detrusor contraction during voiding to max Pdet 10 cm H2O which she supplements with abdominal straining.  -Good flow rate (Qmax 18 mL/s) with a bell-shape flow curve (with few intermittent peaks) and complete bladder emptying (final PVR 0 mL).  -Mildly increased EMG activity during voiding that correlates with abdominal straining.  -No evidence for bladder outlet obstruction.  -Fluoroscopy reveals a mildly trabeculated bladder wall without diverticuli or VUR. Bladder neck is primarily closed during filling but appears open during episodes of stress incontinence and voiding.    Previous RU: 21 cc on bladder scan by nursing.    A/P:  87 y/o with mixed incontinence helped by the sling but continues to have urgency and urge incontinence.  We discussed stopping the sanctura and starting myrbetriq, she will monitory bp.   -discontinue sanctura 60 mg   -start timed voiding  -start myrbetriq 25 mg  -use estrogen cream more regularly.  -f/u in 3 months, if continues then consider repeat UDS    Thank you for allowing me to participate in the care of  Ms.Ramona RADHA Treviño   and I will keep you updated on her progress.    Irma Painter MD   14 min spent with patient and her daughter.     Benefits, risks, and possible complications of procedure explained to patient/caregiver who verbalized understanding and gave verbal consent. Benefits, risks, and possible complications of procedure explained to patient/caregiver who verbalized understanding and gave verbal consent.

## 2023-08-25 DIAGNOSIS — I48.0 PAROXYSMAL ATRIAL FIBRILLATION (H): ICD-10-CM

## 2023-08-25 NOTE — TELEPHONE ENCOUNTER
Routing refill request to provider for review/approval because:   Patient is 18-79 years of age     Last Written Prescription Date:  7/21/23  Last Fill Quantity: 60,  # refills: 0   Last office visit: 6/26/23 with House  Future Office Visit:      Michelle BAIRD RN  [facility]

## 2023-08-28 RX ORDER — APIXABAN 5 MG/1
TABLET, FILM COATED ORAL
Qty: 60 TABLET | Refills: 0 | Status: SHIPPED | OUTPATIENT
Start: 2023-08-28 | End: 2023-09-26

## 2023-09-07 ENCOUNTER — OFFICE VISIT (OUTPATIENT)
Dept: ORTHOPEDICS | Facility: CLINIC | Age: 88
End: 2023-09-07
Payer: COMMERCIAL

## 2023-09-07 DIAGNOSIS — M19.011 OSTEOARTHRITIS OF RIGHT GLENOHUMERAL JOINT: Primary | ICD-10-CM

## 2023-09-07 PROCEDURE — 20611 DRAIN/INJ JOINT/BURSA W/US: CPT | Mod: RT | Performed by: FAMILY MEDICINE

## 2023-09-07 RX ORDER — LIDOCAINE HYDROCHLORIDE 10 MG/ML
3 INJECTION, SOLUTION INFILTRATION; PERINEURAL
Status: SHIPPED | OUTPATIENT
Start: 2023-09-07

## 2023-09-07 RX ORDER — METHYLPREDNISOLONE ACETATE 40 MG/ML
40 INJECTION, SUSPENSION INTRA-ARTICULAR; INTRALESIONAL; INTRAMUSCULAR; SOFT TISSUE
Status: SHIPPED | OUTPATIENT
Start: 2023-09-07

## 2023-09-07 RX ORDER — LIDOCAINE HYDROCHLORIDE 10 MG/ML
4 INJECTION, SOLUTION INFILTRATION; PERINEURAL
Status: SHIPPED | OUTPATIENT
Start: 2023-09-07

## 2023-09-07 RX ADMIN — METHYLPREDNISOLONE ACETATE 40 MG: 40 INJECTION, SUSPENSION INTRA-ARTICULAR; INTRALESIONAL; INTRAMUSCULAR; SOFT TISSUE at 13:42

## 2023-09-07 RX ADMIN — LIDOCAINE HYDROCHLORIDE 3 ML: 10 INJECTION, SOLUTION INFILTRATION; PERINEURAL at 13:42

## 2023-09-07 RX ADMIN — LIDOCAINE HYDROCHLORIDE 4 ML: 10 INJECTION, SOLUTION INFILTRATION; PERINEURAL at 13:42

## 2023-09-07 NOTE — PROGRESS NOTES
PROCEDURE ENCOUNTER    [facility]  Orthopedics  Lucian Ambrosio DO  2023     Name: Aundrea Treviño  MRN: [id number]  Age: 89 year old  : 1934    Diagnosis: Primary osteoarthritis of right shoulder    Aundrea notes very mild improvement in her symptoms of right shoulder after subacromial injection.  She does state that she continues to have pain with movement of her arm.  She is accompanied by her daughter today.    Glenohumeral Injection - Ultrasound Guided  The patient was informed of the risks and the benefits of the procedure and a written consent was signed.  The patient s right shoulder was prepped with chlorhexidine in sterile fashion.   Local anesthesia was performed using a 27-gauge 1.5-inch needle to administer 3 mL of 1% lidocaine without epi.  40 mg of methylprednisolone suspension was drawn up into a 5 mL syringe with 3 mL of 1% lidocaine w/o Epi.  Injection was performed using sterile technique.  Under ultrasound guidance a 3.5-inch 22-gauge needle was used to enter the right glenohumeral joint.  Posterior approach was used with the patient in lateral recumbent position, arm in neutral position at the side.  Needle placement was visualized and documented with ultrasound.  Ultrasound visualization was necessary due to the small joint space entered.  Injection performed long axis to the probe.  Injection solution visualized within the joint space.  Images were permanently stored for the patient's record.  There were no complications. The patient tolerated the procedure well. There was negligible bleeding.   Therapy scheduled to follow for mobilization.  The patient was instructed to call or go to the emergency room with any unusual pain, swelling, redness, or if otherwise concerned.  Lucian Ambrosio DO CAQSM  Primary Care Sports Medicine  [facility]     Large Joint Injection/Arthocentesis: R glenohumeral joint    Date/Time: 2023 1:42 PM    Performed by:  Lucian Ambrosio DO  Authorized by: Lucian Ambrosio DO    Indications:  Pain  Needle Size:  22 G  Guidance: ultrasound    Approach:  Posterior  Location:  Shoulder      Site:  R glenohumeral joint  Medications:  40 mg methylPREDNISolone 40 MG/ML; 4 mL lidocaine 1 %; 3 mL lidocaine 1 %  Medications comment:  3 ML of lidocaine was used as local anesthetic  Outcome:  Tolerated well, no immediate complications  Procedure discussed: discussed risks, benefits, and alternatives    Consent Given by:  Patient  Timeout: timeout called immediately prior to procedure    Prep: patient was prepped and draped in usual sterile fashion     Ultrasound was used to ensure safe and accurate needle placement and injection. Ultrasound images of the procedure were permanently stored.

## 2023-09-07 NOTE — LETTER
2023         RE: Aundrea Treviño  [address]  [facility] [address]        Dear Colleague,    Thank you for referring your patient, Aundrea Treviño, to the [facility] SPORTS MEDICINE CLINIC [place]. Please see a copy of my visit note below.    PROCEDURE ENCOUNTER    [facility]  Orthopedics  EveretteDean Ambrosio,   2023     Name: Aundrea Treviño  MRN: [id number]  Age: 89 year old  : 1934    Diagnosis: Primary osteoarthritis of right shoulder    Aundrea notes very mild improvement in her symptoms of right shoulder after subacromial injection.  She does state that she continues to have pain with movement of her arm.  She is accompanied by her daughter today.    Glenohumeral Injection - Ultrasound Guided  The patient was informed of the risks and the benefits of the procedure and a written consent was signed.  The patient s right shoulder was prepped with chlorhexidine in sterile fashion.   Local anesthesia was performed using a 27-gauge 1.5-inch needle to administer 3 mL of 1% lidocaine without epi.  40 mg of methylprednisolone suspension was drawn up into a 5 mL syringe with 3 mL of 1% lidocaine w/o Epi.  Injection was performed using sterile technique.  Under ultrasound guidance a 3.5-inch 22-gauge needle was used to enter the right glenohumeral joint.  Posterior approach was used with the patient in lateral recumbent position, arm in neutral position at the side.  Needle placement was visualized and documented with ultrasound.  Ultrasound visualization was necessary due to the small joint space entered.  Injection performed long axis to the probe.  Injection solution visualized within the joint space.  Images were permanently stored for the patient's record.  There were no complications. The patient tolerated the procedure well. There was negligible bleeding.   Therapy scheduled to follow for mobilization.  The patient was instructed to call or go to the emergency room with  any unusual pain, swelling, redness, or if otherwise concerned.  Lucian Ambrosio DO CAQSM  Primary Care Sports Medicine  [facility]     Large Joint Injection/Arthocentesis: R glenohumeral joint    Date/Time: 9/7/2023 1:42 PM    Performed by: Lucian Ambrosio DO  Authorized by: Lucian Ambrosio DO    Indications:  Pain  Needle Size:  22 G  Guidance: ultrasound    Approach:  Posterior  Location:  Shoulder      Site:  R glenohumeral joint  Medications:  40 mg methylPREDNISolone 40 MG/ML; 4 mL lidocaine 1 %; 3 mL lidocaine 1 %  Medications comment:  3 ML of lidocaine was used as local anesthetic  Outcome:  Tolerated well, no immediate complications  Procedure discussed: discussed risks, benefits, and alternatives    Consent Given by:  Patient  Timeout: timeout called immediately prior to procedure    Prep: patient was prepped and draped in usual sterile fashion     Ultrasound was used to ensure safe and accurate needle placement and injection. Ultrasound images of the procedure were permanently stored.          Again, thank you for allowing me to participate in the care of your patient.        Sincerely,        Lucian Ambrosio DO

## 2023-09-07 NOTE — PATIENT INSTRUCTIONS
Thank you for choosing [facility]    [facility] LOCATIONS  [place] [address] [address], [address]th [place]   [place], [place], [address] [place] [address]   [phone]       APPOINTMENTS: [phone]    CARE QUESTIONS: [phone],    BILLING QUESTIONS: [phone]    FAX NUMBER: [phone]        Follow up: In 3-6 months as needed for injection      No diagnosis found.    Steroid Injection Information:    A corticosteroid injection was administered at your visit today.  The area of injection may be sore, slightly swollen for 1-2 days afterward.  Immediately after injection, you may have an area of numbness, which is caused by the local anesthetic, lidocaine (similar to novacaine) in the shot.  This medicine will wear off in about 4 hours.  In addition to the lidocaine, a steroid medication was injected, called triamcinolone acetate.  This medication is intended to provide long-acting antiinflammatory / pain relief.  It may take 2-5 days for this medication to provide noticeable relief.      After an injection, Dr. Ambrosio recommends:    Protecting the area wearing a bandage or gauze pad for at least 24 hours.    Using ice; ice bag or frozen vegetables over the injection site every one to two hours when able (for 15 minutes at a time).      Avoid any strenuous activity even if the knee is already feeling better immediately afterward. Light stretching is encouraged but refrain from home exercise program and increasing activity level for about 48 hours.    Avoid soaking in a hot tub, bath, or pool for 48 hours after injection. Showering is fine!    Watch for signs of infection, including increasing pain, redness and swelling that last more than 48 hours after injection.

## 2023-09-24 DIAGNOSIS — I48.0 PAROXYSMAL ATRIAL FIBRILLATION (H): ICD-10-CM

## 2023-09-26 RX ORDER — APIXABAN 5 MG/1
5 TABLET, FILM COATED ORAL 2 TIMES DAILY
Qty: 180 TABLET | Refills: 3 | Status: SHIPPED | OUTPATIENT
Start: 2023-09-26 | End: 2024-09-25

## 2023-10-06 ENCOUNTER — TRANSFERRED RECORDS (OUTPATIENT)
Dept: MULTI SPECIALTY CLINIC | Facility: CLINIC | Age: 88
End: 2023-10-06
Payer: COMMERCIAL

## 2023-10-06 LAB
RETINOPATHY: NEGATIVE
RETINOPATHY: NEGATIVE

## 2023-11-06 ENCOUNTER — TELEPHONE (OUTPATIENT)
Dept: FAMILY MEDICINE | Facility: CLINIC | Age: 88
End: 2023-11-06
Payer: COMMERCIAL

## 2023-11-06 NOTE — TELEPHONE ENCOUNTER
Forms/Letter Request    Type of form/letter: Handicap Parking permit    Have you been seen for this request: Yes     Do we have the form/letter: Yes: clinic has forms placed in care team 4 providers form folder    Who is the form from? Patients daughter calling to get handicap parking permit renewed    Where did/will the form come from? We have the form in clinic    When is form/letter needed by: 1/24/2024    How would you like the form/letter returned: Mail  Is this the correct address?: Yes  [address]   [place] [address]    Patient Notified form requests are processed in 3-5 business days:Yes    Okay to leave a detailed message?: Yes at Other phone number:  [phone]

## 2023-11-08 NOTE — TELEPHONE ENCOUNTER
Form filled and placed in Care Team 4. Please copy and send for scanning and let them know it is ready to be picked up. They will need to complete the first page. Krysten Molina M.D.

## 2023-11-09 NOTE — TELEPHONE ENCOUNTER
Copy sent to abstract  Copy kept in clinic   Original mailed to address listed in original request message

## 2024-01-25 ENCOUNTER — OFFICE VISIT (OUTPATIENT)
Dept: FAMILY MEDICINE | Facility: CLINIC | Age: 89
End: 2024-01-25
Payer: COMMERCIAL

## 2024-01-25 VITALS
SYSTOLIC BLOOD PRESSURE: 146 MMHG | BODY MASS INDEX: 25.81 KG/M2 | HEART RATE: 73 BPM | DIASTOLIC BLOOD PRESSURE: 72 MMHG | RESPIRATION RATE: 20 BRPM | OXYGEN SATURATION: 98 % | HEIGHT: 64 IN | TEMPERATURE: 97.9 F | WEIGHT: 151.2 LBS

## 2024-01-25 DIAGNOSIS — R60.0 BILATERAL LOWER EXTREMITY EDEMA: ICD-10-CM

## 2024-01-25 DIAGNOSIS — I48.0 PAROXYSMAL ATRIAL FIBRILLATION (H): ICD-10-CM

## 2024-01-25 DIAGNOSIS — N81.10 BLADDER PROLAPSE, FEMALE, ACQUIRED: ICD-10-CM

## 2024-01-25 DIAGNOSIS — N39.46 MIXED INCONTINENCE URGE AND STRESS (MALE)(FEMALE): Primary | ICD-10-CM

## 2024-01-25 DIAGNOSIS — E11.42 TYPE 2 DIABETES MELLITUS WITH DIABETIC POLYNEUROPATHY, WITHOUT LONG-TERM CURRENT USE OF INSULIN (H): ICD-10-CM

## 2024-01-25 DIAGNOSIS — I10 ESSENTIAL HYPERTENSION: ICD-10-CM

## 2024-01-25 LAB — HBA1C MFR BLD: 6.6 % (ref 0–5.6)

## 2024-01-25 PROCEDURE — 36415 COLL VENOUS BLD VENIPUNCTURE: CPT | Performed by: NURSE PRACTITIONER

## 2024-01-25 PROCEDURE — 99215 OFFICE O/P EST HI 40 MIN: CPT | Performed by: NURSE PRACTITIONER

## 2024-01-25 PROCEDURE — 80053 COMPREHEN METABOLIC PANEL: CPT | Performed by: NURSE PRACTITIONER

## 2024-01-25 PROCEDURE — 83036 HEMOGLOBIN GLYCOSYLATED A1C: CPT | Performed by: NURSE PRACTITIONER

## 2024-01-25 RX ORDER — NYSTATIN 100000 U/G
OINTMENT TOPICAL 3 TIMES DAILY
Qty: 30 G | Refills: 11 | Status: SHIPPED | OUTPATIENT
Start: 2024-01-25

## 2024-01-25 RX ORDER — FUROSEMIDE 20 MG
20 TABLET ORAL DAILY PRN
Qty: 30 TABLET | Refills: 0 | Status: SHIPPED | OUTPATIENT
Start: 2024-01-25 | End: 2024-06-13

## 2024-01-25 RX ORDER — MIRABEGRON 25 MG/1
25 TABLET, FILM COATED, EXTENDED RELEASE ORAL DAILY
Qty: 90 TABLET | Refills: 1 | Status: SHIPPED | OUTPATIENT
Start: 2024-01-25 | End: 2024-06-13

## 2024-01-25 ASSESSMENT — PAIN SCALES - GENERAL: PAINLEVEL: NO PAIN (0)

## 2024-01-25 NOTE — PROGRESS NOTES
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  I do think her incontinence is multifactorial and there are likely structural issues from her previous sling contributing.  It sounds like she does not have control of her urine when she repositions.  We did have a conversation that these medications can have an anticholinergic effect and advised to monitor for dizziness.  If she does not have improvement, I would consider discontinuing due to risk of potential.  Will also send in an order for DME for incontinence supplies this pain out-of-pocket is a considerable financial burden..  - Incontinence Supplies Order  - nystatin (MYCOSTATIN) 577097 UNIT/GM external ointment; Apply topically 3 times daily To vaginal and groin area.  - mirabegron (MYRBETRIQ) 25 MG 24 hr tablet; Take 1 tablet (25 mg) by mouth daily    Bladder prolapse, female, acquired  See above    Bilateral lower extremity edema  Significant edema and I suspect this is secondary to legs being dependent for prolonged periods of time.  Continue compression stockings and we discussed elevation.  I think it would be reasonable to use as needed Lasix to control her symptoms.  She was advised to hold her hydrochlorothiazide on the days she uses this medication.  We will continue to monitor labs and I am checking a BMP today.  - furosemide (LASIX) 20 MG tablet; Take 1 tablet (20 mg) by mouth daily as needed (For increased leg swelling.) Hold your hydrochlorothiazide when taking this    Type 2 diabetes mellitus with diabetic polyneuropathy, without long-term current use of insulin (H)  Able overall.  Continue metformin.  - HEMOGLOBIN A1C; Future  - HEMOGLOBIN A1C    Essential hypertension  Stable, would liberalize parameters given her age and polypharmacy.  - COMPREHENSIVE METABOLIC PANEL; Future  - COMPREHENSIVE METABOLIC  "PANEL    Paroxysmal atrial fibrillation (H)  Rate controlled today.  Continue Coreg.      40 minutes spent by me on the date of the encounter doing chart review, history and exam, documentation and further activities per the note      BMI  Estimated body mass index is 26.28 kg/m  as calculated from the following:    Height as of this encounter: 1.615 m (5' 3.6\").    Weight as of this encounter: 68.6 kg (151 lb 3.2 oz).           Fabiano Guillaume is a 89 year old, presenting for the following health issues:  rectal area (skin breakdown/)        1/25/2024     2:29 PM   Additional Questions   Roomed by Hortencia ECKERT   Accompanied by daughter     History of Present Illness       Reason for visit:  Skin rash and bladder issues  Symptom onset:  More than a month  Symptoms include:  Itchy and painful in rectal area  Symptom intensity:  Moderate  Symptom progression:  Staying the same  Had these symptoms before:  No  What makes it worse:  No  What makes it better:  Creams    She eats 2-3 servings of fruits and vegetables daily.She consumes 1 sweetened beverage(s) daily.She exercises with enough effort to increase her heart rate 9 or less minutes per day.  She exercises with enough effort to increase her heart rate 3 or less days per week.   She is taking medications regularly.       Irritation in the rectal area, not painful.  Has to sit offloaded.  Rash has been there a couple months, but spreading up her rectal area.  Urinary incontinence: has done everything through urology.  Had previous sling.  Followed with urology who recommended Myrbetriq which was not covered at the time.  She feels like she has no control, when she changes positions, urine leaks out.    BLE edema has worsened.  Couldn't get shoes on today.  Wearing compression stockings.  Does sit with legs crossed and dependent most of the time.            Objective    BP (!) 146/72 (BP Location: Right arm, Patient Position: Sitting, Cuff Size: Adult Regular)   " "Pulse 73   Temp 97.9  F (36.6  C) (Temporal)   Resp 20   Ht 1.615 m (5' 3.6\")   Wt 68.6 kg (151 lb 3.2 oz)   SpO2 98%   BMI 26.28 kg/m    Body mass index is 26.28 kg/m .  Physical Exam   Mild erythema, excoriation, and maceration of rectal and vaginal tissues.              Signed Electronically by: TIMOTEO Kincaid CNP  DME (Durable Medical Equipment) Orders and Documentation  Orders Placed This Encounter   Procedures    Incontinence Supplies Order        The patient was assessed and it was determined the patient is in need of the following listed DME Supplies/Equipment. Please complete supporting documentation below to demonstrate medical necessity.              "

## 2024-01-25 NOTE — LETTER
February 6, 2024      Aundrea Treviño  [address]   [place] [address]        Dear ,    We are writing to inform you of your test results.    Labs are stable.       Resulted Orders   HEMOGLOBIN A1C   Result Value Ref Range    Hemoglobin A1C 6.6 (H) 0.0 - 5.6 %      Comment:      Normal <5.7%   Prediabetes 5.7-6.4%    Diabetes 6.5% or higher     Note: Adopted from ADA consensus guidelines.   COMPREHENSIVE METABOLIC PANEL   Result Value Ref Range    Sodium 139 135 - 145 mmol/L      Comment:      Reference intervals for this test were updated on 09/26/2023 to more accurately reflect our healthy population. There may be differences in the flagging of prior results with similar values performed with this method. Interpretation of those prior results can be made in the context of the updated reference intervals.     Potassium 3.9 3.4 - 5.3 mmol/L    Carbon Dioxide (CO2) 29 22 - 29 mmol/L    Anion Gap 11 7 - 15 mmol/L    Urea Nitrogen 17.1 8.0 - 23.0 mg/dL    Creatinine 0.81 0.51 - 0.95 mg/dL    GFR Estimate 69 >60 mL/min/1.73m2    Calcium 9.6 8.8 - 10.2 mg/dL    Chloride 99 98 - 107 mmol/L    Glucose 131 (H) 70 - 99 mg/dL    Alkaline Phosphatase 92 40 - 150 U/L      Comment:      Reference intervals for this test were updated on 11/14/2023 to more accurately reflect our healthy population. There may be differences in the flagging of prior results with similar values performed with this method. Interpretation of those prior results can be made in the context of the updated reference intervals.    AST 14 0 - 45 U/L      Comment:      Reference intervals for this test were updated on 6/12/2023 to more accurately reflect our healthy population. There may be differences in the flagging of prior results with similar values performed with this method. Interpretation of those prior results can be made in the context of the updated reference intervals.    ALT 9 0 - 50 U/L      Comment:      Reference  intervals for this test were updated on 6/12/2023 to more accurately reflect our healthy population. There may be differences in the flagging of prior results with similar values performed with this method. Interpretation of those prior results can be made in the context of the updated reference intervals.      Protein Total 7.0 6.4 - 8.3 g/dL    Albumin 4.4 3.5 - 5.2 g/dL    Bilirubin Total 0.8 <=1.2 mg/dL       If you have any questions or concerns, please call the clinic at the number listed above.       Sincerely,      TIMOTEO Correia CNP / H.H

## 2024-01-25 NOTE — PATIENT INSTRUCTIONS
-Lasix as needed- hold hydrochlorothiazide while taking it  -Elevate legs at rest as high as you can  -Continue compression stockings    -Adding Myrbetriq for urination and ordering depends and pads through home medical supplies.    -Bacitracin or neosporin to arm wound

## 2024-01-26 LAB
ALBUMIN SERPL BCG-MCNC: 4.4 G/DL (ref 3.5–5.2)
ALP SERPL-CCNC: 92 U/L (ref 40–150)
ALT SERPL W P-5'-P-CCNC: 9 U/L (ref 0–50)
ANION GAP SERPL CALCULATED.3IONS-SCNC: 11 MMOL/L (ref 7–15)
AST SERPL W P-5'-P-CCNC: 14 U/L (ref 0–45)
BILIRUB SERPL-MCNC: 0.8 MG/DL
BUN SERPL-MCNC: 17.1 MG/DL (ref 8–23)
CALCIUM SERPL-MCNC: 9.6 MG/DL (ref 8.8–10.2)
CHLORIDE SERPL-SCNC: 99 MMOL/L (ref 98–107)
CREAT SERPL-MCNC: 0.81 MG/DL (ref 0.51–0.95)
DEPRECATED HCO3 PLAS-SCNC: 29 MMOL/L (ref 22–29)
EGFRCR SERPLBLD CKD-EPI 2021: 69 ML/MIN/1.73M2
GLUCOSE SERPL-MCNC: 131 MG/DL (ref 70–99)
POTASSIUM SERPL-SCNC: 3.9 MMOL/L (ref 3.4–5.3)
PROT SERPL-MCNC: 7 G/DL (ref 6.4–8.3)
SODIUM SERPL-SCNC: 139 MMOL/L (ref 135–145)

## 2024-02-01 ENCOUNTER — OFFICE VISIT (OUTPATIENT)
Dept: ORTHOPEDICS | Facility: CLINIC | Age: 89
End: 2024-02-01
Payer: COMMERCIAL

## 2024-02-01 DIAGNOSIS — M19.011 OSTEOARTHRITIS OF RIGHT GLENOHUMERAL JOINT: Primary | ICD-10-CM

## 2024-02-01 PROCEDURE — 20611 DRAIN/INJ JOINT/BURSA W/US: CPT | Mod: RT | Performed by: FAMILY MEDICINE

## 2024-02-01 RX ORDER — METHYLPREDNISOLONE ACETATE 40 MG/ML
40 INJECTION, SUSPENSION INTRA-ARTICULAR; INTRALESIONAL; INTRAMUSCULAR; SOFT TISSUE
Status: SHIPPED | OUTPATIENT
Start: 2024-02-01

## 2024-02-01 RX ORDER — LIDOCAINE HYDROCHLORIDE 10 MG/ML
4 INJECTION, SOLUTION INFILTRATION; PERINEURAL
Status: SHIPPED | OUTPATIENT
Start: 2024-02-01

## 2024-02-01 RX ORDER — LIDOCAINE HYDROCHLORIDE 10 MG/ML
3 INJECTION, SOLUTION INFILTRATION; PERINEURAL
Status: SHIPPED | OUTPATIENT
Start: 2024-02-01

## 2024-02-01 RX ADMIN — LIDOCAINE HYDROCHLORIDE 3 ML: 10 INJECTION, SOLUTION INFILTRATION; PERINEURAL at 13:49

## 2024-02-01 RX ADMIN — METHYLPREDNISOLONE ACETATE 40 MG: 40 INJECTION, SUSPENSION INTRA-ARTICULAR; INTRALESIONAL; INTRAMUSCULAR; SOFT TISSUE at 13:49

## 2024-02-01 RX ADMIN — LIDOCAINE HYDROCHLORIDE 4 ML: 10 INJECTION, SOLUTION INFILTRATION; PERINEURAL at 13:49

## 2024-02-01 NOTE — LETTER
2/1/2024         RE: Aundrea Treviño  [address]  [place] [address]        Dear Colleague,    Thank you for referring your patient, Aundrea Treviño, to the [facility]. Please see a copy of my visit note below.    ESTABLISHED PATIENT FOLLOW-UP:  Follow Up of the Right Shoulder       HISTORY OF PRESENT ILLNESS  Ms. Treviño is a pleasant 89 year old year old female who presents to clinic today for follow-up of right shoulder pain     Date of injury: Chronic  Date last seen: 9/07/24  Following Therapeutic Plan: Yes  Pain: never really left but fell on it shortly after the last injection.   Function: raising up overhead is painful   Interval History: Fell on the shoulder since last visit     Additional medical/Social/Surgical histories reviewed in EPIC and updated as appropriate.    Glenohumeral Injection - Ultrasound Guided  The patient was informed of the risks and the benefits of the procedure and a written consent was signed.  The patient s right shoulder was prepped with chlorhexidine in sterile fashion.   Local anesthesia was performed using a 27-gauge 1.5-inch needle to administer 3 mL of 1% lidocaine without epi.  40 mg of methylprednisolone suspension was drawn up into a 5 mL syringe with 3 mL of 1% lidocaine w/o Epi.  Injection was performed using sterile technique.  Under ultrasound guidance a 3.5-inch 22-gauge needle was used to enter the right glenohumeral joint.  Posterior approach was used with the patient in lateral recumbent position, arm in neutral position at the side.  Needle placement was visualized and documented with ultrasound.  Ultrasound visualization was necessary due to the small joint space entered.  Injection performed long axis to the probe.  Injection solution visualized within the joint space.  Images were permanently stored for the patient's record.  There were no complications. The patient tolerated the procedure well. There was  negligible bleeding.   The patient was instructed to call or go to the emergency room with any unusual pain, swelling, redness, or if otherwise concerned.  Symptom diary recommended. We will review duration of relief and consider whether injection is beneficial to repeat in future or not. Briefly discussed suprascapular nerve block as secondary option.  Lucian Ambrosio DO [facility]  Primary Care Sports Medicine  [facility]     Large Joint Injection/Arthocentesis: R glenohumeral joint    Date/Time: 2/1/2024 1:49 PM    Performed by: Lucian Ambrosio DO  Authorized by: Lucian Ambrosio DO    Indications:  Pain  Needle Size:  22 G  Guidance: ultrasound    Approach:  Posterolateral  Location:  Shoulder      Site:  R glenohumeral joint  Medications:  40 mg methylPREDNISolone 40 MG/ML; 3 mL lidocaine 1 %; 4 mL lidocaine 1 %  Medications comment:  3ml of lidocaine was used as a local anesthetic    Outcome:  Tolerated well, no immediate complications  Procedure discussed: discussed risks, benefits, and alternatives    Consent Given by:  Patient  Timeout: timeout called immediately prior to procedure    Prep: patient was prepped and draped in usual sterile fashion     Ultrasound was used to ensure safe and accurate needle placement and injection. Ultrasound images of the procedure were permanently stored.          Again, thank you for allowing me to participate in the care of your patient.        Sincerely,        Lucian Ambrosio DO

## 2024-02-01 NOTE — PROGRESS NOTES
ESTABLISHED PATIENT FOLLOW-UP:  Follow Up of the Right Shoulder       HISTORY OF PRESENT ILLNESS  Ms. Treviño is a pleasant 89 year old year old female who presents to clinic today for follow-up of right shoulder pain     Date of injury: Chronic  Date last seen: 9/07/24  Following Therapeutic Plan: Yes  Pain: never really left but fell on it shortly after the last injection.   Function: raising up overhead is painful   Interval History: Fell on the shoulder since last visit     Additional medical/Social/Surgical histories reviewed in [facility] and updated as appropriate.    Glenohumeral Injection - Ultrasound Guided  The patient was informed of the risks and the benefits of the procedure and a written consent was signed.  The patient s right shoulder was prepped with chlorhexidine in sterile fashion.   Local anesthesia was performed using a 27-gauge 1.5-inch needle to administer 3 mL of 1% lidocaine without epi.  40 mg of methylprednisolone suspension was drawn up into a 5 mL syringe with 3 mL of 1% lidocaine w/o Epi.  Injection was performed using sterile technique.  Under ultrasound guidance a 3.5-inch 22-gauge needle was used to enter the right glenohumeral joint.  Posterior approach was used with the patient in lateral recumbent position, arm in neutral position at the side.  Needle placement was visualized and documented with ultrasound.  Ultrasound visualization was necessary due to the small joint space entered.  Injection performed long axis to the probe.  Injection solution visualized within the joint space.  Images were permanently stored for the patient's record.  There were no complications. The patient tolerated the procedure well. There was negligible bleeding.   The patient was instructed to call or go to the emergency room with any unusual pain, swelling, redness, or if otherwise concerned.  Symptom diary recommended. We will review duration of relief and consider whether injection is beneficial to repeat  in future or not. Briefly discussed suprascapular nerve block as secondary option.  Lucian Ambrosio DO CAQSM  Primary Care Sports Medicine  [facility]     Large Joint Injection/Arthocentesis: R glenohumeral joint    Date/Time: 2/1/2024 1:49 PM    Performed by: Lucian Ambrosio DO  Authorized by: Lucian Ambrosio DO    Indications:  Pain  Needle Size:  22 G  Guidance: ultrasound    Approach:  Posterolateral  Location:  Shoulder      Site:  R glenohumeral joint  Medications:  40 mg methylPREDNISolone 40 MG/ML; 3 mL lidocaine 1 %; 4 mL lidocaine 1 %  Medications comment:  3ml of lidocaine was used as a local anesthetic    Outcome:  Tolerated well, no immediate complications  Procedure discussed: discussed risks, benefits, and alternatives    Consent Given by:  Patient  Timeout: timeout called immediately prior to procedure    Prep: patient was prepped and draped in usual sterile fashion     Ultrasound was used to ensure safe and accurate needle placement and injection. Ultrasound images of the procedure were permanently stored.

## 2024-04-11 ENCOUNTER — OFFICE VISIT (OUTPATIENT)
Dept: ORTHOPEDICS | Facility: CLINIC | Age: 89
End: 2024-04-11
Payer: COMMERCIAL

## 2024-04-11 ENCOUNTER — ANCILLARY PROCEDURE (OUTPATIENT)
Dept: GENERAL RADIOLOGY | Facility: CLINIC | Age: 89
End: 2024-04-11
Attending: FAMILY MEDICINE
Payer: COMMERCIAL

## 2024-04-11 DIAGNOSIS — M17.11 PRIMARY OSTEOARTHRITIS OF RIGHT KNEE: Primary | ICD-10-CM

## 2024-04-11 DIAGNOSIS — M25.561 ACUTE PAIN OF RIGHT KNEE: ICD-10-CM

## 2024-04-11 PROCEDURE — 99214 OFFICE O/P EST MOD 30 MIN: CPT | Performed by: FAMILY MEDICINE

## 2024-04-11 PROCEDURE — 73562 X-RAY EXAM OF KNEE 3: CPT | Mod: TC | Performed by: RADIOLOGY

## 2024-04-11 NOTE — PROGRESS NOTES
CHIEF COMPLAINT:  Pain of the Right Knee     HISTORY OF PRESENT ILLNESS  Ms. Treviño is a pleasant 89 year old year old female who presents to clinic today with right knee pain.  Aundrea explains that pain started a few months ago.  Her primary complaint is feeling of knee giving way with a sharp twinge.  This can occur while she is ambulating, but does use a walker to help her.  She has not noted her knee giving way.    Onset: sudden, intermittent  Location: right knee  Quality:  sharp  Duration: 2 months   Severity: 8/10 at worst  Timing:intermittent episodes appx once/day feeing of giving way.  Modifying factors:  resting and non-use makes it better, movement and use makes it worse  Associated signs & symptoms: pain and swelling  Previous similar pain: No  Treatments to date: Walker    Additional history: as documented    Review of Systems:  Have you recently had a a fever, chills, weight loss? No  Do you have any vision problems? No  Do you have any chest pain or edema? No  Do you have any shortness of breath or wheezing?  No  Do you have stomach problems? No  Do you have any numbness or focal weakness? No  Do you have diabetes? Yes, takes metformin and is well controlled  Do you have problems with bleeding or clotting? No  Do you have an rashes or other skin lesions? No    MEDICAL HISTORY  Patient Active Problem List   Diagnosis    Disorder of bone and cartilage    Female stress incontinence    Dystonia    Essential tremor    Refusal of blood transfusions as patient is [employer]    Hypertension goal BP (blood pressure) < 140/90    CARDIOVASCULAR SCREENING; LDL GOAL LESS THAN 130    Gout    Venous (peripheral) insufficiency    Chronic anticoagulation    Paroxysmal atrial fibrillation (H)    Moderate tricuspid regurgitation    ACP (advance care planning)    Patient is [employer]    Type 2 diabetes mellitus with diabetic polyneuropathy, without long-term current use of insulin (H)    Primary  osteoarthritis of right shoulder    Primary osteoarthritis of first carpometacarpal joint of left hand    Elevated C-reactive protein (CRP)    S/P total hip arthroplasty    Primary osteoarthritis involving multiple joints    Acute pain of right knee    Effusion of right knee    Status post total replacement of right hip, 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    Acute post-operative pain    Uncontrolled hypertension    Anemia due to blood loss, acute    Intractable vomiting with nausea, unspecified vomiting type    Loose stools    Debility    Essential hypertension    Nausea       Current Outpatient Medications   Medication Sig Dispense Refill    acetaminophen (TYLENOL) 500 MG tablet Take 2 tablets (1,000 mg) by mouth 3 times daily      Alfalfa 650 MG TABS Take 2 capsules by mouth 2 times daily For arthritis pain      Alpha Lipoic Acid 200 MG CAPS Take 200 mg by mouth daily       amLODIPine (NORVASC) 5 MG tablet TAKE 1/2 TABLET (2.5MG) BY MOUTH ONCE DAILY 90 tablet 3    apixaban ANTICOAGULANT (ELIQUIS ANTICOAGULANT) 5 MG tablet TAKE ONE TABLET BY MOUTH TWICE DAILY 180 tablet 3    blood glucose (NO BRAND SPECIFIED) lancets standard Use to test blood sugar one times daily or as directed. Dispense compatible with insurance and glucometer - One Touch Ultra 100 each 3    blood glucose (NO BRAND SPECIFIED) test strip Use to test blood sugars one times daily or as directed.  Dispense compatible with insurance and glucometer - One Touch Ultra 100 strip 3    blood glucose monitoring (NO BRAND SPECIFIED) meter device kit Use to test blood sugar one times daily or as directed.  Dispense One Touch Ultra per insurance 1 kit 0    calcium carbonate 750 MG CHEW Take 750 mg by mouth 2 times daily       carvedilol (COREG) 25 MG tablet TAKE 1 TABLET (25 MG) BY MOUTH 2 TIMES DAILY WITH MEALS 180 tablet 3    Estriol 10 % CREA 1 Application. See Admin Instructions Insert 1 mg vaginally one time a day for Atrophic Vaginitis for 2 Weeks Apply small amount to finger and  apply to inside vagina daily for two weeks then twice weekly. AND Insert 1 mg vaginally one time a day every Mon, Thu for Atrophic Vaginitis Apply small amount to finger and apply to inside vagina daily for two weeks then twice weekly.   -Pt reports using PRN now.      FLAXSEED (LINSEED) PO POWD Take 1 Tablespoonful by mouth every morning       furosemide (LASIX) 20 MG tablet Take 1 tablet (20 mg) by mouth daily as needed (For increased leg swelling.) Hold your hydrochlorothiazide when taking this 30 tablet 0    hydrochlorothiazide (HYDRODIURIL) 25 MG tablet Take 1 tablet (25 mg) by mouth daily 90 tablet 3    lisinopril (ZESTRIL) 40 MG tablet Take 1 tablet (40 mg) by mouth daily 90 tablet 3    metFORMIN (GLUCOPHAGE) 500 MG tablet Take 1 tablet (500 mg) by mouth daily (with dinner) 90 tablet 3    mirabegron (MYRBETRIQ) 25 MG 24 hr tablet Take 1 tablet (25 mg) by mouth daily 90 tablet 1    nystatin (MYCOSTATIN) 241420 UNIT/GM external ointment Apply topically 3 times daily To vaginal and groin area. 30 g 11    ondansetron (ZOFRAN) 4 MG tablet Take by mouth every 6 hours as needed for nausea      order for DME Equipment being ordered: Left wrist splint. 1 each 0    predniSONE (DELTASONE) 5 MG tablet Take 5 mg by mouth daily as needed (for painful flare)      senna-docusate (SENOKOT-S/PERICOLACE) 8.6-50 MG tablet Take 1 tablet by mouth 2 times daily as needed for constipation (Take while taking narcotic pain medications.) 15 tablet 0    triamcinolone (ARISTOCORT HP) 0.5 % external cream Apply topically 2 times daily to affected area as directed. 15 g 0    VITAMIN D 1000 UNIT OR TABS Take 1,000 Units by mouth At Bedtime  30 0       Allergies   Allergen Reactions    Amlodipine      Swollen ankles and rash but tolerates 2.5 mg without any symptoms    Apple Juice     Apple [Chromium]     Food     Kiwi     Nuts     Pear     Tape [Adhesive Tape]        Family History   Problem Relation Age of Onset    No Known Problems Mother      Diabetes Father         type 2    Hypertension Father     Cerebrovascular Disease Father     Arthritis Father     Other Cancer Sister        Additional medical/Social/Surgical histories reviewed in [facility] and updated as appropriate.       PHYSICAL EXAM  There were no vitals taken for this visit.    General  - normal appearance, in no obvious distress  Musculoskeletal - left knee  - stance: mildly antalgic gait, mild genu varum  - inspection: trace effusion  - palpation: medial joint line tenderness  - ROM: 110 degrees flexion, 0 degrees extension, painful active ROM  - strength: 5/5 in flexion, 5/5 in extension  - special tests:  (-) Tony  (-) varus at 0 and 30 degrees flexion  (-) valgus at 0 and 30 degrees flexion  Neuro  - no sensory or motor deficit, grossly normal coordination, normal muscle tone       IMAGING : XR knee right 3 views. Final results and radiologist's interpretation, available in the [facility] health record. Images were reviewed with the patient/family members in the office today. My personal interpretation of the performed imaging is severe osteoarthritic changes of right knee at lateral compartment and patellofemoral compartments.     ASSESSMENT & PLAN  Ms. Treviño is a 89 year old year old female who presents to clinic today with chronic right knee pain and feeling of instability.    X-rays as well as examination today consistent with symptomatic lateral compartment of patellofemoral compartment osteoarthritic changes.  DJD is graded as severe.    Diagnosis: End-stage osteoarthritis of right knee    Treatment options discussed for Aundrea, these were discussed with patient and her daughter.  Options including use of a walker to improve ambulation, we discussed consideration for injections including hyaluronic acid and corticosteroid.  We also discussed physical therapy. Tylenol to be used PRN.    At this time using shared decision-making, her mother would like to try a hyaluronic acid  injection.  Corticosteroid ejections have been previously lacking benefit to her shoulder.  Prior authorization was submitted for Synvisc One.      I will see her back in 2 weeks for injection if approved.  If this is not approved we can discuss an option for corticosteroid versus expectant management of the knee.    It was a pleasure seeing Aundrea today.    Lucian Ambrosio DO, CAM  Primary Care Sports Medicine

## 2024-04-11 NOTE — LETTER
4/11/2024         RE: Aundrea Treviño  [address]  [place] [place] [facility] [address]        Dear Colleague,    Thank you for referring your patient, Aundrea Treviño, to the [facility] [place]. Please see a copy of my visit note below.    CHIEF COMPLAINT:  Pain of the Right Knee     HISTORY OF PRESENT ILLNESS  Ms. Treviño is a pleasant 89 year old year old female who presents to clinic today with right knee pain.  Aundrea explains that pain started a few months ago.  Her primary complaint is feeling of knee giving way with a sharp twinge.  This can occur while she is ambulating, but does use a walker to help her.  She has not noted her knee giving way.    Onset: sudden, intermittent  Location: right knee  Quality:  sharp  Duration: 2 months   Severity: 8/10 at worst  Timing:intermittent episodes appx once/day feeing of giving way.  Modifying factors:  resting and non-use makes it better, movement and use makes it worse  Associated signs & symptoms: pain and swelling  Previous similar pain: No  Treatments to date: Walker    Additional history: as documented    Review of Systems:  Have you recently had a a fever, chills, weight loss? No  Do you have any vision problems? No  Do you have any chest pain or edema? No  Do you have any shortness of breath or wheezing?  No  Do you have stomach problems? No  Do you have any numbness or focal weakness? No  Do you have diabetes? Yes, takes metformin and is well controlled  Do you have problems with bleeding or clotting? No  Do you have an rashes or other skin lesions? No    MEDICAL HISTORY  Patient Active Problem List   Diagnosis     Disorder of bone and cartilage     Female stress incontinence     Dystonia     Essential tremor     Refusal of blood transfusions as patient is [employer]     Hypertension goal BP (blood pressure) < 140/90     CARDIOVASCULAR SCREENING; LDL GOAL LESS THAN 130     Gout     Venous (peripheral) insufficiency      Chronic anticoagulation     Paroxysmal atrial fibrillation (H)     Moderate tricuspid regurgitation     ACP (advance care planning)     Patient is [employer]     Type 2 diabetes mellitus with diabetic polyneuropathy, without long-term current use of insulin (H)     Primary osteoarthritis of right shoulder     Primary osteoarthritis of first carpometacarpal joint of left hand     Elevated C-reactive protein (CRP)     S/P total hip arthroplasty     Primary osteoarthritis involving multiple joints     Acute pain of right knee     Effusion of right knee     Status post total replacement of right hip, 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     Acute post-operative pain     Uncontrolled hypertension     Anemia due to blood loss, acute     Intractable vomiting with nausea, unspecified vomiting type     Loose stools     Debility     Essential hypertension     Nausea       Current Outpatient Medications   Medication Sig Dispense Refill     acetaminophen (TYLENOL) 500 MG tablet Take 2 tablets (1,000 mg) by mouth 3 times daily       Alfalfa 650 MG TABS Take 2 capsules by mouth 2 times daily For arthritis pain       Alpha Lipoic Acid 200 MG CAPS Take 200 mg by mouth daily        amLODIPine (NORVASC) 5 MG tablet TAKE 1/2 TABLET (2.5MG) BY MOUTH ONCE DAILY 90 tablet 3     apixaban ANTICOAGULANT (ELIQUIS ANTICOAGULANT) 5 MG tablet TAKE ONE TABLET BY MOUTH TWICE DAILY 180 tablet 3     blood glucose (NO BRAND SPECIFIED) lancets standard Use to test blood sugar one times daily or as directed. Dispense compatible with insurance and glucometer - One Touch Ultra 100 each 3     blood glucose (NO BRAND SPECIFIED) test strip Use to test blood sugars one times daily or as directed.  Dispense compatible with insurance and glucometer - One Touch Ultra 100 strip 3     blood glucose monitoring (NO BRAND SPECIFIED) meter device kit Use to test blood sugar one times daily or as directed.  Dispense One Touch Ultra per insurance 1 kit 0     calcium carbonate 750 MG CHEW  Take 750 mg by mouth 2 times daily        carvedilol (COREG) 25 MG tablet TAKE 1 TABLET (25 MG) BY MOUTH 2 TIMES DAILY WITH MEALS 180 tablet 3     Estriol 10 % CREA 1 Application. See Admin Instructions Insert 1 mg vaginally one time a day for Atrophic Vaginitis for 2 Weeks Apply small amount to finger and apply to inside vagina daily for two weeks then twice weekly. AND Insert 1 mg vaginally one time a day every Mon, Thu for Atrophic Vaginitis Apply small amount to finger and apply to inside vagina daily for two weeks then twice weekly.   -Pt reports using PRN now.       FLAXSEED (LINSEED) PO POWD Take 1 Tablespoonful by mouth every morning        furosemide (LASIX) 20 MG tablet Take 1 tablet (20 mg) by mouth daily as needed (For increased leg swelling.) Hold your hydrochlorothiazide when taking this 30 tablet 0     hydrochlorothiazide (HYDRODIURIL) 25 MG tablet Take 1 tablet (25 mg) by mouth daily 90 tablet 3     lisinopril (ZESTRIL) 40 MG tablet Take 1 tablet (40 mg) by mouth daily 90 tablet 3     metFORMIN (GLUCOPHAGE) 500 MG tablet Take 1 tablet (500 mg) by mouth daily (with dinner) 90 tablet 3     mirabegron (MYRBETRIQ) 25 MG 24 hr tablet Take 1 tablet (25 mg) by mouth daily 90 tablet 1     nystatin (MYCOSTATIN) 401096 UNIT/GM external ointment Apply topically 3 times daily To vaginal and groin area. 30 g 11     ondansetron (ZOFRAN) 4 MG tablet Take by mouth every 6 hours as needed for nausea       order for DME Equipment being ordered: Left wrist splint. 1 each 0     predniSONE (DELTASONE) 5 MG tablet Take 5 mg by mouth daily as needed (for painful flare)       senna-docusate (SENOKOT-S/PERICOLACE) 8.6-50 MG tablet Take 1 tablet by mouth 2 times daily as needed for constipation (Take while taking narcotic pain medications.) 15 tablet 0     triamcinolone (ARISTOCORT HP) 0.5 % external cream Apply topically 2 times daily to affected area as directed. 15 g 0     VITAMIN D 1000 UNIT OR TABS Take 1,000 Units by  mouth At Bedtime  30 0       Allergies   Allergen Reactions     Amlodipine      Swollen ankles and rash but tolerates 2.5 mg without any symptoms     Apple Juice      Apple [Chromium]      Food      Kiwi      Nuts      Pear      Tape [Adhesive Tape]        Family History   Problem Relation Age of Onset     No Known Problems Mother      Diabetes Father         type 2     Hypertension Father      Cerebrovascular Disease Father      Arthritis Father      Other Cancer Sister        Additional medical/Social/Surgical histories reviewed in [facility] and updated as appropriate.       PHYSICAL EXAM  There were no vitals taken for this visit.    General  - normal appearance, in no obvious distress  Musculoskeletal - left knee  - stance: mildly antalgic gait, mild genu varum  - inspection: trace effusion  - palpation: medial joint line tenderness  - ROM: 110 degrees flexion, 0 degrees extension, painful active ROM  - strength: 5/5 in flexion, 5/5 in extension  - special tests:  (-) Tony  (-) varus at 0 and 30 degrees flexion  (-) valgus at 0 and 30 degrees flexion  Neuro  - no sensory or motor deficit, grossly normal coordination, normal muscle tone       IMAGING : XR knee right 3 views. Final results and radiologist's interpretation, available in the [facility] health record. Images were reviewed with the patient/family members in the office today. My personal interpretation of the performed imaging is severe osteoarthritic changes of right knee at lateral compartment and patellofemoral compartments.     ASSESSMENT & PLAN  Ms. Treviño is a 89 year old year old female who presents to clinic today with chronic right knee pain and feeling of instability.    X-rays as well as examination today consistent with symptomatic lateral compartment of patellofemoral compartment osteoarthritic changes.  DJD is graded as severe.    Diagnosis: End-stage osteoarthritis of right knee    Treatment options discussed for Aundrea, these were discussed  with patient and her daughter.  Options including use of a walker to improve ambulation, we discussed consideration for injections including hyaluronic acid and corticosteroid.  We also discussed physical therapy. Tylenol to be used PRN.    At this time using shared decision-making, her mother would like to try a hyaluronic acid injection.  Corticosteroid ejections have been previously lacking benefit to her shoulder.  Prior authorization was submitted for Synvisc One.      I will see her back in 2 weeks for injection if approved.  If this is not approved we can discuss an option for corticosteroid versus expectant management of the knee.    It was a pleasure seeing Aundrea today.    Lucian Ambrosio DO, [facility]  Primary Care Sports Medicine       Again, thank you for allowing me to participate in the care of your patient.        Sincerely,        Lucian Ambrosio DO

## 2024-04-17 ENCOUNTER — TELEPHONE (OUTPATIENT)
Dept: ORTHOPEDICS | Facility: CLINIC | Age: 89
End: 2024-04-17
Payer: COMMERCIAL

## 2024-04-17 NOTE — TELEPHONE ENCOUNTER
Called patient  Explained PA process via phone, patient understood.   Patient scheduled for gel injections may 9.     William Dan, ATC

## 2024-04-17 NOTE — TELEPHONE ENCOUNTER
Patient Returning Call    Reason for call:  needing prior auth for gel injection patient is set up with apt     Information relayed to patient:  te sent to clinic     Patient has additional questions:  No      Okay to leave a detailed message?: Yes at Other phone number:  [phone] *

## 2024-05-16 ENCOUNTER — OFFICE VISIT (OUTPATIENT)
Dept: ORTHOPEDICS | Facility: CLINIC | Age: 89
End: 2024-05-16
Payer: COMMERCIAL

## 2024-05-16 VITALS
HEIGHT: 64 IN | SYSTOLIC BLOOD PRESSURE: 142 MMHG | DIASTOLIC BLOOD PRESSURE: 82 MMHG | BODY MASS INDEX: 26.46 KG/M2 | WEIGHT: 155 LBS

## 2024-05-16 DIAGNOSIS — M17.11 PRIMARY OSTEOARTHRITIS OF RIGHT KNEE: Primary | ICD-10-CM

## 2024-05-16 PROCEDURE — 20610 DRAIN/INJ JOINT/BURSA W/O US: CPT | Mod: RT | Performed by: FAMILY MEDICINE

## 2024-05-16 RX ORDER — LIDOCAINE HYDROCHLORIDE 10 MG/ML
3 INJECTION, SOLUTION INFILTRATION; PERINEURAL
Status: SHIPPED | OUTPATIENT
Start: 2024-05-16

## 2024-05-16 RX ADMIN — LIDOCAINE HYDROCHLORIDE 3 ML: 10 INJECTION, SOLUTION INFILTRATION; PERINEURAL at 13:22

## 2024-05-22 ENCOUNTER — OFFICE VISIT (OUTPATIENT)
Dept: FAMILY MEDICINE | Facility: CLINIC | Age: 89
End: 2024-05-22
Payer: COMMERCIAL

## 2024-05-22 VITALS
BODY MASS INDEX: 26.09 KG/M2 | HEART RATE: 65 BPM | DIASTOLIC BLOOD PRESSURE: 80 MMHG | RESPIRATION RATE: 16 BRPM | SYSTOLIC BLOOD PRESSURE: 156 MMHG | TEMPERATURE: 98.8 F | OXYGEN SATURATION: 98 % | WEIGHT: 152 LBS

## 2024-05-22 DIAGNOSIS — E11.42 TYPE 2 DIABETES MELLITUS WITH DIABETIC POLYNEUROPATHY, WITHOUT LONG-TERM CURRENT USE OF INSULIN (H): ICD-10-CM

## 2024-05-22 DIAGNOSIS — R06.02 SHORTNESS OF BREATH: ICD-10-CM

## 2024-05-22 DIAGNOSIS — Z13.220 LIPID SCREENING: ICD-10-CM

## 2024-05-22 DIAGNOSIS — R60.0 BILATERAL LOWER EXTREMITY EDEMA: Primary | ICD-10-CM

## 2024-05-22 DIAGNOSIS — N81.10 BLADDER PROLAPSE, FEMALE, ACQUIRED: ICD-10-CM

## 2024-05-22 DIAGNOSIS — Z79.01 CHRONIC ANTICOAGULATION: ICD-10-CM

## 2024-05-22 DIAGNOSIS — N39.46 MIXED INCONTINENCE URGE AND STRESS (MALE)(FEMALE): ICD-10-CM

## 2024-05-22 DIAGNOSIS — I48.0 PAROXYSMAL ATRIAL FIBRILLATION (H): ICD-10-CM

## 2024-05-22 DIAGNOSIS — G62.9 PERIPHERAL POLYNEUROPATHY: ICD-10-CM

## 2024-05-22 DIAGNOSIS — I10 ESSENTIAL HYPERTENSION: ICD-10-CM

## 2024-05-22 DIAGNOSIS — I87.2 VENOUS (PERIPHERAL) INSUFFICIENCY: ICD-10-CM

## 2024-05-22 LAB
ERYTHROCYTE [DISTWIDTH] IN BLOOD BY AUTOMATED COUNT: 13.7 % (ref 10–15)
HBA1C MFR BLD: 6.4 % (ref 0–5.6)
HCT VFR BLD AUTO: 39.9 % (ref 35–47)
HGB BLD-MCNC: 12.9 G/DL (ref 11.7–15.7)
MCH RBC QN AUTO: 27.2 PG (ref 26.5–33)
MCHC RBC AUTO-ENTMCNC: 32.3 G/DL (ref 31.5–36.5)
MCV RBC AUTO: 84 FL (ref 78–100)
PLATELET # BLD AUTO: 150 10E3/UL (ref 150–450)
RBC # BLD AUTO: 4.75 10E6/UL (ref 3.8–5.2)
WBC # BLD AUTO: 5 10E3/UL (ref 4–11)

## 2024-05-22 PROCEDURE — 82043 UR ALBUMIN QUANTITATIVE: CPT | Performed by: FAMILY MEDICINE

## 2024-05-22 PROCEDURE — 82570 ASSAY OF URINE CREATININE: CPT | Performed by: FAMILY MEDICINE

## 2024-05-22 PROCEDURE — 82607 VITAMIN B-12: CPT | Performed by: FAMILY MEDICINE

## 2024-05-22 PROCEDURE — 91320 SARSCV2 VAC 30MCG TRS-SUC IM: CPT | Performed by: FAMILY MEDICINE

## 2024-05-22 PROCEDURE — 85027 COMPLETE CBC AUTOMATED: CPT | Performed by: FAMILY MEDICINE

## 2024-05-22 PROCEDURE — 99417 PROLNG OP E/M EACH 15 MIN: CPT | Performed by: FAMILY MEDICINE

## 2024-05-22 PROCEDURE — 83036 HEMOGLOBIN GLYCOSYLATED A1C: CPT | Performed by: FAMILY MEDICINE

## 2024-05-22 PROCEDURE — 99000 SPECIMEN HANDLING OFFICE-LAB: CPT | Performed by: FAMILY MEDICINE

## 2024-05-22 PROCEDURE — 80061 LIPID PANEL: CPT | Performed by: FAMILY MEDICINE

## 2024-05-22 PROCEDURE — 99215 OFFICE O/P EST HI 40 MIN: CPT | Mod: 25 | Performed by: FAMILY MEDICINE

## 2024-05-22 PROCEDURE — 36415 COLL VENOUS BLD VENIPUNCTURE: CPT | Performed by: FAMILY MEDICINE

## 2024-05-22 PROCEDURE — 80053 COMPREHEN METABOLIC PANEL: CPT | Performed by: FAMILY MEDICINE

## 2024-05-22 PROCEDURE — 90480 ADMN SARSCOV2 VAC 1/ONLY CMP: CPT | Performed by: FAMILY MEDICINE

## 2024-05-22 PROCEDURE — 84207 ASSAY OF VITAMIN B-6: CPT | Mod: 90 | Performed by: FAMILY MEDICINE

## 2024-05-22 RX ORDER — POTASSIUM CHLORIDE 750 MG/1
10 TABLET, EXTENDED RELEASE ORAL 2 TIMES DAILY
Qty: 90 TABLET | Refills: 1 | Status: SHIPPED | OUTPATIENT
Start: 2024-05-22 | End: 2024-08-12

## 2024-05-22 RX ORDER — RESPIRATORY SYNCYTIAL VIRUS VACCINE 120MCG/0.5
0.5 KIT INTRAMUSCULAR ONCE
Qty: 1 EACH | Refills: 0 | Status: CANCELLED | OUTPATIENT
Start: 2024-05-22 | End: 2024-05-22

## 2024-05-22 RX ORDER — FUROSEMIDE 20 MG
20 TABLET ORAL DAILY
Qty: 90 TABLET | Refills: 1 | Status: SHIPPED | OUTPATIENT
Start: 2024-05-22

## 2024-05-22 NOTE — PATIENT INSTRUCTIONS
Start furosemide 20mg daily as well as potassium 10meq daily   Schedule lab visit in 2 weeks to recheck kidney function and potassium  Schedule echocardiogram and cardiology appointment at [facility]  Schedule with the lymphedema clinic  Check home blood pressure daily  See Mandy Matias in about 2-3 weeks to report blood pressures, go through all of your medications and possibly make suggestions about affordability of the myrbetriq.

## 2024-05-22 NOTE — LETTER
May 30, 2024      Aundrea Treviño  [address]   [place] [address]        Dear ,    We are writing to inform you of your test results.    our lab results are stable. Please let us know if you have any questions.       Resulted Orders   CBC with platelets   Result Value Ref Range    WBC Count 5.0 4.0 - 11.0 10e3/uL    RBC Count 4.75 3.80 - 5.20 10e6/uL    Hemoglobin 12.9 11.7 - 15.7 g/dL    Hematocrit 39.9 35.0 - 47.0 %    MCV 84 78 - 100 fL    MCH 27.2 26.5 - 33.0 pg    MCHC 32.3 31.5 - 36.5 g/dL    RDW 13.7 10.0 - 15.0 %    Platelet Count 150 150 - 450 10e3/uL   Comprehensive metabolic panel (BMP + Alb, Alk Phos, ALT, AST, Total. Bili, TP)   Result Value Ref Range    Sodium 140 135 - 145 mmol/L      Comment:      Reference intervals for this test were updated on 09/26/2023 to more accurately reflect our healthy population. There may be differences in the flagging of prior results with similar values performed with this method. Interpretation of those prior results can be made in the context of the updated reference intervals.     Potassium 4.2 3.4 - 5.3 mmol/L    Carbon Dioxide (CO2) 28 22 - 29 mmol/L    Anion Gap 13 7 - 15 mmol/L    Urea Nitrogen 14.6 8.0 - 23.0 mg/dL    Creatinine 0.77 0.51 - 0.95 mg/dL    GFR Estimate 73 >60 mL/min/1.73m2    Calcium 9.8 8.8 - 10.2 mg/dL    Chloride 99 98 - 107 mmol/L    Glucose 131 (H) 70 - 99 mg/dL    Alkaline Phosphatase 87 40 - 150 U/L    AST 15 0 - 45 U/L      Comment:      Reference intervals for this test were updated on 6/12/2023 to more accurately reflect our healthy population. There may be differences in the flagging of prior results with similar values performed with this method. Interpretation of those prior results can be made in the context of the updated reference intervals.    ALT 9 0 - 50 U/L      Comment:      Reference intervals for this test were updated on 6/12/2023 to more accurately reflect our healthy population. There may be  differences in the flagging of prior results with similar values performed with this method. Interpretation of those prior results can be made in the context of the updated reference intervals.      Protein Total 7.0 6.4 - 8.3 g/dL    Albumin 4.6 3.5 - 5.2 g/dL    Bilirubin Total 1.1 <=1.2 mg/dL    Patient Fasting > 8hrs? Unknown    Lipid panel reflex to direct LDL Fasting   Result Value Ref Range    Cholesterol 157 <200 mg/dL    Triglycerides 80 <150 mg/dL    Direct Measure HDL 54 >=50 mg/dL    LDL Cholesterol Calculated 87 <=100 mg/dL    Non HDL Cholesterol 103 <130 mg/dL    Patient Fasting > 8hrs? Unknown     Narrative    Cholesterol  Desirable:  <200 mg/dL    Triglycerides  Normal:  Less than 150 mg/dL  Borderline High:  150-199 mg/dL  High:  200-499 mg/dL  Very High:  Greater than or equal to 500 mg/dL    Direct Measure HDL  Female:  Greater than or equal to 50 mg/dL   Male:  Greater than or equal to 40 mg/dL    LDL Cholesterol  Desirable:  <100mg/dL  Above Desirable:  100-129 mg/dL   Borderline High:  130-159 mg/dL   High:  160-189 mg/dL   Very High:  >= 190 mg/dL    Non HDL Cholesterol  Desirable:  130 mg/dL  Above Desirable:  130-159 mg/dL  Borderline High:  160-189 mg/dL  High:  190-219 mg/dL  Very High:  Greater than or equal to 220 mg/dL   Albumin Random Urine Quantitative with Creat Ratio   Result Value Ref Range    Creatinine Urine mg/dL 71.5 mg/dL      Comment:      The reference ranges have not been established in urine creatinine. The results should be integrated into the clinical context for interpretation.    Albumin Urine mg/L <12.0 mg/L      Comment:      The reference ranges have not been established in urine albumin. The results should be integrated into the clinical context for interpretation.    Albumin Urine mg/g Cr        Comment:      Unable to calculate, urine albumin and/or urine creatinine is outside detectable limits.  Microalbuminuria is defined as an albumin:creatinine ratio of 17 to  299 for males and 25 to 299 for females. A ratio of albumin:creatinine of 300 or higher is indicative of overt proteinuria.  Due to biologic variability, positive results should be confirmed by a second, first-morning random or 24-hour timed urine specimen. If there is discrepancy, a third specimen is recommended. When 2 out of 3 results are in the microalbuminuria range, this is evidence for incipient nephropathy and warrants increased efforts at glucose control, blood pressure control, and institution of therapy with an angiotensin-converting-enzyme (ACE) inhibitor (if the patient can tolerate it).     Hemoglobin A1c   Result Value Ref Range    Hemoglobin A1C 6.4 (H) 0.0 - 5.6 %      Comment:      Normal <5.7%   Prediabetes 5.7-6.4%    Diabetes 6.5% or higher     Note: Adopted from ADA consensus guidelines.   Vitamin B12   Result Value Ref Range    Vitamin B12 811 232 - 1,245 pg/mL   Vitamin B6   Result Value Ref Range    Vitamin B6 29.2 20.0 - 125.0 nmol/L      Comment:      INTERPRETIVE INFORMATION: Vitamin B6 (Pyridoxal 5-Phosphate)    Pyridoxal 5'-phosphate measured in a specimen collected   following an 8-hour or overnight fast accurately indicates   vitamin B6 nutritional status. Non-fasting specimen   concentration reflects recent vitamin intake.    This test was developed and its performance characteristics   determined by [facility] It has not been cleared or   approved by the [place] Food and Drug Administration. This test   was performed in a CLIA certified laboratory and is   intended for clinical purposes.  Performed By: [facility]  [address] [place] [id number]  : Dash Nogueira MD, PhD  CLIA Number: [id number]       If you have any questions or concerns, please call the clinic at the number listed above.       Sincerely,      Krysten Molina MD

## 2024-05-22 NOTE — PROGRESS NOTES
Assessment & Plan      Bilateral lower extremity edema  Venous (peripheral) insufficiency.    Longstanding, but becoming more uncomfortable  continues compression stockings  Start Lasix 20 mg daily along with potassium 10 meq daily.  Schedule lab visit in 2 to 3 weeks for BMP  Schedule with MTM  Schedule echocardiogram    Significant edema and I suspect this is secondary to legs being dependent for prolonged periods of time.  Continue compression stockings and we discussed elevation.  I think it would be reasonable to use as needed Lasix to control her symptoms.  She was advised to hold her hydrochlorothiazide on the days she uses this medication.  We will continue to monitor labs and I am checking a BMP today.  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  Myrbetric work, but is too expensive.   She uses an incontinence pad.  I encouraged continued use of barrier cream for protection.  In addition, I encouraged her to get up and move around once an hour and to shift positions regularly to protect the skin from developing pressure ulcer    Appears Myrbetriq is now covered by her insurance and we will trial this to see if she has some improvement.  I do think her incontinence is multifactorial and there are likely structural issues from her previous sling contributing.  It sounds like she does not have control of her urine when she repositions.  We did have a conversation that these medications can have an anticholinergic effect and advised to monitor for dizziness.  If she does not have improvement, I would consider discontinuing due to risk of potential.  Will also send in an order for DME for incontinence supplies this pain out-of-pocket is a considerable financial burden..  - Incontinence  Supplies Order  - nystatin (MYCOSTATIN) 473556 UNIT/GM external ointment; Apply topically 3 times daily To vaginal and groin area.  - mirabegron (MYRBETRIQ) 25 MG 24 hr tablet; Take 1 tablet (25 mg) by mouth daily     Bladder prolapse, female, acquired  See above       Type 2 diabetes mellitus with diabetic polyneuropathy, without long-term current use of insulin (H)   Controlled. A1C today. continue metformin 500mg daily. Tolerating it well.   Peripheral neuropathy bilateral feet to lower legs worsening over time. BG is very well controlled.    At risk of b12 deficiency on metformin. B12 level was previously on the low side of the normal range. Recommended continuing vitamin B12 supplement.       Hypertension goal BP (blood pressure) < 140/90  Controlled.  No changes today.  Continues amlodipine 2.5 mg daily, carvedilol 25 mg twice daily, lisinopril 40 mg daily.  Will stop hydrochlorothiazide as she is starting Lasix for leg edema.  CMP, urine albumin today       Chronic anticoagulation   Paroxysmal A. Fib     Continues on Eliquis 5 mg twice daily.  rate controlled on carvedilol.   CBC, CMP today         Peripheral neuropathy -- denies pain, but the numbness and sense of tightness is uncomfortable.   B12 supplement 1000mcg daily     Numbness, no pain. Worsening over time. Will start with lab visit for cbc, b12, folate, tsh. Consider further eval with neurology. She does not want medication to treat the neuropathy symptoms.      BRAN  Progressing slowly over time.  Denies shortness of breath at rest.  Aundrea does not notice that it is changed much, but her daughter feels like it has increased.  She denies PND or orthopnea  Denies chest pain  Schedule echocardiogram and follow-up with cardiology-she prefers to switch to [facility] as she lives in [place].    57 minutes spent on the date of the encounter doing chart review, history and exam, documentation and further activities per the note       Subjective   Aundrea is a  89 year old, presenting for the following health issues:  edema in legs  and Follow Up    History of Present Illness       Reason for visit:  BLE edema    She eats 4 or more servings of fruits and vegetables daily.She consumes 1 sweetened beverage(s) daily.She exercises with enough effort to increase her heart rate 9 or less minutes per day.  She exercises with enough effort to increase her heart rate 3 or less days per week.   She is taking medications regularly.     Here with her daughter Minerva.     Edema in legs:  - Reports having edema in her legs. Previously prescribed Lasix by Kristal Berg, took as needed for a couple of days. Believes that the Lasix helped to some extent . However, her daughter feels that the edema has progressed beyond the point of taking Lasix as needed and might need to be on it all the time. Reports that her legs hurt, unclear whether this is due to the edema or neuropathy. Wears compression stockings all the time. uses moisturizer on her legs     Neuropathy:  - Has neuropathy . Recently visited a neurologist who requested a B6 and B12 lab test. Has fasted in preparation for these tests. Currently taking a B12 supplement.    Shortness of breath:  - Reports experiencing shortness of breath, particularly when walking. her daughter has also noticed this. Does not experience chest pain and does not have trouble sleeping in a flat bed. Spends half the night in a recliner and the other half in bed. Does not feel out of breath unless she is exerting herself.     Skin concerns:  - Has a dry ear and spots on her skin. Also has a spot on her coccyx, which does not appear to be open but is causing some discomfort. Wears a pad all the time for incontinence, which may be contributing to the irritation in this area. uses a barrier cream in the area    Medication concerns:  -  Has also been taking Myrbetriq for urinary symptoms, but stopped due to the high cost. Not interested in trying a vaginal  estrogen ring.    Misc:  - Has a blood pressure cuff at home, but does not regularly take her blood pressure.          Objective    BP (!) 156/80 (BP Location: Right arm, Patient Position: Sitting, Cuff Size: Adult Regular)   Pulse 65   Temp 98.8  F (37.1  C) (Temporal)   Resp 16   Wt 68.9 kg (152 lb)   SpO2 98%   BMI 26.09 kg/m    Body mass index is 26.09 kg/m .  Physical Exam   GENERAL: alert and no distress  Skin: There is slight scaling at the top of the gluteal fold without surrounding erythema.  The area has a barrier cream on it.  There is no surrounding erythema no apparent ulceration.          Signed Electronically by: Krysten Molina MD

## 2024-05-23 LAB
ALBUMIN SERPL BCG-MCNC: 4.6 G/DL (ref 3.5–5.2)
ALP SERPL-CCNC: 87 U/L (ref 40–150)
ALT SERPL W P-5'-P-CCNC: 9 U/L (ref 0–50)
ANION GAP SERPL CALCULATED.3IONS-SCNC: 13 MMOL/L (ref 7–15)
AST SERPL W P-5'-P-CCNC: 15 U/L (ref 0–45)
BILIRUB SERPL-MCNC: 1.1 MG/DL
BUN SERPL-MCNC: 14.6 MG/DL (ref 8–23)
CALCIUM SERPL-MCNC: 9.8 MG/DL (ref 8.8–10.2)
CHLORIDE SERPL-SCNC: 99 MMOL/L (ref 98–107)
CHOLEST SERPL-MCNC: 157 MG/DL
CREAT SERPL-MCNC: 0.77 MG/DL (ref 0.51–0.95)
CREAT UR-MCNC: 71.5 MG/DL
DEPRECATED HCO3 PLAS-SCNC: 28 MMOL/L (ref 22–29)
EGFRCR SERPLBLD CKD-EPI 2021: 73 ML/MIN/1.73M2
FASTING STATUS PATIENT QL REPORTED: ABNORMAL
FASTING STATUS PATIENT QL REPORTED: NORMAL
GLUCOSE SERPL-MCNC: 131 MG/DL (ref 70–99)
HDLC SERPL-MCNC: 54 MG/DL
LDLC SERPL CALC-MCNC: 87 MG/DL
MICROALBUMIN UR-MCNC: <12 MG/L
MICROALBUMIN/CREAT UR: NORMAL MG/G{CREAT}
NONHDLC SERPL-MCNC: 103 MG/DL
POTASSIUM SERPL-SCNC: 4.2 MMOL/L (ref 3.4–5.3)
PROT SERPL-MCNC: 7 G/DL (ref 6.4–8.3)
SODIUM SERPL-SCNC: 140 MMOL/L (ref 135–145)
TRIGL SERPL-MCNC: 80 MG/DL
VIT B12 SERPL-MCNC: 811 PG/ML (ref 232–1245)

## 2024-05-24 LAB — PYRIDOXAL PHOS SERPL-SCNC: 29.2 NMOL/L

## 2024-05-28 ENCOUNTER — PATIENT OUTREACH (OUTPATIENT)
Dept: CARE COORDINATION | Facility: CLINIC | Age: 89
End: 2024-05-28
Payer: COMMERCIAL

## 2024-06-09 NOTE — PROGRESS NOTES
Medication Therapy Management (MTM) Encounter    ASSESSMENT:                            Medication Adherence/Access: No issues identified    Hypertension:   Stable. Patient is meeting blood pressure goal of < 140/90mmHg. Patient would benefit from : lets STOP all her amlodipine for now due to significant LLE and some SOB, recheck BMP lab today for Potassium recheck . See cards md July .     Diabetes:   Stable.  Patient is meeting A1c goal of < 7%.  DPNP: will trial 600mg./day ALA dose x 6 months if no LL/foot numbness improvement then ok to discontinue.     Afib:  Stable. ( See plan for eliquis help when in med part-d donut [facility] later this year).           PLAN:                            1. Blood Pressure today 137/76mmhg.-- excellent -- but you have alot of lower leg/feet edema(swelling) --lets STOP all the amlodipine for now --take daily home Blood Pressure's with goal of <140/90mmhg.   Wear your compression socks daily.     2. Lets have you try 600mg./once /day of alpha lipoic acid to see if this dose helps your numbness in your neuropathy.     3. FYI--[facility] your Eliquis [facility]  -see if you qualify for lower cost drug.         Follow-up: Return in about 10 weeks (around 8/22/2024), or @ 1 PM, for BP Recheck, Medication Therapy Management Visit.(Please bring your home Blood Pressure meter to your next visit).     SUBJECTIVE/OBJECTIVE:                          Aundrea Treviño is a 89 year old female coming in for an initial visit. She was referred to me from Krysten Molina  . Patient was accompanied by daughter Minerva. .     Reason for visit:     Reason for Referral:  polypharmacy    Allergies/ADRs: Reviewed in chart  Past Medical History: Reviewed in chart  Tobacco: She reports that she quit smoking about 64 years ago. Her smoking use included cigarettes. She has never used smokeless tobacco.  Alcohol: not currently using  Other Substance Use: none   E-cigarette  Use: none   Caffeine: decaff daily   Social: retired -catrina calls qam with her friends.   Activity: walks with walker         Medication Adherence/Access: Patient daughter Minerva set up her pill box(es).    Diabetes   Metformin 500mg with dinner daily.   Not taking aspirin due to Eliquis   Patient is not experiencing side effects.  Current diabetes symptoms: none  Blood sugar monitoring: never  Diet/Exercise: eats as healthy as she can. Not much exercise --walks with a walker now.      Eye exam is up to date  Foot exam: has numbness in both feet  all the way up to her knees.   DPNP:  have ALA 200mg /day pill --still numbness in feet up to knee--what to do ?     Lab Results   Component Value Date    A1C 6.4 05/22/2024    A1C 6.6 01/25/2024    A1C 6.8 06/26/2023    A1C 6.8 05/11/2022    A1C 6.6 07/07/2021    A1C 5.9 08/20/2020    A1C 6.1 09/23/2019    A1C 6.9 05/20/2019    A1C 6.7 02/18/2019         Hypertension   Furosemide 20mg . In am (added 5-2024).  Lisinopril 40mg in am   Amlodipine 1/2 tab of 5mg. In PM   Carvedilol 25mg. Tab 2 x day (bfast /lunch )   Klor-con 10meq tab -1 2 x day .  Patient reports the following medication side effects: SOB a little bit with exertion -edema in lower legs/feet despite wearing compression socks.   Patient self monitors blood pressure.  Home BP monitoring see chart below  .     Date Home bp's       6-13-24 139/70 p68             6-11-24 147/83 p93      6-10-24 136/75              6-8-24 128/66 p-71               Potassium   Date Value Ref Range Status   05/22/2024 4.2 3.4 - 5.3 mmol/L Final   05/11/2022 3.8 3.4 - 5.3 mmol/L Final   07/07/2021 4.0 3.4 - 5.3 mmol/L Final         BP Readings from Last 3 Encounters:   06/13/24 137/76   05/22/24 (!) 156/80   05/16/24 (!) 142/82            Afib:  Eliquis 5mg twice daily for stroke prevention.  (Daughter admits when in med part-d donut hole --$$$--any options)?  Patient reports no current medication side effects.    Patient does not have  a history of GI bleed.   Patient does not self-monitor blood pressure.   [id number] score of 5.      Today's Vitals: /76   Pulse 105   Wt 152 lb 11.2 oz (69.3 kg)   SpO2 95%   BMI 26.21 kg/m    ----------------      I spent 60 minutes with this patient today. All changes were made via collaborative practice agreement with Krysten Molina MD, MD. A copy of the visit note was provided to the patient's provider(s).    A summary of these recommendations was given to the patient.    Mandy Matias [facility].  Medication Therapy Management Provider  [phone]           Medication Therapy Recommendations  Type 2 diabetes mellitus with diabetic polyneuropathy, without long-term current use of insulin (H)    Current Medication: Alpha Lipoic Acid 200 MG CAPS (Discontinued)   Rationale: Dose too low - Dosage too low - Effectiveness   Recommendation: Increase Dose - Alpha-Lipoic Acid 600 MG Tabs   Status: Accepted - no CPA Needed         Uncontrolled hypertension    Current Medication: amLODIPine (NORVASC) 5 MG tablet (Discontinued)   Rationale: Undesirable effect - Adverse medication event - Safety   Recommendation: Discontinue Medication   Status: Accepted per CPA

## 2024-06-11 ENCOUNTER — PATIENT OUTREACH (OUTPATIENT)
Dept: CARE COORDINATION | Facility: CLINIC | Age: 89
End: 2024-06-11
Payer: COMMERCIAL

## 2024-06-13 ENCOUNTER — LAB (OUTPATIENT)
Dept: LAB | Facility: CLINIC | Age: 89
End: 2024-06-13
Payer: COMMERCIAL

## 2024-06-13 ENCOUNTER — OFFICE VISIT (OUTPATIENT)
Dept: PHARMACY | Facility: CLINIC | Age: 89
End: 2024-06-13
Payer: COMMERCIAL

## 2024-06-13 VITALS
DIASTOLIC BLOOD PRESSURE: 76 MMHG | BODY MASS INDEX: 26.21 KG/M2 | SYSTOLIC BLOOD PRESSURE: 137 MMHG | HEART RATE: 105 BPM | OXYGEN SATURATION: 95 % | WEIGHT: 152.7 LBS

## 2024-06-13 DIAGNOSIS — E11.42 TYPE 2 DIABETES MELLITUS WITH DIABETIC POLYNEUROPATHY, WITHOUT LONG-TERM CURRENT USE OF INSULIN (H): ICD-10-CM

## 2024-06-13 DIAGNOSIS — I48.0 PAROXYSMAL ATRIAL FIBRILLATION (H): ICD-10-CM

## 2024-06-13 DIAGNOSIS — R60.0 BILATERAL LOWER EXTREMITY EDEMA: ICD-10-CM

## 2024-06-13 DIAGNOSIS — I10 HYPERTENSION GOAL BP (BLOOD PRESSURE) < 140/90: Primary | ICD-10-CM

## 2024-06-13 PROCEDURE — 99605 MTMS BY PHARM NP 15 MIN: CPT | Performed by: PHARMACIST

## 2024-06-13 PROCEDURE — 36415 COLL VENOUS BLD VENIPUNCTURE: CPT

## 2024-06-13 PROCEDURE — 99607 MTMS BY PHARM ADDL 15 MIN: CPT | Performed by: PHARMACIST

## 2024-06-13 PROCEDURE — 80048 BASIC METABOLIC PNL TOTAL CA: CPT

## 2024-06-13 RX ORDER — PERPHENAZINE 16 MG
600 TABLET ORAL DAILY
COMMUNITY

## 2024-06-13 RX ORDER — ELECTROLYTES/DEXTROSE
1 SOLUTION, ORAL ORAL DAILY
COMMUNITY

## 2024-06-13 RX ORDER — LANOLIN ALCOHOL/MO/W.PET/CERES
1000 CREAM (GRAM) TOPICAL DAILY
COMMUNITY

## 2024-06-13 RX ORDER — FUROSEMIDE 20 MG
20 TABLET ORAL DAILY
Qty: 90 TABLET | Refills: 0 | Status: SHIPPED | OUTPATIENT
Start: 2024-06-13 | End: 2024-06-13

## 2024-06-13 NOTE — LETTER
June 18, 2024      Aundrea Treviño  [address]   [place] [address]        Dear ,    We are writing to inform you of your test results.    Your lab results are stable. Please let us know if you have any questions.     Resulted Orders   Basic metabolic panel  (Ca, Cl, CO2, Creat, Gluc, K, Na, BUN)   Result Value Ref Range    Sodium 140 135 - 145 mmol/L      Comment:      Reference intervals for this test were updated on 09/26/2023 to more accurately reflect our healthy population. There may be differences in the flagging of prior results with similar values performed with this method. Interpretation of those prior results can be made in the context of the updated reference intervals.     Potassium 4.4 3.4 - 5.3 mmol/L    Chloride 104 98 - 107 mmol/L    Carbon Dioxide (CO2) 23 22 - 29 mmol/L    Anion Gap 13 7 - 15 mmol/L    Urea Nitrogen 16.6 8.0 - 23.0 mg/dL    Creatinine 0.87 0.51 - 0.95 mg/dL    GFR Estimate 63 >60 mL/min/1.73m2      Comment:      eGFR calculated using 2021 CKD-EPI equation.    Calcium 9.4 8.8 - 10.2 mg/dL    Glucose 182 (H) 70 - 99 mg/dL       If you have any questions or concerns, please call the clinic at the number listed above.       Sincerely,      MALLORY/ Krysten Molina MD

## 2024-06-13 NOTE — LETTER
_  Medication List        Prepared on: 06/13/2024     Bring your Medication List when you go to the doctor, hospital, or   emergency room. And, share it with your family or caregivers.     Note any changes to how you take your medications.  Cross out medications when you no longer use them.    Medication How I take it Why I use it Prescriber   acetaminophen (TYLENOL) 500 MG tablet Take 2 tablets (1,000 mg) by mouth 3 times daily Status post total replacement of right hip TIMOTEO Palmer CNP   Alfalfa 650 MG TABS Take 2 capsules by mouth 2 times daily For arthritis pain  General health  Patient Reported   alpha-lipoic acid 600 MG capsule Take 600 mg by mouth daily  General health  Patient Reported   apixaban ANTICOAGULANT (ELIQUIS ANTICOAGULANT) 5 MG tablet TAKE ONE TABLET BY MOUTH TWICE DAILY Paroxysmal Atrial Fibrillation (H) Krysten Molina MD   calcium carbonate 750 MG CHEW Take 750 mg by mouth 2 times daily   Bone health  Patient Reported   carvedilol (COREG) 25 MG tablet TAKE 1 TABLET (25 MG) BY MOUTH 2 TIMES DAILY WITH MEALS SOB (Shortness of Breath); Essential hypertension with goal blood pressure less than 140/90 Krysten Molina MD   cyanocobalamin (VITAMIN B-12) 1000 MCG tablet Take 1,000 mcg by mouth daily  General health  Patient Reported   FLAXSEED (LINSEED) PO POWD Take 1 Tablespoonful by mouth every morning  DM w/o Complication Type II Johana John MD   furosemide (LASIX) 20 MG tablet Take 1 tablet (20 mg) by mouth daily Bilateral lower extremity edema Krysten Molina MD   hylan (SYNVISC ONE) injection 48 mg   Primary osteoarthritis of right knee Lucian Ambrosio, DO   lidocaine 1 % injection 3 mL   Osteoarthritis of right glenohumeral joint Lucian Ambrosio, DO   lidocaine 1 % injection 3 mL   Osteoarthritis of right glenohumeral joint Lucian Ambrosio, DO   lidocaine 1 % injection 3 mL   Primary osteoarthritis of right knee Lucian Ambrosio, DO   lidocaine 1 % injection 4 mL   Osteoarthritis of  right glenohumeral joint Lucian Ambrosio DO   lidocaine 1 % injection 4 mL   Osteoarthritis of right glenohumeral joint Lucian Ambrosio DO   lidocaine 1 % injection 4 mL   Osteoarthritis of right glenohumeral joint Lucian Ambrosio DO   lisinopril (ZESTRIL) 40 MG tablet Take 1 tablet (40 mg) by mouth daily Hypertension Goal BP (Blood Pressure) < 140/90 Krysten Molina MD   metFORMIN (GLUCOPHAGE) 500 MG tablet Take 1 tablet (500 mg) by mouth daily (with dinner) Type 2 diabetes mellitus with diabetic polyneuropathy, without long-term current use of insulin (H) Krysten Molina MD   methylPREDNISolone (DEPO-Medrol) injection 40 mg   Osteoarthritis of right glenohumeral joint Lucian Ambrosio DO   methylPREDNISolone (DEPO-Medrol) injection 40 mg   Osteoarthritis of right glenohumeral joint Lucian Ambrosio DO   nystatin (MYCOSTATIN) 109116 UNIT/GM external ointment Apply topically 3 times daily To vaginal and groin area. Mixed Incontinence Urge and Stress (Male)(Female) Kristal Berg DNP   potassium chloride nikhil ER (KLOR-CON M10) 10 MEQ CR tablet Take 1 tablet (10 mEq) by mouth 2 times daily Bilateral lower extremity edema Krysten Molina MD   Pyridoxine HCl (VITAMIN B6) 100 MG TABS Take 1 tablet by mouth daily  General health  Patient Reported   triamcinolone (ARISTOCORT HP) 0.5 % external cream Apply topically 2 times daily to affected area as directed. Itching Krysten Molina MD   triamcinolone (KENALOG-40) injection 40 mg   Osteoarthritis of right glenohumeral joint Lucian Ambrosio DO   VITAMIN D 1000 UNIT OR TABS Take 2,000 Units by mouth at bedtime  General health  Terry Mota MD         Add new medications, over-the-counter drugs, herbals, vitamins, or  minerals in the blank rows below.    Medication How I take it Why I use it Prescriber                                      Allergies:      - Amlodipine  - Apple Juice  - Apple [chromium]  - Food  - Kiwi  - Nuts  - Pear  - Tape [adhesive Tape]        Side effects I  have had:      Not on File        Other Information:              My notes and questions:

## 2024-06-13 NOTE — LETTER
June 13, 2024  Aundrea Treviño  [address]   [place] [address]    Dear Ms. Treviño, GUILHERME [facility]     Thank you for talking with me on Jun 13, 2024 about your health and medications. As a follow-up to our conversation, I have included two documents:      Your Recommended To-Do List has steps you should take to get the best results from your medications.  Your Medication List will help you keep track of your medications and how to take them.    If you want to talk about these documents, please call Mandy Matias RPH at phone: [phone], Monday-Friday 8-4:30pm.    I look forward to working with you and your doctors to make sure your medications work well for you.    Sincerely,  Mandy Matias RPH  [facility] Pharmacist, [facility]

## 2024-06-13 NOTE — Clinical Note
Krysten--thx for mtm referral --cleaned up her med loist and also she has significan LLE again on amlodipine so with passing Blood Pressure today since addition of lasix --will triall off amlodipine , repeat bmp lab today , she will see juany lawler in July and me again in late August for Blood Pressure recheck.  Lion-minnie

## 2024-06-13 NOTE — PATIENT INSTRUCTIONS
"Recommendations from today's MTM visit:                                                    MTM (medication therapy management) is a service provided by a clinical pharmacist designed to help you get the most of out of your medicines.   Today we reviewed what your medicines are for, how to know if they are working, that your medicines are safe and how to make your medicine regimen as easy as possible.      1. Blood Pressure today 137/76mmhg.-- excellent -- but you have alot of lower leg/feet edema(swelling) --lets STOP all the amlodipine for now --take daily home Blood Pressure's with goal of <140/90mmhg.   Wear your compression socks daily.     2. Lets have you try 600mg./once /day of alpha lipoic acid to see if this dose helps your numbness in your neuropathy.     3. FYI--for your Eliquis --[place] you get [place] the [place] --contact drug  -see if you qualify for lower cost drug.         Follow-up: Return in about 10 weeks (around 8/22/2024), or @ 1 PM, for BP Recheck, Medication Therapy Management Visit.(Please bring your home Blood Pressure meter to your next visit).     It was great speaking with you today.  I value your experience and would be very thankful for your time in providing feedback in our clinic survey. In the next few days, you may receive an email or text message from [employer] with a link to a survey related to your  clinical pharmacist.\"     To schedule another MTM appointment, please call the clinic directly or you may call the MTM scheduling line at [phone] or toll-free at [phone].     My Clinical Pharmacist's contact information:                                                      Please feel free to contact me with any questions or concerns you have.      Mandy Matias Rp.  Medication Therapy Management Provider  [phone]    "

## 2024-06-13 NOTE — LETTER
"Recommended To-Do List      Prepared on: 06/13/2024       You can get the best results from your medications by completing the items on this \"To-Do List.\"      Bring your To-Do List when you go to your doctor. And, share it with your family or caregivers.    My To-Do List:  What we talked about: What I should do:   Your medication dosage being too low    Increase your dosage of Alpha Lipoic Acid now to 600mg./day --try daily for 6 months -see if any improvement?          What we talked about: What I should do:   An issue with your medication    Stop taking amLODIPine - this blood pressure pill can cause fluid /swelling in your feet/legs .          What we talked about: What I should do:                     "

## 2024-06-14 LAB
ANION GAP SERPL CALCULATED.3IONS-SCNC: 13 MMOL/L (ref 7–15)
BUN SERPL-MCNC: 16.6 MG/DL (ref 8–23)
CALCIUM SERPL-MCNC: 9.4 MG/DL (ref 8.8–10.2)
CHLORIDE SERPL-SCNC: 104 MMOL/L (ref 98–107)
CREAT SERPL-MCNC: 0.87 MG/DL (ref 0.51–0.95)
DEPRECATED HCO3 PLAS-SCNC: 23 MMOL/L (ref 22–29)
EGFRCR SERPLBLD CKD-EPI 2021: 63 ML/MIN/1.73M2
GLUCOSE SERPL-MCNC: 182 MG/DL (ref 70–99)
POTASSIUM SERPL-SCNC: 4.4 MMOL/L (ref 3.4–5.3)
SODIUM SERPL-SCNC: 140 MMOL/L (ref 135–145)

## 2024-06-28 DIAGNOSIS — R06.02 SOB (SHORTNESS OF BREATH): ICD-10-CM

## 2024-06-28 DIAGNOSIS — I10 ESSENTIAL HYPERTENSION WITH GOAL BLOOD PRESSURE LESS THAN 140/90: ICD-10-CM

## 2024-06-28 RX ORDER — CARVEDILOL 25 MG/1
TABLET ORAL
Qty: 180 TABLET | Refills: 2 | Status: SHIPPED | OUTPATIENT
Start: 2024-06-28

## 2024-07-02 ENCOUNTER — OFFICE VISIT (OUTPATIENT)
Dept: CARDIOLOGY | Facility: CLINIC | Age: 89
End: 2024-07-02
Attending: FAMILY MEDICINE
Payer: COMMERCIAL

## 2024-07-02 ENCOUNTER — HOSPITAL ENCOUNTER (OUTPATIENT)
Dept: CARDIOLOGY | Facility: CLINIC | Age: 89
Discharge: HOME | End: 2024-07-02
Attending: FAMILY MEDICINE | Admitting: FAMILY MEDICINE
Payer: COMMERCIAL

## 2024-07-02 VITALS
DIASTOLIC BLOOD PRESSURE: 92 MMHG | WEIGHT: 147.2 LBS | OXYGEN SATURATION: 97 % | HEIGHT: 64 IN | SYSTOLIC BLOOD PRESSURE: 167 MMHG | BODY MASS INDEX: 25.13 KG/M2 | HEART RATE: 82 BPM

## 2024-07-02 DIAGNOSIS — I48.0 PAROXYSMAL ATRIAL FIBRILLATION (H): ICD-10-CM

## 2024-07-02 DIAGNOSIS — R06.02 SHORTNESS OF BREATH: ICD-10-CM

## 2024-07-02 DIAGNOSIS — R60.0 BILATERAL LOWER EXTREMITY EDEMA: ICD-10-CM

## 2024-07-02 DIAGNOSIS — Z79.01 CHRONIC ANTICOAGULATION: ICD-10-CM

## 2024-07-02 LAB — LVEF ECHO: NORMAL

## 2024-07-02 PROCEDURE — 93000 ELECTROCARDIOGRAM COMPLETE: CPT | Performed by: INTERNAL MEDICINE

## 2024-07-02 PROCEDURE — 93306 TTE W/DOPPLER COMPLETE: CPT | Mod: 26 | Performed by: INTERNAL MEDICINE

## 2024-07-02 PROCEDURE — 99204 OFFICE O/P NEW MOD 45 MIN: CPT | Mod: 25 | Performed by: INTERNAL MEDICINE

## 2024-07-02 PROCEDURE — 93306 TTE W/DOPPLER COMPLETE: CPT

## 2024-07-02 NOTE — PROGRESS NOTES
CARDIOLOGY CLINIC CONSULTATION      REASON FOR CONSULT:   Lymphedema, TR    PRIMARY CARE PHYSICIAN:  Krysten Molina MD        History of Present Illness   Aundrea Treviño is an extremely pleasant 89 year old female here as a new patient for evaluation of lymphedema and TR.  Medical history is significant for chronic AF, mild-moderate TR, HTN, NIDDM2, and chronic venous stasis.  She was last seen in our system by Dr. Geeta Christian in 2019.      She saw her PCP, Dr. Krysten Molina, in 5/2024, due to progressive LE swelling.  This was felt to be primarily due to dependent edema, but could not exclude decompensated HF / progressive TR.  A repeat TTE was ordered given moderate TR on prior echo from 2017, and was completed earlier today, showing normal bi-V function, mild-moderate TR, and estimated RA pressure of 8 mmHg (intermediate).  She was started on low dose Lasix (20 mg daily) in place of her hydrochlorothiazide, and her amlodipine was stopped a few weeks later.  She tells me that her LE swelling has significantly improved, though has not completely gone away.  No other cardiac complaints.  No significant bleeding issues on the Eliquis.    As part of today's visit, I reviewed her most recent TTE, lipid panel, BMP, CBC, and A1c, as well as her ECG in clinic today (AF with controlled rate and unchanged anterior Q-waves)      Assessment & Plan     Bilateral LE edema, consistent with venous stasis, improving with low-dose Lasix  Mild-moderate TR, unlikely to be contributing significantly to LE edema  Chronic AF, on Eliquis  HTN, poorly controlled at present  NIDDM 2      It was a pleasure to meet with Aundrea in clinic today.  We discussed the results of her echo, and thankfully this in combination with her exam is not suggestive of a cardiac etiology for her LE edema.  I agree that this is most consistent with venous stasis (possibly a component of amlodipine side effect as well).  My only suggestion for them was to  consider seeing the lymphedema clinic, but they tell me this has already been set up, which I think is great.  Accordingly, I would encourage them to keep this appointment, and follow whatever recommendations are given there.  BP is up today in clinic, but I will have her continue to work on this with her PCP who will follow her in the long term.      Follow-up:  No routine cardiology follow-up is required at this time.  However, we would be happy to see Aundrea back at any time with future questions or concerns.        Edd Diego MD  Interventional Cardiology  July 2, 2024      The longitudinal plan of care for the diagnosis(es)/condition(s) as documented were addressed during this visit. Due to the added complexity in care, I will continue to support Aundrea in the subsequent management and with ongoing continuity of care.        Medications   Current Outpatient Medications   Medication Sig Dispense Refill    acetaminophen (TYLENOL) 500 MG tablet Take 2 tablets (1,000 mg) by mouth 3 times daily      Alfalfa 650 MG TABS Take 2 capsules by mouth 2 times daily For arthritis pain      alpha-lipoic acid 600 MG capsule Take 600 mg by mouth daily      apixaban ANTICOAGULANT (ELIQUIS ANTICOAGULANT) 5 MG tablet TAKE ONE TABLET BY MOUTH TWICE DAILY 180 tablet 3    calcium carbonate 750 MG CHEW Take 750 mg by mouth 2 times daily       carvedilol (COREG) 25 MG tablet TAKE 1 TABLET (25 MG) BY MOUTH 2 TIMES DAILY WITH MEALS 180 tablet 2    cyanocobalamin (VITAMIN B-12) 1000 MCG tablet Take 1,000 mcg by mouth daily      FLAXSEED (LINSEED) PO POWD Take 1 Tablespoonful by mouth every morning       furosemide (LASIX) 20 MG tablet Take 1 tablet (20 mg) by mouth daily 90 tablet 1    lisinopril (ZESTRIL) 40 MG tablet Take 1 tablet (40 mg) by mouth daily 90 tablet 3    metFORMIN (GLUCOPHAGE) 500 MG tablet Take 1 tablet (500 mg) by mouth daily (with dinner) 90 tablet 3    nystatin (MYCOSTATIN) 389394 UNIT/GM external ointment  Apply topically 3 times daily To vaginal and groin area. 30 g 11    potassium chloride nikhil ER (KLOR-CON M10) 10 MEQ CR tablet Take 1 tablet (10 mEq) by mouth 2 times daily 90 tablet 1    Pyridoxine HCl (VITAMIN B6) 100 MG TABS Take 1 tablet by mouth daily      triamcinolone (ARISTOCORT HP) 0.5 % external cream Apply topically 2 times daily to affected area as directed. 15 g 0    VITAMIN D 1000 UNIT OR TABS Take 2,000 Units by mouth at bedtime 30 0     Current Facility-Administered Medications   Medication Dose Route Frequency Provider Last Rate Last Admin    hylan (SYNVISC ONE) injection 48 mg  48 mg      48 mg at 05/16/24 1322    lidocaine 1 % injection 3 mL  3 mL      3 mL at 05/16/24 1322    lidocaine 1 % injection 3 mL  3 mL   SupikLucian A, DO   3 mL at 02/01/24 1349    lidocaine 1 % injection 3 mL  3 mL   SupikLucian A, DO   3 mL at 09/07/23 1342    lidocaine 1 % injection 4 mL  4 mL   SupLucian mendez A, DO   4 mL at 02/01/24 1349    lidocaine 1 % injection 4 mL  4 mL   SupikLcuian A, DO   4 mL at 09/07/23 1342    lidocaine 1 % injection 4 mL  4 mL   SupikLucian A, DO   4 mL at 07/27/23 1733    methylPREDNISolone (DEPO-Medrol) injection 40 mg  40 mg   Supik Everette, DO   40 mg at 02/01/24 1349    methylPREDNISolone (DEPO-Medrol) injection 40 mg  40 mg   Supik Everette, DO   40 mg at 09/07/23 1342    triamcinolone (KENALOG-40) injection 40 mg  40 mg   Supik, Everette, DO   40 mg at 07/27/23 1733     Allergies   Allergies   Allergen Reactions    Amlodipine      Swollen ankles and rash but tolerates 2.5 mg without any symptoms    Apple Juice     Apple [Chromium]     Food     Kiwi     Nuts     Pear     Tape [Adhesive Tape]          Physical Exam       BP: (!) 167/92 Pulse: 82     SpO2: 97 %      Vital Signs with Ranges  Pulse:  [82] 82  BP: (167)/(92) 167/92  SpO2:  [97 %] 97 %  147 lbs 3.2 oz    Constitutional:  Well appearing, NAD  Respiratory: Normal respiratory effort, CTAB  Cardiovascular: Irregularly  irregular, normal rate, 2/6 holosystolic murmur.  JVP < 7 cm H2O.  There is 2+ bilateral LE edema.  Normal carotid upstrokes, no carotid bruits.

## 2024-07-02 NOTE — LETTER
7/2/2024    Krysten Molina MD, MD  [address]  [place] [address]    RE: Aundrea Treviño       Dear Colleague,     I had the pleasure of seeing Aundrea Treviño in the [facility].  CARDIOLOGY CLINIC CONSULTATION      REASON FOR CONSULT:   Lymphedema, TR    PRIMARY CARE PHYSICIAN:  Krysten Molina MD        History of Present Illness  Aundrea Treviño is an extremely pleasant 89 year old female here as a new patient for evaluation of lymphedema and TR.  Medical history is significant for chronic AF, mild-moderate TR, HTN, NIDDM2, and chronic venous stasis.  She was last seen in our system by Dr. Geeta Christian in 2019.      She saw her PCP, Dr. Krysten Molina, in 5/2024, due to progressive LE swelling.  This was felt to be primarily due to dependent edema, but could not exclude decompensated HF / progressive TR.  A repeat TTE was ordered given moderate TR on prior echo from 2017, and was completed earlier today, showing normal bi-V function, mild-moderate TR, and estimated RA pressure of 8 mmHg (intermediate).  She was started on low dose Lasix (20 mg daily) in place of her hydrochlorothiazide, and her amlodipine was stopped a few weeks later.  She tells me that her LE swelling has significantly improved, though has not completely gone away.  No other cardiac complaints.  No significant bleeding issues on the Eliquis.    As part of today's visit, I reviewed her most recent TTE, lipid panel, BMP, CBC, and A1c, as well as her ECG in clinic today (AF with controlled rate and unchanged anterior Q-waves)      Assessment & Plan    Bilateral LE edema, consistent with venous stasis, improving with low-dose Lasix  Mild-moderate TR, unlikely to be contributing significantly to LE edema  Chronic AF, on Eliquis  HTN, poorly controlled at present  NIDDM 2      It was a pleasure to meet with Aundrea in clinic today.  We discussed the results of her echo, and thankfully this in combination with her exam  is not suggestive of a cardiac etiology for her LE edema.  I agree that this is most consistent with venous stasis (possibly a component of amlodipine side effect as well).  My only suggestion for them was to consider seeing the lymphedema clinic, but they tell me this has already been set up, which I think is great.  Accordingly, I would encourage them to keep this appointment, and follow whatever recommendations are given there.  BP is up today in clinic, but I will have her continue to work on this with her PCP who will follow her in the long term.      Follow-up:  No routine cardiology follow-up is required at this time.  However, we would be happy to see Aundrea back at any time with future questions or concerns.        Edd Diego MD  Interventional Cardiology  July 2, 2024      The longitudinal plan of care for the diagnosis(es)/condition(s) as documented were addressed during this visit. Due to the added complexity in care, I will continue to support Aundrea in the subsequent management and with ongoing continuity of care.        Medications  Current Outpatient Medications   Medication Sig Dispense Refill    acetaminophen (TYLENOL) 500 MG tablet Take 2 tablets (1,000 mg) by mouth 3 times daily      Alfalfa 650 MG TABS Take 2 capsules by mouth 2 times daily For arthritis pain      alpha-lipoic acid 600 MG capsule Take 600 mg by mouth daily      apixaban ANTICOAGULANT (ELIQUIS ANTICOAGULANT) 5 MG tablet TAKE ONE TABLET BY MOUTH TWICE DAILY 180 tablet 3    calcium carbonate 750 MG CHEW Take 750 mg by mouth 2 times daily       carvedilol (COREG) 25 MG tablet TAKE 1 TABLET (25 MG) BY MOUTH 2 TIMES DAILY WITH MEALS 180 tablet 2    cyanocobalamin (VITAMIN B-12) 1000 MCG tablet Take 1,000 mcg by mouth daily      FLAXSEED (LINSEED) PO POWD Take 1 Tablespoonful by mouth every morning       furosemide (LASIX) 20 MG tablet Take 1 tablet (20 mg) by mouth daily 90 tablet 1    lisinopril (ZESTRIL) 40 MG tablet Take 1  tablet (40 mg) by mouth daily 90 tablet 3    metFORMIN (GLUCOPHAGE) 500 MG tablet Take 1 tablet (500 mg) by mouth daily (with dinner) 90 tablet 3    nystatin (MYCOSTATIN) 775985 UNIT/GM external ointment Apply topically 3 times daily To vaginal and groin area. 30 g 11    potassium chloride nikhil ER (KLOR-CON M10) 10 MEQ CR tablet Take 1 tablet (10 mEq) by mouth 2 times daily 90 tablet 1    Pyridoxine HCl (VITAMIN B6) 100 MG TABS Take 1 tablet by mouth daily      triamcinolone (ARISTOCORT HP) 0.5 % external cream Apply topically 2 times daily to affected area as directed. 15 g 0    VITAMIN D 1000 UNIT OR TABS Take 2,000 Units by mouth at bedtime 30 0     Current Facility-Administered Medications   Medication Dose Route Frequency Provider Last Rate Last Admin    hylan (SYNVISC ONE) injection 48 mg  48 mg      48 mg at 05/16/24 1322    lidocaine 1 % injection 3 mL  3 mL      3 mL at 05/16/24 1322    lidocaine 1 % injection 3 mL  3 mL   Lucian Ambrosio A, DO   3 mL at 02/01/24 1349    lidocaine 1 % injection 3 mL  3 mL   SupikLucian A, DO   3 mL at 09/07/23 1342    lidocaine 1 % injection 4 mL  4 mL   SupLucian mendez A, DO   4 mL at 02/01/24 1349    lidocaine 1 % injection 4 mL  4 mL   SupLucian mendez A, DO   4 mL at 09/07/23 1342    lidocaine 1 % injection 4 mL  4 mL   SupLucian mendez A, DO   4 mL at 07/27/23 1733    methylPREDNISolone (DEPO-Medrol) injection 40 mg  40 mg   Lucian Ambrosio A, DO   40 mg at 02/01/24 1349    methylPREDNISolone (DEPO-Medrol) injection 40 mg  40 mg   Supik Everette, DO   40 mg at 09/07/23 1342    triamcinolone (KENALOG-40) injection 40 mg  40 mg   Hebert Everette, DO   40 mg at 07/27/23 1733     Allergies  Allergies   Allergen Reactions    Amlodipine      Swollen ankles and rash but tolerates 2.5 mg without any symptoms    Apple Juice     Apple [Chromium]     Food     Kiwi     Nuts     Pear     Tape [Adhesive Tape]          Physical Exam      BP: (!) 167/92 Pulse: 82     SpO2: 97 %      Vital Signs with  Ranges  Pulse:  [82] 82  BP: (167)/(92) 167/92  SpO2:  [97 %] 97 %  147 lbs 3.2 oz    Constitutional:  Well appearing, NAD  Respiratory: Normal respiratory effort, CTAB  Cardiovascular: Irregularly irregular, normal rate, 2/6 holosystolic murmur.  JVP < 7 cm H2O.  There is 2+ bilateral LE edema.  Normal carotid upstrokes, no carotid bruits.      Thank you for allowing me to participate in the care of your patient.      Sincerely,     Edd Diego MD     [facility]  cc:   Krysten Molina MD  [address] [place] [address]

## 2024-08-10 DIAGNOSIS — I10 HYPERTENSION GOAL BP (BLOOD PRESSURE) < 140/90: ICD-10-CM

## 2024-08-10 DIAGNOSIS — R60.0 BILATERAL LOWER EXTREMITY EDEMA: ICD-10-CM

## 2024-08-12 RX ORDER — POTASSIUM CHLORIDE 750 MG/1
10 TABLET, EXTENDED RELEASE ORAL 2 TIMES DAILY
Qty: 90 TABLET | Refills: 2 | Status: SHIPPED | OUTPATIENT
Start: 2024-08-12

## 2024-08-14 RX ORDER — LISINOPRIL 40 MG/1
40 TABLET ORAL DAILY
Qty: 90 TABLET | Refills: 0 | Status: SHIPPED | OUTPATIENT
Start: 2024-08-14

## 2024-08-15 ENCOUNTER — PATIENT OUTREACH (OUTPATIENT)
Dept: CARE COORDINATION | Facility: CLINIC | Age: 89
End: 2024-08-15
Payer: COMMERCIAL

## 2024-08-20 NOTE — PROGRESS NOTES
Medication Therapy Management (MTM) Encounter    ASSESSMENT:                            Medication Adherence/Access: No issues identified    Hypertension:   Stable. Patient is meeting blood pressure goal of < 140/90mmHg.      Diabetes:   Stable.  Patient is meeting A1c goal of < 7%.  DPNP: stable on 600mg./day ALA.    Afib:  Stable. ( See plan for eliquis help when in med part-d donut [facility] this year).           PLAN:                            1. Please call Eliquis drug  to ask if you qualify for reduced cost drug :  Our live specialists can contact your insurance company to research your prescription insurance benefits for ELIQUIS and explain them to you--including whether ELIQUIS is covered by your plan. Before we can contact your health plan to confirm your coverage details, you must sign a Patient Authorization and Agreement form. You can provide your e-signature by visiting [email or web address]. If you have questions along the way, our live specialists are available at [phone]), Monday - Friday 8 AM - 8 PM ET.    2. Blood Pressure today = 129/74 mmhg in clinic ,  134/71mmhg on your home meter --both very good. Continue all same medications as is .     3. Happy 89th birthday on the [birth year]th !          Follow-up: See . Dr. Molina this fall, refer back to me if any medication issues arise.     SUBJECTIVE/OBJECTIVE:                          Aundrea Treviño is a 89 year old female coming in for a follow-up (6-13-24) visit. She was referred to me from Krysten Molina  . Patient was accompanied by daughter Minerva. .     Reason for visit:   Blood Pressure recheck    Allergies/ADRs: Reviewed in chart  Past Medical History: Reviewed in chart  Tobacco: She reports that she quit smoking about 64 years ago. Her smoking use included cigarettes. She has never used smokeless tobacco.  Alcohol: not currently using  Other Substance Use: none   E-cigarette Use: none   Caffeine: decaff daily   Social:  retired -[employer] calls qam with her friends.   Activity: walks with walker         Medication Adherence/Access: Patient daughter Minerva set up her pill box(es).    Diabetes   Metformin 500mg with dinner daily.   Not taking aspirin due to Eliquis   Patient is not experiencing side effects.  Current diabetes symptoms: none  Blood sugar monitoring: never  Diet/Exercise: eats as healthy as she can. Not much exercise --walks with a walker now.      Eye exam is up to date  Foot exam: has numbness in both feet  all the way up to her knees.   DPNP:  600mg /day pill --still numbness a bit in feet -higher dose not making any difference ...    Lab Results   Component Value Date    A1C 6.4 05/22/2024    A1C 6.6 01/25/2024    A1C 6.8 06/26/2023    A1C 6.8 05/11/2022    A1C 6.6 07/07/2021    A1C 5.9 08/20/2020    A1C 6.1 09/23/2019    A1C 6.9 05/20/2019    A1C 6.7 02/18/2019         Hypertension   Furosemide 20mg . In am (added 5-2024).  Lisinopril 40mg in am   Amlodipine -stopped this drug 6-13-24 due to LLE. (Lost 7+ lbs fluid off the drug --feeling much better).   Carvedilol 25mg. Tab 2 x day (bfast /lunch )   Klor-con 10meq tab -2 x day .  Patient reports the following medication side effects: SOB a little bit with exertion -edema in lower legs/feet despite wearing compression socks.   Patient self monitors blood pressure.  Home BP monitoring see chart below  .     Today she brought hr home arm cuff Blood Pressure meter to clinic 8-22-24 = left arm -150/83  p=89  2nd reading -10 minutes later on rt. Arm = 134/71mmhg.      Date Home bp's      8-22-24 123/81 p57      8-21 129/62 p-69      8-20 134/83 p=107 -felt rushed      8-19 140/70 p-64      8-18 110/62 p=61      8-17 117/62 p=75      8-16 120/59 p=102              Date Home bp's       6-13-24 139/70 p68             6-11-24 147/83 p93      6-10-24 136/75              6-8-24 128/66 p-71               Potassium   Date Value Ref Range Status   06/13/2024 4.4 3.4 - 5.3 mmol/L  Final   05/11/2022 3.8 3.4 - 5.3 mmol/L Final   07/07/2021 4.0 3.4 - 5.3 mmol/L Final         BP Readings from Last 3 Encounters:   08/22/24 129/74   07/02/24 (!) 167/92   06/13/24 137/76            Afib:  Eliquis 5mg twice daily for stroke prevention.  ([facility])?  Patient reports no current medication side effects.    Patient does not have a history of GI bleed.   Patient does not self-monitor blood pressure.   [id number] score of 5.      Today's Vitals: /74   Pulse 71   Wt 145 lb 8 oz (66 kg)   SpO2 93%   BMI 24.98 kg/m    ----------------      I spent 30 minutes with this patient today. All changes were made via collaborative practice agreement with Krysten Molina MD, MD. A copy of the visit note was provided to the patient's provider(s).    A summary of these recommendations was given to the patient.    Mandy Matias kurt.  Medication Therapy Management Provider  [phone]           Medication Therapy Recommendations  No medication therapy recommendations to display

## 2024-08-22 ENCOUNTER — OFFICE VISIT (OUTPATIENT)
Dept: PHARMACY | Facility: CLINIC | Age: 89
End: 2024-08-22
Payer: COMMERCIAL

## 2024-08-22 VITALS
DIASTOLIC BLOOD PRESSURE: 74 MMHG | SYSTOLIC BLOOD PRESSURE: 129 MMHG | BODY MASS INDEX: 24.98 KG/M2 | OXYGEN SATURATION: 93 % | HEART RATE: 71 BPM | WEIGHT: 145.5 LBS

## 2024-08-22 DIAGNOSIS — R60.0 BILATERAL LOWER EXTREMITY EDEMA: ICD-10-CM

## 2024-08-22 DIAGNOSIS — E11.42 TYPE 2 DIABETES MELLITUS WITH DIABETIC POLYNEUROPATHY, WITHOUT LONG-TERM CURRENT USE OF INSULIN (H): Primary | ICD-10-CM

## 2024-08-22 DIAGNOSIS — I10 HYPERTENSION GOAL BP (BLOOD PRESSURE) < 140/90: ICD-10-CM

## 2024-08-22 DIAGNOSIS — I48.0 PAROXYSMAL ATRIAL FIBRILLATION (H): ICD-10-CM

## 2024-08-22 PROCEDURE — 99606 MTMS BY PHARM EST 15 MIN: CPT | Performed by: PHARMACIST

## 2024-08-22 NOTE — Clinical Note
Krysten--happy to report Blood Pressure wnl today and off amlodipine -dropped 7 lbs. Fluid --legs much less edema -Mandy

## 2024-08-22 NOTE — PATIENT INSTRUCTIONS
"Recommendations from today's MTM visit:                                                         1. Please call Eliquis drug  to ask if you qualify for reduced cost drug :  Our live specialists can contact your insurance company to research your prescription insurance benefits for ELIQUIS and explain them to you--including whether ELIQUIS is covered by your plan. Before we can contact your health plan to confirm your coverage details, you must sign a Patient Authorization and Agreement form. You can provide your e-signature by visiting [email or web address]. If you have questions along the way, our live specialists are available at [phone]), Monday - Friday 8 AM - 8 PM ET.    2. Blood Pressure today = 129/74 mmhg in clinic ,  134/71mmhg on your home meter --both very good. Continue all same medications as is .     3. Happy 89th birthday on the [birth year]th !          Follow-up: See . Dr. Molina this fall, refer back to me if any medication issues arise.     It was great speaking with you today.  I value your experience and would be very thankful for your time in providing feedback in our clinic survey. In the next few days, you may receive an email or text message from [employer] with a link to a survey related to your  clinical pharmacist.\"     To schedule another MTM appointment, please call the clinic directly or you may call the MTM scheduling line at [phone] or toll-free at [phone].     My Clinical Pharmacist's contact information:                                                      Please feel free to contact me with any questions or concerns you have.      Mandy Matias Rph.  Medication Therapy Management Provider  [phone]    "

## 2024-09-05 ENCOUNTER — TRANSFERRED RECORDS (OUTPATIENT)
Dept: MULTI SPECIALTY CLINIC | Facility: CLINIC | Age: 89
End: 2024-09-05

## 2024-09-05 LAB — RETINOPATHY: NORMAL

## 2024-09-07 DIAGNOSIS — E11.42 TYPE 2 DIABETES MELLITUS WITH DIABETIC POLYNEUROPATHY, WITHOUT LONG-TERM CURRENT USE OF INSULIN (H): ICD-10-CM

## 2024-09-25 DIAGNOSIS — I48.0 PAROXYSMAL ATRIAL FIBRILLATION (H): ICD-10-CM

## 2024-09-25 RX ORDER — APIXABAN 5 MG/1
5 TABLET, FILM COATED ORAL 2 TIMES DAILY
Qty: 180 TABLET | Refills: 0 | Status: SHIPPED | OUTPATIENT
Start: 2024-09-25

## 2024-09-27 ENCOUNTER — TELEPHONE (OUTPATIENT)
Dept: ORTHOPEDICS | Facility: CLINIC | Age: 89
End: 2024-09-27
Payer: COMMERCIAL

## 2024-09-27 NOTE — TELEPHONE ENCOUNTER
Other: Pt;s daughter is calling to see how long in between injections does patient she needs to wait for another one, currently scheduled for 10/23     Could we send this information to you in [facility] or would you prefer to receive a phone call?:   Patient would prefer a phone call   Okay to leave a detailed message?: Yes at Other phone number:  [phone]

## 2024-09-30 NOTE — TELEPHONE ENCOUNTER
Upon chart review patient had right knee Synvisc One injection on 5/16/24.     There is a consent to communicate on file with daughter, Minerva.     Call to Minerva and informed her that it is too soon to repeat Synvsic One injection as those need to be at least 6 months apart. She stated the Synvisc One injection her mom received on 5/16/24 helped for ~4 months.     Patient has not had cortisone injection in the knee but daughter states she would be interested in trying this if Dr. Ambrosio is in agreement.     Patient has appointment scheduled for 10/23/24 and would like to get knee and shoulder CSI.     Writer informed patient that plan is reasonable. We will inform Dr. Ambrosio and office will call Minerva back if Dr. Ambrosio has concerns about plan.     Sylwia Lane, ATC

## 2024-10-02 NOTE — TELEPHONE ENCOUNTER
Called and spoke with patient's daughter. She was pleased to have confirmation that the corticosteroid injection will be done at this next visit, and was agreeable to plan for HA injection in November.    QUENTIN Mcdowell, ATC, LAT

## 2024-10-23 ENCOUNTER — OFFICE VISIT (OUTPATIENT)
Dept: ORTHOPEDICS | Facility: CLINIC | Age: 89
End: 2024-10-23
Payer: COMMERCIAL

## 2024-10-23 DIAGNOSIS — M17.11 PRIMARY OSTEOARTHRITIS OF RIGHT KNEE: ICD-10-CM

## 2024-10-23 DIAGNOSIS — M19.011 OSTEOARTHRITIS OF RIGHT GLENOHUMERAL JOINT: ICD-10-CM

## 2024-10-23 PROCEDURE — 20610 DRAIN/INJ JOINT/BURSA W/O US: CPT | Mod: RT | Performed by: FAMILY MEDICINE

## 2024-10-23 RX ORDER — LIDOCAINE HYDROCHLORIDE 10 MG/ML
4 INJECTION, SOLUTION INFILTRATION; PERINEURAL
Status: COMPLETED | OUTPATIENT
Start: 2024-10-23 | End: 2024-10-23

## 2024-10-23 RX ORDER — TRIAMCINOLONE ACETONIDE 40 MG/ML
40 INJECTION, SUSPENSION INTRA-ARTICULAR; INTRAMUSCULAR
Status: COMPLETED | OUTPATIENT
Start: 2024-10-23 | End: 2024-10-23

## 2024-10-23 RX ORDER — LIDOCAINE HYDROCHLORIDE 10 MG/ML
7 INJECTION, SOLUTION INFILTRATION; PERINEURAL
Status: COMPLETED | OUTPATIENT
Start: 2024-10-23 | End: 2024-10-23

## 2024-10-23 RX ORDER — METHYLPREDNISOLONE ACETATE 40 MG/ML
40 INJECTION, SUSPENSION INTRA-ARTICULAR; INTRALESIONAL; INTRAMUSCULAR; SOFT TISSUE
Status: COMPLETED | OUTPATIENT
Start: 2024-10-23 | End: 2024-10-23

## 2024-10-23 RX ADMIN — LIDOCAINE HYDROCHLORIDE 7 ML: 10 INJECTION, SOLUTION INFILTRATION; PERINEURAL at 13:44

## 2024-10-23 RX ADMIN — TRIAMCINOLONE ACETONIDE 40 MG: 40 INJECTION, SUSPENSION INTRA-ARTICULAR; INTRAMUSCULAR at 13:43

## 2024-10-23 RX ADMIN — METHYLPREDNISOLONE ACETATE 40 MG: 40 INJECTION, SUSPENSION INTRA-ARTICULAR; INTRALESIONAL; INTRAMUSCULAR; SOFT TISSUE at 13:44

## 2024-10-23 RX ADMIN — LIDOCAINE HYDROCHLORIDE 4 ML: 10 INJECTION, SOLUTION INFILTRATION; PERINEURAL at 13:43

## 2024-10-23 NOTE — PROGRESS NOTES
PROCEDURE ENCOUNTER    [facility]  Orthopedics  Lucian Ambrosio,   2024     Name: Aundrea Treviño  MRN: [id number]  Age: 89 year old  : 1934    Referring provider:   Diagnosis:

## 2024-10-23 NOTE — PROGRESS NOTES
Large Joint Injection/Arthocentesis: R knee joint    Date/Time: 10/23/2024 1:43 PM    Performed by: Lucian Ambrosio DO  Authorized by: Lucian Ambrosio DO    Needle Size comment:  23G   Guidance: landmark guided    Approach:  Anterolateral  Location:  Knee      Medications:  40 mg triamcinolone 40 MG/ML; 4 mL lidocaine 1 %  Outcome:  Tolerated well, no immediate complications  Procedure discussed: discussed risks, benefits, and alternatives    Consent Given by:  Patient  Timeout: timeout called immediately prior to procedure    Prep: patient was prepped and draped in usual sterile fashion    Large Joint Injection/Arthocentesis: R glenohumeral joint    Date/Time: 10/23/2024 1:44 PM    Performed by: Lucian Ambrosio DO  Authorized by: Lucian Ambrosio DO    Approach:  Lateral  Location:  Shoulder      Site:  R glenohumeral joint  Medications:  40 mg methylPREDNISolone 40 MG/ML; 7 mL lidocaine 1 %  Outcome:  Tolerated well, no immediate complications  Procedure discussed: discussed risks, benefits, and alternatives    Consent Given by:  Patient  Timeout: timeout called immediately prior to procedure    Prep: patient was prepped and draped in usual sterile fashion

## 2024-10-23 NOTE — PROGRESS NOTES
PROCEDURE [facility]  Orthopedics  Lucian Ambrosio,   2024     Name: Aundrea Treviño  MRN: [id number]  Age: 89 year old  : 1934    Referring provider: SelfSelf  Diagnosis:  1.  Glenohumeral osteoarthritis of right shoulder  2.  Primary osteoarthritis of right knee    Glenohumeral Injection - Ultrasound Guided  The patient was informed of the risks and the benefits of the procedure and a written consent was signed.  The patient s right shoulder was prepped with chlorhexidine in sterile fashion.     Local anesthesia was performed using a 27-gauge 1.5-inch needle to administer 3 mL of 1% lidocaine without epi.    40 mg of methylprednisolone suspension was drawn up into a 5 mL syringe with 3 mL of 1% lidocaine w/o Epi.    Injection was performed using sterile technique.  Under ultrasound guidance a 3.5-inch 22-gauge needle was used to enter the right glenohumeral joint.  Posterior approach was used with the patient in lateral recumbent position, arm in neutral position at the side.  Needle placement was visualized and documented with ultrasound.  Ultrasound visualization was necessary due to the small joint space entered.  Injection performed long axis to the probe.  Injection solution visualized within the joint space.  Images were permanently stored for the patient's record.    There were no complications. The patient tolerated the procedure well. There was negligible bleeding.     PROCEDURE    Right Knee Injection - Intraarticular  The patient was informed of the risks and the benefits of the procedure and a written consent was signed.  The patient s right knee was prepped with chlorhexidine in sterile fashion.   40 mg of triamcinolone suspension was drawn up into a 5 mL syringe with 4 mL of 1% lidocaine.  Injection was performed using substerile technique.  A 1.5-inch 22-gauge needle was used to enter the lateral aspect of the right knee.  Injection performed successfully without  difficulty.  There were no complications. The patient tolerated the procedure well. There was negligible bleeding.   The patient was instructed to ice the knee upon leaving clinic and refrain from overuse over the next 3 days.   The patient was instructed to call or go to the emergency room with any unusual pain, swelling, redness, or if otherwise concerned.  A follow up appointment will be scheduled to evaluate response to the injection, and to assess range of motion and pain.  Wraparound knee brace also dispensed for her to improve stability given valgus instability related to asymmetric lateral compartment severe osteoarthritis.  The patient was instructed to call or go to the emergency room with any unusual pain, swelling, redness, or if otherwise concerned.  Symptom diary recommended. We will review duration of relief and consider whether injection is beneficial to repeat in future or not. Briefly discussed suprascapular nerve block as secondary option.    Lucian Ambrosio DO [facility]  Primary Care Sports Medicine  [facility]

## 2024-11-05 DIAGNOSIS — I10 HYPERTENSION GOAL BP (BLOOD PRESSURE) < 140/90: ICD-10-CM

## 2024-11-05 RX ORDER — LISINOPRIL 40 MG/1
40 TABLET ORAL DAILY
Qty: 90 TABLET | Refills: 0 | Status: SHIPPED | OUTPATIENT
Start: 2024-11-05

## 2024-11-13 ENCOUNTER — TRANSFERRED RECORDS (OUTPATIENT)
Dept: HEALTH INFORMATION MANAGEMENT | Facility: CLINIC | Age: 89
End: 2024-11-13
Payer: COMMERCIAL

## 2024-11-17 DIAGNOSIS — R60.0 BILATERAL LOWER EXTREMITY EDEMA: ICD-10-CM

## 2024-11-17 RX ORDER — FUROSEMIDE 20 MG/1
20 TABLET ORAL DAILY
Qty: 90 TABLET | Refills: 1 | Status: SHIPPED | OUTPATIENT
Start: 2024-11-17

## 2024-11-18 ENCOUNTER — OFFICE VISIT (OUTPATIENT)
Dept: FAMILY MEDICINE | Facility: CLINIC | Age: 89
End: 2024-11-18
Payer: COMMERCIAL

## 2024-11-18 VITALS
HEART RATE: 83 BPM | DIASTOLIC BLOOD PRESSURE: 86 MMHG | RESPIRATION RATE: 15 BRPM | WEIGHT: 142.3 LBS | BODY MASS INDEX: 24.3 KG/M2 | TEMPERATURE: 97.2 F | OXYGEN SATURATION: 98 % | SYSTOLIC BLOOD PRESSURE: 124 MMHG | HEIGHT: 64 IN

## 2024-11-18 DIAGNOSIS — Z79.01 CHRONIC ANTICOAGULATION: ICD-10-CM

## 2024-11-18 DIAGNOSIS — E11.42 TYPE 2 DIABETES MELLITUS WITH DIABETIC POLYNEUROPATHY, WITHOUT LONG-TERM CURRENT USE OF INSULIN (H): Primary | ICD-10-CM

## 2024-11-18 DIAGNOSIS — I10 ESSENTIAL HYPERTENSION WITH GOAL BLOOD PRESSURE LESS THAN 140/90: ICD-10-CM

## 2024-11-18 DIAGNOSIS — N81.10 BLADDER PROLAPSE, FEMALE, ACQUIRED: ICD-10-CM

## 2024-11-18 DIAGNOSIS — I48.0 PAROXYSMAL ATRIAL FIBRILLATION (H): ICD-10-CM

## 2024-11-18 DIAGNOSIS — N39.46 MIXED INCONTINENCE URGE AND STRESS (MALE)(FEMALE): ICD-10-CM

## 2024-11-18 DIAGNOSIS — I10 HYPERTENSION GOAL BP (BLOOD PRESSURE) < 140/90: ICD-10-CM

## 2024-11-18 DIAGNOSIS — I87.2 VENOUS (PERIPHERAL) INSUFFICIENCY: ICD-10-CM

## 2024-11-18 DIAGNOSIS — R60.0 BILATERAL LOWER EXTREMITY EDEMA: ICD-10-CM

## 2024-11-18 LAB
EST. AVERAGE GLUCOSE BLD GHB EST-MCNC: 131 MG/DL
HBA1C MFR BLD: 6.2 % (ref 0–5.6)

## 2024-11-18 PROCEDURE — 99214 OFFICE O/P EST MOD 30 MIN: CPT | Performed by: FAMILY MEDICINE

## 2024-11-18 PROCEDURE — 91320 SARSCV2 VAC 30MCG TRS-SUC IM: CPT | Performed by: FAMILY MEDICINE

## 2024-11-18 PROCEDURE — 90662 IIV NO PRSV INCREASED AG IM: CPT | Performed by: FAMILY MEDICINE

## 2024-11-18 PROCEDURE — 83036 HEMOGLOBIN GLYCOSYLATED A1C: CPT | Performed by: FAMILY MEDICINE

## 2024-11-18 PROCEDURE — 36415 COLL VENOUS BLD VENIPUNCTURE: CPT | Performed by: FAMILY MEDICINE

## 2024-11-18 PROCEDURE — G0008 ADMIN INFLUENZA VIRUS VAC: HCPCS | Performed by: FAMILY MEDICINE

## 2024-11-18 PROCEDURE — 80053 COMPREHEN METABOLIC PANEL: CPT | Performed by: FAMILY MEDICINE

## 2024-11-18 PROCEDURE — 90480 ADMN SARSCOV2 VAC 1/ONLY CMP: CPT | Performed by: FAMILY MEDICINE

## 2024-11-18 PROCEDURE — G2211 COMPLEX E/M VISIT ADD ON: HCPCS | Performed by: FAMILY MEDICINE

## 2024-11-18 RX ORDER — LISINOPRIL 40 MG/1
40 TABLET ORAL DAILY
Qty: 90 TABLET | Refills: 1 | Status: SHIPPED | OUTPATIENT
Start: 2024-11-18

## 2024-11-18 ASSESSMENT — PAIN SCALES - GENERAL: PAINLEVEL_OUTOF10: NO PAIN (0)

## 2024-11-18 NOTE — LETTER
November 19, 2024      Aundrea Treviño  [address]   [place] [address]        Dear ,    We are writing to inform you of your test results.    Your lab results are stable. Please let us know if you have any questions.     Resulted Orders   Hemoglobin A1c   Result Value Ref Range    Estimated Average Glucose 131 (H) <117 mg/dL    Hemoglobin A1C 6.2 (H) 0.0 - 5.6 %      Comment:      Normal <5.7%   Prediabetes 5.7-6.4%    Diabetes 6.5% or higher     Note: Adopted from ADA consensus guidelines.   Comprehensive metabolic panel (BMP + Alb, Alk Phos, ALT, AST, Total. Bili, TP)   Result Value Ref Range    Sodium 143 135 - 145 mmol/L    Potassium 4.2 3.4 - 5.3 mmol/L    Carbon Dioxide (CO2) 29 22 - 29 mmol/L    Anion Gap 12 7 - 15 mmol/L    Urea Nitrogen 17.2 8.0 - 23.0 mg/dL    Creatinine 0.88 0.51 - 0.95 mg/dL    GFR Estimate 62 >60 mL/min/1.73m2      Comment:      eGFR calculated using 2021 CKD-EPI equation.    Calcium 10.2 8.8 - 10.4 mg/dL      Comment:      Reference intervals for this test were updated on 7/16/2024 to reflect our healthy population more accurately. There may be differences in the flagging of prior results with similar values performed with this method. Those prior results can be interpreted in the context of the updated reference intervals.    Chloride 102 98 - 107 mmol/L    Glucose 121 (H) 70 - 99 mg/dL    Alkaline Phosphatase 90 40 - 150 U/L    AST 18 0 - 45 U/L    ALT 11 0 - 50 U/L    Protein Total 7.0 6.4 - 8.3 g/dL    Albumin 4.6 3.5 - 5.2 g/dL    Bilirubin Total 0.8 <=1.2 mg/dL       If you have any questions or concerns, please call the clinic at the number listed above.       Sincerely,      Krysten Molina MD - PC

## 2024-11-18 NOTE — PROGRESS NOTES
Assessment & Plan     Bilateral lower extremity edema  Venous (peripheral) insufficiency.    Much improved. Likely venous insufficiency with some amlodipine contributing as well (amlodipine has been discontinued)  Longstanding, continues compression stockings  Started Lasix 20 mg daily along with potassium 10 meq daily had echo and saw cardiology. Does not appear to have decompensated HF contributing to the edema.             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  Suze worked, but was too expensive.   She uses an incontinence pad.  I encouraged continued use of barrier cream for protection.  In addition, I encouraged her to get up and move around once an hour and to shift positions regularly to protect the skin from developing pressure ulcer     Appears Myrbetriq is now covered by her insurance and we will trial this to see if she has some improvement.  I do think her incontinence is multifactorial and there are likely structural issues from her previous sling contributing.  It sounds like she does not have control of her urine when she repositions.  We did have a conversation that these medications can have an anticholinergic effect and advised to monitor for dizziness.  If she does not have improvement, I would consider discontinuing due to risk of potential.  Will also send in an order for DME for incontinence supplies this pain out-of-pocket is a considerable financial burden..  - Incontinence Supplies Order  - nystatin (MYCOSTATIN) 854891 UNIT/GM external ointment; Apply topically 3 times daily To vaginal and groin area.  - mirabegron (MYRBETRIQ) 25 MG 24 hr tablet; Take 1 tablet (25 mg) by mouth daily     Bladder prolapse, female, acquired  See above        Type 2 diabetes mellitus with diabetic polyneuropathy, without long-term current use of insulin (H)  Controlled. A1C today 6.2. continue metformin 500mg daily. Tolerating it well.  "  Peripheral neuropathy bilateral feet to lower legs worsening over time. BG is very well controlled.    At risk of b12 deficiency on metformin. B12 level was previously on the low side of the normal range. Recommended continuing vitamin B12 supplement.       Hypertension goal BP (blood pressure) < 140/90  Controlled.  No changes today.  Continues carvedilol 25 mg twice daily, lisinopril 40 mg daily, furosemide 20mg daily (for peripheral edema and htn) with potassium 10meq daily.   CMP today        Chronic anticoagulation   Paroxysmal A. Fib     Continues on Eliquis 5 mg twice daily.  rate controlled on carvedilol.    CMP today         Peripheral neuropathy -- denies pain, but the numbness and sense of tightness is uncomfortable.   Continues B12 supplement 1000mcg daily, ALA 600mg daily      Numbness, no pain. Worsening over time. Will start with lab visit for cbc, b12, folate, tsh. Consider further eval with neurology. She does not want medication to treat the neuropathy symptoms.      BRAN  No complaints today    From previous visit: Progressing slowly over time.  Denies shortness of breath at rest.     She denies PND or orthopnea  Denies chest pain  Echo and cardiology visit completed this summer. Mild-mod TR, unlikely to be contributing to LE edema. without evidence of decompensated HF          BMI  Estimated body mass index is 24.43 kg/m  as calculated from the following:    Height as of this encounter: 1.626 m (5' 4\").    Weight as of this encounter: 64.5 kg (142 lb 4.8 oz).           Fabiano Guillaume is a 89 year old, presenting for the following health issues:  Recheck Medication (Flu shot, check ears )    History of Present Illness       Reason for visit:  Edema follow up    She eats 2-3 servings of fruits and vegetables daily.She consumes 2 sweetened beverage(s) daily.She exercises with enough effort to increase her heart rate 9 or less minutes per day.  She exercises with enough effort to increase her " "heart rate 3 or less days per week.   She is taking medications regularly.      She goes to bed at around midnight to 2am and sleeps through two alarms at 7:30 am. She always gets up and goes on zoom with a group until about 10am. Then she goes to sleep and that duration varies. Maybe 2-3 hours. Then she fixes something to eat 2, 3 or 4 pm then eats dinner at 9 or 10pm. Watches a lot of television, occasionally falls asleep but mostly awake.       She is moving into an assisted living soon so wonders if her unusual sleep schedule will be an issue for meals there. Will try to adjust.      Objective    /86 (BP Location: Right arm, Patient Position: Sitting, Cuff Size: Adult Regular)   Pulse 83   Temp 97.2  F (36.2  C) (Temporal)   Resp 15   Ht 1.626 m (5' 4\")   Wt 64.5 kg (142 lb 4.8 oz)   SpO2 98%   BMI 24.43 kg/m    Body mass index is 24.43 kg/m .  Physical Exam   GENERAL: alert and no distress  HENT: ear canals and TM's normal, nose and mouth without ulcers or lesions    Results for orders placed or performed in visit on 11/18/24 (from the past 24 hours)   Hemoglobin A1c   Result Value Ref Range    Estimated Average Glucose 131 (H) <117 mg/dL    Hemoglobin A1C 6.2 (H) 0.0 - 5.6 %           Signed Electronically by: Krysten Molina MD     "

## 2024-11-18 NOTE — PROGRESS NOTES
"  {PROVIDER CHARTING PREFERENCE:234476}    Fabiano Guillaume is a 89 year old, presenting for the following health issues:  Recheck Medication (Flu shot, check ears )      11/18/2024     2:43 PM   Additional Questions   Roomed by Josette CHRISTENSEN   Accompanied by daughter         11/18/2024     2:43 PM   Patient Reported Additional Medications   Patient reports taking the following new medications none     Via the Health Maintenance questionnaire, the patient has reported the following services have been completed -Eye Exam: [facility] 2024-09-05, this information has been sent to the abstraction team.  History of Present Illness       Reason for visit:  Edema follow up    She eats 2-3 servings of fruits and vegetables daily.She consumes 2 sweetened beverage(s) daily.She exercises with enough effort to increase her heart rate 9 or less minutes per day.  She exercises with enough effort to increase her heart rate 3 or less days per week.   She is taking medications regularly.       {MA/LPN/RN Pre-Provider Visit Orders- hCG/UA/Strep (Optional):007893}  {SUPERLIST (Optional):338607}  {additonal problems for provider to add (Optional):489056}    {ROS Picklists (Optional):639455}      Objective    /86 (BP Location: Right arm, Patient Position: Sitting, Cuff Size: Adult Regular)   Pulse 83   Temp 97.2  F (36.2  C) (Temporal)   Resp 15   Ht 1.626 m (5' 4\")   Wt 64.5 kg (142 lb 4.8 oz)   SpO2 98%   BMI 24.43 kg/m    Body mass index is 24.43 kg/m .  Physical Exam   {Exam List (Optional):658940}    {Diagnostic Test Results (Optional):113871}        Signed Electronically by: Krysten Molina MD, MD  {Email feedback regarding this note to [email or web address]   :339880}  "

## 2024-11-19 LAB
ALBUMIN SERPL BCG-MCNC: 4.6 G/DL (ref 3.5–5.2)
ALP SERPL-CCNC: 90 U/L (ref 40–150)
ALT SERPL W P-5'-P-CCNC: 11 U/L (ref 0–50)
ANION GAP SERPL CALCULATED.3IONS-SCNC: 12 MMOL/L (ref 7–15)
AST SERPL W P-5'-P-CCNC: 18 U/L (ref 0–45)
BILIRUB SERPL-MCNC: 0.8 MG/DL
BUN SERPL-MCNC: 17.2 MG/DL (ref 8–23)
CALCIUM SERPL-MCNC: 10.2 MG/DL (ref 8.8–10.4)
CHLORIDE SERPL-SCNC: 102 MMOL/L (ref 98–107)
CREAT SERPL-MCNC: 0.88 MG/DL (ref 0.51–0.95)
EGFRCR SERPLBLD CKD-EPI 2021: 62 ML/MIN/1.73M2
GLUCOSE SERPL-MCNC: 121 MG/DL (ref 70–99)
HCO3 SERPL-SCNC: 29 MMOL/L (ref 22–29)
POTASSIUM SERPL-SCNC: 4.2 MMOL/L (ref 3.4–5.3)
PROT SERPL-MCNC: 7 G/DL (ref 6.4–8.3)
SODIUM SERPL-SCNC: 143 MMOL/L (ref 135–145)

## 2024-12-02 NOTE — LETTER
10/23/2024      Aundrea Treviño  [address]  [place] [address]      Dear Colleague,    Thank you for referring your patient, Aundrea Treviño, to the [facility] [place]. Please see a copy of my visit note below.    PROCEDURE ENCOUNTER    [facility]  Orthopedics  EveretteDean Ambrosio, DO  2024     Name: Aundrea Treviño  MRN: [id number]  Age: 89 year old  : 1934    Referring provider: SelfSelf  Diagnosis:  1.  Glenohumeral osteoarthritis of right shoulder  2.  Primary osteoarthritis of right knee    Glenohumeral Injection - Ultrasound Guided  The patient was informed of the risks and the benefits of the procedure and a written consent was signed.  The patient s right shoulder was prepped with chlorhexidine in sterile fashion.     Local anesthesia was performed using a 27-gauge 1.5-inch needle to administer 3 mL of 1% lidocaine without epi.    40 mg of methylprednisolone suspension was drawn up into a 5 mL syringe with 3 mL of 1% lidocaine w/o Epi.    Injection was performed using sterile technique.  Under ultrasound guidance a 3.5-inch 22-gauge needle was used to enter the right glenohumeral joint.  Posterior approach was used with the patient in lateral recumbent position, arm in neutral position at the side.  Needle placement was visualized and documented with ultrasound.  Ultrasound visualization was necessary due to the small joint space entered.  Injection performed long axis to the probe.  Injection solution visualized within the joint space.  Images were permanently stored for the patient's record.    There were no complications. The patient tolerated the procedure well. There was negligible bleeding.     PROCEDURE    Right Knee Injection - Intraarticular  The patient was informed of the risks and the benefits of the procedure and a written consent was signed.  The patient s right knee was prepped with chlorhexidine in sterile fashion.   40 mg of  triamcinolone suspension was drawn up into a 5 mL syringe with 4 mL of 1% lidocaine.  Injection was performed using substerile technique.  A 1.5-inch 22-gauge needle was used to enter the lateral aspect of the right knee.  Injection performed successfully without difficulty.  There were no complications. The patient tolerated the procedure well. There was negligible bleeding.   The patient was instructed to ice the knee upon leaving clinic and refrain from overuse over the next 3 days.   The patient was instructed to call or go to the emergency room with any unusual pain, swelling, redness, or if otherwise concerned.  A follow up appointment will be scheduled to evaluate response to the injection, and to assess range of motion and pain.  Wraparound knee brace also dispensed for her to improve stability given valgus instability related to asymmetric lateral compartment severe osteoarthritis.  The patient was instructed to call or go to the emergency room with any unusual pain, swelling, redness, or if otherwise concerned.  Symptom diary recommended. We will review duration of relief and consider whether injection is beneficial to repeat in future or not. Briefly discussed suprascapular nerve block as secondary option.    Lucian Ambrosio DO CAQSM  Primary Care Sports Medicine  [facility]    PROCEDURE ENCOUNTER    [facility]  Orthopedics  Lucian Ambrosio DO  2024     Name: Aundrea Treviño  MRN: [id number]  Age: 89 year old  : 1934    Referring provider:   Diagnosis:    Large Joint Injection/Arthocentesis: R knee joint    Date/Time: 10/23/2024 1:43 PM    Performed by: Lucian Ambrosio DO  Authorized by: Lucian Ambrosio DO    Needle Size comment:  23G   Guidance: landmark guided    Approach:  Anterolateral  Location:  Knee      Medications:  40 mg triamcinolone 40 MG/ML; 4 mL lidocaine 1 %  Outcome:  Tolerated well, no immediate complications  Procedure discussed: discussed  risks, benefits, and alternatives    Consent Given by:  Patient  Timeout: timeout called immediately prior to procedure    Prep: patient was prepped and draped in usual sterile fashion    Large Joint Injection/Arthocentesis: R glenohumeral joint    Date/Time: 10/23/2024 1:44 PM    Performed by: Lucian Ambrosio DO  Authorized by: Lucian Ambrosio DO    Approach:  Lateral  Location:  Shoulder      Site:  R glenohumeral joint  Medications:  40 mg methylPREDNISolone 40 MG/ML; 7 mL lidocaine 1 %  Outcome:  Tolerated well, no immediate complications  Procedure discussed: discussed risks, benefits, and alternatives    Consent Given by:  Patient  Timeout: timeout called immediately prior to procedure    Prep: patient was prepped and draped in usual sterile fashion          Again, thank you for allowing me to participate in the care of your patient.        Sincerely,        Lucian Ambrosio DO   yes

## 2024-12-17 DIAGNOSIS — R60.0 BILATERAL LOWER EXTREMITY EDEMA: ICD-10-CM

## 2024-12-17 RX ORDER — POTASSIUM CHLORIDE 750 MG/1
10 TABLET, EXTENDED RELEASE ORAL 2 TIMES DAILY
Qty: 180 TABLET | Refills: 2 | Status: SHIPPED | OUTPATIENT
Start: 2024-12-17

## 2024-12-27 DIAGNOSIS — L29.9 ITCHING: ICD-10-CM

## 2024-12-31 RX ORDER — TRIAMCINOLONE ACETONIDE 5 MG/G
CREAM TOPICAL 2 TIMES DAILY
Qty: 15 G | Refills: 0 | Status: SHIPPED | OUTPATIENT
Start: 2024-12-31

## 2025-01-22 ENCOUNTER — MEDICAL CORRESPONDENCE (OUTPATIENT)
Dept: HEALTH INFORMATION MANAGEMENT | Facility: CLINIC | Age: OVER 89
End: 2025-01-22
Payer: COMMERCIAL

## 2025-01-23 ENCOUNTER — TELEPHONE (OUTPATIENT)
Dept: FAMILY MEDICINE | Facility: CLINIC | Age: OVER 89
End: 2025-01-23
Payer: COMMERCIAL

## 2025-01-23 DIAGNOSIS — Z11.1 SCREENING EXAMINATION FOR PULMONARY TUBERCULOSIS: Primary | ICD-10-CM

## 2025-01-23 NOTE — TELEPHONE ENCOUNTER
"RN working on Assisted living admission form that was faxed to PCP and PCP sent to RN team. Printed the requested lists.     Writer called [facility] to verify if TB testing is needed prior to admission as this was not requested specifically and writing is too small on the fax to be legible about these requirements. The DON states that either a 1-step mantoux or Quantifieron gold would be acceptable.   Given the planned 1/27/25 admission and the weekend patient would only be able to do the Quantifieron gold in time     RN's do not do POLSTs this is a legal medical order from the provider. This was verified with Aleyda PARIS RN as being the policy. Writer called to patient as  does have openings for a virtual visit today. Patient was overwhelmed with having to complete a visit today and states, \"I just can't since I am busy moving\". Advised patient that that is their decision but writer cannot guarantee that they admission date will not be impacted by not having the POLST and TB screen. Patient asked that writer reach out to her daughter Minerva on file. Writer called Minerva. No answer. LVM.     Patient Needs:  A VV today with PCP  for the POLST with a separate lab only appointment today  OR  An in-person visit tomorrow with Andrew Francis PA-C  for the POLST and labs after.  has okayed this already. This needs to be in-person since patient is new to him.     Writer called and spoke with patient who was in agreement that an in-person visit tomorrow should work as she knows her daughter is off and planning to be over by noon. Writer scheduled patient for 1:50 pm tomorrow. Patient or daughter will contact us to verify that this works.     Patient verbalized understanding and agrees with plan.      Starr Brown RN  [facility]    "

## 2025-01-23 NOTE — TELEPHONE ENCOUNTER
Daughter, Minerva, returning call (CTC on file).  Minerva confirms 1/24/25 appointment with BENITEZ Francis works for patient/family.  No further action needed at this time.    Johana Munguia RN  [facility]

## 2025-01-29 ENCOUNTER — TELEPHONE (OUTPATIENT)
Dept: FAMILY MEDICINE | Facility: CLINIC | Age: OVER 89
End: 2025-01-29
Payer: COMMERCIAL

## 2025-01-30 ENCOUNTER — DOCUMENTATION ONLY (OUTPATIENT)
Dept: OTHER | Facility: CLINIC | Age: OVER 89
End: 2025-01-30
Payer: COMMERCIAL

## 2025-03-12 ENCOUNTER — TRANSFERRED RECORDS (OUTPATIENT)
Dept: HEALTH INFORMATION MANAGEMENT | Facility: CLINIC | Age: OVER 89
End: 2025-03-12
Payer: COMMERCIAL

## 2025-03-12 LAB — RETINOPATHY: NEGATIVE

## 2025-03-22 DIAGNOSIS — M17.11 PRIMARY OSTEOARTHRITIS OF RIGHT KNEE: Primary | ICD-10-CM

## 2025-03-30 DIAGNOSIS — R06.02 SOB (SHORTNESS OF BREATH): ICD-10-CM

## 2025-03-30 DIAGNOSIS — I10 ESSENTIAL HYPERTENSION WITH GOAL BLOOD PRESSURE LESS THAN 140/90: ICD-10-CM

## 2025-03-30 RX ORDER — CARVEDILOL 25 MG/1
TABLET ORAL
Qty: 180 TABLET | Refills: 2 | Status: SHIPPED | OUTPATIENT
Start: 2025-03-30

## 2025-04-02 ENCOUNTER — OFFICE VISIT (OUTPATIENT)
Dept: ORTHOPEDICS | Facility: CLINIC | Age: OVER 89
End: 2025-04-02
Payer: COMMERCIAL

## 2025-04-02 DIAGNOSIS — M17.11 PRIMARY OSTEOARTHRITIS OF RIGHT KNEE: Primary | ICD-10-CM

## 2025-04-02 DIAGNOSIS — M19.011 OSTEOARTHRITIS OF RIGHT GLENOHUMERAL JOINT: ICD-10-CM

## 2025-04-02 PROCEDURE — 20611 DRAIN/INJ JOINT/BURSA W/US: CPT | Mod: RT | Performed by: FAMILY MEDICINE

## 2025-04-02 PROCEDURE — 20610 DRAIN/INJ JOINT/BURSA W/O US: CPT | Mod: 51 | Performed by: FAMILY MEDICINE

## 2025-04-02 RX ORDER — TRIAMCINOLONE ACETONIDE 40 MG/ML
40 INJECTION, SUSPENSION INTRA-ARTICULAR; INTRAMUSCULAR
Status: COMPLETED | OUTPATIENT
Start: 2025-04-02 | End: 2025-04-02

## 2025-04-02 RX ORDER — LIDOCAINE HYDROCHLORIDE 10 MG/ML
3 INJECTION, SOLUTION INFILTRATION; PERINEURAL
Status: COMPLETED | OUTPATIENT
Start: 2025-04-02 | End: 2025-04-02

## 2025-04-02 RX ORDER — LIDOCAINE HYDROCHLORIDE 10 MG/ML
7 INJECTION, SOLUTION INFILTRATION; PERINEURAL
Status: COMPLETED | OUTPATIENT
Start: 2025-04-02 | End: 2025-04-02

## 2025-04-02 RX ADMIN — LIDOCAINE HYDROCHLORIDE 3 ML: 10 INJECTION, SOLUTION INFILTRATION; PERINEURAL at 14:42

## 2025-04-02 RX ADMIN — LIDOCAINE HYDROCHLORIDE 7 ML: 10 INJECTION, SOLUTION INFILTRATION; PERINEURAL at 14:43

## 2025-04-02 RX ADMIN — TRIAMCINOLONE ACETONIDE 40 MG: 40 INJECTION, SUSPENSION INTRA-ARTICULAR; INTRAMUSCULAR at 14:43

## 2025-04-02 NOTE — LETTER
2025      Aundrea Treviño  [address]  [place] [address]      Dear Colleague,    Thank you for referring your patient, Aundrea Treviño, to the [facility]. Please see a copy of my visit note below.    PROCEDURE ENCOUNTER    [facility]  Everette. Hebert,   2025     Name: Aundrea Treviño  MRN: [id number]  Age: 89 year old  : 1934    Referring provider: Self  Diagnosis: No diagnosis found.    Knee Injection with Hyaluronic Acid - Ultrasound Guided Right    The patient was informed of the risks and the benefits of the procedure and a written consent was signed.    The patient s right knee was prepped with chlorhexidine in sterile fashion.     Synvisc One syringe and medication removed from packaging and examined for package consistency.    Local anesthesia was performed using a 27-gauge 1.5-inch needle to administer 3 mL of 1% lidocaine without epi.     Injection was performed using sterile technique.  Under ultrasound guidance a 1.5-inch 22-gauge needle was used to enter the superolateral aspect of the knee.  Needle placement was visualized and documented with ultrasound.  Ultrasound visualization was necessary due to decreased joint space in the setting of osteoarthritis.  Images were permanently stored for the patient's record.  There were no complications. The patient tolerated the procedure well. There was negligible bleeding.   Right Glenohumeral Injection - Ultrasound Guided  The patient was informed of the risks and the benefits of the procedure and a written consent was signed.  The patient s right shoulder was prepped with chlorhexidine in sterile fashion.   Local anesthesia was performed using a 27-gauge 1.5-inch needle to administer 3 mL of 1% lidocaine without epi.  40 mg of triamcinolone suspension was drawn up into a 5 mL syringe with 3 mL of 1% lidocaine w/o Epi.  Injection was performed using  sterile technique.  Under ultrasound guidance a 3.5-inch 22-gauge needle was used to enter the right glenohumeral joint.  Posterior approach was used with the patient in lateral recumbent position, arm in neutral position at the side.  Needle placement was visualized and documented with ultrasound.  Ultrasound visualization was necessary due to the small joint space entered.  Injection performed long axis to the probe.  Injection solution visualized within the joint space.  Images were permanently stored for the patient's record.  There were no complications. The patient tolerated the procedure well. There was negligible bleeding.   Therapy scheduled to follow for mobilization.  The patient was instructed to call or go to the emergency room with any unusual pain, swelling, redness, or if otherwise concerned.  Lucian Ambrosio DO [facility]  Primary Care Sports Medicine  [facility]      Large Joint Injection/Arthocentesis: R knee joint    Date/Time: 4/2/2025 2:42 PM    Performed by: Lucian Ambrosio DO  Authorized by: Lucian Ambrosio DO    Indications:  Pain and osteoarthritis  Needle Size:  21 G  Guidance: landmark guided    Approach:  Anterolateral  Location:  Knee      Medications:  48 mg hylan 48 MG/6ML; 3 mL lidocaine 1 %  Outcome:  Tolerated well, no immediate complications  Procedure discussed: discussed risks, benefits, and alternatives    Consent Given by:  Patient  Timeout: timeout called immediately prior to procedure    Prep: patient was prepped and draped in usual sterile fashion    Large Joint Injection/Arthocentesis: R glenohumeral joint    Date/Time: 4/2/2025 2:43 PM    Performed by: Lucian Ambrosio DO  Authorized by: Lucian Ambrosio DO    Indications:  Pain  Needle Size:  22 G  Guidance: ultrasound    Approach:  Posterior  Location:  Shoulder      Site:  R glenohumeral joint  Medications:  40 mg triamcinolone 40 MG/ML; 7 mL lidocaine 1 %  Outcome:  Tolerated well, no immediate  complications  Procedure discussed: discussed risks, benefits, and alternatives    Consent Given by:  Patient  Timeout: timeout called immediately prior to procedure    Prep: patient was prepped and draped in usual sterile fashion     Ultrasound was used to ensure safe and accurate needle placement and injection. Ultrasound images of the procedure were permanently stored.          Again, thank you for allowing me to participate in the care of your patient.        Sincerely,        Lucian Ambrosio, DO    Electronically signed

## 2025-04-02 NOTE — PROGRESS NOTES
PROCEDURE ENCOUNTER    [facility]  Sports Medicine Clinic  Everette. DO Hebert  2025     Name: Aundrea Treviño  MRN: [id number]  Age: 89 year old  : 1934    Referring provider: Self  Diagnosis: No diagnosis found.    Knee Injection with Hyaluronic Acid - Ultrasound Guided Right    The patient was informed of the risks and the benefits of the procedure and a written consent was signed.    The patient s right knee was prepped with chlorhexidine in sterile fashion.     Synvisc One syringe and medication removed from packaging and examined for package consistency.    Local anesthesia was performed using a 27-gauge 1.5-inch needle to administer 3 mL of 1% lidocaine without epi.     Injection was performed using sterile technique.  Under ultrasound guidance a 1.5-inch 22-gauge needle was used to enter the superolateral aspect of the knee.  Needle placement was visualized and documented with ultrasound.  Ultrasound visualization was necessary due to decreased joint space in the setting of osteoarthritis.  Images were permanently stored for the patient's record.  There were no complications. The patient tolerated the procedure well. There was negligible bleeding.   Right Glenohumeral Injection - Ultrasound Guided  The patient was informed of the risks and the benefits of the procedure and a written consent was signed.  The patient s right shoulder was prepped with chlorhexidine in sterile fashion.   Local anesthesia was performed using a 27-gauge 1.5-inch needle to administer 3 mL of 1% lidocaine without epi.  40 mg of triamcinolone suspension was drawn up into a 5 mL syringe with 3 mL of 1% lidocaine w/o Epi.  Injection was performed using sterile technique.  Under ultrasound guidance a 3.5-inch 22-gauge needle was used to enter the right glenohumeral joint.  Posterior approach was used with the patient in lateral recumbent position, arm in neutral position at the side.  Needle placement was  visualized and documented with ultrasound.  Ultrasound visualization was necessary due to the small joint space entered.  Injection performed long axis to the probe.  Injection solution visualized within the joint space.  Images were permanently stored for the patient's record.  There were no complications. The patient tolerated the procedure well. There was negligible bleeding.   Therapy scheduled to follow for mobilization.  The patient was instructed to call or go to the emergency room with any unusual pain, swelling, redness, or if otherwise concerned.  Lucian Ambrosio DO [facility]  Primary Care Sports Medicine  [facility]      Large Joint Injection/Arthocentesis: R knee joint    Date/Time: 4/2/2025 2:42 PM    Performed by: Lucian Ambrosio DO  Authorized by: Lucian Ambrosio DO    Indications:  Pain and osteoarthritis  Needle Size:  21 G  Guidance: landmark guided    Approach:  Anterolateral  Location:  Knee      Medications:  48 mg hylan 48 MG/6ML; 3 mL lidocaine 1 %  Outcome:  Tolerated well, no immediate complications  Procedure discussed: discussed risks, benefits, and alternatives    Consent Given by:  Patient  Timeout: timeout called immediately prior to procedure    Prep: patient was prepped and draped in usual sterile fashion    Large Joint Injection/Arthocentesis: R glenohumeral joint    Date/Time: 4/2/2025 2:43 PM    Performed by: Lucian Ambrosio DO  Authorized by: Lucian Ambrosio DO    Indications:  Pain  Needle Size:  22 G  Guidance: ultrasound    Approach:  Posterior  Location:  Shoulder      Site:  R glenohumeral joint  Medications:  40 mg triamcinolone 40 MG/ML; 7 mL lidocaine 1 %  Outcome:  Tolerated well, no immediate complications  Procedure discussed: discussed risks, benefits, and alternatives    Consent Given by:  Patient  Timeout: timeout called immediately prior to procedure    Prep: patient was prepped and draped in usual sterile fashion     Ultrasound was used to  ensure safe and accurate needle placement and injection. Ultrasound images of the procedure were permanently stored.

## 2025-05-12 ENCOUNTER — TELEPHONE (OUTPATIENT)
Dept: FAMILY MEDICINE | Facility: CLINIC | Age: OVER 89
End: 2025-05-12
Payer: COMMERCIAL

## 2025-05-12 NOTE — TELEPHONE ENCOUNTER
"INFECTIOUS DISEASES INPATIENT CONSULT NOTE     Date of Service:12/23/2023    Consult Requested By: Ross Guardado M.D.    Reason for Consultation: UTI    History of Present Illness:   Ramu Barber is a 73 y.o. diabetic male admitted 12/20/2023 for urinary frequency  Past medical history of CKDAlso c/o weakness and increased urinary urgency. Per admit \" Patient reportedly slipped off bed and could not get up.  Denies any fevers or chills.  No other relieving medicine factors are noted.  Patient does report having urological procedure 2 weeks ago completed antibiotics.+  In ED afebrile but leukocytosis with WBC 23  UA 20-50 WBC  He was started on meropenem.  Urine with pseudomonas and Kleb  Infectious Diseases consulted for antibiotic recommendation and management      Review Of Systems:  Feels better  All other systems are reviewed and are negative     PMH:   Past Medical History:   Diagnosis Date    Arthritis     back, hips    Back pain     Chickenpox     High cholesterol     Hypertension     Obesity     Pain     back pain s/p multiple surgeries and spinal stimulator    Pneumonia     Polycythemia, secondary     Sleep apnea     cpap    Snoring     Type II or unspecified type diabetes mellitus without mention of complication, not stated as uncontrolled     oral meds only         PSH:  Past Surgical History:   Procedure Laterality Date    EGD W/CONTROL BLEEDING  8/22/2020         GASTROSCOPY N/A 8/22/2020    Procedure: GASTROSCOPY;  Surgeon: Tawanda Gomez M.D.;  Location: [facility];  Service: Gastroenterology    PB IMPLANT NEUROSTIM/  7/23/2019    Procedure: INSERTION, NEUROSTIMULATOR, PERMANENT, SPINAL CORD;  Surgeon: Teddy Santos M.D.;  Location: [facility];  Service: Pain Management    LUMBAR LAMINECTOMY DISKECTOMY  2/18/2018    Procedure: LUMBAR LAMINECTOMY DISKECTOMY- LT L1-2 HEMILAMI-;  Surgeon: Tom Pittman M.D.;  Location: [facility];  Service: " Daughter, Minerva (CTC on file), calling on behalf of patient.  Patient lives at assisted living facility, hasn't seen Dr. Molina for awhile but also hasn't established care with provider servicing her living facility yet.  Minerva is not with patient, below information is from Minerva's discussion with assisted living staff.    Per Minerva, a nurse at patient's assisted living facility recommended calling the triage line to report symptoms:  SOB x4-5 days   Comes and goes  Patient gets winded when speaking with daughter over phone  Occurs with exertion, resolves with rest  RN at assisted living auscultates crackles   RN palpated abdomen - tenderness in RUQ  Denies pain without palpation  Denies fever, abdominal bloating/swelling  Endorses low appetite - daughter reports low appetite at baseline.  Loose stools - not watery,   Per Minerva, patient has IBS and has chronic intermittent loose stools  RN at [facility] concerned about bowel obstruction.  Did not note bowel sounds  Last BM today per Minerva    Daughter lives an hour away. Assisted living RN thinks patient should be seen at , daughter wonders if OK to wait for 5/14/25 as patient has sports med appointment and UC is in same building.    Writer recommended:  Difficult to determine appropriate disposition without speaking to patient nor RN who assessed her.  SOB should be evaluated urgently - ideally today.  Daughter is unable to take patient today, but agrees to go to UC tomorrow (5/13).  Writer advised monitoring patient closely - call back if new/worsening symptoms, go to ED if red flag symptoms.  Reviewed red flag symptoms related to SOB, bowel obstruction requiring urgent attention - go to ED now.  Worsening breathing difficulty, SOB at rest, chest pain, speaking in short phrases, inability to lie down.  Severe abdominal pain, bloating/swelling, nausea/vomiting, fever, changes to bowel habits.  Call back if other new/worsening symptoms.    Minerva  Neurosurgery    HARDWARE REMOVAL NEURO  2/18/2018    Procedure: HARDWARE REMOVAL NEURO- L2-3 PEDICLE SCREWS;  Surgeon: Tom Pittman M.D.;  Location: [facility];  Service: Neurosurgery    INGUINAL HERNIA REPAIR Right 2/16/2016    Procedure: INGUINAL HERNIA REPAIR;  Surgeon: Sj Baird M.D.;  Location: SUR[facility]ERY [facility];  Service:     FUSION, SPINE, LUMBAR, PLIF  1/21/2015    Performed by Ko Suarez M.D. at SURGERY [facility]    CARPAL TUNNEL RELEASE Right 2014    SHOULDER ARTHROSCOPY Right 2014    CERVICAL DISK AND FUSION ANTERIOR  2005    LUMBAR DECOMPRESSION  2004    LUMBAR DECOMPRESSION  2003       FAMILY HX:  Family History   Problem Relation Age of Onset    Sleep Apnea Mother     Heart Attack Father     Heart Attack Brother        SOCIAL HX:  Social History     Socioeconomic History    Marital status:      Spouse name: Not on file    Number of children: Not on file    Years of education: Not on file    Highest education level: Not on file   Occupational History    Not on file   Tobacco Use    Smoking status: Never    Smokeless tobacco: Former     Types: Chew     Quit date: 12/2018   Vaping Use    Vaping Use: Never used   Substance and Sexual Activity    Alcohol use: Yes     Alcohol/week: 1.2 oz     Types: 2 Shots of liquor per week     Comment: 2 a week    Drug use: No    Sexual activity: Not on file   Other Topics Concern    Not on file   Social History Narrative    Not on file     Social Determinants of Health     Financial Resource Strain: Not on file   Food Insecurity: Not on file   Transportation Needs: Not on file   Physical Activity: Not on file   Stress: Not on file   Social Connections: Not on file   Intimate Partner Violence: Not on file   Housing Stability: Not on file     Social History     Tobacco Use   Smoking Status Never   Smokeless Tobacco Former    Types: Chew    Quit date: 12/2018     Social History     Substance and Sexual Activity   Alcohol Use  verbalizes understanding and agreement with plan.    Johana Munguia RN  [facility]     Yes    Alcohol/week: 1.2 oz    Types: 2 Shots of liquor per week    Comment: 2 a week       Allergies/Intolerances:  No Known Allergies      Other Current Medications:    Current Facility-Administered Medications:     meropenem (Merrem) 500 mg in  mL IV-MBP, 500 mg, Intravenous, Q8HRS, Ross Guardado M.D., Stopped at 12/23/23 0547    insulin regular (HumuLIN R,NovoLIN R) injection, 2-9 Units, Subcutaneous, 4X/DAY ACHS, 3 Units at 12/23/23 0822 **AND** POC blood glucose manual result, , , Q AC AND BEDTIME(S) **AND** NOTIFY MD and PharmD, , , Once **AND** Administer 20 grams of glucose (approximately 8 ounces of fruit juice) every 15 minutes PRN FSBG less than 70 mg/dL, , , PRN **AND** dextrose 50% (D50W) injection 25 g, 25 g, Intravenous, Q15 MIN PRN, Ross Guardado M.D.    gabapentin (Neurontin) capsule 300 mg, 300 mg, Oral, BID, Ross Guardado M.D., 300 mg at 12/23/23 0510    senna-docusate (Pericolace Or Senokot S) 8.6-50 MG per tablet 2 Tablet, 2 Tablet, Oral, BID, 2 Tablet at 12/21/23 1907 **AND** polyethylene glycol/lytes (Miralax) Packet 1 Packet, 1 Packet, Oral, QDAY PRN **AND** magnesium hydroxide (Milk Of Magnesia) suspension 30 mL, 30 mL, Oral, QDAY PRN **AND** bisacodyl (Dulcolax) suppository 10 mg, 10 mg, Rectal, QDAY PRN, Eber Rosas M.D.    apixaban (Eliquis) tablet 5 mg, 5 mg, Oral, BID, Eber Roass M.D., 5 mg at 12/23/23 0509    finasteride (Proscar) tablet 5 mg, 5 mg, Oral, DAILY, Eber Rosas M.D., 5 mg at 12/23/23 0510    PARoxetine (Paxil) tablet 20 mg, 20 mg, Oral, DAILY, Eber Rosas M.D., 20 mg at 12/23/23 0510    rosuvastatin (Crestor) tablet 20 mg, 20 mg, Oral, Q EVENING, Eber Rosas M.D., 20 mg at 12/22/23 1735    amLODIPine (Norvasc) tablet 5 mg, 5 mg, Oral, QDAY, Eebr Rosas M.D., 5 mg at 12/23/23 0510    terazosin (Hytrin) capsule 10 mg, 10 mg, Oral, DAILY, Eber Rosas M.D., 10 mg at 12/23/23 0509    [Held by provider] triamterene-hctz (Maxzide-25/Dyazide)  37.5-25 MG per tablet 1 Tablet, 1 Tablet, Oral, Eber JIMENES M.D., 1 Tablet at 23 0552      Most Recent Vital Signs:  /83   Pulse 62   Temp 36.7 °C (98.1 °F) (Temporal)   Resp 16   Ht 1.829 m (6')   Wt 119 kg (263 lb 0.1 oz)   SpO2 93%   BMI 35.67 kg/m²   Temp  Av.8 °C (98.2 °F)  Min: 36.5 °C (97.7 °F)  Max: 37.3 °C (99.1 °F)    Physical Exam:  General: well-appearing, well nourished no acute distress  HEENT: NCAT, PERRLA, sclera anicteric  Neck: supple, no lJVD  Chest: CTAB, unlabored.  Cardiac: RRR,  no m/r/g   Abdomen: non-tender, non-distended  Extremities: No cyanosis, clubbing. no edema, 2+ pulses  Skin: no rashes   Neuro: Alert and oriented times 3, Speech fluent CN intact JOSEPH  Psych: Mood normal Affect normal    Pertinent Lab Results:  Recent Labs     23  0944 23  0950   WBC 23.2* 15.6* 9.9      Recent Labs     23  0944 23  0950   HEMOGLOBIN 15.7 15.0 15.3   HEMATOCRIT 45.9 43.8 44.9   MCV 92.7 91.6 92.4   MCH 31.7 31.4 31.5   PLATELETCT 227 206 223         Recent Labs     23  0040 23  0944 23  0950   SODIUM 138 134* 134*   POTASSIUM 3.6 3.8 3.9   CHLORIDE 101 99 99   CO2    CREATININE 1.33 1.43* 1.38        Recent Labs     23  0944 23  0950   ALBUMIN 4.2 3.3 3.3        Pertinent Micro:  Results       Procedure Component Value Units Date/Time    URINE CULTURE(NEW) [[id number]]  (Abnormal)  (Susceptibility) Collected: 23    Order Status: Completed Specimen: Urine Updated: 23 1023     Significant Indicator POS     Source UR     Site -     Culture Result -      Pseudomonas aeruginosa  >100,000 cfu/mL        Klebsiella pneumoniae  >100,000 cfu/mL      Narrative:      Indication for culture:->Patient WITHOUT an indwelling Michaels  catheter in place with new onset of Dysuria, Frequency,  Urgency, and/or Suprapubic pain    Susceptibility       Pseudomonas  "aeruginosa (1)       Antibiotic Interpretation Microscan   Method Status    Ciprofloxacin Sensitive <=0.25 mcg/mL BARBIE Final    Cefepime Sensitive 4 mcg/mL BARBIE Final    Gentamicin Resistant 4 mcg/mL BARBIE Final    Levofloxacin Sensitive 1 mcg/mL BARBIE Final    Meropenem Sensitive <=1 mcg/mL BARBIE Final    Pip/Tazobactam Sensitive <=8 mcg/mL BARBIE Final    Amikacin Sensitive <=16 mcg/mL BARBIE Final              Klebsiella pneumoniae (2)       Antibiotic Interpretation Microscan   Method Status    Ciprofloxacin Sensitive <=0.25 mcg/mL BARBIE Final    Cefepime Sensitive <=2 mcg/mL BARBIE Final    Gentamicin Sensitive <=2 mcg/mL BARBIE Final    Levofloxacin Sensitive <=0.5 mcg/mL BARBIE Final    Pip/Tazobactam Sensitive <=8 mcg/mL BARBIE Final    Ampicillin/sulbactam Sensitive 8/4 mcg/mL BARBIE Final    Amoxicillin/CA Sensitive <=8/4 mcg/mL BARBIE Final    Tobramycin Sensitive <=2 mcg/mL BARBIE Final    Ceftriaxone Sensitive <=1 mcg/mL BARBIE Final    Cefazolin Sensitive <=2 mcg/mL BARBIE Final     Breakpoints when Cefazolin is used for therapy of infections  other than uncomplicated UTIs due to Enterobacterales are as  follows:  BARBIE and Interpretation:  <=2 S  4 I  >=8 R         Cefuroxime Sensitive <=4 mcg/mL BARBIE Final    Nitrofurantoin Intermediate 64 mcg/mL BARBIE Final    Minocycline Sensitive <=4 mcg/mL BARBIE Final    Trimeth/Sulfa Sensitive <=0.5/9.5 mcg/mL BARBIE Final    Tigecycline Sensitive <=2 mcg/mL BARBIE Final                       BLOOD CULTURE [[id number]] Collected: 12/20/23 1792    Order Status: Completed Specimen: Blood from Peripheral Updated: 12/22/23 0736     Significant Indicator NEG     Source BLD     Site PERIPHERAL     Culture Result No Growth  Note: Blood cultures are incubated for 5 days and  are monitored continuously.Positive blood cultures  are called to the RN and reported as soon as  they are identified.      Narrative:      Per Hospital Policy: Only change Specimen Src: to \"Line\" if  specified by physician order.  Left Forearm/Arm    " "BLOOD CULTURE [[id number]] Collected: 12/21/23 0027    Order Status: Completed Specimen: Blood from Peripheral Updated: 12/22/23 0736     Significant Indicator NEG     Source BLD     Site PERIPHERAL     Culture Result No Growth  Note: Blood cultures are incubated for 5 days and  are monitored continuously.Positive blood cultures  are called to the RN and reported as soon as  they are identified.      Narrative:      Per Hospital Policy: Only change Specimen Src: to \"Line\" if  specified by physician order.  Right Hand    URINALYSIS CULTURE, IF INDICATED [[id number]]  (Abnormal) Collected: 12/21/23 0047    Order Status: Completed Specimen: Urine, Clean Catch Updated: 12/21/23 0117     Color Yellow     Character Clear     Specific Gravity 1.017     Ph 5.0     Glucose 100 mg/dL      Ketones Negative mg/dL      Protein 30 mg/dL      Bilirubin Negative     Urobilinogen, Urine 0.2     Nitrite Negative     Leukocyte Esterase Small     Occult Blood Small     Micro Urine Req Microscopic    Narrative:      Indication for culture:->Patient WITHOUT an indwelling Michaels  catheter in place with new onset of Dysuria, Frequency,  Urgency, and/or Suprapubic pain          Blood Culture Hold   Date Value Ref Range Status   08/12/2020 Collected  Final   08/12/2020 Collected  Final        Studies:  CT head neg  IMPRESSION:   Prior ESBL Ecoli UTI  Recent urethral stricture/repair  UTI-pseudomonas and Kleb  Blood cultures neg  Leukocytosis resolved  GENI-resolved    PLAN:     DC meropenem  EKG reviewed   Start ciprofloxacin 750 mg PO BID for 7 days-please confirm dose with pharmacy given age, weight, CKD    Midline NO-have oral options  FU urology    Plan of care discussed with GERMAN Guardado M.D.. Will sign off    Anabelle Anaya M.D.    "

## 2025-05-13 ENCOUNTER — ANCILLARY PROCEDURE (OUTPATIENT)
Dept: GENERAL RADIOLOGY | Facility: CLINIC | Age: OVER 89
End: 2025-05-13
Attending: FAMILY MEDICINE
Payer: COMMERCIAL

## 2025-05-13 ENCOUNTER — OFFICE VISIT (OUTPATIENT)
Dept: PEDIATRICS | Facility: CLINIC | Age: OVER 89
End: 2025-05-13
Payer: COMMERCIAL

## 2025-05-13 ENCOUNTER — OFFICE VISIT (OUTPATIENT)
Dept: URGENT CARE | Facility: URGENT CARE | Age: OVER 89
End: 2025-05-13
Payer: COMMERCIAL

## 2025-05-13 ENCOUNTER — RESULTS FOLLOW-UP (OUTPATIENT)
Dept: URGENT CARE | Facility: URGENT CARE | Age: OVER 89
End: 2025-05-13

## 2025-05-13 VITALS
OXYGEN SATURATION: 95 % | SYSTOLIC BLOOD PRESSURE: 150 MMHG | DIASTOLIC BLOOD PRESSURE: 71 MMHG | HEART RATE: 88 BPM | BODY MASS INDEX: 26.12 KG/M2 | TEMPERATURE: 98.6 F | WEIGHT: 153 LBS | HEIGHT: 64 IN | RESPIRATION RATE: 19 BRPM

## 2025-05-13 VITALS
DIASTOLIC BLOOD PRESSURE: 71 MMHG | TEMPERATURE: 98.6 F | SYSTOLIC BLOOD PRESSURE: 150 MMHG | WEIGHT: 153 LBS | HEART RATE: 88 BPM | BODY MASS INDEX: 26.26 KG/M2 | OXYGEN SATURATION: 95 % | RESPIRATION RATE: 20 BRPM

## 2025-05-13 DIAGNOSIS — R39.9 URINARY SYMPTOM OR SIGN: ICD-10-CM

## 2025-05-13 DIAGNOSIS — R82.71 ASYMPTOMATIC BACTERIURIA: ICD-10-CM

## 2025-05-13 DIAGNOSIS — N30.00 ACUTE CYSTITIS WITHOUT HEMATURIA: Primary | ICD-10-CM

## 2025-05-13 DIAGNOSIS — R06.02 SOB (SHORTNESS OF BREATH): ICD-10-CM

## 2025-05-13 DIAGNOSIS — R06.02 SHORTNESS OF BREATH: Primary | ICD-10-CM

## 2025-05-13 DIAGNOSIS — R06.02 SOB (SHORTNESS OF BREATH): Primary | ICD-10-CM

## 2025-05-13 LAB
ALBUMIN UR-MCNC: 30 MG/DL
ANION GAP SERPL CALCULATED.3IONS-SCNC: 10 MMOL/L (ref 3–14)
APPEARANCE UR: ABNORMAL
BACTERIA #/AREA URNS HPF: ABNORMAL /HPF
BASOPHILS # BLD AUTO: 0 10E3/UL (ref 0–0.2)
BASOPHILS NFR BLD AUTO: 0 %
BILIRUB UR QL STRIP: NEGATIVE
BUN SERPL-MCNC: 15 MG/DL (ref 7–30)
CALCIUM SERPL-MCNC: 9.9 MG/DL (ref 8.5–10.1)
CHLORIDE BLD-SCNC: 106 MMOL/L (ref 94–109)
CO2 SERPL-SCNC: 26 MMOL/L (ref 20–32)
COLOR UR AUTO: YELLOW
CREAT SERPL-MCNC: 0.9 MG/DL (ref 0.52–1.04)
EGFRCR SERPLBLD CKD-EPI 2021: 60 ML/MIN/1.73M2
EOSINOPHIL # BLD AUTO: 0.1 10E3/UL (ref 0–0.7)
EOSINOPHIL NFR BLD AUTO: 1 %
ERYTHROCYTE [DISTWIDTH] IN BLOOD BY AUTOMATED COUNT: 14.5 % (ref 10–15)
GLUCOSE BLD-MCNC: 141 MG/DL (ref 70–99)
GLUCOSE UR STRIP-MCNC: NEGATIVE MG/DL
HCT VFR BLD AUTO: 36.4 % (ref 35–47)
HGB BLD-MCNC: 11.5 G/DL (ref 11.7–15.7)
HGB UR QL STRIP: ABNORMAL
IMM GRANULOCYTES # BLD: 0 10E3/UL
IMM GRANULOCYTES NFR BLD: 0 %
KETONES UR STRIP-MCNC: NEGATIVE MG/DL
LEUKOCYTE ESTERASE UR QL STRIP: ABNORMAL
LYMPHOCYTES # BLD AUTO: 0.9 10E3/UL (ref 0.8–5.3)
LYMPHOCYTES NFR BLD AUTO: 17 %
MCH RBC QN AUTO: 27.9 PG (ref 26.5–33)
MCHC RBC AUTO-ENTMCNC: 31.6 G/DL (ref 31.5–36.5)
MCV RBC AUTO: 88 FL (ref 78–100)
MONOCYTES # BLD AUTO: 0.3 10E3/UL (ref 0–1.3)
MONOCYTES NFR BLD AUTO: 7 %
NEUTROPHILS # BLD AUTO: 3.9 10E3/UL (ref 1.6–8.3)
NEUTROPHILS NFR BLD AUTO: 75 %
NITRATE UR QL: POSITIVE
NT-PROBNP SERPL-MCNC: 1343 PG/ML (ref 0–624)
PH UR STRIP: 7 [PH] (ref 5–7)
PLATELET # BLD AUTO: 130 10E3/UL (ref 150–450)
POTASSIUM BLD-SCNC: 3.9 MMOL/L (ref 3.4–5.3)
RBC # BLD AUTO: 4.12 10E6/UL (ref 3.8–5.2)
RBC #/AREA URNS AUTO: ABNORMAL /HPF
SODIUM SERPL-SCNC: 142 MMOL/L (ref 135–145)
SP GR UR STRIP: 1.01 (ref 1–1.03)
SQUAMOUS #/AREA URNS AUTO: ABNORMAL /LPF
UROBILINOGEN UR STRIP-ACNC: 0.2 E.U./DL
WBC # BLD AUTO: 5.3 10E3/UL (ref 4–11)
WBC #/AREA URNS AUTO: ABNORMAL /HPF

## 2025-05-13 PROCEDURE — 71046 X-RAY EXAM CHEST 2 VIEWS: CPT | Mod: TC | Performed by: RADIOLOGY

## 2025-05-13 PROCEDURE — 93000 ELECTROCARDIOGRAM COMPLETE: CPT | Performed by: NURSE PRACTITIONER

## 2025-05-13 PROCEDURE — 80048 BASIC METABOLIC PNL TOTAL CA: CPT | Performed by: FAMILY MEDICINE

## 2025-05-13 PROCEDURE — 3078F DIAST BP <80 MM HG: CPT | Performed by: FAMILY MEDICINE

## 2025-05-13 PROCEDURE — 87186 SC STD MICRODIL/AGAR DIL: CPT | Performed by: NURSE PRACTITIONER

## 2025-05-13 PROCEDURE — 81001 URINALYSIS AUTO W/SCOPE: CPT

## 2025-05-13 PROCEDURE — 3078F DIAST BP <80 MM HG: CPT | Performed by: NURSE PRACTITIONER

## 2025-05-13 PROCEDURE — 3077F SYST BP >= 140 MM HG: CPT | Performed by: NURSE PRACTITIONER

## 2025-05-13 PROCEDURE — 99215 OFFICE O/P EST HI 40 MIN: CPT | Performed by: FAMILY MEDICINE

## 2025-05-13 PROCEDURE — 85025 COMPLETE CBC W/AUTO DIFF WBC: CPT | Performed by: FAMILY MEDICINE

## 2025-05-13 PROCEDURE — 99207 REFERRAL TO ACUTE AND DIAGNOSTIC SERVICES: CPT | Performed by: NURSE PRACTITIONER

## 2025-05-13 PROCEDURE — 83880 ASSAY OF NATRIURETIC PEPTIDE: CPT | Performed by: FAMILY MEDICINE

## 2025-05-13 PROCEDURE — 3077F SYST BP >= 140 MM HG: CPT | Performed by: FAMILY MEDICINE

## 2025-05-13 PROCEDURE — 36415 COLL VENOUS BLD VENIPUNCTURE: CPT | Performed by: FAMILY MEDICINE

## 2025-05-13 NOTE — PROGRESS NOTES
Urgent Care Clinic Visit  Rapid rooming was initiated.  Provider was consulted, patient will remain in room and provider will be with patient as soon as possible.  Chief Complaint   Patient presents with    Urgent Care    Shortness of Breath     Pt reports SOB with activity exertions, fatigue and weakness onset 5 days  Hx of Afib    Rule out Urinary Tract Infection     Pt's daughter would like to rule out UTI              5/13/2025    10:55 AM   Additional Questions   Roomed by Cinthia   Accompanied by Minerva - Daughter     Pre-Provider Visit Orders- Urinalysis UA/UC  Patient reports the following symptoms:  possible urinary tract infection (UTI)   Does the patient report any of the following symptoms: vaginal discharge, vaginal itching, possible yeast infection, has a urinary catheter in place, or unable to void in a specimen cup?  No

## 2025-05-13 NOTE — PROGRESS NOTES
"Acute and Diagnostic Services Clinic Visit    {PROVIDER CHARTING PREFERENCE:764458}    Fabiano Guillaume is a 89 year old, presenting for the following health issues:  Shortness of Breath  {(!) Visit Details have not yet been documented.  Please enter Visit Details and then use this list to pull in documentation. (Optional):181187}  HPI      Shortness of Breath/Breathing Problem  Onset/Duration: 5 days  Progression of symptoms: waxing and waning  Accompanying signs and symptoms:       SOB at rest: No       SOB with activity: YES       Pain with inspiration: No       Cough: YES- baseline chronic cough       Pink tinged sputum: No       Sweating: No       Nausea/vomiting: No       Lightheadedness: YES- intermittent, has had long term       Palpitations: No       Fever/chills: No       Heartburn: No  History   Family history of coagulation disorders: No  Tobacco use: No  Previous similar symptoms: no   Precipitating factors:  Related to eating: No  Better with burping: No  Therapies tried and outcome: None      {ROS Picklists (Optional):997221}      Objective    BP (!) 150/71   Pulse 88   Temp 98.6  F (37  C)   Resp 19   Ht 1.626 m (5' 4\")   Wt 69.4 kg (153 lb)   SpO2 95%   BMI 26.26 kg/m    Body mass index is 26.26 kg/m .  Physical Exam   {Exam List (Optional):916260}    {Diagnostic Test Results (Optional):236717}        Signed Electronically by: Valentino Bean MD  {Email feedback regarding this note to [email or web address]   :[id number]}  "

## 2025-05-13 NOTE — PATIENT INSTRUCTIONS
Increase lasix/furosemide dose to TWICE daily for the next week or so      Set up a follow-up appointment to see your Doctor in the next week      If you become lightheaded/dizzy please seek help right away      If symptoms worsen with cough, new fever, labored breathing, chest discomfort -- call for help immediately

## 2025-05-13 NOTE — PROGRESS NOTES
Assessment & Plan     SOB (shortness of breath)  - Basic metabolic panel  - NT-proBNP  - CBC with platelets differential  - XR Chest 2 Views  - Basic metabolic panel  - NT-proBNP  - CBC with platelets differential       Patient presents today with approximate 5 days of shortness of breath and increased lower leg swelling chest x-ray was not concerning for significant overload of fluid her renal function today remains intact from her baseline.  No prior value available for comparison but BNP did return mildly elevated    Per chart review patient has been prescribed Lasix for venous stasis at 20 mg daily with his current presentation we will bump up her dose to 20 mg twice daily for the next 5 days or so.  She is to follow-up with her doctors office in the next week.    Blood work/exam and history does not suggest bacterial infection    Low suspicion for pulmonary embolism    Asymptomatic Bacteriuria  The urine test was collected by the urgent care but patient confirms she has not had any dysuria, hematuria, flank pain, increased urinary frequency from her baseline.  Discussed if symptoms develop to reach out to us we can consider prescribing antibiotic.    50 minutes spent on the date of the encounter doing chart review, history and exam, provider to provider consultation, lab/imaging review, developing treatment plan, patient education, documentation, and further activities per the note.         Valentino Bean MD   [facility]    Fabiano Guillaume is a 89 year old female who presents to clinic today for the following health issues:  Chief Complaint   Patient presents with    Shortness of Breath     HPI    Patient comes in as a referral from urgent care for evaluation of shortness of breath which has been more apparent over the last 5 days her daughter reports that typically she walks to get her own meals but has been relying on a wheelchair to get around.  She does have a chronic cough but no  worsening of this presentation she has not had any fevers.    Patient does not appear lethargic there is no vomiting or diarrhea.  Has a history of tricuspid regurgitation although no formal diagnosis of heart failure.  She does follow with cardiology as she has been evaluated previously for venous stasis.  She is adherent to compression stocking use.    Patient is accompanied by her daughter today.     Patient Active Problem List    Diagnosis Date Noted    Essential hypertension 10/22/2019     Priority: Medium    Nausea 10/22/2019     Priority: Medium    Acute pain of right knee 10/17/2019     Priority: Medium    Effusion of right knee 10/17/2019     Priority: Medium    Status post total replacement of right hip, 9 Oct 10/17/2019     Priority: Medium    Acute post-operative pain 10/17/2019     Priority: Medium    Uncontrolled hypertension 10/17/2019     Priority: Medium    Anemia due to blood loss, acute 10/17/2019     Priority: Medium    Intractable vomiting with nausea, unspecified vomiting type 10/17/2019     Priority: Medium    Loose stools 10/17/2019     Priority: Medium    Debility 10/17/2019     Priority: Medium    Primary osteoarthritis involving multiple joints 10/13/2019     Priority: Medium    S/P total hip arthroplasty 10/09/2019     Priority: Medium    Primary osteoarthritis of right shoulder 05/27/2019     Priority: Medium    Primary osteoarthritis of first carpometacarpal joint of left hand 05/27/2019     Priority: Medium    Elevated C-reactive protein (CRP) 05/27/2019     Priority: Medium    Type 2 diabetes mellitus with diabetic polyneuropathy, without long-term current use of insulin (H) 05/07/2018     Priority: Medium    Patient is Mosque 10/06/2017     Priority: Medium    ACP (advance care planning) 06/01/2017     Priority: Medium     Advance Care Planning 6/1/2017: Patient is Mosque.  See Health Care Directive for information on use of blood products.  Recommend Code  Status in chart and patient's Advance Care Planning documents be reviewed to ensure alignment with patient's current wishes.  Most recent Advance Care Planning document is a Health Care Directive dated 11/20/16.  Dunia Garcia   Advance Care Planning Liaison      Chronic anticoagulation 02/19/2017     Priority: Medium    Paroxysmal atrial fibrillation (H) 02/19/2017     Priority: Medium    Moderate tricuspid regurgitation 02/19/2017     Priority: Medium    Venous (peripheral) insufficiency 01/07/2016     Priority: Medium    Gout 03/21/2012     Priority: Medium    CARDIOVASCULAR SCREENING; LDL GOAL LESS THAN 130 09/20/2011     Priority: Medium    Hypertension goal BP (blood pressure) < 140/90      Priority: Medium    Procedure and treatment not carried out because of patient's decision for reasons of belief and group pressure 03/03/2010     Priority: Medium    Female stress incontinence 12/23/2008     Priority: Medium     (Problem list name updated by automated process. Provider to review and confirm.)      Dystonia 12/23/2008     Priority: Medium    Essential tremor 12/23/2008     Priority: Medium     (Problem list name updated by automated process. Provider to review and confirm.)      Disorder of bone and cartilage 07/19/2006     Priority: Medium     Problem list name updated by automated process. Provider to review         Current Outpatient Medications   Medication Sig Dispense Refill    acetaminophen (TYLENOL) 500 MG tablet Take 2 tablets (1,000 mg) by mouth 3 times daily      Alfalfa 650 MG TABS Take 2 capsules by mouth 2 times daily For arthritis pain      alpha-lipoic acid 600 MG capsule Take 600 mg by mouth daily      apixaban ANTICOAGULANT (ELIQUIS ANTICOAGULANT) 5 MG tablet Take 1 tablet (5 mg) by mouth 2 times daily. 180 tablet 1    calcium carbonate 750 MG CHEW Take 750 mg by mouth 2 times daily       carvedilol (COREG) 25 MG tablet TAKE 1 TABLET (25 MG) BY MOUTH 2 TIMES DAILY WITH MEALS 180 tablet 2     "cyanocobalamin (VITAMIN B-12) 1000 MCG tablet Take 1,000 mcg by mouth daily      furosemide (LASIX) 20 MG tablet TAKE ONE TABLET BY MOUTH ONE TIME DAILY 90 tablet 1    lisinopril (ZESTRIL) 40 MG tablet Take 1 tablet (40 mg) by mouth daily. 90 tablet 1    metFORMIN (GLUCOPHAGE) 500 MG tablet Take 1 tablet (500 mg) by mouth daily (with dinner). 90 tablet 1    nystatin (MYCOSTATIN) 894297 UNIT/GM external ointment Apply topically 3 times daily To vaginal and groin area. 30 g 11    potassium chloride nikhil ER (KLOR-CON M10) 10 MEQ CR tablet TAKE ONE TABLET BY MOUTH TWICE DAILY 180 tablet 2    Pyridoxine HCl (VITAMIN B6) 100 MG TABS Take 1 tablet by mouth daily      triamcinolone (ARISTOCORT HP) 0.5 % external cream Apply topically 2 times daily to affected area as directed. 15 g 0    VITAMIN D 1000 UNIT OR TABS Take 2,000 Units by mouth at bedtime 30 0    FLAXSEED (LINSEED) PO POWD Take 1 Tablespoonful by mouth every morning  (Patient not taking: Reported on 5/13/2025)       No current facility-administered medications for this visit.           Objective    BP (!) 150/71   Pulse 88   Temp 98.6  F (37  C)   Resp 19   Ht 1.626 m (5' 4\")   Wt 69.4 kg (153 lb)   SpO2 95%   BMI 26.26 kg/m    Physical Exam     As noted above and including:   GEN: Non-lethargic, no acute distress.   Compression stockings present.  Right lower extremity swelling more apparent than the left side.   Nonlabored work of breathing  No obvious crackles or wheezes  Results for orders placed or performed in visit on 05/13/25   XR Chest 2 Views     Status: None    Narrative    EXAM: XR CHEST 2 VIEWS  LOCATION: [facility]  DATE: 5/13/2025    INDICATION: SOB low suspicion infectious. HF presentation?  COMPARISON: X-ray 12/1/2014      Impression    IMPRESSION: Trace bilateral pleural effusions with adjacent opacities likely reflecting atelectasis, left greater than right. Mild cardiomegaly. No overt vascular congestion or " pulmonary edema to suggest CHF. Spinal degenerative changes.   Results for orders placed or performed in visit on 05/13/25   Basic metabolic panel     Status: Abnormal   Result Value Ref Range    Sodium 142 135 - 145 mmol/L    Potassium 3.9 3.4 - 5.3 mmol/L    Chloride 106 94 - 109 mmol/L    Carbon Dioxide (CO2) 26 20 - 32 mmol/L    Anion Gap 10 3 - 14 mmol/L    Urea Nitrogen 15 7 - 30 mg/dL    Creatinine 0.90 0.52 - 1.04 mg/dL    GFR Estimate 60 (L) >60 mL/min/1.73m2    Calcium 9.9 8.5 - 10.1 mg/dL    Glucose 141 (H) 70 - 99 mg/dL   NT-proBNP     Status: Abnormal   Result Value Ref Range    NT-proBNP 1,343 (H) 0 - 624 pg/mL   CBC with platelets and differential     Status: Abnormal   Result Value Ref Range    WBC Count 5.3 4.0 - 11.0 10e3/uL    RBC Count 4.12 3.80 - 5.20 10e6/uL    Hemoglobin 11.5 (L) 11.7 - 15.7 g/dL    Hematocrit 36.4 35.0 - 47.0 %    MCV 88 78 - 100 fL    MCH 27.9 26.5 - 33.0 pg    MCHC 31.6 31.5 - 36.5 g/dL    RDW 14.5 10.0 - 15.0 %    Platelet Count 130 (L) 150 - 450 10e3/uL    % Neutrophils 75 %    % Lymphocytes 17 %    % Monocytes 7 %    % Eosinophils 1 %    % Basophils 0 %    % Immature Granulocytes 0 %    Absolute Neutrophils 3.9 1.6 - 8.3 10e3/uL    Absolute Lymphocytes 0.9 0.8 - 5.3 10e3/uL    Absolute Monocytes 0.3 0.0 - 1.3 10e3/uL    Absolute Eosinophils 0.1 0.0 - 0.7 10e3/uL    Absolute Basophils 0.0 0.0 - 0.2 10e3/uL    Absolute Immature Granulocytes 0.0 <=0.4 10e3/uL   CBC with platelets differential     Status: Abnormal    Narrative    The following orders were created for panel order CBC with platelets differential.  Procedure                               Abnormality         Status                     ---------                               -----------         ------                     CBC with platelets and ...[[id number]]  Abnormal            Final result                 Please view results for these tests on the individual orders.   Results for orders placed or performed in  visit on 05/13/25   UA Macroscopic with reflex to Microscopic and Culture - Clinic Collect     Status: Abnormal    Specimen: Urine, Clean Catch   Result Value Ref Range    Color Urine Yellow Colorless, Straw, Light Yellow, Yellow    Appearance Urine Cloudy (A) Clear    Glucose Urine Negative Negative mg/dL    Bilirubin Urine Negative Negative    Ketones Urine Negative Negative mg/dL    Specific Gravity Urine 1.015 1.003 - 1.035    Blood Urine Trace (A) Negative    pH Urine 7.0 5.0 - 7.0    Protein Albumin Urine 30 (A) Negative mg/dL    Urobilinogen Urine 0.2 0.2, 1.0 E.U./dL    Nitrite Urine Positive (A) Negative    Leukocyte Esterase Urine Small (A) Negative   Urine Microscopic Exam     Status: Abnormal   Result Value Ref Range    Bacteria Urine Many (A) None Seen /HPF    RBC Urine 5-10 (A) 0-2 /HPF /HPF    WBC Urine 25-50 (A) 0-5 /HPF /HPF    Squamous Epithelials Urine Few (A) None Seen /LPF                     The use of Dragon/[employer] dictation services may have been used to construct the content in this note; any grammatical or spelling errors are non-intentional. Please contact the author of this note directly if you are in need of any clarification.

## 2025-05-13 NOTE — PROGRESS NOTES
Chief Complaint   Patient presents with    Urgent Care    Shortness of Breath     Pt reports SOB with activity exertions, fatigue and weakness onset 5 days  Hx of Afib    Rule out Urinary Tract Infection     Pt's daughter would like to rule out UTI         ICD-10-CM    1. Shortness of breath  R06.02 EKG 12-lead complete w/read - Clinics      2. Urinary symptom or sign  R39.9 UA Macroscopic with reflex to Microscopic and Culture - Clinic Collect     Urine Microscopic Exam     Urine Culture      3. Asymptomatic bacteriuria  R82.71       Patient is referred to the [facility] acute and diagnostic services.  Spoke with Dr. Valentino Bean and he will continue evaluation and treatment.    Red flag warning signs and when to go to the emergency room discussed.  Reviewed potential adverse reactions to medications.    Results for orders placed or performed in visit on 05/13/25 (from the past 24 hours)   UA Macroscopic with reflex to Microscopic and Culture - Clinic Collect    Specimen: Urine, Clean Catch   Result Value Ref Range    Color Urine Yellow Colorless, Straw, Light Yellow, Yellow    Appearance Urine Cloudy (A) Clear    Glucose Urine Negative Negative mg/dL    Bilirubin Urine Negative Negative    Ketones Urine Negative Negative mg/dL    Specific Gravity Urine 1.015 1.003 - 1.035    Blood Urine Trace (A) Negative    pH Urine 7.0 5.0 - 7.0    Protein Albumin Urine 30 (A) Negative mg/dL    Urobilinogen Urine 0.2 0.2, 1.0 E.U./dL    Nitrite Urine Positive (A) Negative    Leukocyte Esterase Urine Small (A) Negative   Urine Microscopic Exam   Result Value Ref Range    Bacteria Urine Many (A) None Seen /HPF    RBC Urine 5-10 (A) 0-2 /HPF /HPF    WBC Urine 25-50 (A) 0-5 /HPF /HPF    Squamous Epithelials Urine Few (A) None Seen /LPF       Subjective     Aundrea Treviño is an 89 year old female who presents to clinic today for shortness of breath, fatigue, weakness for the last 5 days.  Seems to be getting more fatigued and weak per  daughter.    ROS: 10 point ROS neg other than the symptoms noted above in the HPI.       Objective    BP (!) 150/71   Pulse 88   Temp 98.6  F (37  C) (Oral)   Resp 20   Wt 69.4 kg (153 lb)   SpO2 95%   Breastfeeding No   BMI 26.26 kg/m    Nurses notes and VS have been reviewed.    Physical Exam       GENERAL APPEARANCE: healthy appearing, alert     HENT: ear canals and TM's normal christening     NECK: no adenopathy or asymmetry, thyroid normal to palpation     RESP: Bibasilar crackles are present, no increased work of breathing noted     CV: regular rates and rhythm, no murmurs, rubs, or gallop     ABDOMEN:  soft, nontender, no HSM or masses and bowel sounds normal     MS: extremities normal- no gross deformities noted; normal muscle tone.     SKIN: no suspicious lesions or rashes     NEURO: Short-term memory problems, alert, moves all extremities, appropriate strength for age      TIMOTEO Williamson, CNP  [facility] Provider    The use of Dragon/[employer] dictation services may have been used to construct the content in this note; any grammatical or spelling errors are non-intentional. Please contact the author of this note directly if you are in need of any clarification.

## 2025-05-15 LAB
BACTERIA UR CULT: ABNORMAL
BACTERIA UR CULT: ABNORMAL

## 2025-05-16 RX ORDER — CEPHALEXIN 500 MG/1
500 CAPSULE ORAL 2 TIMES DAILY
Qty: 14 CAPSULE | Refills: 0 | Status: SHIPPED | OUTPATIENT
Start: 2025-05-16 | End: 2025-05-23

## 2025-05-28 ENCOUNTER — OFFICE VISIT (OUTPATIENT)
Dept: ORTHOPEDICS | Facility: CLINIC | Age: OVER 89
End: 2025-05-28
Payer: COMMERCIAL

## 2025-05-28 DIAGNOSIS — M17.11 PRIMARY OSTEOARTHRITIS OF RIGHT KNEE: Primary | ICD-10-CM

## 2025-05-28 PROCEDURE — 20610 DRAIN/INJ JOINT/BURSA W/O US: CPT | Mod: RT | Performed by: FAMILY MEDICINE

## 2025-05-28 RX ORDER — LIDOCAINE HYDROCHLORIDE 10 MG/ML
4 INJECTION, SOLUTION INFILTRATION; PERINEURAL
Status: COMPLETED | OUTPATIENT
Start: 2025-05-28 | End: 2025-05-28

## 2025-05-28 RX ORDER — TRIAMCINOLONE ACETONIDE 40 MG/ML
40 INJECTION, SUSPENSION INTRA-ARTICULAR; INTRAMUSCULAR
Status: COMPLETED | OUTPATIENT
Start: 2025-05-28 | End: 2025-05-28

## 2025-05-28 RX ADMIN — LIDOCAINE HYDROCHLORIDE 4 ML: 10 INJECTION, SOLUTION INFILTRATION; PERINEURAL at 14:13

## 2025-05-28 RX ADMIN — TRIAMCINOLONE ACETONIDE 40 MG: 40 INJECTION, SUSPENSION INTRA-ARTICULAR; INTRAMUSCULAR at 14:13

## 2025-05-28 NOTE — LETTER
5/28/2025      Aundrea Treviño  [address]  [place] [address]      Dear Colleague,    Thank you for referring your patient, Aundrea Treviño, to the [facility] [place]. Please see a copy of my visit note below.    ESTABLISHED PATIENT FOLLOW-UP:  Right knee pain    HISTORY OF PRESENT ILLNESS  Ms. Treviño is a pleasant 89 year old year old female who presents to clinic today for follow-up of right knee pain.     Date of injury/onset: Chronic  Date last seen: 4/2/2025  Following Therapeutic Plan: Gel Injection  Pain: Continued, not worsened  Function: Limited walking, wheelchair/walker  Interval History: Patient had gel injection 6 weeks ago and did not appreciate any relief. She has been walking minimally and using wheelchair for longer distances of transportation.      Last treatments:  4/2/25 - Synvisc One injection - No relief  10/23/24 - Corticosteroid Injection  - Relief  5/16/24 - Synvisc One injection  4/11/24 - Corticosteroid injection    Additional medical/Social/Surgical histories reviewed in [facility] and updated as appropriate.    REVIEW OF SYSTEMS (5/28/2025)  CONSTITUTIONAL: Denies fever and weight loss  GASTROINTESTINAL: Denies abdominal pain, nausea, vomiting  MUSCULOSKELETAL: See HPI  SKIN: Denies any recent rash or lesion  NEUROLOGICAL: Denies numbness or focal weakness     PHYSICAL EXAM  There were no vitals taken for this visit.    General  - normal appearance, in no obvious distress  Musculoskeletal - left knee  - stance: wheel chair mobile today  - inspection: trace effusion  - palpation: medial joint line tenderness  - ROM: 110 degrees flexion, 0 degrees extension, painful active ROM  - strength: 5/5 in flexion, 5/5 in extension  - special tests:  (-) Tony  (-) varus at 0 and 30 degrees flexion  (-) valgus at 0 and 30 degrees flexion  Neuro  - no sensory or motor deficit, grossly normal coordination, normal muscle tone      ASSESSMENT & PLAN  Ms. Treviño  is a 89 year old year old female who presents to clinic today for follow up of chronic right knee pain secondary to known osteoarthritis of right knee.    Prior hyaluronic acid injections have afforded little relief performed 2 months ago. I have recommended that we stop utilizing HA injections in the future.  Corticosteroid injections shoulder pain which has also been persisting despite previous corticosteroid injections.    If current injection performed today provides at least 2 months to 3 months of relief, we can then consider repeating this next around the month of September. Otherwise, if no benefit will refer to Dr. Quintero or pain management team for consideration of RFA or genicular nerve block.    PROCEDURE    Right Knee Injection - Intraarticular  The patient was informed of the risks and the benefits of the procedure and a written consent was signed.  The patient s right knee was prepped with chlorhexidine in sterile fashion.   40 mg of triamcinolone suspension was drawn up into a 5 mL syringe with 4 mL of 1% lidocaine.  Injection was performed using substerile technique.  A 1.5-inch 22-gauge needle was used to enter the lateral aspect of the right knee.  Injection performed successfully without difficulty.  There were no complications. The patient tolerated the procedure well. There was negligible bleeding.   The patient was instructed to ice the knee upon leaving clinic and refrain from overuse over the next 3 days.   The patient was instructed to call or go to the emergency room with any unusual pain, swelling, redness, or if otherwise concerned.  A follow up appointment will be scheduled to evaluate response to the injection, and to assess range of motion and pain.      It was a pleasure seeing Aundrea.    Lucian Ambrosio DO, CAQSM  Primary Care Sports Medicine      Large Joint Injection/Arthocentesis: R knee joint    Date/Time: 5/28/2025 2:13 PM    Performed by: Lucian Ambrosio DO  Authorized by: Hebert  Lucian GAO DO    Indications:  Pain and osteoarthritis  Needle Size comment:  23 G   Guidance: landmark guided    Approach:  Anterolateral  Location:  Knee      Medications:  40 mg triamcinolone 40 MG/ML; 4 mL lidocaine 1 %  Outcome:  Tolerated well, no immediate complications  Procedure discussed: discussed risks, benefits, and alternatives    Consent Given by:  Patient  Timeout: timeout called immediately prior to procedure    Prep: patient was prepped and draped in usual sterile fashion           Again, thank you for allowing me to participate in the care of your patient.        Sincerely,        Lucian Ambrosio DO    Electronically signed

## 2025-05-28 NOTE — PROGRESS NOTES
ESTABLISHED PATIENT FOLLOW-UP:  Right knee pain    HISTORY OF PRESENT ILLNESS  Ms. Treviño is a pleasant 89 year old year old female who presents to clinic today for follow-up of right knee pain.     Date of injury/onset: Chronic  Date last seen: 4/2/2025  Following Therapeutic Plan: Gel Injection  Pain: Continued, not worsened  Function: Limited walking, wheelchair/walker  Interval History: Patient had gel injection 6 weeks ago and did not appreciate any relief. She has been walking minimally and using wheelchair for longer distances of transportation.      Last treatments:  4/2/25 - Synvisc One injection - No relief  10/23/24 - Corticosteroid Injection  - Relief  5/16/24 - Synvisc One injection  4/11/24 - Corticosteroid injection    Additional medical/Social/Surgical histories reviewed in [facility] and updated as appropriate.    REVIEW OF SYSTEMS (5/28/2025)  CONSTITUTIONAL: Denies fever and weight loss  GASTROINTESTINAL: Denies abdominal pain, nausea, vomiting  MUSCULOSKELETAL: See HPI  SKIN: Denies any recent rash or lesion  NEUROLOGICAL: Denies numbness or focal weakness     PHYSICAL EXAM  There were no vitals taken for this visit.    General  - normal appearance, in no obvious distress  Musculoskeletal - left knee  - stance: wheel chair mobile today  - inspection: trace effusion  - palpation: medial joint line tenderness  - ROM: 110 degrees flexion, 0 degrees extension, painful active ROM  - strength: 5/5 in flexion, 5/5 in extension  - special tests:  (-) Tony  (-) varus at 0 and 30 degrees flexion  (-) valgus at 0 and 30 degrees flexion  Neuro  - no sensory or motor deficit, grossly normal coordination, normal muscle tone      ASSESSMENT & PLAN  Ms. Treviño is a 89 year old year old female who presents to clinic today for follow up of chronic right knee pain secondary to known osteoarthritis of right knee.    Prior hyaluronic acid injections have afforded little relief performed 2 months ago. I have  recommended that we stop utilizing HA injections in the future.  Corticosteroid injections shoulder pain which has also been persisting despite previous corticosteroid injections.    If current injection performed today provides at least 2 months to 3 months of relief, we can then consider repeating this next around the month of September. Otherwise, if no benefit will refer to Dr. Quintero or pain management team for consideration of RFA or genicular nerve block.    PROCEDURE    Right Knee Injection - Intraarticular  The patient was informed of the risks and the benefits of the procedure and a written consent was signed.  The patient s right knee was prepped with chlorhexidine in sterile fashion.   40 mg of triamcinolone suspension was drawn up into a 5 mL syringe with 4 mL of 1% lidocaine.  Injection was performed using substerile technique.  A 1.5-inch 22-gauge needle was used to enter the lateral aspect of the right knee.  Injection performed successfully without difficulty.  There were no complications. The patient tolerated the procedure well. There was negligible bleeding.   The patient was instructed to ice the knee upon leaving clinic and refrain from overuse over the next 3 days.   The patient was instructed to call or go to the emergency room with any unusual pain, swelling, redness, or if otherwise concerned.  A follow up appointment will be scheduled to evaluate response to the injection, and to assess range of motion and pain.      It was a pleasure seeing Aundrea.    Lucian Ambrosio DO, [facility]  Primary Care Sports Medicine      Large Joint Injection/Arthocentesis: R knee joint    Date/Time: 5/28/2025 2:13 PM    Performed by: Lucian Ambrosio DO  Authorized by: Lucian Ambrosio DO    Indications:  Pain and osteoarthritis  Needle Size comment:  23 G   Guidance: landmark guided    Approach:  Anterolateral  Location:  Knee      Medications:  40 mg triamcinolone 40 MG/ML; 4 mL lidocaine 1 %  Outcome:  Tolerated  well, no immediate complications  Procedure discussed: discussed risks, benefits, and alternatives    Consent Given by:  Patient  Timeout: timeout called immediately prior to procedure    Prep: patient was prepped and draped in usual sterile fashion

## 2025-06-01 DIAGNOSIS — E11.42 TYPE 2 DIABETES MELLITUS WITH DIABETIC POLYNEUROPATHY, WITHOUT LONG-TERM CURRENT USE OF INSULIN (H): ICD-10-CM

## 2025-06-12 DIAGNOSIS — I48.0 PAROXYSMAL ATRIAL FIBRILLATION (H): ICD-10-CM

## 2025-06-12 RX ORDER — APIXABAN 5 MG/1
5 TABLET, FILM COATED ORAL 2 TIMES DAILY
Qty: 180 TABLET | Refills: 0 | OUTPATIENT
Start: 2025-06-12

## 2025-06-12 NOTE — TELEPHONE ENCOUNTER
RN please find out if she has transferred care to a provider on site at her new apartment. If so, I would recommend getting further refills from that person. Otherwise please ask her for an alternative pharmacy other than [facility]. Krysten Molina M.D.

## 2025-06-12 NOTE — TELEPHONE ENCOUNTER
Called patient to clarify whether or not she has transferred care to on-site clinician. Patient thinks she likely has, but is unsure and asks that we call and check with her daughter, Minerva (CTC on file). Called Minerva, no answer. Left message for patient to call back and ask to speak with an available triage nurse.    BENITEZ Rivas, BSN, PHN, AMB-BC (she/her)  [facility] RN

## 2025-06-12 NOTE — TELEPHONE ENCOUNTER
Patient Returning Call    Reason for call:  [facility] called back.  Daughter reported patient has an onsite provider and pharmacy.  But daughter would like to stay with [facility] pharmacy.      Information relayed to patient:       RN please find out if she has transferred care to a provider on site at her new apartment. If so, I would recommend getting further refills from that person. Otherwise please ask her for an alternative pharmacy other than [facility]. Krysten Molina M.D.         Patient has additional questions:  No  Patient does not need the Eliquis for now.  Just picked it up from [facility] not long ago and still has approximately 3 more weeks.  Will reach out to [facility] further when needed or transfer script to another pharmacy in the interim.      No further action needed.       Isaac Alexander RN   [facility]

## 2025-06-25 ENCOUNTER — TRANSFERRED RECORDS (OUTPATIENT)
Dept: HEALTH INFORMATION MANAGEMENT | Facility: CLINIC | Age: OVER 89
End: 2025-06-25
Payer: COMMERCIAL

## 2025-06-25 DIAGNOSIS — I48.0 PAROXYSMAL ATRIAL FIBRILLATION (H): ICD-10-CM

## 2025-06-25 RX ORDER — APIXABAN 5 MG/1
5 TABLET, FILM COATED ORAL 2 TIMES DAILY
Qty: 180 TABLET | Refills: 0 | OUTPATIENT
Start: 2025-06-25

## 2025-07-07 ENCOUNTER — TELEPHONE (OUTPATIENT)
Dept: ORTHOPEDICS | Facility: CLINIC | Age: OVER 89
End: 2025-07-07
Payer: COMMERCIAL

## 2025-07-07 NOTE — TELEPHONE ENCOUNTER
Other: Patients daughter is calling to get pain clinic doctor name and have order sent for nerve block for patients knee     Could we send this information to you [email or web address] or would you prefer to receive a phone call?:   Patient would prefer a phone call   Okay to leave a detailed message?: Yes at Other phone number:  [phone]

## 2025-07-10 DIAGNOSIS — M17.11 PRIMARY OSTEOARTHRITIS OF RIGHT KNEE: Primary | ICD-10-CM

## 2025-07-10 NOTE — TELEPHONE ENCOUNTER
Upon phone call to the patient's daughter, consent to communicate does exist on file.  Left a detailed message with next steps listed below, for patient to get the genicular nerve block and potentially RFA procedure done by her pain management department.  I let her know that the scheduling team should be reaching out to her to help with this.  After the details below are given, call was ended by giving our callback number for the sports medicine department.    Pain Management [phone]     Markos: Dr. Atkins, Dr. Klaus Hawkins: Dr. Delcid

## 2025-07-14 ENCOUNTER — PATIENT OUTREACH (OUTPATIENT)
Dept: CARE COORDINATION | Facility: CLINIC | Age: OVER 89
End: 2025-07-14
Payer: COMMERCIAL

## 2025-08-26 DIAGNOSIS — E11.42 TYPE 2 DIABETES MELLITUS WITH DIABETIC POLYNEUROPATHY, WITHOUT LONG-TERM CURRENT USE OF INSULIN (H): ICD-10-CM

## (undated) DEVICE — GLOVE PROTEXIS MICRO 7.0  2D73PM70

## (undated) DEVICE — GLOVE PROTEXIS POWDER FREE 7.0 ORTHOPEDIC 2D73ET70

## (undated) DEVICE — GLOVE PROTEXIS POWDER FREE 7.5 ORTHOPEDIC 2D73ET75

## (undated) DEVICE — PAD CHUX UNDERPAD 30X30"

## (undated) DEVICE — SUCTION TIP YANKAUER STR K87

## (undated) DEVICE — GLOVE PROTEXIS BLUE W/NEU-THERA 7.5  2D73EB75

## (undated) DEVICE — PAD PERI W/WINGS 1580A

## (undated) DEVICE — PACK CYSTO CUSTOM ASC

## (undated) DEVICE — RETR ELASTIC STAYS LONE STAR SHARP 5MM 8/PACK 3311-8G

## (undated) DEVICE — SUCTION MANIFOLD NEPTUNE 2 SYS 4 PORT 0702-020-000

## (undated) DEVICE — CATH FOLEY 18FR 5ML SILICONE LUBRI-SIL 175818

## (undated) DEVICE — ESU GROUND PAD UNIVERSAL W/O CORD

## (undated) DEVICE — DRSG STERI STRIP 1/2X4" R1547

## (undated) DEVICE — DRAPE IOBAN INCISE 23X17" 6650EZ

## (undated) DEVICE — RETR RING LONE STAR 28.3X18.3CM W/CATH CLIPSX2 3308G

## (undated) DEVICE — LINEN TOWEL PACK X5 5464

## (undated) DEVICE — IMM PILLOW ABDUCT HIP MED 31143061

## (undated) DEVICE — PAD FOAM MCGUIRE KIT 0814-0150

## (undated) DEVICE — SOL WATER IRRIG 1000ML BOTTLE 2F7114

## (undated) DEVICE — SOL WATER IRRIG 500ML BOTTLE 2F7113

## (undated) DEVICE — PANTIES MESH LG/XLG 2PK 706M2

## (undated) DEVICE — STPL SKIN SUBCUTICULAR INSORB  2030

## (undated) DEVICE — NDL 25GA 1.5" 305127

## (undated) DEVICE — GLOVE PROTEXIS W/NEU-THERA 7.5  2D73TE75

## (undated) DEVICE — BONE CLEANING TIP INTERPULSE  0210-010-000

## (undated) DEVICE — SU VICRYL 2-0 CP-1 27" J466H

## (undated) DEVICE — SOL NACL 0.9% INJ 1000ML BAG 2B1324X

## (undated) DEVICE — GLOVE PROTEXIS W/NEU-THERA 7.0  2D73TE70

## (undated) DEVICE — SU VICRYL 1 CP-1 27" J468H

## (undated) DEVICE — SYR EAR BULB 3OZ 0035830

## (undated) DEVICE — PACK TOTAL HIP W/POUCH SOP15HPFSM

## (undated) DEVICE — PREP CHLORAPREP 26ML TINTED ORANGE  260815

## (undated) DEVICE — SU VICRYL 2-0 CP-1 27" UND J266H

## (undated) DEVICE — SOL NACL 0.9% IRRIG 1000ML BOTTLE 2F7124

## (undated) DEVICE — DRSG AQUACEL AG 3.5X9.75" HYDROFIBER 412011

## (undated) DEVICE — KIT SURGICAL TURNOVER FVSD-01D

## (undated) DEVICE — DRAPE UNDER BUTTOCK 8483

## (undated) DEVICE — MANIFOLD NEPTUNE 4 PORT 700-20

## (undated) DEVICE — BLADE SAW SAGITTAL STRK 25X90X1.27MM HD SYS 6 6125-127-090

## (undated) DEVICE — TUBING SUCTION MEDI-VAC 1/4"X20' N620A

## (undated) DEVICE — SUCTION IRR SYSTEM W/O TIP INTERPULSE HANDPIECE 0210-100-000

## (undated) RX ORDER — ONDANSETRON 2 MG/ML
INJECTION INTRAMUSCULAR; INTRAVENOUS
Status: DISPENSED
Start: 2019-10-09

## (undated) RX ORDER — PROPOFOL 10 MG/ML
INJECTION, EMULSION INTRAVENOUS
Status: DISPENSED
Start: 2019-10-09

## (undated) RX ORDER — FENTANYL CITRATE 50 UG/ML
INJECTION, SOLUTION INTRAMUSCULAR; INTRAVENOUS
Status: DISPENSED
Start: 2019-07-23

## (undated) RX ORDER — HYDROMORPHONE HYDROCHLORIDE 1 MG/ML
INJECTION, SOLUTION INTRAMUSCULAR; INTRAVENOUS; SUBCUTANEOUS
Status: DISPENSED
Start: 2019-10-09

## (undated) RX ORDER — DEXAMETHASONE SODIUM PHOSPHATE 4 MG/ML
INJECTION, SOLUTION INTRA-ARTICULAR; INTRALESIONAL; INTRAMUSCULAR; INTRAVENOUS; SOFT TISSUE
Status: DISPENSED
Start: 2019-10-09

## (undated) RX ORDER — LIDOCAINE HYDROCHLORIDE 20 MG/ML
JELLY TOPICAL
Status: DISPENSED
Start: 2019-05-15

## (undated) RX ORDER — ACETAMINOPHEN 325 MG/1
TABLET ORAL
Status: DISPENSED
Start: 2019-07-23

## (undated) RX ORDER — EPINEPHRINE 1 MG/ML
INJECTION, SOLUTION, CONCENTRATE INTRAVENOUS
Status: DISPENSED
Start: 2019-07-23

## (undated) RX ORDER — LIDOCAINE HYDROCHLORIDE 20 MG/ML
INJECTION, SOLUTION EPIDURAL; INFILTRATION; INTRACAUDAL; PERINEURAL
Status: DISPENSED
Start: 2019-10-09

## (undated) RX ORDER — NEOSTIGMINE METHYLSULFATE 1 MG/ML
VIAL (ML) INJECTION
Status: DISPENSED
Start: 2019-10-09

## (undated) RX ORDER — SULFAMETHOXAZOLE/TRIMETHOPRIM 800-160 MG
TABLET ORAL
Status: DISPENSED
Start: 2019-05-08

## (undated) RX ORDER — CEFAZOLIN SODIUM 2 G/100ML
INJECTION, SOLUTION INTRAVENOUS
Status: DISPENSED
Start: 2019-10-09

## (undated) RX ORDER — BUPIVACAINE HYDROCHLORIDE 2.5 MG/ML
INJECTION, SOLUTION EPIDURAL; INFILTRATION; INTRACAUDAL
Status: DISPENSED
Start: 2019-07-23

## (undated) RX ORDER — CEFAZOLIN SODIUM 1 G/3ML
INJECTION, POWDER, FOR SOLUTION INTRAMUSCULAR; INTRAVENOUS
Status: DISPENSED
Start: 2019-07-23

## (undated) RX ORDER — LIDOCAINE HYDROCHLORIDE 20 MG/ML
JELLY TOPICAL
Status: DISPENSED
Start: 2019-07-23

## (undated) RX ORDER — FENTANYL CITRATE 50 UG/ML
INJECTION, SOLUTION INTRAMUSCULAR; INTRAVENOUS
Status: DISPENSED
Start: 2019-10-09